# Patient Record
Sex: MALE
[De-identification: names, ages, dates, MRNs, and addresses within clinical notes are randomized per-mention and may not be internally consistent; named-entity substitution may affect disease eponyms.]

---

## 2017-03-31 ENCOUNTER — HOSPITAL ENCOUNTER (INPATIENT)
Dept: HOSPITAL 42 - ED | Age: 62
LOS: 6 days | Discharge: HOME | DRG: 690 | End: 2017-04-06
Attending: INTERNAL MEDICINE | Admitting: INTERNAL MEDICINE
Payer: COMMERCIAL

## 2017-03-31 VITALS — BODY MASS INDEX: 23.6 KG/M2

## 2017-03-31 DIAGNOSIS — L02.221: ICD-10-CM

## 2017-03-31 DIAGNOSIS — B96.20: ICD-10-CM

## 2017-03-31 DIAGNOSIS — K40.90: ICD-10-CM

## 2017-03-31 DIAGNOSIS — K76.9: ICD-10-CM

## 2017-03-31 DIAGNOSIS — K76.0: ICD-10-CM

## 2017-03-31 DIAGNOSIS — B95.7: ICD-10-CM

## 2017-03-31 DIAGNOSIS — K29.70: ICD-10-CM

## 2017-03-31 DIAGNOSIS — B37.89: ICD-10-CM

## 2017-03-31 DIAGNOSIS — Z83.3: ICD-10-CM

## 2017-03-31 DIAGNOSIS — D64.9: ICD-10-CM

## 2017-03-31 DIAGNOSIS — R16.0: ICD-10-CM

## 2017-03-31 DIAGNOSIS — K57.90: ICD-10-CM

## 2017-03-31 DIAGNOSIS — K63.5: ICD-10-CM

## 2017-03-31 DIAGNOSIS — K59.00: ICD-10-CM

## 2017-03-31 DIAGNOSIS — K64.8: ICD-10-CM

## 2017-03-31 DIAGNOSIS — E03.9: ICD-10-CM

## 2017-03-31 DIAGNOSIS — J45.909: ICD-10-CM

## 2017-03-31 DIAGNOSIS — N43.3: ICD-10-CM

## 2017-03-31 DIAGNOSIS — N17.9: ICD-10-CM

## 2017-03-31 DIAGNOSIS — N50.89: ICD-10-CM

## 2017-03-31 DIAGNOSIS — N18.9: ICD-10-CM

## 2017-03-31 DIAGNOSIS — Z82.5: ICD-10-CM

## 2017-03-31 DIAGNOSIS — N99.842: ICD-10-CM

## 2017-03-31 DIAGNOSIS — Z87.440: ICD-10-CM

## 2017-03-31 DIAGNOSIS — T83.89XA: ICD-10-CM

## 2017-03-31 DIAGNOSIS — F41.9: ICD-10-CM

## 2017-03-31 DIAGNOSIS — N13.30: ICD-10-CM

## 2017-03-31 DIAGNOSIS — Z93.2: ICD-10-CM

## 2017-03-31 DIAGNOSIS — Z90.6: ICD-10-CM

## 2017-03-31 DIAGNOSIS — N40.0: ICD-10-CM

## 2017-03-31 DIAGNOSIS — K22.2: ICD-10-CM

## 2017-03-31 DIAGNOSIS — N39.0: Primary | ICD-10-CM

## 2017-03-31 DIAGNOSIS — K21.9: ICD-10-CM

## 2017-03-31 LAB
ADD MANUAL DIFF?: NO
ALBUMIN/GLOB SERPL: 0.9 {RATIO} (ref 1.1–1.8)
ALP SERPL-CCNC: 64 U/L (ref 38–133)
ALT SERPL-CCNC: 9 U/L (ref 7–56)
AMORPH SED URNS QL MICRO: (no result)
APPEARANCE UR: (no result)
AST SERPL-CCNC: 27 U/L (ref 15–59)
BACTERIA #/AREA URNS HPF: (no result) /[HPF]
BASOPHILS # BLD AUTO: 0.02 K/MM3 (ref 0–2)
BASOPHILS NFR BLD: 0.4 % (ref 0–3)
BILIRUB SERPL-MCNC: 0.3 MG/DL (ref 0.2–1.3)
BILIRUB UR-MCNC: NEGATIVE MG/DL
BUN SERPL-MCNC: 16 MG/DL (ref 7–21)
CALCIUM SERPL-MCNC: 9 MG/DL (ref 8.4–10.5)
CHLORIDE SERPL-SCNC: 103 MMOL/L (ref 98–107)
CO2 SERPL-SCNC: 29 MMOL/L (ref 21–33)
COLOR UR: YELLOW
EOSINOPHIL # BLD: 0.3 10*3/UL (ref 0–0.7)
EOSINOPHIL NFR BLD: 5.6 % (ref 1.5–5)
EPI CELLS #/AREA URNS HPF: (no result) /HPF (ref 0–5)
ERYTHROCYTE [DISTWIDTH] IN BLOOD BY AUTOMATED COUNT: 14.8 % (ref 11.5–14.5)
GLOBULIN SER-MCNC: 4.4 GM/DL
GLUCOSE SERPL-MCNC: 128 MG/DL (ref 70–110)
GLUCOSE UR STRIP-MCNC: NEGATIVE MG/DL
GRANULOCYTES # BLD: 2.9 10*3/UL (ref 1.4–6.5)
GRANULOCYTES NFR BLD: 57.9 % (ref 50–68)
HCT VFR BLD CALC: 32.6 % (ref 42–52)
KETONES UR STRIP-MCNC: NEGATIVE MG/DL
LEUKOCYTE ESTERASE UR-ACNC: (no result) LEU/UL
LIPASE SERPL-CCNC: 96 U/L (ref 23–300)
LYMPHOCYTES # BLD: 1.5 10*3/UL (ref 1.2–3.4)
LYMPHOCYTES NFR BLD AUTO: 30.1 % (ref 22–35)
MCH RBC QN AUTO: 24.4 PG (ref 25–35)
MCHC RBC AUTO-ENTMCNC: 31.3 G/DL (ref 31–37)
MCV RBC AUTO: 78 FL (ref 80–105)
MONOCYTES # BLD AUTO: 0.3 10*3/UL (ref 0.1–0.6)
MONOCYTES NFR BLD: 6 % (ref 1–6)
PH UR STRIP: 6 [PH] (ref 4.7–8)
PLATELET # BLD: 259 10^3/UL (ref 120–450)
PMV BLD AUTO: 8.5 FL (ref 7–11)
POTASSIUM SERPL-SCNC: 3.7 MMOL/L (ref 3.6–5)
PROT SERPL-MCNC: 8.3 G/DL (ref 5.8–8.3)
PROT UR STRIP-MCNC: 100 MG/DL
RBC # UR STRIP: (no result) /UL
RBC #/AREA URNS HPF: (no result) /HPF (ref 0–2)
SODIUM SERPL-SCNC: 141 MMOL/L (ref 132–148)
SP GR UR STRIP: 1.02 (ref 1–1.03)
UROBILINOGEN UR STRIP-ACNC: 0.2 E.U./DL
WBC # BLD AUTO: 5 10^3/UL (ref 4.5–11)
WBC #/AREA URNS HPF: (no result) /HPF (ref 0–6)

## 2017-03-31 NOTE — ED PDOC
Arrival/HPI





- General


Historian: Patient





- General


Chief Complaint: Male Genitourinary


Time Seen by Provider: 03/31/17 17:28





- History of Present Illness


Narrative History of Present Illness (Text): 





03/31/17 21:57


61-year-old male presents today with diffuse abdominal pain worsening over the 

past week. He is complaining of urinary frequency and burning sensation around 

the urostomy site. Patient also with a one-month history of left testicular 

swelling. Patient states he has been taking pain medications at home without 

improvement in his symptoms. Denies chest pain or shortness of breath. Denies 

back pain. Denies fevers but is complaining of chills. No other complaints  (

Azoia,Carin T)








Past Medical History





- Provider Review


Nursing Documentation Reviewed: Yes





- Travel History


Have you recently traveled outside [place] w/in the past 3 mons?: No





- Infectious Disease


Hx of Infectious Diseases: None





- Tetanus Immunization


Tetanus Immunization: Unknown





- Cardiac


Hx Pacemaker: No





- Pulmonary


Hx Respiratory Disorders: Yes


Hx Asthma: Yes





- Neurological


Hx Paralysis: No





- HEENT


Hx HEENT Disorder: No





- Renal


Hx Renal Disorder: Yes (Interstitial Cystitis)


Other/Comment: urinary bladder removed, has urostomy





- Endocrine/Metabolic


Hx Endocrine Disorders: Yes


Hx Hypothyroidism: Yes





- Hematological/Oncological


Hx Blood Transfusions: No


Hx Blood Transfusion Reaction: No





- Integumentary


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


Other/Comment: boil on abd above urostomy bright red with foul smelling 

drainage with pain to right hip, groin, r lower abd





- Musculoskeletal/Rheumatological


Hx Musculoskeletal Disorders: No





- Gastrointestinal


Hx Gastrointestinal Disorders: Yes (esophageal stricture,HERNIORHAPPHY 4 X)


Hx Gastroesophageal Reflux: Yes


Hx Liver Failure: Yes (HEPATOMEGALY)


HX Swallowing Problems: Yes


Other/Comment: constipation





- Genitourinary/Gynecological


Hx Genitourinary Disorders: Yes (interstital cystitis,  urostomy 2009)


Hx Hematuria: Yes


Hx Prostate Problems: Yes (LASER SX-DYSURIA, enlarged 2004)


Hx Urinary Tract Infection: Yes





- Psychiatric


Hx Emotional Abuse: No


Hx Physical Abuse: No


Hx Substance Use: No





- Surgical History


Other/Comment: bladder removal, post stoma interstitial hernias multiple hernias

, has right inguinal hernia presently





- Anesthesia


Hx Anesthesia Reactions: Yes (N/V X 1 PROCEDURE)


Hx Malignant Hyperthermia: No





- Suicidal Assessment


Feels Threatened In Home Enviroment: No





Family/Social History





- Physician Review


Nursing Documentation Reviewed: Yes


Family/Social History: Unknown Family HX


Smoking Status: Never Smoked


Hx Alcohol Use: No


Hx Substance Use: No


Hx Substance Use Treatment: No





Allergies/Home Meds


Allergies/Adverse Reactions: 


Allergies





No Known Allergies Allergy (Verified 03/02/17 17:17)


 








Home Medications: 


 Home Meds











 Medication  Instructions  Recorded  Confirmed


 


Levothyroxine [Synthroid] 100 mcg PO DAILY 12/15/16 03/06/17


 


Albuterol HFA [Ventolin HFA 90 1 puff IH PRN PRN 03/03/17 03/06/17





mcg/actuation (8 g)]   


 


traMADol [Ultram] 50 mg PO BID 03/03/17 03/06/17














Review of Systems





- Review of Systems


Constitutional: Other (chills).  absent: Fatigue


Respiratory: absent: SOB, Cough


Cardiovascular: absent: Chest Pain, Palpitations


Gastrointestinal: Abdominal Pain, Nausea.  absent: Constipation, Diarrhea, 

Vomiting


Genitourinary Male: Frequency.  absent: Hematuria


Musculoskeletal: absent: Back Pain, Neck Pain


Skin: absent: Rash, Pruritis


Neurological: absent: Headache





Physical Exam


Vital Signs Reviewed: Yes


Temperature: Afebrile


Blood Pressure: Normal


Pulse: Regular


Respiratory Rate: Normal


Appearance: Positive for: Well-Appearing, Non-Toxic, Comfortable


Pain Distress: None


Mental Status: Positive for: Alert and Oriented X 3





- Systems Exam


Head: Present: Atraumatic


Mouth: Present: Moist Mucous Membranes


Respiratory/Chest: Present: Clear to Auscultation


Cardiovascular: Present: Regular Rate and Rhythm, Normal S1, S2.  No: Murmurs


Abdomen: Present: Tenderness (+ diffuse abdominal tenderness, greatest in the 

right lower quadrant. ), Normal Bowel Sounds, Guarding (voluntary), Ostomy 

Tubes (urostomy; without surrounding erythema or edema).  No: Distention, 

Peritoneal Signs, Rebound


Genitourinary Male: Present: Circumcised Penis, Other (left sided scrotal 

swelling; + minimal tenderness. no erythema; ).  No: Normal External Genitalia


Back: Present: Normal Inspection


Upper Extremity: Present: Normal Inspection, Normal ROM


Lower Extremity: Present: Normal Inspection


Skin: Present: Warm, Dry, Normal Color.  No: Rashes


Psychiatric: Present: Alert, Oriented x 3





Vital Signs











  Temp Pulse Resp BP Pulse Ox


 


 04/01/17 01:13   53 L  16  105/72  99


 


 03/31/17 23:17   54 L  16  137/81  98


 


 03/31/17 22:17   52 L  16  142/82  100


 


 03/31/17 17:58  98.2 F  60  17  137/69  99


 


 03/31/17 17:55  98.2 F  60  17  137/69  99

















Medical Decision Making


ED Course and Treatment: 


03/31/17 22:02


Patient is nontoxic well appearing with stable vital signs presenting with 

worsening abdominal pain and urinary symptoms over the past week





CBC within normal limits


CMP within normal limits


blood cultures


urine cultures. 





Urinalysis + blood, nitrates, moderate leukocytes 20-25 white blood cells








Rocephin given IV for UTI. 





CAT scan :FINDINGS:


LOWER THORAX: No infiltrate seen in the lung bases.


ABDOMEN:


LIVER: Stable appearance of multiple scattered, small low density liver lesions

, which are not


definitely cysts, given their densities. These are indeterminate in nature. A 

least 7 are seen, with the


largest measuring 1.2 cm. Findings could be further evaluated with followup 

liver MRI, as clinically


indicated, on a nonemergent basis.


GALLBLADDER AND BILE DUCTS: No CT evidence of acute cholecystitis. No evidence 

of


significant biliary ductal dilatation.


PANCREAS: No CT evidence of acute pancreatitis.


SPLEEN: No acute abnormality of the spleen identified.


ADRENALS: No acute abnormality of the adrenal glands identified.


KIDNEYS AND URETERS: Ileal conduit is seen in the right anterior pelvis. 

Bilateral


hydroureteronephrosis is seen, overall mild in degree, greatest in the distal 

ureters, with no definite


causative obstructing stones seen or evidence of significant urothelial 

enhancement or abnormal


renal enhancement.


STOMACH AND BOWEL: Ileal conduit seen in the right anterior pelvis which 

appears to drain via a


right anterior pelvic wall ileostomy. Otherwise, no significant abnormality of 

the bowel is identified. No


evidence of small bowel obstruction. No acute abnormality of the colon 

identified.


APPENDIX: Appendix is seen, and is within normal limits in appearance. .


PELVIS:


BLADDER: Evidence of previous cystectomy.


REPRODUCTIVE: Prostate gland is enlarged. The scrotum was included on the exam. 

There is


moderate amount of fluid in the anterior scrotum, suspicious for a hydrocele. 

No evidence of scrotal


gas.


ABDOMEN and PELVIS:


INTRAPERITONEAL SPACE: Again seen in the right anterior pelvis is a masslike 

area measuring 7


x 2.7 cm. This is elongated and irregular in shape, and contains what appears 

to be surgical material


within it, including tiny metallic surgical clips and additional linear 

hyperdense surgical material, as


well as fluid and a small amount of air. This has a soft tissue rim. It is 

suspicious for a chronic right


pelvic fluid collection, associated with retained surgical material. It closely 

abuts the ileal conduit.


Compared to the 11/16/2016 CT, it has mildly decreased in size. There is stable 

appearance of


nearby irregular soft tissue in the right anterior pelvis, which extends into 

the anterior pelvic wall soft


tissues. This could be due to scarring or chronic phlegmonous inflammation. No 

free fluid. No


evidence of free air.


BONES/JOINTS: No acute fractures or other acute bony abnormality noted.


SOFT TISSUES: New masslike density measuring 4 cm seen in the left anterior 

pelvis, with an


irregular shape, located just above the left inguinal canal, most likely 

representing a small


postoperative fluid collection. This does not contain gas. It most likely 

represents a lymphocele,


resolving hematoma, or seroma. Recommend clinical correlation and followup. 

Findings in the left


inguinal and left anterior pelvic soft tissues which are most likely related to 

interval repair of a left


inguinal hernia. Post operative scarring noted in the left inguinal soft 

tissues.


VASCULATURE: No evidence of abdominal aortic aneurysm. No evidence of periaortic


hemorrhage.


LYMPH NODES: No evidence of diffuse pathologic lymphadenopathy.


IMPRESSION:


- New 4 cm masslike density in the left anterior pelvis, most likely 

representing a small


postoperative fluid collection related to prior left inguinal hernia repair. 

This does not contain


gas. It most likely represents a lymphocele, resolving hematoma, or seroma. 

Recommend


clinical correlation and followup.


- 7 x 2 cm complex collection again seen in the right anterior pelvis, 

containing surgical


material, and associated with adjacent scarring or chronic phlegmonous 

inflammation. This


appears stable compared to a 1/31/2017 CT and mildly decreased in size compared 

to an 11/16 2016 CT. It is suspicious for a chronic fluid collection, associated with 

retained surgical


material. It closely abuts the ileal conduit.


- Mild bilateral hydroureteronephrosis, with no causative obstructing stones 

seen, stable


compared to a prior CT, of uncertain clinical significance.


- Moderate hydrocele incidentally noted in the scrotum.


- Otherwise, no evidence of significant acute process.


- Patient is post cystectomy and ileal conduit formation.


- Small indeterminate liver lesions, unchanged compared to a prior study. See 

above.


- See above for remaining findings





Patient reassessment: after toradol pt still c/o pain and is tenderness rlq 

abdomen. morphine ordered











Discussed all results with patient in depth





dr. marroquin patient; to be admitted to hospitalist. 


pt still with continued pain; will admit observational status to med/surg with 

surgical consult for abdominal pain, uti, abnormal ct. 


case discussed with michel owens; accepts admission





Impression: Abdominal pain, UTI, b/l hydronephrosis


admit observational status to med/surg. 


 (Carin Barnett)








- Lab Interpretations


Microbiology Results: 





Microbiology Results





03/31/17 21:00   Blood   S.aureus & Coag-Neg Staph PNA FISH - Final


03/31/17 21:00   Blood   Blood Culture - Final


                            Coagulase Neg Staphylococcus


03/31/17 21:00   Blood   Gram Stain - Final


03/31/17 20:45   Blood   Blood Culture - Final


                            Coagulase Neg Staphylococcus


03/31/17 20:45   Blood   Gram Stain - Final


03/31/17 18:35   Urine   Urine Culture - Final


                            ,000 CFU/ML.


                            MULTIPLE SPECIES. SUGGEST REPEAT SPECIMEM.











Lab Results: 











 04/03/17 06:50 





 04/03/17 06:50 





 Lab Results





04/03/17 06:50: WBC 4.8, RBC 4.05, Hgb 9.8 L, Hct 31.4 L, MCV 77.5 L, MCH 24.2 L

, MCHC 31.2, RDW 14.5, Plt Count 220, MPV 8.4, Gran % 60.4, Lymph % (Auto) 24.5

, Mono % (Auto) 6.4 H, Eos % (Auto) 8.3 H, Baso % (Auto) 0.4, Gran # 2.90, 

Lymph # 1.2, Mono # 0.3, Eos # 0.4, Baso # 0.02, Sodium 142, Potassium 3.9, 

Chloride 102, Carbon Dioxide 29, Anion Gap 15, BUN 15, Creatinine 1.3, Est GFR (

 Amer) > 60, Est GFR (Non-Af Amer) 56, Random Glucose 99, Calcium 8.6, 

Total Bilirubin 0.8, AST 20, ALT 10, Alkaline Phosphatase 53, Total Protein 7.4

, Albumin 3.4, Globulin 4.0, Albumin/Globulin Ratio 0.9 L


04/02/17 08:06: WBC 5.3  D, RBC 4.12, Hgb 9.8 L, Hct 32.3 L, MCV 78.4 L, MCH 

23.8 L, MCHC 30.3 L, RDW 14.9 H, Plt Count 239, MPV 8.8, Gran % 64.3, Lymph % (

Auto) 22.5, Mono % (Auto) 6.3 H, Eos % (Auto) 6.3 H, Baso % (Auto) 0.6, Gran # 

3.38, Lymph # 1.2, Mono # 0.3, Eos # 0.3, Baso # 0.03, Sodium 140, Potassium 

3.4 L, Chloride 101, Carbon Dioxide 28, Anion Gap 14, BUN 13, Creatinine 1.4, 

Est GFR ( Amer) > 60, Est GFR (Non-Af Amer) 52, Random Glucose 100, 

Calcium 8.6, Total Bilirubin 0.5, AST 24, ALT 12, Alkaline Phosphatase 56, 

Total Protein 7.5, Albumin 3.5, Globulin 4.0, Albumin/Globulin Ratio 0.9 L


04/01/17 07:00: Sodium 141, Potassium 3.6, Chloride 105, Carbon Dioxide 28, 

Anion Gap 12, BUN 14, Creatinine 1.2, Est GFR (African Amer) > 60, Est GFR (Non-

Af Amer) > 60, Random Glucose 93, Calcium 8.5, Total Bilirubin 0.4, AST 22, ALT 

13, Alkaline Phosphatase 57, Total Protein 7.5, Albumin 3.6, Globulin 3.9, 

Albumin/Globulin Ratio 0.9 L, Free PSA 0.8, % Free PSA 16 L, Total PSA 4.9 H, 

Prostate Cancer Risk 20, Free T4 1.36, Thyroxine (T4) 7.5, TSH 3rd Generation 

1.25


04/01/17 05:00: WBC 6.9  D, RBC 4.19, Hgb 10.1 L, Hct 32.7 L, MCV 78.0 L, MCH 

24.1 L, MCHC 30.9 L, RDW 14.7 H, Plt Count 243, MPV 8.6, Gran % 73.8 H, Lymph % 

(Auto) 16.9 L, Mono % (Auto) 4.7, Eos % (Auto) 4.5, Baso % (Auto) 0.1, Gran # 

5.06, Lymph # 1.2, Mono # 0.3, Eos # 0.3, Baso # 0.01


03/31/17 19:01: WBC 5.0  D, RBC 4.18, Hgb 10.2 L, Hct 32.6 L, MCV 78.0 L, MCH 

24.4 L, MCHC 31.3, RDW 14.8 H, Plt Count 259, MPV 8.5, Gran % 57.9, Lymph % (

Auto) 30.1, Mono % (Auto) 6.0, Eos % (Auto) 5.6 H, Baso % (Auto) 0.4, Gran # 

2.90, Lymph # 1.5, Mono # 0.3, Eos # 0.3, Baso # 0.02, Sodium 141, Potassium 3.7

, Chloride 103, Carbon Dioxide 29, Anion Gap 13, BUN 16, Creatinine 1.1, Est 

GFR ( Amer) > 60, Est GFR (Non-Af Amer) > 60, Random Glucose 128 H, 

Calcium 9.0, Total Bilirubin 0.3, AST 27, ALT 9, Alkaline Phosphatase 64, Total 

Protein 8.3, Albumin 3.9, Globulin 4.4, Albumin/Globulin Ratio 0.9 L, Lipase 96


03/31/17 18:35: Urine Color Yellow, Urine Appearance Cloudy, Urine pH 6.0, Ur 

Specific Gravity 1.025, Urine Protein 100 H, Urine Glucose (UA) Negative, Urine 

Ketones Negative, Urine Blood Large H, Urine Nitrate Positive H, Urine 

Bilirubin Negative, Urine Urobilinogen 0.2, Ur Leukocyte Esterase Moderate H, 

Urine RBC Tntc, Urine WBC 20 - 25, Ur Epithelial Cells Many, Amorphous Sediment 

Large, Urine Bacteria Small














- RAD Interpretation


Radiology Orders: 











03/31/17 18:30


ABD & PELVIS IV CONTRAST ONLY [CT] Stat 

















- Medication Orders


Current Medication Orders: 











Albuterol Sulfate (Albuterol 0.083% Inhal Sol (2.5 Mg/3 Ml) Ud)  2.5 mg IH 

[id number] PRN


   PRN Reason: Shortness of Breath


Alprazolam (Xanax)  0.25 mg PO Q6 PRN; Protocol


   PRN Reason: Anxiety


   Stop: 04/09/17 00:01


   Last Admin: 04/05/17 14:43  Dose: 0.25 MG





Behavioural


 Document     04/05/17 14:43  AS  (Rec: 04/05/17 14:43  AS  [id number])


     Maintenance


      Maintenance Dose                           Yes


Re-Assess: Reassess Psych Meds


 Document     04/05/17 15:43  AS  (Rec: 04/05/17 16:47  AS  [id number])


      Reassess Psych Med                         Effective





Enoxaparin Sodium (Lovenox)  40 mg SC DAILY MARIA D


   PRN Reason: Protocol


   Last Admin: 04/05/17 11:02  Dose: 40 MG





Protocol for PTT Monitoring


 Document     04/05/17 11:02  AS  (Rec: 04/05/17 11:04  AS  [id number])


     Protocol


      Protocol for PTT Monitoring                Following clinical pathway


                                                 protocol (regime/therapy)


Subcutaneous Administrations


 Document     04/05/17 11:02  AS  (Rec: 04/05/17 11:04  AS  [id number])


     Injection Site


      MAR Injection Site                         Left Abdomen


     Charges for Administration


      # of Subcutaneous Administrations          1





Meropenem 1 gm/ Sodium (Chloride)  100 mls @ 100 mls/hr IVPB Q8 MARIA D


   PRN Reason: Protocol


   Stop: 04/08/17 10:01


   Last Admin: 04/05/17 14:30  Dose: 100 MLS/HR





eMAR Start Stop


 Document     04/05/17 14:30  AS  (Rec: 04/05/17 14:30  AS  [id number])


     Intravenous Solution


      Start Date                                 04/05/17


      Start Time                                 14:30


      End Date                                   04/05/17


      End time                                   15:30


      Total Infusion Time                        60





Vancomycin HCl (Vancomycin 1gm)  250 mls @ 167 mls/hr IVPB Q12H MARIA D


   PRN Reason: Protocol


   Last Admin: 04/05/17 11:07  Dose: 167 MLS/HR





eMAR Start Stop


 Document     04/05/17 11:07  AS  (Rec: 04/05/17 11:07  AS  [id number])


     Intravenous Solution


      Start Date                                 04/05/17


      Start Time                                 11:07


      End Date                                   04/05/17


      End time                                   12:37


      Total Infusion Time                        90





Levothyroxine Sodium (Synthroid)  100 mcg PO DAILY MARIA D


   Last Admin: 04/05/17 11:02  Dose: 100 MCG





Morphine Sulfate (Morphine)  4 mg IVP Q3H PRN


   PRN Reason: Pain, moderate (4-7)


   Last Admin: 04/05/17 17:08  Dose: 4 MG





MAR Pain Assessment


 Document     04/05/17 17:08  AS  (Rec: 04/05/17 17:08  AS  [id number])


     Pain Reassessment


      Is this a pain reassessment?               No


     Presence of Pain


      Presence of Pain                           Yes


IVP Administration


 Document     04/05/17 17:08  AS  (Rec: 04/05/17 17:08  AS  [id number])


     Charges for Administration


      # of IVP Administrations                   1





Nystatin (Nystop Topical Powder)  0 gm TOP TID [facility], [facility]


   Last Admin: 04/05/17 14:35  Dose: 1 APPLIC





Ondansetron HCl (Zofran Odt)  4 mg PO Q6 PRN


   PRN Reason: Nausea/Vomiting


   Last Admin: 04/04/17 21:20  Dose: 4 MG





Pantoprazole Sodium (Protonix Ec Tab)  40 mg PO ACB [facility], [facility]


   Last Admin: 04/05/17 08:26  Dose: 40 MG





Polyethylene Glycol (Miralax)  17 gm PO DAILY [facility]


   Last Admin: 04/05/17 11:02  Dose: 17 GM





Discontinued Medications





Albuterol Sulfate (Albuterol 0.083% Inhal Sol (2.5 Mg/3 Ml) Ud)  2.5 mg IH 

[id number] PRN


   PRN Reason: Shortness of Breath


Diphenhydramine HCl (Benadryl)  25 mg PO ONCE ONE


   Stop: 04/04/17 23:31


   Last Admin: 04/04/17 23:43  Dose: 25 MG





Hydromorphone HCl (Dilaudid)  0.5 mg IVP Q3H PRN


   PRN Reason: Pain, severe (8-10)


   Last Admin: 04/04/17 04:19  Dose: 0.5 MG





MAR Pain Assessment


 Document     04/04/17 04:19  MJ  (Rec: 04/04/17 04:21  MJ  [id number])


     Pain Reassessment


      Is this a pain reassessment?               No


     Sleep


      Is patient sleeping during reassessment?   No


     Presence of Pain


      Presence of Pain                           Yes


     Pain Scale Used


      Pain Scale Used                            Numeric


     Location


      Pain Location Body Site                    Abdomen


     Description


      Description                                Constant


      Intensity of Pain at present               8


      Alleviating Factors/Management             Medication


       Techniques                                


      Alleviating Factors                        Medication


IVP Administration


 Document     04/04/17 04:19  MJ  (Rec: 04/04/17 04:21  MJ  [id number])


     Charges for Administration


      # of IVP Administrations                   1


Re-Assess: MAR Pain Assessment


 Document     04/04/17 05:19  MJ  (Rec: 04/04/17 07:56  MJ  [id number])


     Pain Reassessment


      Is this a pain reassessment?               Yes


     Sleep


      Is patient sleeping during reassessment?   Yes





Sodium Chloride (Sodium Chloride 0.9%)  1,000 mls @ 999 mls/hr IV .Q1H1M STA


   Stop: 03/31/17 19:29


   Last Admin: 03/31/17 19:00  Dose: 999 MLS/HR





eMAR Start Stop


 Document     03/31/17 19:00  HI  (Rec: 03/31/17 19:55  HI  [id number])


     Intravenous Solution


      Start Date                                 03/31/17


      Start Time                                 19:00





Ceftriaxone Sodium (Rocephin 1 Gram Ivpb)  100 mls @ 200 mls/hr IVPB STAT STA


   PRN Reason: Protocol


   Stop: 03/31/17 19:33


   Last Admin: 03/31/17 20:03  Dose: 200 MLS/HR





eMAR Start Stop


 Document     03/31/17 20:03  HI  (Rec: 03/31/17 20:03  HI  [id number])


     Intravenous Solution


      Start Date                                 03/31/17


      Start Time                                 20:03





Ceftriaxone Sodium (Rocephin 1 Gram Ivpb)  100 mls @ 100 mls/hr IVPB DAILY MARIA D


   PRN Reason: Protocol


   Last Admin: 04/02/17 18:04  Dose: 100 MLS/HR





eMAR Start Stop


 Document     04/02/17 18:04  RV  (Rec: 04/02/17 18:05  RV  [id number])


     Intravenous Solution


      Start Date                                 04/02/17


      Start Time                                 09:30


      End Date                                   04/02/17


      End time                                   10:30


      Total Infusion Time                        60





Iohexol (Omnipaque 350 100 Ml) Confirm Administered Dose 350 mg .ROUTE .STK-MED 

ONE


   Stop: 03/31/17 18:39


Ketorolac Tromethamine (Toradol)  30 mg IVP STAT STA


   Stop: 03/31/17 19:44


   Last Admin: 03/31/17 20:03  Dose: 30 MG





IVP Administration


 Document     03/31/17 20:03  HI  (Rec: 03/31/17 20:03  HI  [id number])


     Charges for Administration


      # of IVP Administrations                   1





Magnesium Hydroxide (Milk Of Magnesia)  30 ml PO ONCE ONE


   Stop: 04/01/17 15:02


   Last Admin: 04/01/17 15:30  Dose: 30 ML





Morphine Sulfate (Morphine)  4 mg IVP STAT STA


   Stop: 03/31/17 21:59


   Last Admin: 03/31/17 22:08  Dose: 4 MG





MAR Pain Assessment


 Document     03/31/17 22:08  HI  (Rec: 03/31/17 22:12  HI  [id number])


     Pain Reassessment


      Is this a pain reassessment?               No


     Presence of Pain


      Presence of Pain                           Yes


     Pain Scale Used


      Pain Scale Used                            Numeric


     Description


      Intensity of Pain at present               7


IVP Administration


 Document     03/31/17 22:08  HI  (Rec: 03/31/17 22:12  HI  [id number])


     Charges for Administration


      # of IVP Administrations                   1





Morphine Sulfate (Morphine)  1 mg IVP Q3 PRN


   PRN Reason: Pain, moderate (4-7)


   Last Admin: 04/01/17 17:28  Dose: 1 MG





MAR Pain Assessment


 Document     04/01/17 17:28  BIR  (Rec: 04/01/17 17:28  BIR  [id number])


     Pain Reassessment


      Is this a pain reassessment?               No


     Sleep


      Is patient sleeping during reassessment?   No


     Presence of Pain


      Presence of Pain                           Yes


IVP Administration


 Document     04/01/17 17:28  BIR  (Rec: 04/01/17 17:28  BIR  [id number])


     Charges for Administration


      # of IVP Administrations                   1





Morphine Sulfate (Morphine)  3 mg IM Q3H PRN


   PRN Reason: Pain, severe (8-10)


   Last Admin: 04/02/17 14:23  Dose: 3 MG





MAR Pain Assessment


 Document     04/02/17 14:23  RV  (Rec: 04/02/17 14:23  RV  [id number])


     Pain Reassessment


      Is this a pain reassessment?               No


     Sleep


      Is patient sleeping during reassessment?   No


     Presence of Pain


      Presence of Pain                           Yes


     Location


      Left, Right or Bilateral                   Bilateral


      Upper or Lower                             Lower


      Pain Location Body Site                    Abdomen


     Description


      Description                                Constant


      Intensity of Pain at present               9


      Pain Behavior                              Irritability


                                                 Facial Grimacing


      Aggravating Factors                        ADL's


                                                 Changing Position


                                                 Standing


                                                 Sitting


      Alleviating Factors/Management             Medication


       Techniques                                


      Alleviating Factors                        Medication


IM Administration Charges


 Document     04/02/17 14:23  RV  (Rec: 04/02/17 14:23  RV  [id number])


     Injection Site


      MAR Injection Site                         Left Arm


     Charges for Administration


      # of IM Administrations                    1


Re-Assess: MAR Pain Assessment


 Document     04/02/17 15:23  RV  (Rec: 04/02/17 17:29  RV  [id number])


     Pain Reassessment


      Is this a pain reassessment?               Yes


     Sleep


      Is patient sleeping during reassessment?   Yes





Morphine Sulfate (Morphine)  4 mg IVP Q3H PRN


   PRN Reason: Pain, severe (8-10)


   Last Admin: 04/03/17 17:05  Dose: 4 MG





MAR Pain Assessment


 Document     04/03/17 17:05  FARIDA  (Rec: 04/03/17 17:05  FARIDA  [id number])


     Pain Reassessment


      Is this a pain reassessment?               Yes


     Sleep


      Is patient sleeping during reassessment?   No


     Presence of Pain


      Presence of Pain                           Yes


     Pain Scale Used


      Pain Scale Used                            Numeric


     Location


      Pain Location Body Site                    Abdomen


     Description


      Description                                Sharp


      Intensity of Pain at present               8


      Pain Behavior                              Moaning


                                                 Guarding


                                                 Irritability


      Aggravating Factors                        ADL's


      Alleviating Factors/Management             Medication


       Techniques                                


      Alleviating Factors                        Medication


IVP Administration


 Document     04/03/17 17:05  FARIDA  (Rec: 04/03/17 17:05  FARIDA  [id number])


     Charges for Administration


      # of IVP Administrations                   1


Re-Assess: MAR Pain Assessment


 Document     04/03/17 18:05  FARIDA  (Rec: 04/03/17 19:42    [id number])


     Pain Reassessment


      Is this a pain reassessment?               Yes


     Sleep


      Is patient sleeping during reassessment?   Yes





Potassium Chloride (K-Dur 20 Meq Er Tab)  40 meq PO STAT STA


   Stop: 04/02/17 09:29


   Last Admin: 04/02/17 09:57  Dose: 40 MEQ














ED OBSERVATION


Date of observation admission: 03/31/17


Time of observation admission: 18:30





- Observation admission statement


Patient is being placed in observation because:: 


abdominal pain


 (Carin Barnett)








- Goals of Observation


Goals of observation are:: 


improvement in symptoms (Carin Barnett)








- Progress Note


Progress Note: 


03/31/17 20:15


pt resting in er. c/o pain  





03/31/17 22:34


morphine given for pain; still with pain. 





03/31/17 23:34


case discussed with dr. jarek owens; accepts observational status admission for UTI, 

intractable abdominal pain.  (Carin Barnett)








Disposition/Present on Arrival





- Present on Arrival


Any Indicators Present on Arrival: Yes


History of DVT/PE: No


History of Uncontrolled Diabetes: No


Urinary Catheter: Yes (urostomy)


History of Decub. Ulcer: No


History Surgical Site Infection Following: None





- Disposition


Have Diagnosis and Disposition been Completed?: Yes


Disposition Time: 22:05


Patient Plan: Observation





- Disposition


Diagnosis: 


 Urinary tract infection, Abdominal pain, Hydronephrosis


Disposition: HOSPITALIZED


Patient Problems: 


 Current Active Problems











Problem Status Diagnosed


 


Abdominal pain Acute 


 


Hydronephrosis Acute 


 


Urinary tract infection Acute 











Condition: FAIR

## 2017-03-31 NOTE — CT
EXAM:

  CT Abdomen and Pelvis With Intravenous Contrast



CLINICAL HISTORY:

  61 years old, male; Pain; Abdominal pain; Acute; Prior surgery; Surgery date: 

6+ months; Surgery type: Bladder removed for cysittis; Additional info: Abd pain



TECHNIQUE:

  Axial computed tomography images of the abdomen and pelvis with intravenous 

contrast.  This CT exam was performed using one or more of the following dose 

reduction techniques:  automated exposure control, adjustment of the mA and/or 

kV according to patient size, and/or use of iterative reconstruction technique.

  Coronal and sagittal reformatted images were created and reviewed.



CONTRAST:

  100 mL of OMNI administered intravenously.



EXAM DATE/TIME:

  3/31/2017 6:30 PM



COMPARISON:

  Prior CT scans of 1/31/2017 and 11/16/2016.



FINDINGS:

  LOWER THORAX:  No infiltrate seen in the lung bases.



 ABDOMEN:

  LIVER:  Stable appearance of multiple scattered, small low density liver 

lesions, which are not definitely cysts, given their densities.  These are 

indeterminate in nature.  A least 7 are seen, with the largest measuring 1.2 

cm.  Findings could be further evaluated with followup liver MRI, as clinically 

indicated, on a nonemergent basis.

  GALLBLADDER AND BILE DUCTS:  No CT evidence of acute cholecystitis.  No 

evidence of significant biliary ductal dilatation.

  PANCREAS:  No CT evidence of acute pancreatitis.

  SPLEEN:  No acute abnormality of the spleen identified.

  ADRENALS:  No acute abnormality of the adrenal glands identified.

  KIDNEYS AND URETERS:  Ileal conduit is seen in the right anterior pelvis.  

Bilateral hydroureteronephrosis is seen, overall mild in degree, greatest in 

the distal ureters, with no definite causative obstructing stones seen or 

evidence of significant urothelial enhancement or abnormal renal enhancement.

  STOMACH AND BOWEL:  Ileal conduit seen in the right anterior pelvis which 

appears to drain via a right anterior pelvic wall ileostomy. Otherwise, no 

significant abnormality of the bowel is identified.  No evidence of small bowel 

obstruction.  No acute abnormality of the colon identified.

  APPENDIX: Appendix is seen, and is within normal limits in appearance. .



 PELVIS:

  BLADDER:  Evidence of previous cystectomy.

  REPRODUCTIVE:  Prostate gland is enlarged.  The scrotum was included on the 

exam.  There is moderate amount of fluid in the anterior scrotum, suspicious 

for a hydrocele.  No evidence of scrotal gas.



 ABDOMEN and PELVIS:

  INTRAPERITONEAL SPACE:  Again seen in the right anterior pelvis is a masslike 

area measuring 7 x 2.7 cm.  This is elongated and irregular in shape, and 

contains what appears to be surgical material within it, including tiny 

metallic surgical clips and additional linear hyperdense surgical material, as 

well as fluid and a small amount of air.  This has a soft tissue rim.  It is 

suspicious for a chronic right pelvic fluid collection, associated with 

retained surgical material.  It closely abuts the ileal conduit.  Compared to 

the 11/16/2016 CT, it has mildly decreased in size.  There is stable appearance 

of nearby irregular soft tissue in the right anterior pelvis, which extends 

into the anterior pelvic wall soft tissues.  This could be due to scarring or 

chronic phlegmonous inflammation.  No free fluid.  No evidence of free air.

  BONES/JOINTS:  No acute fractures or other acute bony abnormality noted.

  SOFT TISSUES:  New masslike density measuring 4 cm seen in the left anterior 

pelvis, with an irregular shape, located just above the left inguinal canal, 

most likely representing a small postoperative fluid collection.  This does not 

contain gas.  It most likely represents a lymphocele, resolving hematoma, or 

seroma. Recommend clinical correlation and followup.  Findings in the left 

inguinal and left anterior pelvic soft tissues which are most likely related to 

interval repair of a left inguinal hernia.  Post operative scarring noted in 

the left inguinal soft tissues.

  VASCULATURE:  No evidence of abdominal aortic aneurysm. No evidence of 

periaortic hemorrhage.

  LYMPH NODES:   No evidence of diffuse pathologic lymphadenopathy. 



IMPRESSION:     

- New 4 cm masslike density in the left anterior pelvis, most likely 

representing a small postoperative fluid collection related to prior left 

inguinal hernia repair.  This does not contain gas.  It most likely represents 

a lymphocele, resolving hematoma, or seroma. Recommend clinical correlation and 

followup.  

- 7 x 2 cm complex collection again seen in the right anterior pelvis, 

containing surgical material, and associated with adjacent scarring or chronic 

phlegmonous inflammation.  This appears stable compared to a 1/31/2017 CT and 

mildly decreased in size compared to an 11/16 2016 CT.  It is suspicious for a 

chronic fluid collection, associated with retained surgical material.  It 

closely abuts the ileal conduit.

- Mild bilateral hydroureteronephrosis, with no causative obstructing stones 

seen, stable compared to a prior CT, of uncertain clinical significance.

- Moderate hydrocele incidentally noted in the scrotum.

- Otherwise, no evidence of significant acute process.

- Patient is post cystectomy and ileal conduit formation.

- Small indeterminate liver lesions, unchanged compared to a prior study.  See 

above.

- See above for remaining findings.

## 2017-04-01 LAB
ADD MANUAL DIFF?: NO
ALBUMIN/GLOB SERPL: 0.9 {RATIO} (ref 1.1–1.8)
ALP SERPL-CCNC: 57 U/L (ref 38–133)
ALT SERPL-CCNC: 13 U/L (ref 7–56)
AST SERPL-CCNC: 22 U/L (ref 15–59)
BASOPHILS # BLD AUTO: 0.01 K/MM3 (ref 0–2)
BASOPHILS NFR BLD: 0.1 % (ref 0–3)
BILIRUB SERPL-MCNC: 0.4 MG/DL (ref 0.2–1.3)
BUN SERPL-MCNC: 14 MG/DL (ref 7–21)
CALCIUM SERPL-MCNC: 8.5 MG/DL (ref 8.4–10.5)
CHLORIDE SERPL-SCNC: 105 MMOL/L (ref 95–110)
CO2 SERPL-SCNC: 28 MMOL/L (ref 21–33)
EOSINOPHIL # BLD: 0.3 10*3/UL (ref 0–0.7)
EOSINOPHIL NFR BLD: 4.5 % (ref 1.5–5)
ERYTHROCYTE [DISTWIDTH] IN BLOOD BY AUTOMATED COUNT: 14.7 % (ref 11.5–14.5)
GLOBULIN SER-MCNC: 3.9 GM/DL
GLUCOSE SERPL-MCNC: 93 MG/DL (ref 70–110)
GRANULOCYTES # BLD: 5.06 10*3/UL (ref 1.4–6.5)
GRANULOCYTES NFR BLD: 73.8 % (ref 50–68)
HCT VFR BLD CALC: 32.7 % (ref 42–52)
LYMPHOCYTES # BLD: 1.2 10*3/UL (ref 1.2–3.4)
LYMPHOCYTES NFR BLD AUTO: 16.9 % (ref 22–35)
MCH RBC QN AUTO: 24.1 PG (ref 25–35)
MCHC RBC AUTO-ENTMCNC: 30.9 G/DL (ref 31–37)
MCV RBC AUTO: 78 FL (ref 80–105)
MONOCYTES # BLD AUTO: 0.3 10*3/UL (ref 0.1–0.6)
MONOCYTES NFR BLD: 4.7 % (ref 1–6)
PLATELET # BLD: 243 10^3/UL (ref 120–450)
PMV BLD AUTO: 8.6 FL (ref 7–11)
POTASSIUM SERPL-SCNC: 3.6 MMOL/L (ref 3.6–5)
PROT SERPL-MCNC: 7.5 G/DL (ref 5.8–8.3)
SODIUM SERPL-SCNC: 141 MMOL/L (ref 132–148)
T4 FREE SERPL-MCNC: 1.36 NG/DL (ref 0.78–2.19)
T4 SERPL-MCNC: 7.5 UG/DL (ref 5.5–11)
TSH SERPL-ACNC: 1.25 MIU/ML (ref 0.46–4.68)
WBC # BLD AUTO: 6.9 10^3/UL (ref 4.5–11)

## 2017-04-01 RX ADMIN — MORPHINE SULFATE PRN MG: 2 INJECTION, SOLUTION INTRAMUSCULAR; INTRAVENOUS at 02:32

## 2017-04-01 RX ADMIN — NYSTATIN SCH APPLIC: 100000 POWDER TOPICAL at 17:29

## 2017-04-01 RX ADMIN — NYSTATIN SCH APPLIC: 100000 POWDER TOPICAL at 14:30

## 2017-04-01 RX ADMIN — VANCOMYCIN HYDROCHLORIDE SCH MLS/HR: 1 INJECTION, POWDER, LYOPHILIZED, FOR SOLUTION INTRAVENOUS at 22:00

## 2017-04-01 RX ADMIN — MORPHINE SULFATE PRN MG: 4 INJECTION, SOLUTION INTRAMUSCULAR; INTRAVENOUS at 21:00

## 2017-04-01 RX ADMIN — ENOXAPARIN SODIUM SCH MG: 40 INJECTION SUBCUTANEOUS at 09:09

## 2017-04-01 RX ADMIN — CEFTRIAXONE SCH MLS/HR: 1 INJECTION, SOLUTION INTRAVENOUS at 09:10

## 2017-04-01 RX ADMIN — NYSTATIN SCH APPLIC: 100000 POWDER TOPICAL at 09:09

## 2017-04-01 RX ADMIN — VANCOMYCIN HYDROCHLORIDE SCH MLS/HR: 1 INJECTION, POWDER, LYOPHILIZED, FOR SOLUTION INTRAVENOUS at 10:30

## 2017-04-01 RX ADMIN — MORPHINE SULFATE PRN MG: 2 INJECTION, SOLUTION INTRAMUSCULAR; INTRAVENOUS at 07:56

## 2017-04-01 RX ADMIN — MORPHINE SULFATE PRN MG: 2 INJECTION, SOLUTION INTRAMUSCULAR; INTRAVENOUS at 17:28

## 2017-04-01 RX ADMIN — PANTOPRAZOLE SODIUM SCH MG: 40 TABLET, DELAYED RELEASE ORAL at 09:10

## 2017-04-01 NOTE — CP.PCM.HP
History of Present Illness





- History of Present Illness


History of Present Illness: 


PGY-1 for Dr. DEANNA Dave





Admission: Intractable suprapubic pain, Complex UTI








61-year-old male, with Interstitial hemorrhagic cyctitis s/p ileal conduit and 

cystectomy, presents today with worsening of suprapubic pain that started 1 

week ago. The suprapubic pain wrapped around the flank area b/l. Pt noticed 

that his urine drained from urostomy turned cloudy x 1 week with foul smell. 

Today, he felt strong sever cramps from mid-suprapubic area radiating to the 

shaft of penis. (+) Nausea, chills. 





Patient also with a one-month history of left testicular swelling since L 

inguinal hernia repair 3/6/17. Patient states he has been taking pain 

medications at home without improvement in his symptoms. 





At ED arrival. VS stable. Hb at 10, which is chronic. WBC 5. CMP normal.


Urinalysis shows (+) blood, nitrates, moderate leukocytes 20-25 white blood 

cells


CT of abdomen and pelvis shows:


   (1) Multiple small low density liver lesions, which are not definitely cysts

, given their densities. largest 1.2 cm. liver MRI


   (2) Bilateral hydroureteronephrosis, mild. No obstructing stones


   (3) Ileal conduit in the right anterior pelvis drain via a right anterior 

pelvic wall ileostomy. 


   (4) Prostate gland is enlarged. Moderate amount of fluid in the anterior 

scrotum, suspicious for a hydrocele. 


   (5) chronic right pelvic fluid collection, associated with retained surgical 

material. 


   (6) New masslike density measuring 4 cm seen in the left anterior pelvis, 

with an irregular shape, located just above the left inguinal canal, most 

likely representing a small postoperative fluid collection. No gas. It most 

likely represents a lymphocele, resolving hematoma, or seroma. 





Pt received morphine and toradol. still have pain. Receive 1L NS bolus and 

ceftiazone. Blood and urine culture sent.





ROS: + chills. + Nausea + suprapubic pain


   Denies chest pain or shortness of breath. Denies back pain. 


   10 bowel movement, formed stool, chronic





PMH


   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 

[facility]


   H[place] at [place] [facility] [place]


   GERD


   Asthma, never intubated or hospitalized for it


   Hypothyroidism


   Hx UTI, multiple


   Anemia, Colonospcopy showed gastritis, diverticulosos, polyps


   vertiglio arms and hands


   Enlarged prostate, s/p LASER SX-DYSURIA, enlarged 2004


    bladder removal, post stoma interstitial hernias multiple hernias


   Fatty liver, s/p liver biopsy





PSH


   esophageal stricture,HERNIORHAPPHY 4 X


   Incarcerated L inguinal hernia repair, 3/6/2017; R inguinal repair 30 years 

ago


   cystectomy, transuretero-uretostomy


   Incisional hernia repairs w/ mesh


   right inguinal hernia repair


   I&D abdominal abscess


   deviated nasal septum repair


   interSTIM placement and removal for sacral nerve


   bladder biopsy


   Upper Endoscopy with esophageal dilation





FH   


   Diabetes





SH   


   denies tobacco/EtOH/drugs








All


   NKDA





Med


   Omeprazole 20 daily


   Synthroid 100 mcg daily


   Albuterol PRN





PMD: Dr. Stein


Urologist: Dr. Ashley


Surgeon: Dr. Crawford


GI: Dr. Pham





Present on Admission





- Present on Admission


Any Indicators Present on Admission: Yes


Urinary Catheter: Yes (urostomy)





Past Patient History





- Infectious Disease


Hx of Infectious Diseases: None





- Tetanus Immunizations


Tetanus Immunization: Unknown





- Past Medical History & Family History


Past Medical History?: Yes





- Past Social History


Smoking Status: Never Smoked





- CARDIAC


Hx Pacemaker: No





- PULMONARY


Hx Respiratory Disorders: Yes


Hx Asthma: Yes





- NEUROLOGICAL


Hx Paralysis: No





- HEENT


Hx HEENT Problems: No





- RENAL


Hx Chronic Kidney Disease: Yes (Interstitial Cystitis)


Other/Comment: urinary bladder removed, has urostomy





- ENDOCRINE/METABOLIC


Hx Endocrine Disorders: Yes


Hx Hypothyroidism: Yes





- HEMATOLOGICAL/ONCOLOGICAL


Hx Blood Transfusions: No


Hx Blood Transfusion Reaction: No





- INTEGUMENTARY


Hx Dermatological Problems: Yes (vertiglio arms and hands)


Other/Comment: boil on abd above urostomy bright red with foul smelling 

drainage with pain to right hip, groin, r lower abd





- MUSCULOSKELETAL/RHEUMATOLOGICAL


Hx Musculoskeletal Disorders: No





- GASTROINTESTINAL


Hx Gastrointestinal Disorders: Yes (esophageal stricture,HERNIORHAPPHY 4 X)


Hx Gastroesophageal Reflux: Yes


Hx Liver Failure: Yes (HEPATOMEGALY)


HX Swallowing Problems: Yes


Other/Comment: constipation





- GENITOURINARY/GYNECOLOGICAL


Hx Genitourinary Disorders: Yes (interstital cystitis,  urostomy 2009)


Hx Hematuria: Yes


Hx Prostate Problems: Yes (LASER SX-DYSURIA, enlarged 2004)


Hx Urinary Tract Infection: Yes





- PSYCHIATRIC


Hx Emotional Abuse: No


Hx Physical Abuse: No


Hx Substance Use: No





- SURGICAL HISTORY


Other/Comment: bladder removal, post stoma interstitial hernias multiple hernias

, has right inguinal hernia presently





- ANESTHESIA


Hx Anesthesia Reactions: Yes (N/V X 1 PROCEDURE)


Hx Malignant Hyperthermia: No





Meds


Allergies/Adverse Reactions: 


 Allergies











Allergy/AdvReac Type Severity Reaction Status Date / Time


 


No Known Allergies Allergy   Verified 03/02/17 17:17














Physical Exam





- Constitutional


Appears: No Acute Distress, Chronically Ill


Additional comments: 


pale





- Head Exam


Head Exam: ATRAUMATIC, NORMOCEPHALIC





- Eye Exam


Eye Exam: EOMI, Normal appearance.  absent: Scleral icterus


Pupil Exam: NORMAL ACCOMODATION





- ENT Exam


ENT Exam: Mucous Membranes Moist





- Neck Exam


Neck exam: Positive for: Normal Inspection.  Negative for: Meningismus





- Respiratory Exam


Respiratory Exam: Clear to Auscultation Bilateral, NORMAL BREATHING PATTERN.  

absent: Rales, Rhonchi, Wheezes





- Cardiovascular Exam


Cardiovascular Exam: REGULAR RHYTHM, +S1, +S2.  absent: Systolic Murmur





- GI/Abdominal Exam


GI & Abdominal Exam: Normal Bowel Sounds, Soft, Tenderness (No tenderness all 4 

quadrants. (+) suprapubic tenderness on midline. )


Additional comments: 


Urostomy bag intact with yellow cloudy urine





-  Exam


 Exam: Scrotal Swelling (L scrotal swelling. Rash along medial thigh/scrotal 

fold. No perineal redness).  absent: Testicular Tenderness, Uretheral Discharge

, Testicular Vertical Lie, Bladder Distension





- Extremities Exam


Extremities exam: Positive for: normal capillary refill, pedal pulses present.  

Negative for: calf tenderness, pedal edema





- Back Exam


Back exam: CVA tenderness (L), CVA tenderness (R).  absent: rash noted





- Neurological Exam


Neurological exam: Alert, Oriented x3





- Psychiatric Exam


Psychiatric exam: Anxious, Normal Affect, Normal Mood





- Skin


Skin Exam: Dry, Normal Color, Rash (scrotal/inner thighs), Warm





Results





- Vital Signs


Recent Vital Signs: 





 Last Vital Signs











Temp  98.2 F   03/31/17 17:58


 


Pulse  52 L  03/31/17 22:17


 


Resp  16   03/31/17 22:17


 


BP  142/82   03/31/17 22:17


 


Pulse Ox  100   03/31/17 22:17














- Labs


Result Diagrams: 


 03/31/17 19:01





 03/31/17 19:01


Labs: 





 Laboratory Results - last 24 hr











  03/31/17 03/31/17





  18:35 19:01


 


WBC   5.0  D


 


RBC   4.18


 


Hgb   10.2 L


 


Hct   32.6 L


 


MCV   78.0 L


 


MCH   24.4 L


 


MCHC   31.3


 


RDW   14.8 H


 


Plt Count   259


 


MPV   8.5


 


Gran %   57.9


 


Lymph % (Auto)   30.1


 


Mono % (Auto)   6.0


 


Eos % (Auto)   5.6 H


 


Baso % (Auto)   0.4


 


Gran #   2.90


 


Lymph #   1.5


 


Mono #   0.3


 


Eos #   0.3


 


Baso #   0.02


 


Sodium   141


 


Potassium   3.7


 


Chloride   103


 


Carbon Dioxide   29


 


Anion Gap   13


 


BUN   16


 


Creatinine   1.1


 


Est GFR ( Amer)   > 60


 


Est GFR (Non-Af Amer)   > 60


 


Random Glucose   128 H


 


Calcium   9.0


 


Total Bilirubin   0.3


 


AST   27


 


ALT   9


 


Alkaline Phosphatase   64


 


Total Protein   8.3


 


Albumin   3.9


 


Globulin   4.4


 


Albumin/Globulin Ratio   0.9 L


 


Lipase   96


 


Urine Color  Yellow 


 


Urine Appearance  Cloudy 


 


Urine pH  6.0 


 


Ur Specific Gravity  1.025 


 


Urine Protein  100 H 


 


Urine Glucose (UA)  Negative 


 


Urine Ketones  Negative 


 


Urine Blood  Large H 


 


Urine Nitrate  Positive H 


 


Urine Bilirubin  Negative 


 


Urine Urobilinogen  0.2 


 


Ur Leukocyte Esterase  Moderate H 


 


Urine RBC  Tntc 


 


Urine WBC  20 - 25 


 


Ur Epithelial Cells  Many 


 


Amorphous Sediment  Large 


 


Urine Bacteria  Small 














Assessment & Plan





- Assessment and Plan (Free Text)


Plan: 


61-year-old male, with Interstitial hemorrhagic cyctitis s/p ileal conduit and 

cystectomy, presents today with worsening of suprapubic pain that started 1 

week ago. The suprapubic pain wrapped around the flank area b/l. Pt noticed 

that his urine drained from urostomy turned cloudy x 1 week with foul smell. 

Today, he felt strong sever cramps from mid-suprapubic area radiating to the 

shaft of penis. (+) Nausea, chills, swelling L scrotum.





Complex UTI vs cystitis vs pyelonephritis


Bilateral hydroureteronephrosis, mild. No obstructing stones


- Ceftriazone IV


- Urol consult





Suprapubic pain


New masslike density measuring 4 cm seen in the left anterior pelvis


- pain from cystitis vs irritation from Pelvic mass


- postoperative fluid collection vs lymphocele vs resolving hematoma vs or 

seroma. 


- Morphine 1q3 PRN


- Surgery consult





Hydrocele, painless


Prostate enlarged


Candidiasis along scrotal fold, medial thighs


- lymphatic drainage blockage vs Injury vs Infection vs Testicula tumor


- CT: Moderate amount of fluid in the anterior scrotum, suspicious for a 

hydrocele.


- nystatin powder TID


- PSA


- Urol consult for possible aspirate





Hx Liver lesions, s/p Liver biopsy jan 2017, benign


- CT: Multiple small low density liver lesions, which are not definitely cysts, 

given their densities. largest 1.2 cm


- follow up with GI


- Consider liver MRI





Hypothyroidism on synthroid   


- check TSH, F4





Chronic anemia stable at 10-11. Hx Diverticula, internal hemorrhoids, sessile 

polys (hyperplastic) 8-20mm, Stomach papule.





Hx Asthma on albuterol PRN





Prophylaxis


- lovenox, home omeprazole





S/R/D/w Dr. DEANNA Dave





- Date & Time


Date: 04/01/17


Time: 01:22

## 2017-04-01 NOTE — PN
DATE: 04/01/2017



CHIEF COMPLAINT:  Abdominal pain.



HISTORY OF PRESENT ILLNESS:  This is one of multiple admissions for this patient with chronic abdomin
al pain.  The patient has a history of interstitial cystitis and had a cystectomy and ileal conduit d
one at [facility] years ago.  He has had multiple admissions with continued problems since the surgery.  SO avila recently had a left inguinal hernia repair.  He has had some pain and swelling since the surgery.  




He is admitted again now with suprapubic pain and a urinary infection.  The patient reports his urine
 became cloudy about 1 week ago.  The patient was seen recently in my office.  He was referred to see
 Dr. Gallo Stern, who is one of the original surgeons who performed his cystectomy and conduit.  He 
has had a history of mild hydronephrosis and, on the current CT scan, this appears to be stable.  



LABORATORY EXAM:  The patient's GFR is noted to be greater than 60, so there does not appear to be an
y significant decrease in his renal function.  His urinalysis did show positive nitrates, 20-25 wbc's
, and too numerous to count RBCs; however, this is urine from an ileal conduit.  



VITAL SIGNS:  The patient is afebrile.  He had no noted fever while at the hospital.  Temperature tod
ay was 97.6, blood pressure 133/84, respirations 18.  



PLAN:  For now, is to continue with pain management.  Consultation with Dr. Crawford regarding the
 suprapubic pain and swelling after the hernia repair.  A CT scan was done, which showed the chronic 
collection was somewhat smaller; however, there appears to be a new swelling and hydrocele, and a new
 collection in the left anterior pelvis, which appears to be a small postoperative fluid collection. 
 The collection did not contain any gas, but does likely represent a lymphocele resolving hematoma wi
th seroma by CAT scan.  We will continue to follow the patient with you.





__________________________________________

Anthony Ashley MD







cc:



DD: 04/01/2017 12:08:22  [id number]

TT: 04/01/2017 12:38:02

Confirmation # [id number]

Dictation # [id number]

ln

## 2017-04-01 NOTE — CP.PCM.CON
History of Present Illness





- History of Present Illness


History of Present Illness: 


SURGERY CONSULT NOTE FOR DR. HARPER





61[place] [facility] [place]rapub[place] pressure and discomfort. Patient states this 

pain stated about one week ago and radiates towards his urostomy on the right 

abdomen. He states he as had this type of pain in the past before multiples 

times and it usually goes away by itself. Patient states the pressure made it 

feel as though he needed to urinated even though he has a history of cystectomy 

and ileal conduit with urostomy. Denies any urethra discharge. Patient denies 

fevers, chill. States he has been able to eat but sometimes feels nausea but 

denies any vomiting. He complains of constipation but states he has a history 

of this and it is not new. Patient denies any issues with recent surgical site 

for repair of left inguinal hernia. Patient has developed a left fluid filled 

sac in the left scrotal sac. He does have urine output from his urostomy. 





PMH: Hemorrhagic cystitis, hypothyroid, asthma, liver lesion


PSH: Radical cystectomy, ileal conduit, urostomy, multiple hernia repairs, most 

recent left inguinal hernia repair 3 weeks ago, wound debridements


Social: denies tobacco, alcohol, illicit drugs


Allergies: NKDA





Past Patient History





- Infectious Disease


Hx of Infectious Diseases: None





- Tetanus Immunizations


Tetanus Immunization: Unknown





- Past Medical History & Family History


Past Medical History?: Yes





- Past Social History


Smoking Status: Never Smoked





- CARDIAC


Hx Pacemaker: No





- PULMONARY


Hx Respiratory Disorders: Yes


Hx Asthma: Yes





- NEUROLOGICAL


Hx Paralysis: No





- HEENT


Hx HEENT Problems: No





- RENAL


Hx Chronic Kidney Disease: Yes (Interstitial Cystitis)


Other/Comment: urinary bladder removed, has urostomy





- ENDOCRINE/METABOLIC


Hx Endocrine Disorders: Yes


Hx Hypothyroidism: Yes





- HEMATOLOGICAL/ONCOLOGICAL


Hx Blood Transfusions: No


Hx Blood Transfusion Reaction: No





- INTEGUMENTARY


Hx Dermatological Problems: Yes (vertiglio arms and hands)


Other/Comment: boil on abd above urostomy bright red with foul smelling 

drainage with pain to right hip, groin, r lower abd





- MUSCULOSKELETAL/RHEUMATOLOGICAL


Hx Musculoskeletal Disorders: No





- GASTROINTESTINAL


Hx Gastrointestinal Disorders: Yes (esophageal stricture,HERNIORHAPPHY 4 X)


Hx Gastroesophageal Reflux: Yes


Hx Liver Failure: Yes (HEPATOMEGALY)


HX Swallowing Problems: Yes


Other/Comment: constipation





- GENITOURINARY/GYNECOLOGICAL


Hx Genitourinary Disorders: Yes (interstital cystitis,  urostomy 2009)


Hx Hematuria: Yes


Hx Prostate Problems: Yes (LASER SX-DYSURIA, enlarged 2004)


Hx Urinary Tract Infection: Yes





- PSYCHIATRIC


Hx Emotional Abuse: No


Hx Physical Abuse: No


Hx Substance Use: No





- SURGICAL HISTORY


Other/Comment: bladder removal, post stoma interstitial hernias multiple hernias

, has right inguinal hernia presently





- ANESTHESIA


Hx Anesthesia Reactions: Yes (N/V X 1 PROCEDURE)


Hx Malignant Hyperthermia: No





Meds


Allergies/Adverse Reactions: 


 Allergies











Allergy/AdvReac Type Severity Reaction Status Date / Time


 


No Known Allergies Allergy   Verified 03/02/17 17:17














- Medications


Medications: 


 Current Medications





Albuterol Sulfate (Albuterol 0.083% Inhal Sol (2.5 Mg/3 Ml) Ud)  2.5 mg IH 

[id number] PRN


   PRN Reason: Shortness of Breath


Enoxaparin Sodium (Lovenox)  40 mg SC DAILY [facility]IA D


   PRN Reason: Protocol


Ceftriaxone Sodium (Rocephin 1 Gram Ivpb)  100 mls @ 100 mls/hr IVPB DAILY [facility]


   PRN Reason: Protocol


Levothyroxine Sodium (Synthroid)  100 mcg PO DAILY [facility]


Morphine Sulfate (Morphine)  1 mg IVP Q3 PRN


   PRN Reason: Pain, moderate (4-7)


   Last Admin: 04/01/17 02:32 Dose:  1 mg


Nystatin (Nystop Topical Powder)  0 gm TOP TID [facility]


Ondansetron HCl (Zofran Odt)  4 mg PO Q6 PRN


   PRN Reason: Nausea/Vomiting


Pantoprazole Sodium (Protonix Ec Tab)  40 mg PO ACB [facility] D











Physical Exam





- Constitutional


Appears: Non-toxic, No Acute Distress





- Head Exam


Head Exam: ATRAUMATIC





- Eye Exam


Eye Exam: EOMI


Pupil Exam: PERRL





- ENT Exam


ENT Exam: Mucous Membranes Moist


Additional comments: 


poor dental hygiene





- Respiratory Exam


Respiratory Exam: Clear to Auscultation Bilateral, NORMAL BREATHING PATTERN





- Cardiovascular Exam


Cardiovascular Exam: REGULAR RHYTHM, +S1, +S2





- GI/Abdominal Exam


GI & Abdominal Exam: Soft.  absent: Distended, Firm, Guarding, Rebound, Rigid, 

Tenderness


Additional comments: 


parastomal hernia, urostomy in place with urine output, multiple healed 

surgical scars noted, no signs of skin erythema, no tenderness on the left/

right inguinal region 





-  Exam


 Exam: Scrotal Swelling (left side fluid filled sac palpable in the left 

scrotum).  absent: Testicular Tenderness





- Extremities Exam


Extremities exam: Negative for: tenderness





- Neurological Exam


Neurological exam: Alert, Oriented x3





- Psychiatric Exam


Psychiatric exam: Anxious





- Skin


Skin Exam: Dry, Intact, Normal Color, Warm





Results





- Vital Signs


Recent Vital Signs: 


 Last Vital Signs











Temp  97.6 F   04/01/17 01:41


 


Pulse  50 L  04/01/17 01:41


 


Resp  18   04/01/17 01:41


 


BP  133/84   04/01/17 01:41


 


Pulse Ox  99   04/01/17 01:13














- Labs


Result Diagrams: 


 04/01/17 05:00





 04/01/17 07:00


Labs: 


 Laboratory Results - last 24 hr











  03/31/17 03/31/17





  18:35 19:01


 


WBC   5.0  D


 


RBC   4.18


 


Hgb   10.2 L


 


Hct   32.6 L


 


MCV   78.0 L


 


MCH   24.4 L


 


MCHC   31.3


 


RDW   14.8 H


 


Plt Count   259


 


MPV   8.5


 


Gran %   57.9


 


Lymph % (Auto)   30.1


 


Mono % (Auto)   6.0


 


Eos % (Auto)   5.6 H


 


Baso % (Auto)   0.4


 


Gran #   2.90


 


Lymph #   1.5


 


Mono #   0.3


 


Eos #   0.3


 


Baso #   0.02


 


Sodium   141


 


Potassium   3.7


 


Chloride   103


 


Carbon Dioxide   29


 


Anion Gap   13


 


BUN   16


 


Creatinine   1.1


 


Est GFR ( Amer)   > 60


 


Est GFR (Non-Af Amer)   > 60


 


Random Glucose   128 H


 


Calcium   9.0


 


Total Bilirubin   0.3


 


AST   27


 


ALT   9


 


Alkaline Phosphatase   64


 


Total Protein   8.3


 


Albumin   3.9


 


Globulin   4.4


 


Albumin/Globulin Ratio   0.9 L


 


Lipase   96


 


Urine Color  Yellow 


 


Urine Appearance  Cloudy 


 


Urine pH  6.0 


 


Ur Specific Gravity  1.025 


 


Urine Protein  100 H 


 


Urine Glucose (UA)  Negative 


 


Urine Ketones  Negative 


 


Urine Blood  Large H 


 


Urine Nitrate  Positive H 


 


Urine Bilirubin  Negative 


 


Urine Urobilinogen  0.2 


 


Ur Leukocyte Esterase  Moderate H 


 


Urine RBC  Tntc 


 


Urine WBC  20 - 25 


 


Ur Epithelial Cells  Many 


 


Amorphous Sediment  Large 


 


Urine Bacteria  Small 














Assessment & Plan





- Assessment and Plan (Free Text)


Assessment: 


61M with suprapubic pressure/discomfort being treated for UTI


Plan: 


- continue antibiotics


- continue medical management


- serial abdominal exams


- re-evaluate patient this afternoon





Discussed with Dr. Yvette Ward, PGY1

## 2017-04-01 NOTE — CP.PCM.CON
History of Present Illness





- History of Present Illness


History of Present Illness: 


61 year old male with PMH of Abdominal wall abscess S/P drainage, S/P repeat 

debridement and wound vacuum placement with Enterobacter (11/2016), 

hypothyroidism, Prostate disease, anxiety disorder, esophageal strictures, S/P 

ileal conduit for the urine, recently had surgery done - had left inguinal 

hernia repair, patient had left testicular swelling associated with this. The 

surgery was done in early March 2017. He had been doing well but has been 

having abdominal pain for about a week now associated with nausea. He also 

noted some cloudy urine and note of swelling of his left testicle. He denies 

fever or chills, no headache or dizziness, no chest pain, no SOB, no sore throat

, no cough or colds, no diarrhea. In the ED, CT Abdomen and pelvis was done 

which showed new left pelvic collection. There was also note of pyuria on 

urinalysis. Infectious Diseases consult is requested to further evaluate and 

manage. 





Review of Systems





- Review of Systems


All systems: reviewed and no additional remarkable complaints except (as per HPI

)





Past Patient History





- Infectious Disease


Hx of Infectious Diseases: None





- Tetanus Immunizations


Tetanus Immunization: Unknown





- Past Medical History & Family History


Past Medical History?: Yes


Past Family History: Reviewed and not pertinent





- Past Social History


Smoking Status: Never Smoked


Alcohol: None


Drugs: Denies





- CARDIAC


Hx Pacemaker: No





- PULMONARY


Hx Respiratory Disorders: Yes


Hx Asthma: Yes





- NEUROLOGICAL


Hx Paralysis: No





- HEENT


Hx HEENT Problems: No





- RENAL


Hx Chronic Kidney Disease: Yes (Interstitial Cystitis)


Other/Comment: urinary bladder removed, has urostomy





- ENDOCRINE/METABOLIC


Hx Endocrine Disorders: Yes


Hx Hypothyroidism: Yes





- HEMATOLOGICAL/ONCOLOGICAL


Hx Blood Transfusions: No


Hx Blood Transfusion Reaction: No





- INTEGUMENTARY


Hx Dermatological Problems: Yes (vertiglio arms and hands)


Other/Comment: boil on abd above urostomy bright red with foul smelling 

drainage with pain to right hip, groin, r lower abd





- MUSCULOSKELETAL/RHEUMATOLOGICAL


Hx Musculoskeletal Disorders: No





- GASTROINTESTINAL


Hx Gastrointestinal Disorders: Yes (esophageal stricture,HERNIORHAPPHY 4 X)


Hx Gastroesophageal Reflux: Yes


Hx Liver Failure: Yes (HEPATOMEGALY)


HX Swallowing Problems: Yes


Other/Comment: constipation





- GENITOURINARY/GYNECOLOGICAL


Hx Genitourinary Disorders: Yes (interstital cystitis,  urostomy 2009)


Hx Hematuria: Yes


Hx Prostate Problems: Yes (LASER SX-DYSURIA, enlarged 2004)


Hx Urinary Tract Infection: Yes





- PSYCHIATRIC


Hx Emotional Abuse: No


Hx Physical Abuse: No


Hx Substance Use: No





- SURGICAL HISTORY


Other/Comment: bladder removal, post stoma interstitial hernias multiple hernias

, has right inguinal hernia presently





- ANESTHESIA


Hx Anesthesia Reactions: Yes (N/V X 1 PROCEDURE)


Hx Malignant Hyperthermia: No





Meds


Allergies/Adverse Reactions: 


 Allergies











Allergy/AdvReac Type Severity Reaction Status Date / Time


 


No Known Allergies Allergy   Verified 03/02/17 17:17














- Medications


Medications: 


 Current Medications





Albuterol Sulfate (Albuterol 0.083% Inhal Sol (2.5 Mg/3 Ml) Ud)  2.5 mg IH 

[id number] PRN


   PRN Reason: Shortness of Breath


Enoxaparin Sodium (Lovenox)  40 mg SC DAILY [facility] D


   PRN Reason: Protocol


   Last Admin: 04/01/17 09:09 Dose:  40 mg


Ceftriaxone Sodium (Rocephin 1 Gram Ivpb)  100 mls @ 100 mls/hr IVPB DAILY [facility]


   PRN Reason: Protocol


   Last Admin: 04/01/17 09:10 Dose:  100 mls/hr


Levothyroxine Sodium (Synthroid)  100 mcg PO DAILY [facility]


   Last Admin: 04/01/17 09:10 Dose:  100 mcg


Morphine Sulfate (Morphine)  1 mg IVP Q3 PRN


   PRN Reason: Pain, moderate (4-7)


   Last Admin: 04/01/17 07:56 Dose:  1 mg


Nystatin (Nystop Topical Powder)  0 gm TOP TID [facility]


   Last Admin: 04/01/17 09:09 Dose:  1 applic


Ondansetron HCl (Zofran Odt)  4 mg PO Q6 PRN


   PRN Reason: Nausea/Vomiting


Pantoprazole Sodium (Protonix Ec Tab)  40 mg PO ACB [facility]


   Last Admin: 04/01/17 09:10 Dose:  40 mg











Physical Exam





- Constitutional


Appears: Non-toxic, No Acute Distress





- Head Exam


Head Exam: NORMAL INSPECTION





- ENT Exam


ENT Exam: Mucous Membranes Moist





- Neck Exam


Neck exam: Negative for: Lymphadenopathy, Meningismus





- Respiratory Exam


Respiratory Exam: Decreased Breath Sounds





- Cardiovascular Exam


Cardiovascular Exam: +S1, +S2





- GI/Abdominal Exam


GI & Abdominal Exam: Soft, Tenderness (mild, left lower quadrant area with some 

swelling of the left testicle without tenderness noted)


Additional comments: 


There is note of ileal conduit without tenderness noted





Results





- Vital Signs


Recent Vital Signs: 


 Last Vital Signs











Temp  97.9 F   04/01/17 06:00


 


Pulse  55 L  04/01/17 06:00


 


Resp  18   04/01/17 06:00


 


BP  118/68   04/01/17 06:00


 


Pulse Ox  99   04/01/17 06:00














- Labs


Result Diagrams: 


 04/01/17 05:00





 04/01/17 07:00


Labs: 


 Laboratory Results - last 24 hr











  03/31/17 03/31/17 04/01/17





  18:35 19:01 05:00


 


WBC   5.0  D  6.9  D


 


RBC   4.18  4.19


 


Hgb   10.2 L  10.1 L


 


Hct   32.6 L  32.7 L


 


MCV   78.0 L  78.0 L


 


MCH   24.4 L  24.1 L


 


MCHC   31.3  30.9 L


 


RDW   14.8 H  14.7 H


 


Plt Count   259  243


 


MPV   8.5  8.6


 


Gran %   57.9  73.8 H


 


Lymph % (Auto)   30.1  16.9 L


 


Mono % (Auto)   6.0  4.7


 


Eos % (Auto)   5.6 H  4.5


 


Baso % (Auto)   0.4  0.1


 


Gran #   2.90  5.06


 


Lymph #   1.5  1.2


 


Mono #   0.3  0.3


 


Eos #   0.3  0.3


 


Baso #   0.02  0.01


 


Sodium   141 


 


Potassium   3.7 


 


Chloride   103 


 


Carbon Dioxide   29 


 


Anion Gap   13 


 


BUN   16 


 


Creatinine   1.1 


 


Est GFR ( Amer)   > 60 


 


Est GFR (Non-Af Amer)   > 60 


 


Random Glucose   128 H 


 


Calcium   9.0 


 


Total Bilirubin   0.3 


 


AST   27 


 


ALT   9 


 


Alkaline Phosphatase   64 


 


Total Protein   8.3 


 


Albumin   3.9 


 


Globulin   4.4 


 


Albumin/Globulin Ratio   0.9 L 


 


Lipase   96 


 


Free T4   


 


Thyroxine (T4)   


 


TSH 3rd Generation   


 


Urine Color  Yellow  


 


Urine Appearance  Cloudy  


 


Urine pH  6.0  


 


Ur Specific Gravity  1.025  


 


Urine Protein  100 H  


 


Urine Glucose (UA)  Negative  


 


Urine Ketones  Negative  


 


Urine Blood  Large H  


 


Urine Nitrate  Positive H  


 


Urine Bilirubin  Negative  


 


Urine Urobilinogen  0.2  


 


Ur Leukocyte Esterase  Moderate H  


 


Urine RBC  Tntc  


 


Urine WBC  20 - 25  


 


Ur Epithelial Cells  Many  


 


Amorphous Sediment  Large  


 


Urine Bacteria  Small  














  04/01/17





  07:00


 


WBC 


 


RBC 


 


Hgb 


 


Hct 


 


MCV 


 


MCH 


 


MCHC 


 


RDW 


 


Plt Count 


 


MPV 


 


Gran % 


 


Lymph % (Auto) 


 


Mono % (Auto) 


 


Eos % (Auto) 


 


Baso % (Auto) 


 


Gran # 


 


Lymph # 


 


Mono # 


 


Eos # 


 


Baso # 


 


Sodium  141


 


Potassium  3.6


 


Chloride  105


 


Carbon Dioxide  28


 


Anion Gap  12


 


BUN  14


 


Creatinine  1.2


 


Est GFR ( Amer)  > 60


 


Est GFR (Non-Af Amer)  > 60


 


Random Glucose  93


 


Calcium  8.5


 


Total Bilirubin  0.4


 


AST  22


 


ALT  13


 


Alkaline Phosphatase  57


 


Total Protein  7.5


 


Albumin  3.6


 


Globulin  3.9


 


Albumin/Globulin Ratio  0.9 L


 


Lipase 


 


Free T4  1.36


 


Thyroxine (T4)  7.5


 


TSH 3rd Generation  1.25


 


Urine Color 


 


Urine Appearance 


 


Urine pH 


 


Ur Specific Gravity 


 


Urine Protein 


 


Urine Glucose (UA) 


 


Urine Ketones 


 


Urine Blood 


 


Urine Nitrate 


 


Urine Bilirubin 


 


Urine Urobilinogen 


 


Ur Leukocyte Esterase 


 


Urine RBC 


 


Urine WBC 


 


Ur Epithelial Cells 


 


Amorphous Sediment 


 


Urine Bacteria 














Assessment & Plan





- Assessment and Plan (Free Text)


Plan: 


Assessment


consider left pelvic area fluid collection post-op R/O abscess


consider urinary tract infection


history of Abdominal wall abscess S/P drainage S/P repeat debridement and wound 

vacuum placement, with Enterobacter


hypothyroidism


Prostate disease


anxiety disorder


esophageal strictures


acute renal failure





Plan


started patient on Vancomycin and Merrem pending blood cx, urine cx; follow up 

Surgery evaluation of the fluid collection


Will follow clinically

## 2017-04-02 LAB
ADD MANUAL DIFF?: NO
ALBUMIN/GLOB SERPL: 0.9 {RATIO} (ref 1.1–1.8)
ALP SERPL-CCNC: 56 U/L (ref 38–133)
ALT SERPL-CCNC: 12 U/L (ref 7–56)
AST SERPL-CCNC: 24 U/L (ref 15–59)
BASOPHILS # BLD AUTO: 0.03 K/MM3 (ref 0–2)
BASOPHILS NFR BLD: 0.6 % (ref 0–3)
BILIRUB SERPL-MCNC: 0.5 MG/DL (ref 0.2–1.3)
BUN SERPL-MCNC: 13 MG/DL (ref 7–21)
CALCIUM SERPL-MCNC: 8.6 MG/DL (ref 8.4–10.5)
CHLORIDE SERPL-SCNC: 101 MMOL/L (ref 95–110)
CO2 SERPL-SCNC: 28 MMOL/L (ref 21–33)
EOSINOPHIL # BLD: 0.3 10*3/UL (ref 0–0.7)
EOSINOPHIL NFR BLD: 6.3 % (ref 1.5–5)
ERYTHROCYTE [DISTWIDTH] IN BLOOD BY AUTOMATED COUNT: 14.9 % (ref 11.5–14.5)
GLOBULIN SER-MCNC: 4 GM/DL
GLUCOSE SERPL-MCNC: 100 MG/DL (ref 70–110)
GRANULOCYTES # BLD: 3.38 10*3/UL (ref 1.4–6.5)
GRANULOCYTES NFR BLD: 64.3 % (ref 50–68)
HCT VFR BLD CALC: 32.3 % (ref 42–52)
LYMPHOCYTES # BLD: 1.2 10*3/UL (ref 1.2–3.4)
LYMPHOCYTES NFR BLD AUTO: 22.5 % (ref 22–35)
MCH RBC QN AUTO: 23.8 PG (ref 25–35)
MCHC RBC AUTO-ENTMCNC: 30.3 G/DL (ref 31–37)
MCV RBC AUTO: 78.4 FL (ref 80–105)
MONOCYTES # BLD AUTO: 0.3 10*3/UL (ref 0.1–0.6)
MONOCYTES NFR BLD: 6.3 % (ref 1–6)
PLATELET # BLD: 239 10^3/UL (ref 120–450)
PMV BLD AUTO: 8.8 FL (ref 7–11)
POTASSIUM SERPL-SCNC: 3.4 MMOL/L (ref 3.6–5)
PROT SERPL-MCNC: 7.5 G/DL (ref 5.8–8.3)
SODIUM SERPL-SCNC: 140 MMOL/L (ref 132–148)
WBC # BLD AUTO: 5.3 10^3/UL (ref 4.5–11)

## 2017-04-02 RX ADMIN — NYSTATIN SCH APPLIC: 100000 POWDER TOPICAL at 09:58

## 2017-04-02 RX ADMIN — MORPHINE SULFATE PRN MG: 4 INJECTION, SOLUTION INTRAMUSCULAR; INTRAVENOUS at 11:38

## 2017-04-02 RX ADMIN — NYSTATIN SCH APPLIC: 100000 POWDER TOPICAL at 13:26

## 2017-04-02 RX ADMIN — CEFTRIAXONE SCH MLS/HR: 1 INJECTION, SOLUTION INTRAVENOUS at 18:04

## 2017-04-02 RX ADMIN — VANCOMYCIN HYDROCHLORIDE SCH MLS/HR: 1 INJECTION, POWDER, LYOPHILIZED, FOR SOLUTION INTRAVENOUS at 11:38

## 2017-04-02 RX ADMIN — MORPHINE SULFATE PRN MG: 4 INJECTION, SOLUTION INTRAMUSCULAR; INTRAVENOUS at 08:37

## 2017-04-02 RX ADMIN — NYSTATIN SCH APPLIC: 100000 POWDER TOPICAL at 17:29

## 2017-04-02 RX ADMIN — PANTOPRAZOLE SODIUM SCH MG: 40 TABLET, DELAYED RELEASE ORAL at 09:58

## 2017-04-02 RX ADMIN — ENOXAPARIN SODIUM SCH MG: 40 INJECTION SUBCUTANEOUS at 09:58

## 2017-04-02 RX ADMIN — MORPHINE SULFATE PRN MG: 4 INJECTION, SOLUTION INTRAMUSCULAR; INTRAVENOUS at 18:23

## 2017-04-02 RX ADMIN — VANCOMYCIN HYDROCHLORIDE SCH MLS/HR: 1 INJECTION, POWDER, LYOPHILIZED, FOR SOLUTION INTRAVENOUS at 22:42

## 2017-04-02 RX ADMIN — MORPHINE SULFATE PRN MG: 4 INJECTION, SOLUTION INTRAMUSCULAR; INTRAVENOUS at 22:42

## 2017-04-02 RX ADMIN — MORPHINE SULFATE PRN MG: 4 INJECTION, SOLUTION INTRAMUSCULAR; INTRAVENOUS at 04:23

## 2017-04-02 RX ADMIN — MORPHINE SULFATE PRN MG: 4 INJECTION, SOLUTION INTRAMUSCULAR; INTRAVENOUS at 14:23

## 2017-04-02 NOTE — PN
DATE: 04/02/2017



The patient is in bed in no acute distress, nontoxic.



PHYSICAL EXAMINATION:

VITAL SIGNS:  Temperature is 98, blood pressure is 119/60, respiratory rate of 18.

HEENT:  Unremarkable.

NECK:  Supple.

LUNGS:  Have decreased breath sounds.

HEART:  Normal S1, S2.

ABDOMEN:  Soft, nontender.



LABORATORY DATA:  Reveals a white count of 5.3, hemoglobin of 9 and platelets of 239.  Chemistries re
veal BUN of 13, creatinine of 1.4.  Urinalysis is noted.  Microbiology reveals the blood cultures no 
growth.  Urine cultures no growth.



ASSESSMENT AND PLAN:  This is a 61-year-old male with left pelvic area fluid collection, rule out sep
sis with urinary tract infection, history of abdominal wall abscess status post drainage, on vancomyc
in, meropenem at this time in a patient with anxiety, prostate disease, hypothyroidism, esophageal st
rictures.  Waiting for microbiology.  We will continue the meropenem.  We will discontinue the ceftri
axone which was started by  _____ yesterday.  The patient is also on vancomycin and meropenem as p
er Dr. Johnson.





__________________________________________

Margarito Khan MD







cc:



DD: 04/02/2017 10:36:35  350

TT: 04/02/2017 11:15:27

Confirmation # [id number]

Dictation # [id number]

mn

## 2017-04-02 NOTE — CP.PCM.PN
Subjective





- Date & Time of Evaluation


Date of Evaluation: 04/02/17


Time of Evaluation: 11:55





- Subjective


Subjective: 


Surgery for Dr. Crawford





Pt s&e. C/o abd pain. Denies F/C/N/V/D/Cp/SOB. Stoma in place. Tolerating diet. 





Objective





- Vital Signs/Intake and Output


Vital Signs (last 24 hours): 


 











Temp Pulse Resp BP Pulse Ox


 


 98.3 F   56 L  19   119/60   97 


 


 04/02/17 06:00  04/02/17 06:00  04/02/17 06:00  04/02/17 06:00  04/02/17 06:00








Intake and Output: 


 











 04/02/17 04/02/17





 06:59 18:59


 


Intake Total 360 


 


Output Total 1900 


 


Balance -1540 














- Medications


Medications: 


 Current Medications





Albuterol Sulfate (Albuterol 0.083% Inhal Sol (2.5 Mg/3 Ml) Ud)  2.5 mg IH 

[id number] PRN


   PRN Reason: Shortness of Breath


Alprazolam (Xanax)  0.25 mg PO Q6 PRN; Protocol


   PRN Reason: Anxiety


   Stop: 04/09/17 00:01


   Last Admin: 04/02/17 08:37 Dose:  0.25 mg


Enoxaparin Sodium (Lovenox)  40 mg SC DAILY [facility]IA D


   PRN Reason: Protocol


   Last Admin: 04/02/17 09:58 Dose:  40 mg


Meropenem 1 gm/ Sodium (Chloride)  100 mls @ 100 mls/hr IVPB Q8 [facility] D


   PRN Reason: Protocol


   Stop: 04/08/17 10:01


   Last Admin: 04/02/17 06:15 Dose:  100 mls/hr


Vancomycin HCl (Vancomycin 1gm)  250 mls @ 167 mls/hr IVPB Q12H [facility]


   PRN Reason: Protocol


   Last Admin: 04/02/17 11:38 Dose:  167 mls/hr


Levothyroxine Sodium (Synthroid)  100 mcg PO DAILY [facility]


   Last Admin: 04/02/17 09:58 Dose:  100 mcg


Morphine Sulfate (Morphine)  1 mg IVP Q3 PRN


   PRN Reason: Pain, moderate (4-7)


   Last Admin: 04/01/17 17:28 Dose:  1 mg


Morphine Sulfate (Morphine)  3 mg IM Q3H PRN


   PRN Reason: Pain, severe (8-10)


   Last Admin: 04/02/17 11:38 Dose:  3 mg


Nystatin (Nystop Topical Powder)  0 gm TOP TID [facility]


   Last Admin: 04/02/17 09:58 Dose:  1 applic


Ondansetron HCl (Zofran Odt)  4 mg PO Q6 PRN


   PRN Reason: Nausea/Vomiting


   Last Admin: 04/01/17 13:22 Dose:  4 mg


Pantoprazole Sodium (Protonix Ec Tab)  40 mg PO ACB [facility]


   Last Admin: 04/02/17 09:58 Dose:  40 mg











- Labs


Labs: 


 





 04/02/17 08:06 





 04/02/17 08:06 











- Constitutional


Appears: No Acute Distress





- Head Exam


Head Exam: ATRAUMATIC, NORMAL INSPECTION, NORMOCEPHALIC





- Eye Exam


Eye Exam: EOMI, Normal appearance, PERRL


Pupil Exam: NORMAL ACCOMODATION, PERRL





- ENT Exam


ENT Exam: Mucous Membranes Moist, Normal Exam





- Neck Exam


Neck Exam: Full ROM, Normal Inspection.  absent: Lymphadenopathy





- Respiratory Exam


Respiratory Exam: Clear to Ausculation Bilateral, NORMAL BREATHING PATTERN





- Cardiovascular Exam


Cardiovascular Exam: REGULAR RHYTHM, +S1, +S2.  absent: Murmur





- GI/Abdominal Exam


GI & Abdominal Exam: Soft, Tenderness, Normal Bowel Sounds.  absent: Distended, 

Firm, Guarding, Rigid


Additional comments: 


Ileal conduit in place: cloudy Urine. No erythema. 





-  Exam


 Exam: absent: Uretheral Discharge





- Extremities Exam


Extremities Exam: Full ROM, Normal Inspection





- Back Exam


Back Exam: NORMAL INSPECTION





- Neurological Exam


Neurological Exam: Alert, Awake, CN II-XII Intact, Normal Gait, Oriented x3





- Psychiatric Exam


Psychiatric exam: Normal Affect, Normal Mood





- Skin


Skin Exam: Dry, Intact, Normal Color, Warm





Assessment and Plan





- Assessment and Plan (Free Text)


Assessment: 


61M with suprapubic pressure/discomfort being treated for UTI h/o hernia repair





Plan: 


- continue antibiotics


- continue medical management


- serial abdominal exams


- No emergent surgical intervention at this time. 





Discussed with Dr. Crawford

## 2017-04-02 NOTE — CP.PCM.PN
<Garth Ortega - Last Filed: 04/02/17 16:08>





Subjective





- Date & Time of Evaluation


Date of Evaluation: 04/02/17


Time of Evaluation: 16:08





- Subjective


Subjective: 


Pt seen and examined at bedside. Pt was given morphine for pain overnight. Pt 

doing well today, with improvement in pain. Pt tolerating diet at this time. Pt 

denies CP, SOB, N/V/D.





Objective





- Vital Signs/Intake and Output


Vital Signs (last 24 hours): 


 











Temp Pulse Resp BP Pulse Ox


 


 98.3 F   56 L  19   119/60   97 


 


 04/02/17 06:00  04/02/17 06:00  04/02/17 06:00  04/02/17 06:00  04/02/17 06:00








Intake and Output: 


 











 04/02/17 04/02/17





 06:59 18:59


 


Intake Total 360 600


 


Output Total 1900 500


 


Balance -1540 100














- Medications


Medications: 


 Current Medications





Albuterol Sulfate (Albuterol 0.083% Inhal Sol (2.5 Mg/3 Ml) Ud)  2.5 mg IH 

[id number] PRN


   PRN Reason: Shortness of Breath


Alprazolam (Xanax)  0.25 mg PO Q6 PRN; Protocol


   PRN Reason: Anxiety


   Stop: 04/09/17 00:01


   Last Admin: 04/02/17 14:23 Dose:  0.25 mg


Enoxaparin Sodium (Lovenox)  40 mg SC DAILY [facility]


   PRN Reason: Protocol


   Last Admin: 04/02/17 09:58 Dose:  40 mg


Meropenem 1 gm/ Sodium (Chloride)  100 mls @ 100 mls/hr IVPB Q8 [facility]


   PRN Reason: Protocol


   Stop: 04/08/17 10:01


   Last Admin: 04/02/17 13:25 Dose:  100 mls/hr


Vancomycin HCl (Vancomycin 1gm)  250 mls @ 167 mls/hr IVPB Q12H [facility]


   PRN Reason: Protocol


   Last Admin: 04/02/17 11:38 Dose:  167 mls/hr


Levothyroxine Sodium (Synthroid)  100 mcg PO DAILY [facility]


   Last Admin: 04/02/17 09:58 Dose:  100 mcg


Morphine Sulfate (Morphine)  1 mg IVP Q3 PRN


   PRN Reason: Pain, moderate (4-7)


   Last Admin: 04/01/17 17:28 Dose:  1 mg


Morphine Sulfate (Morphine)  3 mg IM Q3H PRN


   PRN Reason: Pain, severe (8-10)


   Last Admin: 04/02/17 14:23 Dose:  3 mg


Nystatin (Nystop Topical Powder)  0 gm TOP TID [facility]


   Last Admin: 04/02/17 13:26 Dose:  1 applic


Ondansetron HCl (Zofran Odt)  4 mg PO Q6 PRN


   PRN Reason: Nausea/Vomiting


   Last Admin: 04/01/17 13:22 Dose:  4 mg


Pantoprazole Sodium (Protonix Ec Tab)  40 mg PO ACB [facility]


   Last Admin: 04/02/17 09:58 Dose:  40 mg











- Labs


Labs: 


 





 04/02/17 08:06 





 04/02/17 08:06 











- Constitutional


Appears: Well, No Acute Distress





- Head Exam


Head Exam: ATRAUMATIC, NORMAL INSPECTION, NORMOCEPHALIC





- ENT Exam


ENT Exam: Mucous Membranes Moist, Normal Exam





- Respiratory Exam


Respiratory Exam: Clear to Ausculation Bilateral, NORMAL BREATHING PATTERN.  

absent: Rales, Rhonchi, Wheezes





- Cardiovascular Exam


Cardiovascular Exam: RRR, +S1, +S2





- GI/Abdominal Exam


GI & Abdominal Exam: Soft, Normal Bowel Sounds.  absent: Tenderness





- Extremities Exam


Extremities Exam: Normal Inspection.  absent: Calf Tenderness, Pedal Edema





- Neurological Exam


Neurological Exam: Alert, Awake, Oriented x3





- Psychiatric Exam


Psychiatric exam: Normal Affect, Normal Mood





- Skin


Skin Exam: Intact, Normal Color, Warm





Assessment and Plan





- Assessment and Plan (Free Text)


Plan: 


60 y/o M with PMH of Interstitial hemorrhagic cyctitis s/p ileal conduit and 

cystectomy, presents with left pelvic fluid collection. Pt is currently being 

seen by ID who placed the patient on Merrem and Vancomycin, pending cultures. 

Pt also being seen by Urology, who recommend medical management at this time. 

Surgery is also following the pt and recommending no surgical intervention. Pt 

will continue abx and symptomatic treatment. 





Pelvic fluid collection


- Likely secondary to recent surgical procedure


- Surgery and urology recommend no surgical treatment


- Continue Merrem and Vancomycin


- Morphine 


- Surgery and urology following 





Hydrocele/UTI


- Urology recommends no surgical intervention 


- Urine culture repeated


- CT: Moderate amount of fluid in the anterior scrotum, suspicious for a 

hydrocele.


- Continue medical management, merrem and vancomycin





Candidiasis 


- Nystatin powder ordered





Prophylaxis


- lovenox


- Zofran





Seen, reviewed, and discussed with attending.





Shannon, PGY-1








<Tosha Hoover - Last Filed: 04/02/17 16:55>





Objective





- Vital Signs/Intake and Output


Vital Signs (last 24 hours): 


 











Temp Pulse Resp BP Pulse Ox


 


 98.3 F   56 L  19   119/60   97 


 


 04/02/17 06:00  04/02/17 06:00  04/02/17 06:00  04/02/17 06:00  04/02/17 06:00








Intake and Output: 


 











 04/02/17 04/02/17





 06:59 18:59


 


Intake Total 360 600


 


Output Total 1900 500


 


Balance -1540 100














- Medications


Medications: 


 Current Medications





Albuterol Sulfate (Albuterol 0.083% Inhal Sol (2.5 Mg/3 Ml) Ud)  2.5 mg IH 

[id number] PRN


   PRN Reason: Shortness of Breath


Alprazolam (Xanax)  0.25 mg PO Q6 PRN; Protocol


   PRN Reason: Anxiety


   Stop: 04/09/17 00:01


   Last Admin: 04/02/17 14:23 Dose:  0.25 mg


Enoxaparin Sodium (Lovenox)  40 mg SC DAILY [facility]


   PRN Reason: Protocol


   Last Admin: 04/02/17 09:58 Dose:  40 mg


Meropenem 1 gm/ Sodium (Chloride)  100 mls @ 100 mls/hr IVPB Q8 [facility]


   PRN Reason: Protocol


   Stop: 04/08/17 10:01


   Last Admin: 04/02/17 13:25 Dose:  100 mls/hr


Vancomycin HCl (Vancomycin 1gm)  250 mls @ 167 mls/hr IVPB Q12H [facility]


   PRN Reason: Protocol


   Last Admin: 04/02/17 11:38 Dose:  167 mls/hr


Levothyroxine Sodium (Synthroid)  100 mcg PO DAILY [facility]


   Last Admin: 04/02/17 09:58 Dose:  100 mcg


Morphine Sulfate (Morphine)  1 mg IVP Q3 PRN


   PRN Reason: Pain, moderate (4-7)


   Last Admin: 04/01/17 17:28 Dose:  1 mg


Morphine Sulfate (Morphine)  3 mg IM Q3H PRN


   PRN Reason: Pain, severe (8-10)


   Last Admin: 04/02/17 14:23 Dose:  3 mg


Nystatin (Nystop Topical Powder)  0 gm TOP TID [facility]


   Last Admin: 04/02/17 13:26 Dose:  1 applic


Ondansetron HCl (Zofran Odt)  4 mg PO Q6 PRN


   PRN Reason: Nausea/Vomiting


   Last Admin: 04/01/17 13:22 Dose:  4 mg


Pantoprazole Sodium (Protonix Ec Tab)  40 mg PO ACB [facility]


   Last Admin: 04/02/17 09:58 Dose:  40 mg











- Labs


Labs: 


 





 04/02/17 08:06 





 04/02/17 08:06 











Attending/Attestation





- Attestation


I have personally seen and examined this patient.: Yes


I have fully participated in the care of the patient.: Yes


I have reviewed all pertinent clinical information, including history, physical 

exam and plan: Yes


Notes (Text): 


04/02/17 16:51


61 year old male with past medical history of interstitial hemorrhagic cystitis 

s/p ileal conduit and cystectomy and history of recent inguinal hernia surgery 

repair who presented with abdominal pain and dysuria.  UA was positive.  CT abd/

pelvis reviewed which shows fluid collection (likely related to recent procedure

).  He is on iv antibiotics being followed by ID, surgery and urology.  

Continue with iv antibiotics while awaiting for cultures.  He continues to 

complain of abdominal pain and GI evaluation is also requested.





Tosha Hoover MD


Hospitalist.

## 2017-04-03 LAB
ADD MANUAL DIFF?: NO
ALBUMIN/GLOB SERPL: 0.9 {RATIO} (ref 1.1–1.8)
ALP SERPL-CCNC: 53 U/L (ref 38–133)
ALT SERPL-CCNC: 10 U/L (ref 7–56)
AST SERPL-CCNC: 20 U/L (ref 15–59)
BASOPHILS # BLD AUTO: 0.02 K/MM3 (ref 0–2)
BASOPHILS NFR BLD: 0.4 % (ref 0–3)
BILIRUB SERPL-MCNC: 0.8 MG/DL (ref 0.2–1.3)
BUN SERPL-MCNC: 15 MG/DL (ref 7–21)
CALCIUM SERPL-MCNC: 8.6 MG/DL (ref 8.4–10.5)
CHLORIDE SERPL-SCNC: 102 MMOL/L (ref 98–107)
CO2 SERPL-SCNC: 29 MMOL/L (ref 21–33)
EOSINOPHIL # BLD: 0.4 10*3/UL (ref 0–0.7)
EOSINOPHIL NFR BLD: 8.3 % (ref 1.5–5)
ERYTHROCYTE [DISTWIDTH] IN BLOOD BY AUTOMATED COUNT: 14.5 % (ref 11.5–14.5)
ESTIM. PROBABILITY CANCER: 20 PERCENT
GLOBULIN SER-MCNC: 4 GM/DL
GLUCOSE SERPL-MCNC: 99 MG/DL (ref 70–110)
GRANULOCYTES # BLD: 2.9 10*3/UL (ref 1.4–6.5)
GRANULOCYTES NFR BLD: 60.4 % (ref 50–68)
HCT VFR BLD CALC: 31.4 % (ref 42–52)
LYMPHOCYTES # BLD: 1.2 10*3/UL (ref 1.2–3.4)
LYMPHOCYTES NFR BLD AUTO: 24.5 % (ref 22–35)
MCH RBC QN AUTO: 24.2 PG (ref 25–35)
MCHC RBC AUTO-ENTMCNC: 31.2 G/DL (ref 31–37)
MCV RBC AUTO: 77.5 FL (ref 80–105)
MONOCYTES # BLD AUTO: 0.3 10*3/UL (ref 0.1–0.6)
MONOCYTES NFR BLD: 6.4 % (ref 1–6)
PLATELET # BLD: 220 10^3/UL (ref 120–450)
PMV BLD AUTO: 8.4 FL (ref 7–11)
POTASSIUM SERPL-SCNC: 3.9 MMOL/L (ref 3.6–5)
PROT SERPL-MCNC: 7.4 G/DL (ref 5.8–8.3)
PSA SERPL-MCNC: 4.9 NG/ML (ref ?–4)
SODIUM SERPL-SCNC: 142 MMOL/L (ref 132–148)
WBC # BLD AUTO: 4.8 10^3/UL (ref 4.5–11)

## 2017-04-03 RX ADMIN — VANCOMYCIN HYDROCHLORIDE SCH MLS/HR: 1 INJECTION, POWDER, LYOPHILIZED, FOR SOLUTION INTRAVENOUS at 21:26

## 2017-04-03 RX ADMIN — PANTOPRAZOLE SODIUM SCH MG: 40 TABLET, DELAYED RELEASE ORAL at 06:49

## 2017-04-03 RX ADMIN — NYSTATIN SCH APPLIC: 100000 POWDER TOPICAL at 10:30

## 2017-04-03 RX ADMIN — HYDROMORPHONE HYDROCHLORIDE PRN MG: 1 INJECTION, SOLUTION INTRAMUSCULAR; INTRAVENOUS; SUBCUTANEOUS at 21:18

## 2017-04-03 RX ADMIN — ENOXAPARIN SODIUM SCH MG: 40 INJECTION SUBCUTANEOUS at 09:28

## 2017-04-03 RX ADMIN — VANCOMYCIN HYDROCHLORIDE SCH MLS/HR: 1 INJECTION, POWDER, LYOPHILIZED, FOR SOLUTION INTRAVENOUS at 09:28

## 2017-04-03 RX ADMIN — NYSTATIN SCH APPLIC: 100000 POWDER TOPICAL at 17:05

## 2017-04-03 RX ADMIN — NYSTATIN SCH APPLIC: 100000 POWDER TOPICAL at 13:58

## 2017-04-03 RX ADMIN — MORPHINE SULFATE PRN MG: 4 INJECTION, SOLUTION INTRAMUSCULAR; INTRAVENOUS at 17:05

## 2017-04-03 RX ADMIN — MORPHINE SULFATE PRN MG: 4 INJECTION, SOLUTION INTRAMUSCULAR; INTRAVENOUS at 06:49

## 2017-04-03 RX ADMIN — MORPHINE SULFATE PRN MG: 4 INJECTION, SOLUTION INTRAMUSCULAR; INTRAVENOUS at 12:45

## 2017-04-03 NOTE — PN
DATE: 04/03/2017



SUBJECTIVE:  The patient was seen and examined at the bedside earlier today.  The patient complains o
f increased urine output and also complains of discharge from his penis, but no reports of any blood.
  He is waiting to get evaluation from urology.  He does complain of abdominal pain, mostly around hi
s pelvic wall by his ileostomy.  No rebound or guarding.  No acute overnight events.  No reports of n
ausea or vomiting, fever or chills.



PHYSICAL EXAMINATION:

VITAL SIGNS:  Temperature is 98.0, blood pressure is 105/61, pulse 56, respirations 18, 96 on room ai
r.

HEENT:  Sclerae are anicteric.

NECK:  Supple.

CARDIAC:  S1, S2.

LUNGS:  Decreased breath sounds, but no rales or wheeze.

ABDOMEN:  With bowel sounds, soft, with positive ileostomy/ileal conduit on the right side with semi-
cloudy urine, no blood.  He has tenderness all around that area, also down by pelvic area.  No reboun
d or guarding.  His left inguinal hernia scar is healing, the incision.  No erythema.  

SKIN:  Dry and intact.

EXTREMITIES:  No edema.



ASSESSMENT:  This is a 61-year-old male who came with abdominal pain, was found to have a soft tissue
 mass-like density in the right anterior pelvis with irregularly shape as well as a 4 cm mass-like de
nsity in the left anterior pelvis, probable seroma, and low density lesions in the liver.  He has a h
istory of a total cystectomy with ileal conduit which is having increased output.  Now complains of p
enile discharge as well as a 4 cm fluid collection in the left anterior abdominal wall and pelvis.  SO avila also has bilateral scrotal swelling, history of anemia and multiple colon polyps.



PLAN:  Continue his antibiotics and surgical followup.  He is also pending evaluation with urology.  
Continue his diet as tolerated.  He did have bowel movement.  No reports of diarrhea or blood.  He is
 on DVT prophylaxis, Lovenox.  On IV antibiotics on meropenem.  Continue GI prophylaxis, Protonix.  SO avila is also on vancomycin IV antibiotics.  It was then discussed with the patient regarding that he may
 benefit from elective colonoscopy when optimal for polypectomy.  Will continue to follow.  The patie
nt was seen and cased discussed with Dr. Pham.





__________________________________________

Claritza VIRK







cc:



DD: 04/03/2017 12:06:53  [id number]

TT: 04/03/2017 12:48:41

Confirmation # [id number]

Dictation # [id number]

mn

## 2017-04-03 NOTE — CP.PCM.PN
<Garth Ortega - Last Filed: 04/03/17 12:10>





Subjective





- Date & Time of Evaluation


Date of Evaluation: 04/03/17


Time of Evaluation: 12:10





- Subjective


Subjective: 


Pt seen and examined at bedside. Pt complaining of lower abdominal pain, which 

is not improving. Pt states he has been producing more urine and has had the 

urge to urinate, even though he is not able to. Denies CP, SOB, N/V/D, fever, 

chills. 





Objective





- Vital Signs/Intake and Output


Vital Signs (last 24 hours): 


 











Temp Pulse Resp BP Pulse Ox


 


 98.0 F   56 L  18   105/61   96 


 


 04/03/17 06:00  04/03/17 06:00  04/03/17 06:00  04/03/17 06:00  04/03/17 06:00








Intake and Output: 


 











 04/03/17 04/03/17





 06:59 18:59


 


Intake Total  240


 


Balance  240














- Medications


Medications: 


 Current Medications





Albuterol Sulfate (Albuterol 0.083% Inhal Sol (2.5 Mg/3 Ml) Ud)  2.5 mg IH 

[id number] PRN


   PRN Reason: Shortness of Breath


Alprazolam (Xanax)  0.25 mg PO Q6 PRN; Protocol


   PRN Reason: Anxiety


   Stop: 04/09/17 00:01


   Last Admin: 04/02/17 22:41 Dose:  0.25 mg


Enoxaparin Sodium (Lovenox)  40 mg SC DAILY [facility]


   PRN Reason: Protocol


   Last Admin: 04/03/17 09:28 Dose:  40 mg


Meropenem 1 gm/ Sodium (Chloride)  100 mls @ 100 mls/hr IVPB Q8 [facility]


   PRN Reason: Protocol


   Stop: 04/08/17 10:01


   Last Admin: 04/03/17 06:46 Dose:  100 mls/hr


Vancomycin HCl (Vancomycin 1gm)  250 mls @ 167 mls/hr IVPB Q12H [facility]


   PRN Reason: Protocol


   Last Admin: 04/03/17 09:28 Dose:  167 mls/hr


Levothyroxine Sodium (Synthroid)  100 mcg PO DAILY [facility]


   Last Admin: 04/03/17 09:28 Dose:  100 mcg


Morphine Sulfate (Morphine)  4 mg IVP Q3H PRN


   PRN Reason: Pain, severe (8-10)


   Last Admin: 04/03/17 06:49 Dose:  4 mg


Nystatin (Nystop Topical Powder)  [facility] gm [facility]


   Last Admin: 04/03/17 10:30 Dose:  1 applic


Ondansetron HCl (Zofran Odt)  4 mg PO Q6 PRN


   PRN Reason: Nausea/Vomiting


   Last Admin: 04/01/17 13:22 Dose:  4 mg


Pantoprazole Sodium (Protonix Ec Tab)  40 mg PO ACB [facility]


   Last Admin: 04/03/17 06:49 Dose:  40 mg











- Constitutional


Appears: Well, No Acute Distress





- Head Exam


Head Exam: ATRAUMATIC, NORMAL INSPECTION, NORMOCEPHALIC





- ENT Exam


ENT Exam: Mucous Membranes Moist, Normal Exam





- Respiratory Exam


Respiratory Exam: Clear to Ausculation Bilateral, NORMAL BREATHING PATTERN.  

absent: Rhonchi, Wheezes





- Cardiovascular Exam


Cardiovascular Exam: RRR, +S1, +S2





- GI/Abdominal Exam


GI & Abdominal Exam: Soft, Tenderness (Lower quadrant tenderness to palpation), 

Normal Bowel Sounds


Additional comments: 


Urostomy bag in place. 





- Extremities Exam


Extremities Exam: Normal Inspection.  absent: Calf Tenderness, Pedal Edema





- Neurological Exam


Neurological Exam: Alert, Awake, Oriented x3





- Psychiatric Exam


Psychiatric exam: Normal Affect, Normal Mood





- Skin


Skin Exam: Intact, Normal Color, Warm





Assessment and Plan





- Assessment and Plan (Free Text)


Plan: 


62 y/o M with PMH of Interstitial hemorrhagic cyctitis s/p ileal conduit and 

cystectomy, presents with left pelvic fluid collection/abscess. CT ab/pelvis 

shows 4 cm post surgical fluid collection, 7 x 2 cm complex collection. Also, 

there is unchanged liver lesions from previous scan. Pt being seen by urology 

and surgery, who both recommend conservative management at this time. Pt is 

placed on Merrem and Vancomycin by ID. Pt will be continued on abx and follow 

cultures. 





Pelvic fluid collection/Abscess 


- Likely secondary to recent surgical procedure


- Surgery and urology recommend conservative treatment


- Continue Merrem and Vancomycin


- Morphine for pain


- Surgery and urology following 





Hydrocele/UTI


- Urology recommends no surgical intervention 


- Urine culture repeated due to contaminated specimen right anterior pelvis


- CT results as above


- Medical management, merrem and vancomycin





Candidiasis 


- Nystatin powder ordered





Prophylaxis


- lovenox


- Zofran





Seen, reviewed, and discussed with attending.





Shannon, PGY-1








<Lora CHUNG,Dante - Last Filed: 04/03/17 17:46>





Objective





- Vital Signs/Intake and Output


Vital Signs (last 24 hours): 


 











Temp Pulse Resp BP Pulse Ox


 


 98.0 F   56 L  18   105/61   96 


 


 04/03/17 06:00  04/03/17 06:00  04/03/17 06:00  04/03/17 06:00  04/03/17 06:00








Intake and Output: 


 











 04/03/17 04/03/17





 06:59 18:59


 


Intake Total  360


 


Output Total  400


 


Balance  -40














- Medications


Medications: 


 Current Medications





Albuterol Sulfate (Albuterol 0.083% Inhal Sol (2.5 Mg/3 Ml) Ud)  2.5 mg IH 

[id number] PRN


   PRN Reason: Shortness of Breath


Alprazolam (Xanax)  0.25 mg PO Q6 PRN; Protocol


   PRN Reason: Anxiety


   Stop: 04/09/17 00:01


   Last Admin: 04/02/17 22:41 Dose:  0.25 mg


Enoxaparin Sodium (Lovenox)  40 mg SC DAILY [facility]


   PRN Reason: Protocol


   Last Admin: 04/03/17 09:28 Dose:  40 mg


Meropenem 1 gm/ Sodium (Chloride)  100 mls @ 100 mls/hr IVPB Q8 [facility]


   PRN Reason: Protocol


   Stop: 04/08/17 10:01


   Last Admin: 04/03/17 13:57 Dose:  100 mls/hr


Vancomycin HCl (Vancomycin 1gm)  250 mls @ 167 mls/hr IVPB Q12H [facility]


   PRN Reason: Protocol


   Last Admin: 04/03/17 09:28 Dose:  167 mls/hr


Levothyroxine Sodium (Synthroid)  100 mcg PO DAILY [facility]


   Last Admin: 04/03/17 09:28 Dose:  100 mcg


Morphine Sulfate (Morphine)  4 mg IVP Q3H PRN


   PRN Reason: Pain, severe (8-10)


   Last Admin: 04/03/17 17:05 Dose:  4 mg


Nystatin (Nystop Topical Powder)  0 gm TOP TID [facility]


   Last Admin: 04/03/17 17:05 Dose:  1 applic


Ondansetron HCl (Zofran Odt)  4 mg PO Q6 PRN


   PRN Reason: Nausea/Vomiting


   Last Admin: 04/01/17 13:22 Dose:  4 mg


Pantoprazole Sodium (Protonix Ec Tab)  40 mg PO ACB [facility]


   Last Admin: 04/03/17 06:49 Dose:  40 mg











Attending/Attestation





- Attestation


I have personally seen and examined this patient.: Yes


I have fully participated in the care of the patient.: Yes


I have reviewed all pertinent clinical information, including history, physical 

exam and plan: Yes


Notes (Text): 





Patient was seen and examined with medical resident .Agreed with resident 

assessment and plan.





62 y/o M with PMH of Interstitial hemorrhagic cystitis s/p ileal conduit and 

cystectomy, presents with left pelvic fluid collection/abscess. CT ab/pelvis 

shows 4 cm post surgical fluid collection, 7 x 2 cm complex collection. Also, 

there is unchanged liver lesions from previous scan. Patient has been evaluated 

by   urology and surgery, who both recommend conservative management at this 

time.He is on broad spectrum antibiotics as per ID.Cultures are negative so 

far.Abdominal pain is better, cultures are negative so far.





Management plan was discussed in detail with patient


 Education was provided.

## 2017-04-03 NOTE — CON
DATE: 04/02/2017





REASON FOR CONSULTATION:  Abdominal pain.



HISTORY OF PRESENT ILLNESS:  This 61-year-old patient with a history of total 
cystectomy with ileal conduit, history of chronic abdominal pain due to 
incisional hernia, history of abdominal abscess, status post surgeries in the 
past.  The patient does have discomfort mainly in the right upper quadrant and 
right side of the abdomen near the ileostomy and also more discomfort on the 
left side of the abdomen.  The patient had repair of the inguinal hernia done 
about close to  4 weeks ago.  The patient is now having a feeling of urgency to 
pass urine.  The patient did have cystectomy and also has now some discharge 
per urethra.  He was concerned about it.  



OTHER PAST MEDICAL HISTORY:  As above.  He has a history of multiple colonic 
polyps  and biopsies were reported  hyperplastic type of polyps than 
adenomatous polyps.  They are larger polyps, not removed pending further workup 
for abdominal pain.  History of iron deficiency anemia, history of hepatic 
lesions status post biopsy nondiagnostic.



SURGICAL HISTORY:  Multiple complicated surgeries.  Ileal conduit.  Surgeries 
for abdominal wall abscess.  He also had an EGD and colonoscopy done.



SOCIAL HISTORY:  Denies smoking, no alcohol.



REVIEW OF SYSTEMS:  Positive as above.  Other systems reviewed and negative.



PHYSICAL EXAMINATION:

GENERAL:  The patient is lying on the bed, not in acute distress. 

VITAL SIGNS:  Temperature is 98.3, pulse 56, blood pressure 119/60, 
respirations 18, O2 saturation is 97%.

HEENT:  Atraumatic, anicteric.

NECK:  Supple.

HEART:  S1, S2 heard.

LUNGS:  Bilateral air entry present.

ABDOMEN:  Soft.  There was ileostomy /ileal conduit present  on  the  right 
side , which is draining cloudy urine .  There was a tenderness around that 
area and also complains of discomfort on the left side of the abdomen.  Well-
healed left inguinal hernia scar was present.

EXTREMITIES:  No edema.



LABORATORY DATA:  Hemoglobin 9.8, hematocrit 32.3, WBC 5.3, platelets 239.  
Chemistry is essentially unremarkable except for potassium is 3.4,   Urine 
shows leukocytes and also nitrites.  The patient did have a CT scan of the 
abdomen and pelvis done with IV contrast.  It showed the same multiple low 
density lesions in the liver, bilateral hydroureteronephrosis.  Ileal conduit.  
There was right pelvic fluid collection with  surgical material material close 
to the ileal conduit.  At the left anterior pelvis there is also new mass-like 
density noticed, most likely representing postoperative fluid collection.  



IMPRESSION:  This 61-year-old patient now admitted with an abdominal pain, 
worsening of the symptoms.  Found to have slightly increased soft tissue mass-
like density.  Found to have a large 7 x 2.7 cm mass-like area noticed in the 
right anterior pelvis with irregular in shape with surgical material in it.  
The patient also has a small 4 cm mass-like density in the left anterior pelvis
, probable seroma.  Multiple low density lesions in the liver.  This 61-year-
old patient status post total cystectomy with ileal conduit has slightly 
increased fluid collection in the right anterior pelvis, closer to the ileal 
conduit with surgical material.  The patient is tender at that point.  There is 
about 4 cm fluid collection noticed on the left side anterior abdominal wall,  
anterior pelvis.  This is thought to be representing some seroma  T The patient 
also has bilateral scrotal swelling.  Other comorbidities include multiple 
colon polyps, anemia.



e.  



IMPRESSION AND PLAN :  

1.  Multiple fluid collections, one on the right side closer to the ileostomy  
increased in size with surgical material and smallier one left side probable 
seroma was present on the left side. The patient is  being followed by the 
urologist team in [facility], whom he saw  about a month ago.  At that 
time, the suggestion was to follow conservative management as the surgical 
procedure was felt to  be more complicated.  The patient did have left inguinal 
hernia repair and there is a small fluid collection noticed on left side .  It 
could be postoperative, but we will request a surgeon to evaluate this.  

2.  The patient had hepatic lesions.  The etiology is unknown.  Biopsy is 
nondiagnostic.Referred to [facility] liver unit but patient did not followup 

3.  The plan is to f Continue the antibiotics and surgical followup.  The 
patient does have some difficulty in moving his bowels.  Consider some stool 
softeners and would benefit from elective colonoscopic evaluation later on for 
polypectomy once the acute problem  is resolved.  



Thank you very much for allowing us to participate in the care of the patient.





__________________________________________

Racheal Pham MD







cc:   



DD: 04/02/2017 21:48:45  [id number]

TT: 04/02/2017 23:49:02

Confirmation # [id number]

Dictation # [id number]

sn [id number]

## 2017-04-03 NOTE — CP.PCM.PN
Subjective





- Date & Time of Evaluation


Date of Evaluation: 04/03/17


Time of Evaluation: 06:35





- Subjective


Subjective: 


Surgery Note for Dr. Crawford





Pt seen and evaluated at the bedside. Has c/o lower abd pain. Denies N/V. Stoma 

in place. Tolerating diet.  Afebrile overnight.








Objective





- Vital Signs/Intake and Output


Vital Signs (last 24 hours): 


 











Temp Pulse Resp BP Pulse Ox


 


 98.3 F   56 L  19   119/60   97 


 


 04/02/17 06:00  04/02/17 06:00  04/02/17 06:00  04/02/17 06:00  04/02/17 06:00








Intake and Output: 


 











 04/03/17 04/03/17





 06:59 18:59


 


Intake Total 660 


 


Output Total 400 


 


Balance 260 














- Medications


Medications: 


 Current Medications





Albuterol Sulfate (Albuterol 0.083% Inhal Sol (2.5 Mg/3 Ml) Ud)  2.5 mg IH 

[id number] PRN


   PRN Reason: Shortness of Breath


Alprazolam (Xanax)  0.25 mg PO Q6 PRN; Protocol


   PRN Reason: Anxiety


   Stop: 04/09/17 00:01


   Last Admin: 04/02/17 22:41 Dose:  0.25 mg


Enoxaparin Sodium (Lovenox)  40 mg SC DAILY [facility]


   PRN Reason: Protocol


   Last Admin: 04/02/17 09:58 Dose:  40 mg


Meropenem 1 gm/ Sodium (Chloride)  100 mls @ 100 mls/hr IVPB Q8 [facility]


   PRN Reason: Protocol


   Stop: 04/08/17 10:01


   Last Admin: 04/03/17 06:46 Dose:  100 mls/hr


Vancomycin HCl (Vancomycin 1gm)  250 mls @ 167 mls/hr IVPB Q12H [facility]


   PRN Reason: Protocol


   Last Admin: 04/02/17 22:42 Dose:  167 mls/hr


Levothyroxine Sodium (Synthroid)  100 mcg PO DAILY [facility]


   Last Admin: 04/02/17 09:58 Dose:  100 mcg


Morphine Sulfate (Morphine)  4 mg IVP Q3H PRN


   PRN Reason: Pain, severe (8-10)


   Last Admin: 04/03/17 06:49 Dose:  4 mg


Nystatin (Nystop Topical Powder)  0 gm TOP TID [facility]


   Last Admin: 04/02/17 17:29 Dose:  1 applic


Ondansetron HCl (Zofran Odt)  4 mg PO Q6 PRN


   PRN Reason: Nausea/Vomiting


   Last Admin: 04/01/17 13:22 Dose:  4 mg


Pantoprazole Sodium (Protonix Ec Tab)  40 mg PO ACB [facility]


   Last Admin: 04/03/17 06:49 Dose:  40 mg











- Labs


Labs: 


 





 04/03/17 06:50 





 04/02/17 08:06 











- Additional Findings


Additional findings: 


- Constitutional


Appears: No Acute Distress





- Head Exam


Head Exam: ATRAUMATIC





- Eye Exam


Eye Exam: EOMI





- ENT Exam


ENT Exam: Mucous Membranes Moist





- Neck Exam


Neck Exam: Full ROM, Normal Inspection.  absent: Lymphadenopathy





- Respiratory Exam


Respiratory Exam: NORMAL BREATHING PATTERN





- Cardiovascular Exam


Cardiovascular Exam: REGULAR RHYTHM





- GI/Abdominal Exam


GI & Abdominal Exam: Soft, Tenderness 


Additional comments: 


Ileal conduit in place: cloudy Urine. No erythema. 





-  Exam


 Exam: absent: Uretheral Discharge





- Extremities Exam


Extremities Exam: Full ROM, Normal Inspection





- Neurological Exam


Neurological Exam: Alert, Awake





- Skin


Skin Exam: Dry, Intact, Normal Color, Warm





Assessment and Plan





- Assessment and Plan (Free Text)


Plan: 


61M with suprapubic pressure/discomfort being treated for UTI, h/o hernia repair





Plan: 


- continue antibiotics


- continue medical management


- serial abdominal exams


- No emergent surgical intervention at this time. 





Nicolas, PGY-1





Case to be discussed with Dr. Crawford

## 2017-04-04 LAB
ALBUMIN/GLOB SERPL: 0.9 {RATIO} (ref 1.1–1.8)
ALP SERPL-CCNC: 58 U/L (ref 38–133)
ALT SERPL-CCNC: 23 U/L (ref 7–56)
APPEARANCE UR: (no result)
AST SERPL-CCNC: 23 U/L (ref 15–59)
BACTERIA #/AREA URNS HPF: (no result) /[HPF]
BILIRUB SERPL-MCNC: 0.4 MG/DL (ref 0.2–1.3)
BILIRUB UR-MCNC: NEGATIVE MG/DL
BUN SERPL-MCNC: 16 MG/DL (ref 7–21)
CALCIUM SERPL-MCNC: 8.5 MG/DL (ref 8.4–10.5)
CHLORIDE SERPL-SCNC: 99 MMOL/L (ref 95–110)
CO2 SERPL-SCNC: 30 MMOL/L (ref 21–33)
COLOR UR: YELLOW
ERYTHROCYTE [DISTWIDTH] IN BLOOD BY AUTOMATED COUNT: 14.4 % (ref 11.5–14.5)
GLOBULIN SER-MCNC: 3.9 GM/DL
GLUCOSE SERPL-MCNC: 105 MG/DL (ref 70–110)
GLUCOSE UR STRIP-MCNC: NEGATIVE MG/DL
HCT VFR BLD CALC: 32.1 % (ref 42–52)
KETONES UR STRIP-MCNC: (no result) MG/DL
LEUKOCYTE ESTERASE UR-ACNC: (no result) LEU/UL
MCH RBC QN AUTO: 24 PG (ref 25–35)
MCHC RBC AUTO-ENTMCNC: 31.2 G/DL (ref 31–37)
MCV RBC AUTO: 77.2 FL (ref 80–105)
PH UR STRIP: 6.5 [PH] (ref 4.7–8)
PLATELET # BLD: 208 10^3/UL (ref 120–450)
PMV BLD AUTO: 8.4 FL (ref 7–11)
POTASSIUM SERPL-SCNC: 3.4 MMOL/L (ref 3.6–5)
PROT SERPL-MCNC: 7.4 G/DL (ref 5.8–8.3)
PROT UR STRIP-MCNC: 100 MG/DL
RBC # UR STRIP: (no result) /UL
SODIUM SERPL-SCNC: 139 MMOL/L (ref 132–148)
SP GR UR STRIP: 1.02 (ref 1–1.03)
UROBILINOGEN UR STRIP-ACNC: 0.2 E.U./DL
WBC # BLD AUTO: 5.1 10^3/UL (ref 4.5–11)
WBC #/AREA URNS HPF: (no result) /HPF (ref 0–6)

## 2017-04-04 RX ADMIN — MORPHINE SULFATE PRN MG: 4 INJECTION, SOLUTION INTRAMUSCULAR; INTRAVENOUS at 17:10

## 2017-04-04 RX ADMIN — VANCOMYCIN HYDROCHLORIDE SCH MLS/HR: 1 INJECTION, POWDER, LYOPHILIZED, FOR SOLUTION INTRAVENOUS at 09:25

## 2017-04-04 RX ADMIN — MORPHINE SULFATE PRN MG: 4 INJECTION, SOLUTION INTRAMUSCULAR; INTRAVENOUS at 09:25

## 2017-04-04 RX ADMIN — NYSTATIN SCH APPLIC: 100000 POWDER TOPICAL at 17:12

## 2017-04-04 RX ADMIN — ENOXAPARIN SODIUM SCH MG: 40 INJECTION SUBCUTANEOUS at 09:25

## 2017-04-04 RX ADMIN — MORPHINE SULFATE PRN MG: 4 INJECTION, SOLUTION INTRAMUSCULAR; INTRAVENOUS at 23:17

## 2017-04-04 RX ADMIN — VANCOMYCIN HYDROCHLORIDE SCH MLS/HR: 1 INJECTION, POWDER, LYOPHILIZED, FOR SOLUTION INTRAVENOUS at 21:02

## 2017-04-04 RX ADMIN — POLYETHYLENE GLYCOL 3350 SCH GM: 17 POWDER, FOR SOLUTION ORAL at 15:36

## 2017-04-04 RX ADMIN — PANTOPRAZOLE SODIUM SCH MG: 40 TABLET, DELAYED RELEASE ORAL at 09:25

## 2017-04-04 RX ADMIN — NYSTATIN SCH APPLIC: 100000 POWDER TOPICAL at 13:50

## 2017-04-04 RX ADMIN — MORPHINE SULFATE PRN MG: 4 INJECTION, SOLUTION INTRAMUSCULAR; INTRAVENOUS at 13:58

## 2017-04-04 RX ADMIN — NYSTATIN SCH APPLIC: 100000 POWDER TOPICAL at 09:28

## 2017-04-04 RX ADMIN — MORPHINE SULFATE PRN MG: 4 INJECTION, SOLUTION INTRAMUSCULAR; INTRAVENOUS at 21:19

## 2017-04-04 RX ADMIN — HYDROMORPHONE HYDROCHLORIDE PRN MG: 1 INJECTION, SOLUTION INTRAMUSCULAR; INTRAVENOUS; SUBCUTANEOUS at 04:19

## 2017-04-04 RX ADMIN — HYDROMORPHONE HYDROCHLORIDE PRN MG: 1 INJECTION, SOLUTION INTRAMUSCULAR; INTRAVENOUS; SUBCUTANEOUS at 01:17

## 2017-04-04 NOTE — CP.PCM.PN
Subjective





- Date & Time of Evaluation


Date of Evaluation: 04/04/17


Time of Evaluation: 07:30





- Subjective


Subjective: 


Surgery Note for Dr. Crawford





Pt seen and evaluated at the bedside. C/o lower abd pain. Denies N/V. Passing 

gas, and BM overnight.  Stoma in place.  Afebrile overnight.





Objective





- Vital Signs/Intake and Output


Vital Signs (last 24 hours): 


 











Temp Pulse Resp BP Pulse Ox


 


 97.9 F   52 L  18   99/59 L  95 


 


 04/04/17 06:00  04/04/17 06:00  04/04/17 06:00  04/04/17 06:00  04/04/17 06:00








Intake and Output: 


 











 04/04/17 04/04/17





 06:59 18:59


 


Intake Total 360 


 


Output Total 200 


 


Balance 160 














- Medications


Medications: 


 Current Medications





Albuterol Sulfate (Albuterol 0.083% Inhal Sol (2.5 Mg/3 Ml) Ud)  2.5 mg IH 

[id number] PRN


   PRN Reason: Shortness of Breath


Alprazolam (Xanax)  0.25 mg PO Q6 PRN; Protocol


   PRN Reason: Anxiety


   Stop: 04/09/17 00:01


   Last Admin: 04/04/17 05:21 Dose:  0.25 mg


Enoxaparin Sodium (Lovenox)  40 mg SC DAILY [facility]


   PRN Reason: Protocol


   Last Admin: 04/03/17 09:28 Dose:  40 mg


Hydromorphone HCl (Dilaudid)  0.5 mg IVP Q3H PRN


   PRN Reason: Pain, severe (8-10)


   Last Admin: 04/04/17 04:19 Dose:  0.5 mg


Meropenem 1 gm/ Sodium (Chloride)  100 mls @ 100 mls/hr IVPB Q8 [facility]


   PRN Reason: Protocol


   Stop: 04/08/17 10:01


   Last Admin: 04/04/17 05:20 Dose:  100 mls/hr


Vancomycin HCl (Vancomycin 1gm)  250 mls @ 167 mls/hr IVPB Q12H [facility]


   PRN Reason: Protocol


   Last Admin: 04/03/17 21:26 Dose:  167 mls/hr


Levothyroxine Sodium (Synthroid)  100 mcg PO DAILY [facility]


   Last Admin: 04/03/17 09:28 Dose:  100 mcg


Nystatin (Nystop Topical Powder)  0 gm TOP TID [facility]


   Last Admin: 04/03/17 17:05 Dose:  1 applic


Ondansetron HCl (Zofran Odt)  4 mg PO Q6 PRN


   PRN Reason: Nausea/Vomiting


   Last Admin: 04/04/17 05:53 Dose:  4 mg


Pantoprazole Sodium (Protonix Ec Tab)  40 mg PO ACB [facility]


   Last Admin: 04/03/17 06:49 Dose:  40 mg











- Labs


Labs: 


 





 04/04/17 06:45 





 04/04/17 06:45 











- Additional Findings


Additional findings: 


- Constitutional


Appears: No Acute Distress





- Head Exam


Head Exam: ATRAUMATIC





- Eye Exam


Eye Exam: EOMI





- ENT Exam


ENT Exam: Mucous Membranes Moist





- Respiratory Exam


Respiratory Exam: NORMAL BREATHING PATTERN





- Cardiovascular Exam


Cardiovascular Exam: REGULAR RHYTHM





- GI/Abdominal Exam


GI & Abdominal Exam: Soft, Tenderness 


Additional comments: 


Ileal conduit in place: cloudy Urine. No erythema. 





-  Exam


 Exam: absent: Uretheral Discharge





- Extremities Exam


Extremities Exam: Full ROM, Normal Inspection





- Neurological Exam


Neurological Exam: Alert, Awake





- Skin


Skin Exam: Dry, Intact, Normal Color, Warm








Assessment and Plan





- Assessment and Plan (Free Text)


Assessment: 


61M with suprapubic pressure/discomfort being treated for UTI, h/o hernia repair





Plan: 


- continue antibiotics


- continue medical management


- serial abdominal exams


- No emergent surgical intervention at this time. 





Nicolas, PGY-1





Case to be discussed with Dr. Crawford

## 2017-04-04 NOTE — CON
DATE: 04/03/2017



CHIEF COMPLAINT:  Intractable suprapubic pain.



HISTORY OF PRESENT ILLNESS:  This is a 61-year-old male who is well known to me from multiple prior a
dmissions.  The patient has a history of severe interstitial cystitis.  He underwent a radical cystec
eleuterio with ileal conduit formation at [facility] years ago.  The patient has had multiple anjana
dmissions for chronic abdominal pain.  He had some abscesses and drainage around his urostomy which w
as revised in the past.  He recently underwent repair of a left inguinal hernia and is complaining of
 scrotal swelling since the procedure.  Currently, the patient is complaining of constipation and fee
ling he needs to urinate, although he has no bladder.  He denies any current fever or chills, but was
 feeling some chills prior to admission.  He also reports that his urine from his urostomy became elda
udy and foul smelling.  The patient was admitted for further evaluation and treatment.



PAST MEDICAL HISTORY:  As above in HPI plus asthma, hypothyroidism, anemia, gastritis, colon polyps, 
fatty liver.



MEDICATIONS:  Include albuterol, Lovenox, meropenem, morphine, Nystatin, Protonix, Synthroid, vancomy
anna, Xanax and Zofran.



ALLERGIES:  No known drug allergies.



FAMILY HISTORY:  Noncontributory.



SOCIAL HISTORY:  No smoking or ETOH use.



REVIEW OF SYSTEMS:  A 12-point review of systems was obtained.  Positive for abdominal pain, constipa
tion, scrotal swelling.  Other systems are negative.



PHYSICAL EXAMINATION:

GENERAL:  The patient is awake and alert, lying in bed.  He is in no acute distress.  He has been afe
brile.

VITAL SIGNS:  Temp of 98, pulse 56, /61, respirations 18.

NECK:  Supple.  There is no adenopathy.

CHEST:  Reveals normal inspiratory effort.

CARDIAC:  Showed positive S1, S2.  There is no peripheral edema.

ABDOMEN:  Soft, there is some mild diffuse tenderness.  There is no rebound or guarding.  A urostomy 
in place, which is viable and functioning.

GENITOURINARY:  Scrotum has some mild edema and swelling.  There is no noted tenderness.  Testes are 
down bilaterally descended, nontender, no masses.  Epididymides are normal.



LABORATORY DATA:  WBC count 4.8, hemoglobin 9.8, creatinine 1.3 with a GFR of 56.  Urinalysis showed 
large blood, positive nitrites, moderate leukocyte esterase.  Urine culture grew ,000 multiple 
species.  Blood culture grew gram-positive cocci.



RADIOLOGIC EXAMINATION:  The patient had a CT scan of the abdomen and pelvis repeated on 3/31.  A new
 CT scan shows again noted in the right anterior pelvis is a mass-like area measuring 7 cm x 27 cm, c
ontains surgical material within it, including tiny metallic clips.  There is fluid and a small amoun
t of air, has a soft tissue rim and is suspicious for chronic right pelvic fluid collection associate
d with a retained surgical material, it is stable in appearance.  There is a nearby irregular soft ti
ssue in the right anterior pelvis which extends into the anterior pelvic wall soft tissues which coul
d be scarring or chronic phlegmon inflammation.  There is a new density measuring 4 cm seen in the le
ft anterior pelvis with an irregular shape just above the left inguinal canal, which appears to be a 
lymphocele or resolving hematoma or seroma.



IMPRESSION AND PLAN:  This is a very complicated 61-year-old male who had a cystectomy and ileal cond
uit done at another institution.  Since that time, the patient has had recurrent problems including c
hronic abdominal pain.  He has had multiple abscesses drained, stomal revision.  He recently had a le
ft inguinal hernia repair and appears to have a resolving seroma.  Urologically the patient's renal f
unction has remained stable.  His ileal conduit is viable and draining.  The patient was recommended 
to see Dr. Earl Stern, a urologist who performed portion of the surgery at [facility] who is now working
 in [facility].  The patient reports he did follow up with Dr. Stern and was told there wa
s nothing surgically to do at this time.  The patient is very complicated given his multiple surgical
 procedures.  It is unclear what the cause of his chronic abdominal pain is and feeling that he needs
 to urinate as he does not have a bladder in situ.  My recommendations would be to have general surge
ry followup as he had a recent inguinal hernia repair, appears to have a seroma.  I would also recomm
end a GI consultation as he has a history of colon polyps.  He is complaining of constipation and a f
eeling of chronic pelvic pressure.  This could be GI related.  As for the urinary infection, urine is
 obtained from the ileal conduit, so there is always going to be a bacterial count.  It is unclear wh
ere the enterococcus in his blood is coming from.  I would also recommend a consultation with Dr. Arnold Jennings to see if there is any possibility of further abscess drainage, I would consider a possible 
nuclear scan to see if there are other sources of infection.



Thank you for allowing us to participate in the care of this patient, it is very complicated and we w
ill continue to follow him with you.  No acute urologic surgical intervention is necessary at this ti
me.





__________________________________________

Anthony Ashley MD







cc:



DD: 04/03/2017 19:34:08  [id number]

TT: 04/03/2017 22:33:43

Confirmation # [id number]

Dictation # [id number]

jn

## 2017-04-04 NOTE — CP.PCM.PN
<Garth Ortega - Last Filed: 04/04/17 15:58>





Subjective





- Date & Time of Evaluation


Date of Evaluation: 04/04/17


Time of Evaluation: 15:38





- Subjective


Subjective: 


Pt seen and examined at bedside. Pt states he had pain overnight and received 

Dilaudid, but it made him nauseous. Pt states he still experience lower 

abdominal pain and urinary frequency. Denies CP, SOB, N/V/D. 





Objective





- Vital Signs/Intake and Output


Vital Signs (last 24 hours): 


 











Temp Pulse Resp BP Pulse Ox


 


 97.9 F   52 L  18   99/59 L  95 


 


 04/04/17 06:00  04/04/17 06:00  04/04/17 06:00  04/04/17 06:00  04/04/17 06:00








Intake and Output: 


 











 04/04/17 04/04/17





 06:59 18:59


 


Intake Total 360 


 


Output Total 200 


 


Balance 160 














- Medications


Medications: 


 Current Medications





Albuterol Sulfate (Albuterol 0.083% Inhal Sol (2.5 Mg/3 Ml) Ud)  2.5 mg IH 

[id number] PRN


   PRN Reason: Shortness of Breath


Alprazolam (Xanax)  0.25 mg PO Q6 PRN; Protocol


   PRN Reason: Anxiety


   Stop: 04/09/17 00:01


   Last Admin: 04/04/17 05:21 Dose:  0.25 mg


Enoxaparin Sodium (Lovenox)  40 mg SC DAILY [facility]


   PRN Reason: Protocol


   Last Admin: 04/04/17 09:25 Dose:  40 mg


Meropenem 1 gm/ Sodium (Chloride)  100 mls @ 100 mls/hr IVPB Q8 [facility]


   PRN Reason: Protocol


   Stop: 04/08/17 10:01


   Last Admin: 04/04/17 13:49 Dose:  100 mls/hr


Vancomycin HCl (Vancomycin 1gm)  250 mls @ 167 mls/hr IVPB Q12H [facility]


   PRN Reason: Protocol


   Last Admin: 04/04/17 09:25 Dose:  167 mls/hr


Levothyroxine Sodium (Synthroid)  100 mcg PO DAILY [facility]


   Last Admin: 04/04/17 09:24 Dose:  100 mcg


Morphine Sulfate (Morphine)  4 mg IVP Q3H PRN


   PRN Reason: Pain, moderate (4-7)


   Last Admin: 04/04/17 13:58 Dose:  4 mg


Nystatin (Nystop Topical Powder)  0 gm TOP TID [facility]


   Last Admin: 04/04/17 13:50 Dose:  1 applic


Ondansetron HCl (Zofran Odt)  4 mg PO Q6 PRN


   PRN Reason: Nausea/Vomiting


   Last Admin: 04/04/17 05:53 Dose:  4 mg


Pantoprazole Sodium (Protonix Ec Tab)  40 mg PO ACB [facility]


   Last Admin: 04/04/17 09:25 Dose:  40 mg


Polyethylene Glycol (Miralax)  17 gm PO DAILY [facility]


   Last Admin: 04/04/17 15:36 Dose:  17 gm











- Labs


Labs: 


 





 04/04/17 06:45 





 04/04/17 06:45 











- Constitutional


Appears: Well, No Acute Distress





- Head Exam


Head Exam: ATRAUMATIC, NORMAL INSPECTION, NORMOCEPHALIC





- ENT Exam


ENT Exam: Mucous Membranes Moist, Normal Exam





- Respiratory Exam


Respiratory Exam: Clear to Ausculation Bilateral, NORMAL BREATHING PATTERN.  

absent: Rhonchi, Wheezes





- Cardiovascular Exam


Cardiovascular Exam: RRR, +S1, +S2





- GI/Abdominal Exam


GI & Abdominal Exam: Soft, Tenderness (LLQ), Normal Bowel Sounds.  absent: 

Distended, Guarding


Additional comments: 


Illeal conduit present





- Extremities Exam


Extremities Exam: Normal Inspection.  absent: Calf Tenderness, Pedal Edema





- Neurological Exam


Neurological Exam: Alert, Awake, Oriented x3





- Psychiatric Exam


Psychiatric exam: Normal Affect, Normal Mood





- Skin


Skin Exam: Intact, Normal Color, Warm





Assessment and Plan





- Assessment and Plan (Free Text)


Plan: 


60 y/o M with PMH of Interstitial hemorrhagic cyctitis s/p ileal conduit and 

cystectomy, presents with left pelvic fluid collection/abscess. CT ab/pelvis 

shows 4 cm post surgical fluid collection, 7 x 2 cm complex collection and 

unchanged liver lesions. Pt was evaluated by IR for drainage of fluid collection

, but was determined not to have drainable collection. Pt also seen by GI, who 

recommend outpt elective colonoscopy. Pt remains on Merrem and Vanc as per ID. 

Pt seen by Urology who states pt is not a surgical candidate at this time. 

Blood cultures show coagulase negative staph and gram positive cocci.





Pelvic fluid collection/Abscess 


- Likely secondary to hernia repair


- Surgery and urology recommend conservative medical treatment


- Continue Merrem and Vancomycin as per ID


- Morphine for pain, not tolerating Dilaudid


- Surgery and urology following 





Hydrocele/UTI


- Urology recommends no surgical intervention 


- Urine culture repeat pending. 


- Medical management, merrem and vancomycin





Candidiasis of groin


- Continue Nystatin powder





Prophylaxis


- lovenox


- Zofran





Seen, reviewed, and discussed with attending.





Shannon, PGY-1








<Lora CHUNG,aDnte - Last Filed: 04/04/17 17:24>





Objective





- Vital Signs/Intake and Output


Vital Signs (last 24 hours): 


 











Temp Pulse Resp BP Pulse Ox


 


 97.9 F   52 L  18   99/59 L  95 


 


 04/04/17 06:00  04/04/17 06:00  04/04/17 06:00  04/04/17 06:00  04/04/17 06:00








Intake and Output: 


 











 04/04/17 04/04/17





 06:59 18:59


 


Intake Total 360 


 


Output Total 200 


 


Balance 160 














- Medications


Medications: 


 Current Medications





Albuterol Sulfate (Albuterol 0.083% Inhal Sol (2.5 Mg/3 Ml) Ud)  2.5 mg IH 

[id number] PRN


   PRN Reason: Shortness of Breath


Alprazolam (Xanax)  0.25 mg PO Q6 PRN; Protocol


   PRN Reason: Anxiety


   Stop: 04/09/17 00:01


   Last Admin: 04/04/17 05:21 Dose:  0.25 mg


Enoxaparin Sodium (Lovenox)  40 mg SC DAILY [facility]


   PRN Reason: Protocol


   Last Admin: 04/04/17 09:25 Dose:  40 mg


Meropenem 1 gm/ Sodium (Chloride)  100 mls @ 100 mls/hr IVPB Q8 [facility]


   PRN Reason: Protocol


   Stop: 04/08/17 10:01


   Last Admin: 04/04/17 13:49 Dose:  100 mls/hr


Vancomycin HCl (Vancomycin 1gm)  250 mls @ 167 mls/hr IVPB Q12H [facility]


   PRN Reason: Protocol


   Last Admin: 04/04/17 09:25 Dose:  167 mls/hr


Levothyroxine Sodium (Synthroid)  100 mcg PO DAILY [facility]


   Last Admin: 04/04/17 09:24 Dose:  100 mcg


Morphine Sulfate (Morphine)  4 mg IVP Q3H PRN


   PRN Reason: Pain, moderate (4-7)


   Last Admin: 04/04/17 17:10 Dose:  4 mg


Nystatin (Nystop Topical Powder)  0 gm TOP TID [facility]


   Last Admin: 04/04/17 17:12 Dose:  1 applic


Ondansetron HCl (Zofran Odt)  4 mg PO Q6 PRN


   PRN Reason: Nausea/Vomiting


   Last Admin: 04/04/17 05:53 Dose:  4 mg


Pantoprazole Sodium (Protonix Ec Tab)  40 mg PO ACB [facility]


   Last Admin: 04/04/17 09:25 Dose:  40 mg


Polyethylene Glycol (Miralax)  17 gm PO DAILY [facility]


   Last Admin: 04/04/17 15:36 Dose:  17 gm











- Labs


Labs: 


 





 04/04/17 06:45 





 04/04/17 06:45 











Attending/Attestation





- Attestation


I have personally seen and examined this patient.: Yes


I have fully participated in the care of the patient.: Yes


I have reviewed all pertinent clinical information, including history, physical 

exam and plan: Yes


Notes (Text): 


Patient was seen and examined with medical resident .Agreed with resident 

assessment and plan.








60 y/o M with PMH of Interstitial hemorrhagic cystitis s/p ileal conduit and 

cystectomy, presents with left pelvic fluid collection/abscess. CT ab/pelvis 

shows 4 cm post surgical fluid collection, 7 x 2 cm complex collection. Blood 

cultures grew gram positive cocci in cluster on IV Vancomycin and 

Meropenem.Patient case was discussed with IR, there is not enough fluid to be 

drained as per IR.





Repeat blood cultures have been ordered, we will follow repeat blood cultures. 





Management plan was discussed in detail with patient


 Education was provided.

## 2017-04-04 NOTE — PN
DATE: 04/04/2017



Seen and examined on the bedside earlier today.  The patient continues to complain of abdominal pain,
 mostly near his stoma site.  The ileostomy is draining cloudy urine, no blood.  Denies any nausea or
 vomiting.  He does complain of having multiple bowel movements yesterday but stool was semi-loose.  
Did not report or note any bleeding.  No fever, chills, shortness of breath or chest pain.



PHYSICAL EXAMINATION:

VITAL SIGNS:  Temperature is 97.9, his blood pressure is 99/59, pulse is _____, respirations 18, 95% 
on room air.



LABORATORY DATA:  WBC is 5.1, H and H is 10.0 and 32.1, platelets are 208.  Chem: Sodium is 139, K 3.
4, BUN 16, creatinine 1.1.  LFTs are within normal limits.



HEENT:  Sclerae are anicteric.

NECK:  Supple.

CARDIAC:  S1, S2.

LUNGS:  Clear.

ABDOMEN:  With bowel sounds positive.  Diffuse tenderness around ileal conduit.  No rebound or guardi
ng.  He also has tenderness near his suprapubic area. 

EXTREMITIES:  No edema.

NEUROLOGIC:  He is awake and alert.



ASSESSMENT:  A 61-year-old male with suprapubic/left diffuse abdominal pain.  He has a urinary tract 
infection, status post recent hernia repair.  The patient complaining of constipation.  He has a pelv
ic fluid collection/abscess evaluated by Dr. Jennings for possible drainage which reports no collection 
or mass that requires drainage of biopsy upon reviewing the CT.  Urinary tract infection, history of 
colon polyp and liver lesion and anemia.



PLAN:  He is on IV antibiotics.  Will start a dose of MiraLax daily and monitor the stools.  He is al
so on IV vancomycin.  Continue GI prophylaxis, Protonix.  The patient is on DVT prophylaxis, Lovenox.
  Also, it has been discussed with patient that he needs a repeat colonoscopy, elective outpatient co
lonoscopy for polypectomy when optimal.  Continue his diet as tolerated.  The patient was seen and ca
se discussed with Dr. Pham.





__________________________________________

Claritza VIRK







cc:



DD: 04/04/2017 14:42:24  [id number]

TT: 04/04/2017 15:14:46

Confirmation # [id number]

Dictation # [id number]

rn

## 2017-04-04 NOTE — PN
DATE: 04/04/2017



TIME:  12:40 p.m.



I reviewed the patient's recent CT scan of the abdomen and pelvis.  The patient 
is post-ileal conduit and cystectomy for interstitial cystitis.  The left 
inguinal changes may be related to previous hernia repair.  There is no 
collection or mass that requires drainage or biopsy.  The ileal conduit is 
unremarkable.



No aspiration or drainage is necessary upon reviewing the CT findings.





__________________________________________

Quinn Jennings MD







cc:   



DD: 04/04/2017 12:40:12  [id number]

TT: 04/04/2017 12:59:11

Confirmation # [id number]

Dictation # [id number]

tn

MTDD

## 2017-04-05 LAB
ALBUMIN/GLOB SERPL: 0.9 {RATIO} (ref 1.1–1.8)
ALP SERPL-CCNC: 52 U/L (ref 38–133)
ALT SERPL-CCNC: 17 U/L (ref 7–56)
AST SERPL-CCNC: 32 U/L (ref 15–59)
BILIRUB SERPL-MCNC: 0.4 MG/DL (ref 0.2–1.3)
BUN SERPL-MCNC: 17 MG/DL (ref 7–21)
CALCIUM SERPL-MCNC: 8.7 MG/DL (ref 8.4–10.5)
CHLORIDE SERPL-SCNC: 98 MMOL/L (ref 95–110)
CO2 SERPL-SCNC: 31 MMOL/L (ref 21–33)
ERYTHROCYTE [DISTWIDTH] IN BLOOD BY AUTOMATED COUNT: 14.6 % (ref 11.5–14.5)
GLOBULIN SER-MCNC: 4 GM/DL
GLUCOSE SERPL-MCNC: 91 MG/DL (ref 70–110)
HCT VFR BLD CALC: 32.1 % (ref 42–52)
MCH RBC QN AUTO: 24.8 PG (ref 25–35)
MCHC RBC AUTO-ENTMCNC: 31.8 G/DL (ref 31–37)
MCV RBC AUTO: 78.1 FL (ref 80–105)
PLATELET # BLD: 231 10^3/UL (ref 120–450)
PMV BLD AUTO: 8.4 FL (ref 7–11)
POTASSIUM SERPL-SCNC: 4.4 MMOL/L (ref 3.6–5)
PROT SERPL-MCNC: 7.6 G/DL (ref 5.8–8.3)
SODIUM SERPL-SCNC: 138 MMOL/L (ref 132–148)
WBC # BLD AUTO: 5 10^3/UL (ref 4.5–11)

## 2017-04-05 RX ADMIN — MORPHINE SULFATE PRN MG: 4 INJECTION, SOLUTION INTRAMUSCULAR; INTRAVENOUS at 14:31

## 2017-04-05 RX ADMIN — MORPHINE SULFATE PRN MG: 4 INJECTION, SOLUTION INTRAMUSCULAR; INTRAVENOUS at 17:08

## 2017-04-05 RX ADMIN — NYSTATIN SCH APPLIC: 100000 POWDER TOPICAL at 11:08

## 2017-04-05 RX ADMIN — MORPHINE SULFATE PRN MG: 4 INJECTION, SOLUTION INTRAMUSCULAR; INTRAVENOUS at 20:28

## 2017-04-05 RX ADMIN — VANCOMYCIN HYDROCHLORIDE SCH MLS/HR: 1 INJECTION, POWDER, LYOPHILIZED, FOR SOLUTION INTRAVENOUS at 11:07

## 2017-04-05 RX ADMIN — POLYETHYLENE GLYCOL 3350 SCH GM: 17 POWDER, FOR SOLUTION ORAL at 11:02

## 2017-04-05 RX ADMIN — VANCOMYCIN HYDROCHLORIDE SCH MLS/HR: 1 INJECTION, POWDER, LYOPHILIZED, FOR SOLUTION INTRAVENOUS at 21:00

## 2017-04-05 RX ADMIN — MORPHINE SULFATE PRN MG: 4 INJECTION, SOLUTION INTRAMUSCULAR; INTRAVENOUS at 08:26

## 2017-04-05 RX ADMIN — ENOXAPARIN SODIUM SCH MG: 40 INJECTION SUBCUTANEOUS at 11:02

## 2017-04-05 RX ADMIN — NYSTATIN SCH: 100000 POWDER TOPICAL at 17:32

## 2017-04-05 RX ADMIN — MORPHINE SULFATE PRN MG: 4 INJECTION, SOLUTION INTRAMUSCULAR; INTRAVENOUS at 11:04

## 2017-04-05 RX ADMIN — MORPHINE SULFATE PRN MG: 4 INJECTION, SOLUTION INTRAMUSCULAR; INTRAVENOUS at 05:14

## 2017-04-05 RX ADMIN — PANTOPRAZOLE SODIUM SCH MG: 40 TABLET, DELAYED RELEASE ORAL at 08:26

## 2017-04-05 RX ADMIN — NYSTATIN SCH APPLIC: 100000 POWDER TOPICAL at 14:35

## 2017-04-05 NOTE — PN
DATE: 04/04/2017



ADDENDUM:



SUBJECTIVE:  This patient was seen and evaluated earlier, discussed with the nursing staff.  This is 
an addendum to the GI progress report dictated by Claritza Claros NP.  The patient still complains of pa
in over the ileostomy area. 



DIAGNOSTICS:  The CT was reviewed by Dr. Quinn Jennings.  There was no collection or mass requiring drai
nage or biopsy noticed by him.  



PHYSICAL EXAMINATION:  There was tenderness over the ileostomy area.  



PLAN:  Continue the antibiotics, surgical followup.  The patient would need followup at the [facility] regarding the complex ileostomy collection and the ___ ileal ____ area, inflammatory vergara
es, and collection and multiple hepatic lesions.  Biopsy before was nondiagnostic.  The patient did n
ot have any bowel movements.  Will increase the dose of MiraLax.  



Thank you for allowing me to participate in the care of the patient.





__________________________________________

Racheal Pham MD







cc:



DD: 04/04/2017 23:58:21  [id number]

TT: 04/05/2017 03:10:09

Confirmation # [id number]

Dictation # [id number]

mn

## 2017-04-05 NOTE — CP.PCM.PN
Subjective





- Date & Time of Evaluation


Date of Evaluation: 04/05/17


Time of Evaluation: 12:30





- Subjective


Subjective: 


Surgery Note for Dr. Crawford





Pt seen and evaluated at the bedside.  Has c/o of lower abdominal pain, 

unchanged from day before.  Denies n/v.  Afebrile overnight.





Objective





- Vital Signs/Intake and Output


Vital Signs (last 24 hours): 


 











Temp Pulse Resp BP Pulse Ox


 


 97.5 F L  48 L  18   97/55 L  95 


 


 04/05/17 06:00  04/05/17 06:00  04/05/17 06:00  04/05/17 06:00  04/05/17 06:00








Intake and Output: 


 











 04/05/17 04/05/17





 06:59 18:59


 


Intake Total 400 


 


Balance 400 














- Medications


Medications: 


 Current Medications





Albuterol Sulfate (Albuterol 0.083% Inhal Sol (2.5 Mg/3 Ml) Ud)  2.5 mg IH 

[id number] PRN


   PRN Reason: Shortness of Breath


Alprazolam (Xanax)  0.25 mg PO Q6 PRN; Protocol


   PRN Reason: Anxiety


   Stop: 04/09/17 00:01


   Last Admin: 04/04/17 21:20 Dose:  0.25 mg


Enoxaparin Sodium (Lovenox)  40 mg SC DAILY [facility]


   PRN Reason: Protocol


   Last Admin: 04/05/17 11:02 Dose:  40 mg


Meropenem 1 gm/ Sodium (Chloride)  100 mls @ 100 mls/hr IVPB Q8 [facility]


   PRN Reason: Protocol


   Stop: 04/08/17 10:01


   Last Admin: 04/05/17 05:13 Dose:  100 mls/hr


Vancomycin HCl (Vancomycin 1gm)  250 mls @ 167 mls/hr IVPB Q12H [facility]


   PRN Reason: Protocol


   Last Admin: 04/05/17 11:07 Dose:  167 mls/hr


Levothyroxine Sodium (Synthroid)  100 mcg PO DAILY [facility]


   Last Admin: 04/05/17 11:02 Dose:  100 mcg


Morphine Sulfate (Morphine)  4 mg IVP Q3H PRN


   PRN Reason: Pain, moderate (4-7)


   Last Admin: 04/05/17 11:04 Dose:  4 mg


Nystatin (Nystop Topical Powder)  0 gm TOP TID [facility]


   Last Admin: 04/05/17 11:08 Dose:  1 applic


Ondansetron HCl (Zofran Odt)  4 mg PO Q6 PRN


   PRN Reason: Nausea/Vomiting


   Last Admin: 04/04/17 21:20 Dose:  4 mg


Pantoprazole Sodium (Protonix Ec Tab)  40 mg PO ACB [facility]


   Last Admin: 04/05/17 08:26 Dose:  40 mg


Polyethylene Glycol (Miralax)  17 gm PO DAILY [facility]


   Last Admin: 04/05/17 11:02 Dose:  17 gm











- Labs


Labs: 


 





 04/05/17 07:30 





 04/05/17 07:30 











- Additional Findings


Additional findings: 


- Constitutional


Appears: No Acute Distress





- Head Exam


Head Exam: ATRAUMATIC





- Eye Exam


Eye Exam: EOMI





- ENT Exam


ENT Exam: Mucous Membranes Moist





- Respiratory Exam


Respiratory Exam: NORMAL BREATHING PATTERN





- Cardiovascular Exam


Cardiovascular Exam: REGULAR RHYTHM, Bradycardia





- GI/Abdominal Exam


GI & Abdominal Exam: Soft, Tenderness 


Additional comments: 


Ileal conduit in place: clear Urine. No erythema. 





-  Exam


 Exam: absent: Uretheral Discharge





- Extremities Exam


Extremities Exam: Full ROM, Normal Inspection





- Neurological Exam


Neurological Exam: Alert, Awake





- Skin


Skin Exam: Dry, Intact, Normal Color, Warm





Assessment and Plan





- Assessment and Plan (Free Text)


Assessment: 


61M with suprapubic pressure/discomfort being treated for UTI, h/o hernia repair


Plan: 


-Ileal conduit is non-obstructed, draining clear yellow urine


- surrounding area is clean, dry and intact


- continue medical management


- No emergent surgical intervention at this time. 





Nicolas, PGY-1





Case to be discussed with Dr. Crawford

## 2017-04-05 NOTE — CARD
--------------- APPROVED REPORT --------------





EXAM: Two-dimensional and M-mode echocardiogram with Doppler and 

color Doppler.



INDICATION

Infection:Rule out subacute bacterial endocarditis 



2D DIMENSIONS 

Left Atrium (2D)3.8   (1.6-4.0cm)IVSd1.0   (0.7-1.1cm)

LVDd4.9   (3.9-5.9cm)PWd1.0   (0.7-1.1cm)

LVDs3.1   (2.5-4.0cm)FS (%) 36.0   %

LVEF (%)65.6   (>50%)



M-Mode DIMENSIONS 

Aortic Root3.00   (2.2-3.7cm)Aortic Cusp Exc.1.60   (1.5-2.0cm)



Aortic Valve

AoV Peak Frbxqjiw110.0cm/Reynaldo Peak GR.14mmHg



Mitral Valve

MV E Gqtloorl80.2cm/sMV A Omnyflax10.3cm/sE/A ratio0.9



TDI

E/Lateral E'0.0E/Medial E'0.0



Tricuspid Valve

TR Peak Yucoyvox703fe/sRAP AAVNUGQB46kmYqRO Peak Gr.25mmHg

[id number]



 LEFT VENTRICLE 

The left ventricle is normal size. There is normal left ventricular 

wall thickness. The left ventricular function is normal. The left 

ventricular ejection fraction is within the normal range. There is 

normal LV segmental wall motion. Transmitral Doppler flow pattern is 

Grade I-abnormal relaxation pattern.



 RIGHT VENTRICLE 

The right ventricle is normal size. There is normal right ventricular 

wall thickness. The right ventricular systolic function is normal.



 ATRIA 

The left atrium size is normal. The right atrium size is normal.



 AORTIC VALVE 

The aortic valve is moderately thickened. There is trace aortic 

regurgitation.



 MITRAL VALVE 

The mitral valve is mildly thickened. There is no mitral valve 

regurgitation noted.



 TRICUSPID VALVE 

There is mild pulmonary hypertension.



 GREAT VESSELS 

The aortic root is normal in size.



<Conclusion>

The left ventricle is normal size.

There is normal left ventricular wall thickness.

The left ventricular function is normal.

The left ventricular ejection fraction is within the normal range.

There is normal LV segmental wall motion.

Transmitral Doppler flow pattern is Grade I-abnormal relaxation 

pattern.

The aortic valve is moderately thickened.

There is trace aortic regurgitation.

There is mild pulmonary hypertension.

No definit vegitation seen

## 2017-04-05 NOTE — CP.PCM.PN
<Garth Ortega - Last Filed: 04/05/17 12:50>





Subjective





- Date & Time of Evaluation


Date of Evaluation: 04/05/17


Time of Evaluation: 12:50





- Subjective


Subjective: 


Pt seen and examined at bedside. Pt states he had trouble sleeping overnight 

and received benadryl. Pt still complaining of left lower abdominal pain. 

Tolerating diet well. Denies CP, SOB, N/V/D, fever, chills. 





Objective





- Vital Signs/Intake and Output


Vital Signs (last 24 hours): 


 











Temp Pulse Resp BP Pulse Ox


 


 97.5 F L  48 L  18   97/55 L  95 


 


 04/05/17 06:00  04/05/17 06:00  04/05/17 06:00  04/05/17 06:00  04/05/17 06:00








Intake and Output: 


 











 04/05/17 04/05/17





 06:59 18:59


 


Intake Total 400 


 


Balance 400 














- Medications


Medications: 


 Current Medications





Albuterol Sulfate (Albuterol 0.083% Inhal Sol (2.5 Mg/3 Ml) Ud)  2.5 mg IH 

[id number] PRN


   PRN Reason: Shortness of Breath


Alprazolam (Xanax)  0.25 mg PO Q6 PRN; Protocol


   PRN Reason: Anxiety


   Stop: 04/09/17 00:01


   Last Admin: 04/04/17 21:20 Dose:  0.25 mg


Enoxaparin Sodium (Lovenox)  40 mg SC DAILY [facility]


   PRN Reason: Protocol


   Last Admin: 04/05/17 11:02 Dose:  40 mg


Meropenem 1 gm/ Sodium (Chloride)  100 mls @ 100 mls/hr IVPB Q8 [facility]


   PRN Reason: Protocol


   Stop: 04/08/17 10:01


   Last Admin: 04/05/17 05:13 Dose:  100 mls/hr


Vancomycin HCl (Vancomycin 1gm)  250 mls @ 167 mls/hr IVPB Q12H [facility]


   PRN Reason: Protocol


   Last Admin: 04/05/17 11:07 Dose:  167 mls/hr


Levothyroxine Sodium (Synthroid)  100 mcg PO DAILY [facility]


   Last Admin: 04/05/17 11:02 Dose:  100 mcg


Morphine Sulfate (Morphine)  4 mg IVP Q3H PRN


   PRN Reason: Pain, moderate (4-7)


   Last Admin: 04/05/17 11:04 Dose:  4 mg


Nystatin (Nystop Topical Powder)  0 gm TOP TID [facility]


   Last Admin: 04/05/17 11:08 Dose:  1 applic


Ondansetron HCl (Zofran Odt)  4 mg PO Q6 PRN


   PRN Reason: Nausea/Vomiting


   Last Admin: 04/04/17 21:20 Dose:  4 mg


Pantoprazole Sodium (Protonix Ec Tab)  40 mg PO ACB [facility]


   Last Admin: 04/05/17 08:26 Dose:  40 mg


Polyethylene Glycol (Miralax)  17 gm PO DAILY [facility]


   Last Admin: 04/05/17 11:02 Dose:  17 gm











- Labs


Labs: 


 





 04/05/17 07:30 





 04/05/17 07:30 











- Constitutional


Appears: Well, No Acute Distress





- Head Exam


Head Exam: ATRAUMATIC, NORMAL INSPECTION, NORMOCEPHALIC





- ENT Exam


ENT Exam: Mucous Membranes Moist, Normal Exam





- Respiratory Exam


Respiratory Exam: Clear to Ausculation Bilateral, NORMAL BREATHING PATTERN.  

absent: Rhonchi, Wheezes





- Cardiovascular Exam


Cardiovascular Exam: RRR, +S1, +S2





- GI/Abdominal Exam


GI & Abdominal Exam: Soft, Normal Bowel Sounds.  absent: Tenderness


Additional comments: 


Ileal stoma clean and dry





- Extremities Exam


Extremities Exam: Normal Inspection.  absent: Calf Tenderness, Pedal Edema





- Neurological Exam


Neurological Exam: Alert, Awake, Oriented x3





- Psychiatric Exam


Psychiatric exam: Normal Affect, Normal Mood





- Skin


Skin Exam: Intact, Normal Color, Warm





Assessment and Plan





- Assessment and Plan (Free Text)


Plan: 


60 y/o M with PMH of Interstitial hemorrhagic cyctitis s/p ileal conduit and 

cystectomy, presents with left pelvic fluid collection/abscess. CT ab/pelvis 

shows 4 cm post surgical fluid collection, 7 x 2 cm complex collection and 

unchanged liver lesions. Pt is not a candidate for fluid drainage by IR. Pt on 

Merrem and Vancomycin at this time. Repeat blood cultures are negative at this 

time. 





Pelvic fluid collection/Abscess 


- Likely secondary to hernia repair


- Surgery and urology recommend conservative medical treatment


- Continue Merrem and Vancomycin as per ID


- Repeat blood cultures are negative at this time


- Echocardiogram pending


- Morphine for pain


- Surgery and urology following 





Hydrocele/UTI


- Urology recommends no surgical intervention 


- Urine culture repeat shows no growth


- Repeat UA is positive for LE, no nitrates, and many bacteria.


- Medical management, merrem and vancomycin





Candidiasis of groin


- Continue Nystatin powder





Prophylaxis


- lovenox


- Zofran





Seen, reviewed, and discussed with attending.





Shannon, PGY-1








<Lora CHUNG,Dante - Last Filed: 04/05/17 16:26>





Objective





- Vital Signs/Intake and Output


Vital Signs (last 24 hours): 


 











Temp Pulse Resp BP Pulse Ox


 


 97.5 F L  48 L  18   97/55 L  95 


 


 04/05/17 06:00  04/05/17 06:00  04/05/17 06:00  04/05/17 06:00  04/05/17 06:00








Intake and Output: 


 











 04/05/17 04/05/17





 06:59 18:59


 


Intake Total 400 


 


Balance 400 














- Medications


Medications: 


 Current Medications





Albuterol Sulfate (Albuterol 0.083% Inhal Sol (2.5 Mg/3 Ml) Ud)  2.5 mg IH 

[id number] PRN


   PRN Reason: Shortness of Breath


Alprazolam (Xanax)  0.25 mg PO Q6 PRN; Protocol


   PRN Reason: Anxiety


   Stop: 04/09/17 00:01


   Last Admin: 04/05/17 14:43 Dose:  0.25 mg


Enoxaparin Sodium (Lovenox)  40 mg SC DAILY [facility]


   PRN Reason: Protocol


   Last Admin: 04/05/17 11:02 Dose:  40 mg


Meropenem 1 gm/ Sodium (Chloride)  100 mls @ 100 mls/hr IVPB Q8 [facility]


   PRN Reason: Protocol


   Stop: 04/08/17 10:01


   Last Admin: 04/05/17 14:30 Dose:  100 mls/hr


Vancomycin HCl (Vancomycin 1gm)  250 mls @ 167 mls/hr IVPB Q12H [facility]


   PRN Reason: Protocol


   Last Admin: 04/05/17 11:07 Dose:  167 mls/hr


Levothyroxine Sodium (Synthroid)  100 mcg PO DAILY [facility]


   Last Admin: 04/05/17 11:02 Dose:  100 mcg


Morphine Sulfate (Morphine)  4 mg IVP Q3H PRN


   PRN Reason: Pain, moderate (4-7)


   Last Admin: 04/05/17 14:31 Dose:  4 mg


Nystatin (Nystop Topical Powder)  0 gm TOP TID [facility]


   Last Admin: 04/05/17 14:35 Dose:  1 applic


Ondansetron HCl (Zofran Odt)  4 mg PO Q6 PRN


   PRN Reason: Nausea/Vomiting


   Last Admin: 04/04/17 21:20 Dose:  4 mg


Pantoprazole Sodium (Protonix Ec Tab)  40 mg PO ACB [facility]


   Last Admin: 04/05/17 08:26 Dose:  40 mg


Polyethylene Glycol (Miralax)  17 gm PO DAILY [facility]


   Last Admin: 04/05/17 11:02 Dose:  17 gm











- Labs


Labs: 


 





 04/05/17 07:30 





 04/05/17 07:30 











Attending/Attestation





- Attestation


I have personally seen and examined this patient.: Yes


I have fully participated in the care of the patient.: Yes


I have reviewed all pertinent clinical information, including history, physical 

exam and plan: Yes


Notes (Text): 





Patient was seen and examined with medical resident .Agreed with resident 

assessment and plan.





Patient is feeling better, abdominal is at base line, patient is afebrile. 

Blood cultures grew coagulase negative staph.Repeat blood cultures are negative 

so far, discuss with ID, if repeat blood cultures remain negative for 48 hour, 

antibiotics can be changed to oral.





Management plan was discussed in detail with patient


 Education was provided.

## 2017-04-06 VITALS
TEMPERATURE: 98.5 F | OXYGEN SATURATION: 96 % | DIASTOLIC BLOOD PRESSURE: 69 MMHG | SYSTOLIC BLOOD PRESSURE: 102 MMHG | HEART RATE: 54 BPM | RESPIRATION RATE: 20 BRPM

## 2017-04-06 LAB
ALBUMIN/GLOB SERPL: 0.9 {RATIO} (ref 1.1–1.8)
ALP SERPL-CCNC: 52 U/L (ref 38–133)
ALT SERPL-CCNC: 21 U/L (ref 7–56)
AST SERPL-CCNC: 29 U/L (ref 15–59)
BILIRUB SERPL-MCNC: 0.4 MG/DL (ref 0.2–1.3)
BUN SERPL-MCNC: 18 MG/DL (ref 7–21)
CALCIUM SERPL-MCNC: 8.9 MG/DL (ref 8.4–10.5)
CHLORIDE SERPL-SCNC: 98 MMOL/L (ref 98–107)
CO2 SERPL-SCNC: 31 MMOL/L (ref 21–33)
ERYTHROCYTE [DISTWIDTH] IN BLOOD BY AUTOMATED COUNT: 14.7 % (ref 11.5–14.5)
GLOBULIN SER-MCNC: 4.2 GM/DL
GLUCOSE SERPL-MCNC: 95 MG/DL (ref 70–110)
HCT VFR BLD CALC: 32.9 % (ref 42–52)
MCH RBC QN AUTO: 24.2 PG (ref 25–35)
MCHC RBC AUTO-ENTMCNC: 31 G/DL (ref 31–37)
MCV RBC AUTO: 78.1 FL (ref 80–105)
PLATELET # BLD: 230 10^3/UL (ref 120–450)
PMV BLD AUTO: 8.6 FL (ref 7–11)
POTASSIUM SERPL-SCNC: 4.3 MMOL/L (ref 3.6–5)
PROT SERPL-MCNC: 7.9 G/DL (ref 5.8–8.3)
SODIUM SERPL-SCNC: 139 MMOL/L (ref 132–148)
WBC # BLD AUTO: 4.6 10^3/UL (ref 4.5–11)

## 2017-04-06 RX ADMIN — MORPHINE SULFATE PRN MG: 4 INJECTION, SOLUTION INTRAMUSCULAR; INTRAVENOUS at 05:41

## 2017-04-06 RX ADMIN — NYSTATIN SCH APPLIC: 100000 POWDER TOPICAL at 13:05

## 2017-04-06 RX ADMIN — MORPHINE SULFATE PRN MG: 4 INJECTION, SOLUTION INTRAMUSCULAR; INTRAVENOUS at 03:29

## 2017-04-06 RX ADMIN — MORPHINE SULFATE PRN MG: 4 INJECTION, SOLUTION INTRAMUSCULAR; INTRAVENOUS at 10:23

## 2017-04-06 RX ADMIN — PANTOPRAZOLE SODIUM SCH MG: 40 TABLET, DELAYED RELEASE ORAL at 10:13

## 2017-04-06 RX ADMIN — MORPHINE SULFATE PRN MG: 4 INJECTION, SOLUTION INTRAMUSCULAR; INTRAVENOUS at 00:07

## 2017-04-06 RX ADMIN — VANCOMYCIN HYDROCHLORIDE SCH MLS/HR: 1 INJECTION, POWDER, LYOPHILIZED, FOR SOLUTION INTRAVENOUS at 10:14

## 2017-04-06 RX ADMIN — POLYETHYLENE GLYCOL 3350 SCH GM: 17 POWDER, FOR SOLUTION ORAL at 10:13

## 2017-04-06 RX ADMIN — ENOXAPARIN SODIUM SCH MG: 40 INJECTION SUBCUTANEOUS at 10:13

## 2017-04-06 RX ADMIN — NYSTATIN SCH APPLIC: 100000 POWDER TOPICAL at 10:13

## 2017-04-06 NOTE — CP.PCM.PN
Subjective





- Date & Time of Evaluation


Date of Evaluation: 04/06/17


Time of Evaluation: 09:50





- Subjective


Subjective: 


Comfortable in bed, less pain in the abdomen, no fevers.





Objective





- Vital Signs/Intake and Output


Vital Signs (last 24 hours): 


 











Temp Pulse Resp BP Pulse Ox


 


 98.5 F   54 L  20   102/69   96 


 


 04/06/17 06:00  04/06/17 06:00  04/06/17 06:00  04/06/17 06:00  04/06/17 06:00








Intake and Output: 


 











 04/06/17 04/06/17





 06:59 18:59


 


Intake Total 720 920


 


Output Total 550 470


 


Balance 170 450














- Labs


Labs: 


 





 04/06/17 07:00 





 04/06/17 07:00 











- Constitutional


Appears: Non-toxic, No Acute Distress





- Head Exam


Head Exam: NORMAL INSPECTION





- ENT Exam


ENT Exam: Mucous Membranes Moist





- Neck Exam


Neck Exam: absent: Lymphadenopathy, Meningismus





- Respiratory Exam


Respiratory Exam: Decreased Breath Sounds





- Cardiovascular Exam


Cardiovascular Exam: +S1, +S2





- GI/Abdominal Exam


GI & Abdominal Exam: Soft.  absent: Tenderness


Additional comments: 


ileal conduit on the right side of the abdomen





Assessment and Plan





- Assessment and Plan (Free Text)


Plan: 


Assessment


consider left pelvic area fluid collection probably post-op seroma 


consider urinary tract infection, clinically improving


Methicillin-sensitive coagulase negative staph in 2 out of 8 bottles - no 

specific source is present (the patient does not have indwelling hardware)


history of Abdominal wall abscess S/P drainage S/P repeat debridement and wound 

vacuum placement, with Enterobacter


hypothyroidism


Prostate disease


anxiety disorder


esophageal strictures


acute renal failure





Plan


on Vancomycin and Merrem (day 6) - as discussed with Dr. Paulino, the patient can 

be switched to PO Augmentin to complete another 5-7 days

## 2017-04-06 NOTE — PN
DATE: 04/05/2017



This patient was seen and evaluated earlier.  The patient is still complaining of significant tendern
ess along the ileal conduit area.  He did have bowel movements, softer now.  



PHYSICAL EXAMINATION:  

VITAL SIGNS:  Temperature 97.5, blood pressure is 97/55, pulse ____, respirations 18, O2 saturation 9
5% room air.

HEENT:  Atraumatic, anicteric.

NECK:  Supple.

HEART:  S1, S2 heard.  

LUNGS:  Bilateral air entry present.

ABDOMEN:  Soft.  There was significant tenderness present along the ileal conduit area.  There is no 
rebound or guarding.

EXTREMITIES:  No edema, no cyanosis.



LABORATORY DATA:  Hemoglobin is 10.2, hematocrit 32.1, WBC 5.0, platelets 231.  Chemistry is essentia
lly unremarkable.



IMPRESSION:  

1.  This is a 61-year-old patient with a total cystectomy for interstitial cystitis had an ileal cond
uit placement.  The patient does have an incisional hernia and multiple surgeries in the past.  Recen
tly, about 3 weeks ago, left inguinal hernia presented.  Multiple surgeries then incisional hernia, c
omplicated.  The patient admitted now with worsening of abdominal pain.  Found to have fluid collecti
ons both in right and left side of the abdomen, right side near the ileal conduit area, complex, cont
aining some suture material.  The patient was evaluated by the interventional radiologist, Dr. Jennings.
  This fluid collection was not for drainage.  Presently, the urine culture showed Escherichia coli u
rinary tract infection.  The patient has been treated with IV antibiotics.  The patient was followed 
at the [facility] with urologist.  ____ at that time initially was recommended to have conse
rvative management.  The patient may need a more aggressive approach if this present treatment regime
n fails.  The patient may benefit from further evaluation at the [facility] regarding this i
grayson conduit.  

2.  The patient also has multiple small liver lesions.  Biopsies inconclusive.  The patient was stron
gly recommended to follow up at the [facility] for another opinion and review of the patholo
gy.  I have discussed personally with the patient and also the patient's father at length in the past
, and encouraged ____ strongly to have this followed up.  

3.  History of anemia.  The patient had an EGD and colonoscopy.  Colonoscopy was found to have multip
le polyps.  They are all hyperplastic polyps.  The polypectomy was not pursued at that time in view o
f these subsequent abdominal issues which got worse.  This repeat colonoscopy can be pursued elective
ly.



Thank you very much for allowing us to participate in the care of the patient.





__________________________________________

Racheal Pham MD







cc:



DD: 04/06/2017 00:46:47  [id number]

TT: 04/06/2017 08:44:40

Confirmation # [id number]

Dictation # [id number]

mn

## 2017-04-06 NOTE — PN
DATE: 04/06/2017



Seen and examined at the bedside earlier today.  The patient denies any nausea, vomiting.  Still has 
abdominal pain to the left quadrant.  No acute overnight events.  He reports moving his bowels yester
day.  No reports of any overt GI bleed.



VITAL SIGNS:  Temperature is 98.5, blood pressure is 102/69, pulse 54, respirations 20, 96 on room ai
r.  



He had urine culture done yesterday, it was negative. Blood cultures x 2 were negative.



PHYSICAL EXAMINATION:

HEENT:  Sclerae are anicteric.

NECK:  Supple.

CARDIAC:  S1, S2.

LUNGS:  Decreased breath sounds but good air entry, no rales or wheeze.

ABDOMEN:  With bowel sounds, soft with positive tenderness around the ileal conduit area.  No rebound
 or guarding.

EXTREMITIES:  No edema.

NEUROLOGIC:  Awake, alert, and oriented.



ASSESSMENT:  This is a 61-year-old male with history of a total cystectomy for interstitial cystitis 
and placement of an ileal conduit.  The patient also has history of incisional hernia with multiple s
urgeries.  He had recent left inguinal hernia surgery.  The patient came with worsening abdominal minh
n.  He was found to have fluid collections in both the right and left sides of the abdomen, one near 
the ileal conduit area complex containing some suture material.  Consult was placed for interventiona
l radiology for possible fluid collection drainage, but not enough fluid collection for drainage.  He
 does have a urinary tract infection, positive Escherichia coli.  He had repeat urine cultures, which
 are now negative.  He was treated with IV antibiotics.  He also has history of anemia and colon poly
ps.  The patient also noted to have multiple small liver lesions.  He did have a biopsy in the past w
hich was inconclusive.



PLAN:  This has been discussed with the patient on previous occasions to follow up at [facility] for another opinion and review of the pathology.  Also discussed with him regarding elective rep
eat colonoscopy.  The patient states understanding.  Right now he is on MiraLax p.o. daily.  He is on
 PPI, continue.  He is on IV antibiotics of vancomycin, on Lovenox.  We will increase his MiraLax.  T
he patient was seen and case discussed with Dr. Pham.





__________________________________________

Claritza VIRK







cc:



DD: 04/06/2017 12:40:12  [id number]

TT: 04/06/2017 13:10:14

Confirmation # [id number]

Dictation # [id number]

rn

## 2017-04-06 NOTE — CP.PCM.DIS
Provider





- Provider


Date of Admission: 


04/03/17 09:21





Attending physician: 


Dante Paulino MD





Primary care physician: 


NO PRIMARY CARE PROVIDER








Hospital Course





- Lab Results


Lab Results: 


 Micro Results





04/04/17 09:15   Blood-Venous   Blood Culture - Preliminary


                            NO GROWTH AFTER 48 HOURS


04/04/17 09:15   Blood-Venous   Blood Culture - Preliminary


                            NO GROWTH AFTER 48 HOURS


04/04/17 11:00   Urine   Urine Culture - Final


                            No Growth (<1,000 CFU/ML)





 Most Recent Lab Values











WBC  4.6 10^3/ul (4.5-11.0)   04/06/17  07:00    


 


RBC  4.21 10^6/uL (3.5-6.1)   04/06/17  07:00    


 


Hgb  10.2 gm/dL (14.0-18.0)  L  04/06/17  07:00    


 


Hct  32.9 % (42.0-52.0)  L  04/06/17  07:00    


 


MCV  78.1 fL (80.0-105.0)  L  04/06/17  07:00    


 


MCH  24.2 pg (25.0-35.0)  L  04/06/17  07:00    


 


MCHC  31.0 g/dl (31.0-37.0)   04/06/17  07:00    


 


RDW  14.7 % (11.5-14.5)  H  04/06/17  07:00    


 


Plt Count  230 10^3/uL (120.0-450.0)   04/06/17  07:00    


 


MPV  8.6 fl (7.0-11.0)   04/06/17  07:00    


 


Gran %  60.4 % (50.0-68.0)   04/03/17  06:50    


 


Lymph % (Auto)  24.5 % (22.0-35.0)   04/03/17  06:50    


 


Mono % (Auto)  6.4 % (1.0-6.0)  H  04/03/17  06:50    


 


Eos % (Auto)  8.3 % (1.5-5.0)  H  04/03/17  06:50    


 


Baso % (Auto)  0.4 % (0.0-3.0)   04/03/17  06:50    


 


Gran #  2.90  (1.4-6.5)   04/03/17  06:50    


 


Lymph #  1.2  (1.2-3.4)   04/03/17  06:50    


 


Mono #  0.3  (0.1-0.6)   04/03/17  06:50    


 


Eos #  0.4  (0.0-0.7)   04/03/17  06:50    


 


Baso #  0.02 K/mm3 (0.0-2.0)   04/03/17  06:50    


 


Sodium  139 mmol/L (132-148)   04/06/17  07:00    


 


Potassium  4.3 mmol/L (3.6-5.0)   04/06/17  07:00    


 


Chloride  98 mmol/L ()   04/06/17  07:00    


 


Carbon Dioxide  31 mmol/L (21-33)   04/06/17  07:00    


 


Anion Gap  14  (10-20)   04/06/17  07:00    


 


BUN  18 mg/dL (7-21)   04/06/17  07:00    


 


Creatinine  1.4 mg/dL (0.5-1.4)   04/06/17  07:00    


 


Est GFR ( Amer)  > 60   04/06/17  07:00    


 


Est GFR (Non-Af Amer)  52   04/06/17  07:00    


 


Random Glucose  95 mg/dL ()   04/06/17  07:00    


 


Calcium  8.9 mg/dL (8.4-10.5)   04/06/17  07:00    


 


Total Bilirubin  0.4 mg/dL (0.2-1.3)   04/06/17  07:00    


 


AST  29 U/L (15-59)   04/06/17  07:00    


 


ALT  21 U/L (7-56)   04/06/17  07:00    


 


Alkaline Phosphatase  52 U/L ()   04/06/17  07:00    


 


Total Protein  7.9 g/dL (5.8-8.3)   04/06/17  07:00    


 


Albumin  3.7 g/dL (3.0-4.8)   04/06/17  07:00    


 


Globulin  4.2 gm/dL  04/06/17  07:00    


 


Albumin/Globulin Ratio  0.9  (1.1-1.8)  L  04/06/17  07:00    


 


Lipase  96 U/L ()   03/31/17  19:01    


 


Free PSA  0.8 ng/mL (())   04/01/17  07:00    


 


% Free PSA  16 Percent (>25)  L  04/01/17  07:00    


 


Total PSA  4.9 ng/mL (<=4.0)  H  04/01/17  07:00    


 


Prostate Cancer Risk  20 Percent (())   04/01/17  07:00    


 


Free T4  1.36 ng/dL (0.78-2.19)   04/01/17  07:00    


 


Thyroxine (T4)  7.5 ug/dL (5.5-11.0)   04/01/17  07:00    


 


TSH 3rd Generation  1.25 mIU/mL (0.46-4.68)   04/01/17  07:00    


 


Urine Color  Yellow  (YELLOW)   04/04/17  11:00    


 


Urine Appearance  Turbid  (CLEAR)   04/04/17  11:00    


 


Urine pH  6.5  (4.7-8.0)   04/04/17  11:00    


 


Ur Specific Gravity  1.020  (1.005-1.035)   04/04/17  11:00    


 


Urine Protein  100 mg/dL (<30 mg/dL)  H  04/04/17  11:00    


 


Urine Glucose (UA)  Negative mg/dL (NEGATIVE)   04/04/17  11:00    


 


Urine Ketones  Trace mg/dL (NEGATIVE)  H  04/04/17  11:00    


 


Urine Blood  Large  (NEGATIVE)  H  04/04/17  11:00    


 


Urine Nitrate  Negative  (NEGATIVE)   04/04/17  11:00    


 


Urine Bilirubin  Negative  (NEGATIVE)   04/04/17  11:00    


 


Urine Urobilinogen  0.2 E.U./dL (<1 E.U./dL)   04/04/17  11:00    


 


Ur Leukocyte Esterase  Large Maria C/uL (NEGATIVE)  H  04/04/17  11:00    


 


Urine RBC  5 - 10 /hpf (0-2)   04/04/17  11:00    


 


Urine WBC  Tntc /hpf (0-6)   04/04/17  11:00    


 


Ur Epithelial Cells  Many /hpf (0-5)   03/31/17  18:35    


 


Amorphous Sediment  Large   03/31/17  18:35    


 


Urine Bacteria  Many  (NEG)   04/04/17  11:00    














Discharge Exam





- Head Exam


Head Exam: ATRAUMATIC, NORMAL INSPECTION, NORMOCEPHALIC





Discharge Plan





- Discharge Medications


Prescriptions: 


Nystatin [Nystop Topical Powder] 1 gm TOP TID #1 bottle


oxyCODONE/Acetaminophen [Percocet 5/325 mg Tab] 1 ea PO Q6H #8 tab


Levothyroxine [Synthroid] 100 mcg PO DAILY #7 tab





- Follow Up Plan


Condition: FAIR


Disposition: HOME/ ROUTINE


Additional Instructions: 


Follow up with doctor at [facility] to discuss further treatment and 

discuss pathology


Follow up with PMD in 1 week


Follow up with GI doctor in week


Follow up with urologist in 1 week


Return to Hospital if symptoms worsen

## 2017-04-11 ENCOUNTER — HOSPITAL ENCOUNTER (INPATIENT)
Dept: HOSPITAL 42 - ED | Age: 62
LOS: 3 days | Discharge: HOME | DRG: 202 | End: 2017-04-14
Attending: INTERNAL MEDICINE | Admitting: HOSPITALIST
Payer: COMMERCIAL

## 2017-04-11 VITALS — BODY MASS INDEX: 23.5 KG/M2

## 2017-04-11 DIAGNOSIS — N28.9: ICD-10-CM

## 2017-04-11 DIAGNOSIS — R11.2: ICD-10-CM

## 2017-04-11 DIAGNOSIS — E03.9: ICD-10-CM

## 2017-04-11 DIAGNOSIS — Z93.2: ICD-10-CM

## 2017-04-11 DIAGNOSIS — J45.901: ICD-10-CM

## 2017-04-11 DIAGNOSIS — K62.1: ICD-10-CM

## 2017-04-11 DIAGNOSIS — N13.30: ICD-10-CM

## 2017-04-11 DIAGNOSIS — K59.00: ICD-10-CM

## 2017-04-11 DIAGNOSIS — R19.7: ICD-10-CM

## 2017-04-11 DIAGNOSIS — K21.9: ICD-10-CM

## 2017-04-11 DIAGNOSIS — K64.8: ICD-10-CM

## 2017-04-11 DIAGNOSIS — Z86.010: ICD-10-CM

## 2017-04-11 DIAGNOSIS — K76.0: ICD-10-CM

## 2017-04-11 DIAGNOSIS — Z86.19: ICD-10-CM

## 2017-04-11 DIAGNOSIS — Z90.6: ICD-10-CM

## 2017-04-11 DIAGNOSIS — K29.70: ICD-10-CM

## 2017-04-11 DIAGNOSIS — D64.9: ICD-10-CM

## 2017-04-11 DIAGNOSIS — R00.1: ICD-10-CM

## 2017-04-11 DIAGNOSIS — Z87.440: ICD-10-CM

## 2017-04-11 DIAGNOSIS — D72.819: ICD-10-CM

## 2017-04-11 DIAGNOSIS — N50.89: ICD-10-CM

## 2017-04-11 DIAGNOSIS — J20.9: Primary | ICD-10-CM

## 2017-04-11 DIAGNOSIS — R65.10: ICD-10-CM

## 2017-04-11 DIAGNOSIS — J06.9: ICD-10-CM

## 2017-04-11 DIAGNOSIS — N40.0: ICD-10-CM

## 2017-04-11 DIAGNOSIS — R16.0: ICD-10-CM

## 2017-04-11 DIAGNOSIS — K57.90: ICD-10-CM

## 2017-04-11 DIAGNOSIS — N30.11: ICD-10-CM

## 2017-04-11 DIAGNOSIS — K22.2: ICD-10-CM

## 2017-04-11 DIAGNOSIS — L80: ICD-10-CM

## 2017-04-11 LAB
ADD MANUAL DIFF?: NO
ALBUMIN/GLOB SERPL: 0.9 {RATIO} (ref 1.1–1.8)
ALP SERPL-CCNC: 55 U/L (ref 38–133)
ALT SERPL-CCNC: 32 U/L (ref 7–56)
APPEARANCE UR: (no result)
AST SERPL-CCNC: 35 U/L (ref 15–59)
BACTERIA #/AREA URNS HPF: (no result) /[HPF]
BASOPHILS # BLD AUTO: 0.02 K/MM3 (ref 0–2)
BASOPHILS NFR BLD: 0.4 % (ref 0–3)
BILIRUB SERPL-MCNC: 0.4 MG/DL (ref 0.2–1.3)
BILIRUB UR-MCNC: NEGATIVE MG/DL
BUN SERPL-MCNC: 18 MG/DL (ref 7–21)
CALCIUM SERPL-MCNC: 8.8 MG/DL (ref 8.4–10.5)
CHLORIDE SERPL-SCNC: 102 MMOL/L (ref 98–107)
CO2 SERPL-SCNC: 23 MMOL/L (ref 21–33)
COLOR UR: YELLOW
EOSINOPHIL # BLD: 0.1 10*3/UL (ref 0–0.7)
EOSINOPHIL NFR BLD: 2.4 % (ref 1.5–5)
EPI CELLS #/AREA URNS HPF: (no result) /HPF (ref 0–5)
ERYTHROCYTE [DISTWIDTH] IN BLOOD BY AUTOMATED COUNT: 14.8 % (ref 11.5–14.5)
GLOBULIN SER-MCNC: 4.5 GM/DL
GLUCOSE SERPL-MCNC: 112 MG/DL (ref 70–110)
GLUCOSE UR STRIP-MCNC: NEGATIVE MG/DL
GRANULOCYTES # BLD: 3.04 10*3/UL (ref 1.4–6.5)
GRANULOCYTES NFR BLD: 60.8 % (ref 50–68)
HCT VFR BLD CALC: 32.6 % (ref 42–52)
KETONES UR STRIP-MCNC: NEGATIVE MG/DL
LEUKOCYTE ESTERASE UR-ACNC: (no result) LEU/UL
LYMPHOCYTES # BLD: 1.6 10*3/UL (ref 1.2–3.4)
LYMPHOCYTES NFR BLD AUTO: 31 % (ref 22–35)
MCH RBC QN AUTO: 23.9 PG (ref 25–35)
MCHC RBC AUTO-ENTMCNC: 31 G/DL (ref 31–37)
MCV RBC AUTO: 77.1 FL (ref 80–105)
MONOCYTES # BLD AUTO: 0.3 10*3/UL (ref 0.1–0.6)
MONOCYTES NFR BLD: 5.4 % (ref 1–6)
PH UR STRIP: 6 [PH] (ref 4.7–8)
PLATELET # BLD: 238 10^3/UL (ref 120–450)
PMV BLD AUTO: 8.5 FL (ref 7–11)
POTASSIUM SERPL-SCNC: 3.7 MMOL/L (ref 3.6–5)
PROT SERPL-MCNC: 8.4 G/DL (ref 5.8–8.3)
PROT UR STRIP-MCNC: 100 MG/DL
RBC # UR STRIP: (no result) /UL
RBC #/AREA URNS HPF: (no result) /HPF (ref 0–2)
SODIUM SERPL-SCNC: 141 MMOL/L (ref 132–148)
SP GR UR STRIP: 1.02 (ref 1–1.03)
UROBILINOGEN UR STRIP-ACNC: 0.2 E.U./DL
WBC # BLD AUTO: 5 10^3/UL (ref 4.5–11)
WBC #/AREA URNS HPF: (no result) /HPF (ref 0–6)

## 2017-04-11 RX ADMIN — MORPHINE SULFATE PRN MG: 2 INJECTION, SOLUTION INTRAMUSCULAR; INTRAVENOUS at 22:22

## 2017-04-11 RX ADMIN — MORPHINE SULFATE PRN MG: 2 INJECTION, SOLUTION INTRAMUSCULAR; INTRAVENOUS at 17:39

## 2017-04-11 RX ADMIN — METHYLPREDNISOLONE SODIUM SUCCINATE SCH MG: 40 INJECTION, POWDER, FOR SOLUTION INTRAMUSCULAR; INTRAVENOUS at 21:09

## 2017-04-11 RX ADMIN — NYSTATIN SCH APPLIC: 100000 POWDER TOPICAL at 18:58

## 2017-04-11 RX ADMIN — MEROPENEM AND SODIUM CHLORIDE SCH MLS/HR: 1 INJECTION, SOLUTION INTRAVENOUS at 21:08

## 2017-04-11 NOTE — CP.PCM.HP
<Jena Ghosh - Last Filed: 04/11/17 18:09>





History of Present Illness





- History of Present Illness


History of Present Illness: 


Medicine HPI for Dr Hoover





HPI:  60 yo M w/ PMHx of asthma, interstitial hemorrhagic cystitis s/p ilieal 

conduit and cystectomy, presents for nausea with constant abdominal pain for 3 

days  with nausea and multiple episodes of non-bloody emesis.  Also c/o 5 day 

history of productive cough, grey sputum.  Reports blood in ilieostomy bag day 

before.  Pt has recent d/c from [facility] one week prior for UTI and bacteremia.  

Reports one episode of non-bloody diarrhea.  Pt also claims headaches and 

denies chest pain, palpitations, urinary symptoms.





PMHx:  asthma, interstitial hemorrhagic cystitis, hypothyroidism, 


PSxHx:   ilieal conduit and cystectomy, esophageal stricture, HERNIORHAPPHY 4 X


   Incarcerated L inguinal hernia repair, 3/6/2017; R inguinal repair 30 years 

ago


   I&D abdominal abscess


   deviated nasal septum repair


   interSTIM placement and removal for sacral nerve


   bladder resection


   Upper Endoscopy with esophageal dilation


Allergies:  NKDA


Medications:Omeprazole 20 daily


   Synthroid 100 mcg daily


   Albuterol PRN, xanax


SOcial:  denies ETOH, smoking and ilicit drugs.  Pt works as a 





Present on Admission





- Present on Admission


Any Indicators Present on Admission: No





Review of Systems





- Review of Systems


All systems: reviewed and no additional remarkable complaints except





- Constitutional


Constitutional: Fever, Headache





- Cardiovascular


Cardiovascular: Dyspnea.  absent: Chest Pain





- Respiratory


Respiratory: Cough.  absent: Hemoptysis





- Gastrointestinal


Gastrointestinal: Abdominal Pain, Loose Stools





- Genitourinary


Genitourinary: absent: Difficulty Urinating, Dysuria





Past Patient History





- Infectious Disease


Hx of Infectious Diseases: None





- Tetanus Immunizations


Tetanus Immunization: Unknown





- Past Medical History & Family History


Past Medical History?: Yes





- Past Social History


Smoking Status: Never Smoked





- CARDIAC


Hx Pacemaker: No





- PULMONARY


Hx Respiratory Disorders: Yes


Hx Asthma: Yes





- NEUROLOGICAL


Hx Paralysis: No





- HEENT


Hx HEENT Problems: No





- RENAL


Hx Chronic Kidney Disease: Yes (Interstitial Cystitis)


Other/Comment: urinary bladder removed, has urostomy





- ENDOCRINE/METABOLIC


Hx Endocrine Disorders: Yes


Hx Hypothyroidism: Yes





- HEMATOLOGICAL/ONCOLOGICAL


Hx Blood Transfusions: No


Hx Blood Transfusion Reaction: No





- INTEGUMENTARY


Hx Dermatological Problems: Yes (vertiglio arms and hands)


Other/Comment: boil on abd above urostomy bright red with foul smelling 

drainage with pain to right hip, groin, r lower abd





- MUSCULOSKELETAL/RHEUMATOLOGICAL


Hx Musculoskeletal Disorders: No





- GASTROINTESTINAL


Hx Gastrointestinal Disorders: Yes (esophageal stricture,HERNIORHAPPHY 4 X)


Hx Gastroesophageal Reflux: Yes


Hx Liver Failure: Yes (HEPATOMEGALY)


HX Swallowing Problems: Yes


Other/Comment: constipation





- GENITOURINARY/GYNECOLOGICAL


Hx Genitourinary Disorders: Yes (interstital cystitis,  urostomy 2009)


Hx Hematuria: Yes


Hx Prostate Problems: Yes (LASER SX-DYSURIA, enlarged 2004)


Hx Urinary Tract Infection: Yes





- PSYCHIATRIC


Hx Emotional Abuse: No


Hx Physical Abuse: No


Hx Substance Use: No





- SURGICAL HISTORY


Other/Comment: bladder removal, post stoma interstitial hernias multiple hernias

, has right inguinal hernia presently





- ANESTHESIA


Hx Anesthesia Reactions: Yes (N/V X 1 PROCEDURE)


Hx Malignant Hyperthermia: No





Meds


Allergies/Adverse Reactions: 


 Allergies











Allergy/AdvReac Type Severity Reaction Status Date / Time


 


No Known Allergies Allergy   Verified 03/02/17 17:17














Physical Exam





- Constitutional


Appears: Non-toxic, No Acute Distress





- Head Exam


Head Exam: ATRAUMATIC, NORMOCEPHALIC





- Eye Exam


Eye Exam: EOMI, Normal appearance


Pupil Exam: NORMAL ACCOMODATION, PERRL





- Respiratory Exam


Respiratory Exam: Wheezes, NORMAL BREATHING PATTERN





- Cardiovascular Exam


Cardiovascular Exam: Bradycardia, +S1, +S2





- GI/Abdominal Exam


GI & Abdominal Exam: Soft, Tenderness


Additional comments: 


tenderness in RLQ along ilieostomy bag





- Extremities Exam


Extremities exam: Positive for: pedal pulses present.  Negative for: tenderness





- Neurological Exam


Neurological exam: Alert, Oriented x3





- Skin


Skin Exam: Intact, Normal Color





Results





- Vital Signs


Recent Vital Signs: 





 Last Vital Signs











Temp      


 


Pulse  56 L  04/11/17 11:13


 


Resp  15   04/11/17 11:13


 


BP  115/72   04/11/17 11:13


 


Pulse Ox  100   04/11/17 11:13














- Labs


Result Diagrams: 


 04/11/17 07:30





 04/11/17 07:30





Assessment & Plan





- Assessment and Plan (Free Text)


Plan: 


60 yo M w/ PMHx of asthma, interstitial hemorrhagic cystitis s/p ilieal conduit 

and cystectomy presents w/ intractable vomiting, abdominal pain and productive 

cough.





INtractable vomiting, abdominal pain:


NPO, NS IV @100cc/hr


zofran


morphine IV 2mg Q6


GI consult, help appreciated





Asthma exacerbation:


Duonebs


solumedrol 30mg IV q12


Levofloxacin for bronchitis





anxiety hx:


xanax





PPx measures:


heparin, protonix





ID consult appreciated


bcx, ucx, stool cx collected, Cdiff stool ordered





<Tosha Hoover - Last Filed: 04/12/17 06:49>





Results





- Vital Signs


Recent Vital Signs: 





 Last Vital Signs











Temp  98.8 F   04/11/17 16:00


 


Pulse  54 L  04/11/17 16:00


 


Resp  20   04/11/17 16:00


 


BP  124/71   04/11/17 16:00


 


Pulse Ox  99   04/11/17 16:00














- Labs


Result Diagrams: 


 04/11/17 07:30





 04/11/17 07:30





Attending/Attestation





- Attestation


I have personally seen and examined this patient.: Yes


I have fully participated in the care of the patient.: Yes


I have reviewed all pertinent clinical information: Yes


Notes (Text): 





04/11/17 


61 year old male with past medical history of asthma, interstitial hemorrhagic 

cystitis s/p ileal conduit and cystectomy, recurrent UTI and recent hernia 

surgery who presents with complaint of cough and shortness of breath secondary 

to acute exacerbation of asthmatic bronchitis.  CXR was negative.  Continue 

with iv steroids, duonebs and antibiotics.  





He also complains of intractable nausea/vomiting with abdominal pain for the 

past few days, unable to tolerate po intake.  Will keep NPO with iv fluids, 

analgesics and antiemetics.  Stool studies are ordered if patient is having 

loose stools.  GI evaluation is requested.





+UA noted.  He is on antibiotics and ID evaluation is requested.  UCx culture 

is pending.





He reports chronic scrotal swelling.  Has history of hydrocele.  Continue with 

scrotal elevation with antifungal cream.  He follows up with urology as 

outpatient.





Continue with xanax prn for anxiety.





Tosha Hoover MD


Hospitalist. 06-Feb-2017 09:12

## 2017-04-11 NOTE — CARD
--------------- APPROVED REPORT --------------





EKG Measurement

Heart [id number]

NE [id number]

[id number]

[id number]

[id number]



<Conclusion>

Sinus bradycardia

Otherwise normal ECG

## 2017-04-11 NOTE — CON
DATE: 04/11/2017



Seen and examined at the bedside this afternoon.



REQUEST FOR CONSULT:  Intractable vomiting.



HISTORY OF PRESENT ILLNESS:  This is a 61-year-old male with a past medical history of interstitial h
emorrhagic cystitis with an ileal conduit and history of cystectomy, as well as asthma and history of
 chronic abdominal pain with history of incisional hernia, history of abdominal abscess, status post 
recent inguinal hernia repair over a month ago, was recently discharged from [facility] f
or complaints of worsening abdominal pain, UTI.  He was discharged on 04/06.  He was sent home on ant
ibiotic therapy of amoxicillin.  The patient returned back to the Emergency Room with complaints of p
ersistent nausea and multiple episodes of nonbloody emesis as well as diarrhea, complained of product
aramis cough with gray sputum.  He states that when he was discharged on Friday, he went to work and com
plained of abdominal cramps, had episodes of diarrhea and vomiting and was unable to keep any food do
wn.  On Saturday he took liquid Imodium at least half the bottle and became constipated.  When he did
 have a bowel movement, it appeared like a dark mucus from his rectum.  He complains of mid abdominal
 discomfort.  The lower abdominal pain that he had near the suprapubic area has improved.  He did alaina
e his temperature on Saturday and reported a temperature of 101.7.  He took some ibuprofen at that ti
me.  He complains of a headache, but denies any chest pain, no shortness of breath or chest pain.  He
 reports that the amoxicillin was making him nauseous.  His last day of this was on Monday.  He did n
ot complete the antibiotic therapy.  He did have a chest x-ray on admission and it was negative for a
ny active pulmonary disease.



PAST MEDICAL HISTORY:  Asthma, interstitial hemorrhagic cystitis, hypothyroidism, I and D of abdomina
l abscess, history of colon polyps.  The patient has a history of multiple polyps, larger polyps not 
removed, a history of iron deficiency anemia, and hepatic lesions status post liver biopsy that was n
ondiagnostic.



PAST SURGICAL HISTORY:  Abdominal wall abscess, incarcerated left inguinal hernia repair on 03/06/201
7, right inguinal hernia repair, deviated septum repair, InterStim placement and removal for sacral n
erve, bladder resection.  He had colonoscopy and endoscopy done in the past.



SOCIAL HISTORY:  He denies smoking, ETOH, or substance abuse.  He is a  for the school [employer].



MEDICATIONS:  Reviewed as per MAR.



FAMILY HISTORY:  Noncontributory at this time.



ALLERGIES:  No known drug allergies.



REVIEW OF SYSTEMS:  Systems were reviewed with positive findings.



BLOOD WORK:  WBC is 5.0, H and H 10.1 and 32.6, platelets is 238.  Chemistry:  Sodium 141, K is 3.7, 
chloride 102, carbon dioxide 22, BUN is 18, creatinine is 1.4.  His LFTs are within normal limits.  U
rinalysis was done and this shows moderate leukocyte esterase, large blood and 100 protein. Sputum cu
lture is pending.



PHYSICAL EXAMINATION:

VITAL SIGNS:  Temperature is 98.6, blood pressure 115/72, pulse 56, respirations 16, is 99 on room ai
r.

HEENT:  Sclerae is anicteric.

NECK:  Supple.

CARDIAC:  S1, S2.

LUNGS:  With decreased breath sounds but good air entry, but positive for wheezing.

ABDOMEN:  With bowel sounds, soft, positive ileostomy.  He does have on palpation tenderness around t
he ileostomy, which is not new, but he now has tenderness to the mid abdomen.  No rebound, guarding, 
or organomegaly.

EXTREMITIES:  No edema.

NEUROLOGIC:  Awake, alert, and oriented.



ASSESSMENT:  This is a 61-year-old male with a history of interstitial hematocrit cystitis, status po
st ileal conduit and cystectomy and history of productive cough who comes to the Emergency Room with 
intractable nausea and vomiting and also a complaint of diarrhea.  He does have a recent history of a
 urinary tract infection, was on antibiotics, rule out any Clostridium difficile colitis.  Has a hist
ory of multiple colonic polyps, liver lesions, constipation, now diarrhea.  It looks like the patient
 may have urinary tract infection, maybe upper respiratory infection, although x-ray negative for any
 pneumonia.



PLAN:  The patient is pending stool studies for Clostridium difficile.  He is on Levaquin.  He is get
ting Solu-Medrol.  He is continued on GI prophylaxis, Protonix, Zofran for nausea.  He is getting Jalil
ax.  Continue IV fluids and he is currently n.p.o.  He is also on DVT prophylaxis of heparin.



We will continue to monitor closely.



Thank you for this consult and for allowing us to participate in your patient's care.  Will make furt
her recommendations based upon the patient's clinical course.  The patient was seen and discussed alan Pham.





__________________________________________

Claritza VIRK







cc:



DD: 04/11/2017 16:25:07  [id number]

TT: 04/11/2017 18:58:32

Confirmation # [id number]

Dictation # [id number]

dn

## 2017-04-11 NOTE — RAD
HISTORY:

productive cough  



COMPARISON:

01/09/2017



TECHNIQUE:

Chest PA and lateral



FINDINGS:



LUNGS:

The lungs are well inflated and clear.



PLEURA:

No significant pleural effusion identified. No pneumothorax apparent.



CARDIOVASCULAR:

Normal.



OSSEOUS STRUCTURES:

No significant abnormalities.



VISUALIZED UPPER ABDOMEN:

Normal.



OTHER FINDINGS:

None.



IMPRESSION:

No active pulmonary disease.

## 2017-04-11 NOTE — ED PDOC
Arrival/HPI





- General


Chief Complaint: Shortness Of Breath


Time Seen by Provider: 04/11/17 07:09


Historian: Patient





- History of Present Illness


Narrative History of Present Illness (Text): 





04/11/17 07:40


62 yo M w h/o asthma, Interstitial hemorrhagic cystitis s/p ileal conduit and 

cystectomy, recently discharged from [facility] after admission for UTI and bacteremia 

presents to ER with c/o grey/green productive cough since Thursday and 

shortness of breath. Patient also admits to nausea and emesis since Sunday, 

unable to keep any PO intake down for the past 2 days. Admits to subjective 

fevers, holocephalic headache x 2 days, some blood in ostomy bag yesterday, no 

change in chronic constant abdominal pain. Patient denies chills, chest pain, 

palpitations, new rashes, edema.


Time/Duration: < week


Symptom Onset: Gradual


Symptom Course: Worsening


Severity Level: 5


Activities at Onset: Rest


Modifying Factors (Text): 





04/11/17 07:41


Nothing improves or worsens cough. Nausea and emesis episodes worsened by cough.





Past Medical History





- Provider Review


Nursing Documentation Reviewed: Yes





- Travel History


Have you recently traveled outside [place] w/in the past 3 mons?: No





- Patient History


Narrative Patient History: 


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 

[facility]


Bladder removal, post stoma interstitial hernias multiple hernias


Hx abscess at ileal conduit stoma, MSSA, 2016


GERD


Asthma, never intubated or hospitalized for it


Hypothyroidism


Hx UTI, multiple


Anemia, Colonospcopy showed gastritis, diverticulosos, polyps


vertiglio arms and hands


Enlarged prostate, s/p LASER SX-DYSURIA, enlarged 2004


Fatty liver, s/p liver biopsy





- Infectious Disease


Hx of Infectious Diseases: None





- Tetanus Immunization


Tetanus Immunization: Unknown





- Cardiac


Hx Pacemaker: No





- Pulmonary


Hx Respiratory Disorders: Yes


Hx Asthma: Yes





- Neurological


Hx Paralysis: No





- HEENT


Hx HEENT Disorder: No





- Renal


Hx Renal Disorder: Yes (Interstitial Cystitis)


Other/Comment: urinary bladder removed, has urostomy





- Endocrine/Metabolic


Hx Endocrine Disorders: Yes


Hx Hypothyroidism: Yes





- Hematological/Oncological


Hx Blood Transfusions: No


Hx Blood Transfusion Reaction: No





- Integumentary


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


Other/Comment: boil on abd above urostomy bright red with foul smelling 

drainage with pain to right hip, groin, r lower abd





- Musculoskeletal/Rheumatological


Hx Musculoskeletal Disorders: No





- Gastrointestinal


Hx Gastrointestinal Disorders: Yes (esophageal stricture,HERNIORHAPPHY 4 X)


Hx Gastroesophageal Reflux: Yes


Hx Liver Failure: Yes (HEPATOMEGALY)


HX Swallowing Problems: Yes


Other/Comment: constipation





- Genitourinary/Gynecological


Hx Genitourinary Disorders: Yes (interstital cystitis,  urostomy 2009)


Hx Hematuria: Yes


Hx Prostate Problems: Yes (LASER SX-DYSURIA, enlarged 2004)


Hx Urinary Tract Infection: Yes





- Psychiatric


Hx Emotional Abuse: No


Hx Physical Abuse: No


Hx Substance Use: No





- Surgical History


Other/Comment: bladder removal, post stoma interstitial hernias multiple hernias

, has right inguinal hernia presently





- Anesthesia


Hx Anesthesia Reactions: Yes (N/V X 1 PROCEDURE)


Hx Malignant Hyperthermia: No





- Suicidal Assessment


Feels Threatened In Home Enviroment: No





Family/Social History


Family/Social History: Diabetes


Smoking Status: Never Smoked


Hx Alcohol Use: No


Hx Substance Use: No


Hx Substance Use Treatment: No





Allergies/Home Meds


Allergies/Adverse Reactions: 


Allergies





No Known Allergies Allergy (Verified 03/02/17 17:17)


 








Home Medications: 


 Home Meds











 Medication  Instructions  Recorded  Confirmed


 


traMADol [Ultram] 50 mg PO BID 03/03/17 03/06/17














Review of Systems





- Review of Systems


Constitutional: Fevers


Eyes: Normal


ENT: Sore Throat


Respiratory: SOB


Cardiovascular: Normal.  absent: Chest Pain, Palpitations, Edema, Calf Pain, 

Syncope


Gastrointestinal: Abdominal Pain (chornic, diffuse, no chnages), Nausea, 

Vomiting


Genitourinary Male: Hematuria (x1 yesterday)


Musculoskeletal: Normal


Skin: Normal.  absent: Rash


Neurological: Headache


Endocrine: absent: Normal





Physical Exam


Vital Signs











  Pulse Resp BP Pulse Ox


 


 04/11/17 10:00  52 L  20  114/72  100


 


 04/11/17 08:54  52 L  26 H  117/76  99


 


 04/11/17 06:59  51 L  24  112/71  100











Blood Pressure: Normal


Pulse: Bradycardic


Respiratory Rate: Normal


Appearance: Positive for: Well-Appearing, Non-Toxic


Pain Distress: None


Mental Status: Positive for: Alert and Oriented X 3





- Systems Exam


Head: Present: Atraumatic, Normocephalic


Pupils: Present: PERRL


Extroacular Muscles: Present: EOMI


Conjunctiva: Present: Normal


Mouth: Present: Moist Mucous Membranes


Neck: Present: Normal Range of Motion.  No: JVD


Respiratory/Chest: Present: Good Air Exchange, Wheezes (mild, end-exp).  No: 

Respiratory Distress, Accessory Muscle Use, Rales, Rhonchi


Cardiovascular: Present: Regular Rate and Rhythm, Normal S1, S2.  No: Murmurs


Abdomen: Present: Tenderness (diffuse), Normal Bowel Sounds, Other (ileal 

conduit ostomy lower abdomen).  No: Distention, Peritoneal Signs, Rebound, 

Guarding


Upper Extremity: Present: Normal Inspection.  No: Cyanosis


Lower Extremity: Present: Normal Inspection.  No: Edema, CALF TENDERNESS


Neurological: Present: GCS=15, CN II-XII Intact, Speech Normal


Skin: Present: Warm, Dry.  No: Rashes


Psychiatric: Present: Alert, Oriented x 3, Normal Concentration





Medical Decision Making


ED Course and Treatment: 


04/11/17 08:40


CXR no active disease. 








04/11/17 09:37


Patient re-examined, still complaining of nausea, abd pain, headache, some 

dizziness. States he is concerned about going home given intolerance of PO 

intake in the past 3 days. 








04/11/17 10:00


Hospitalist jo. Spoke with Dr. Gela Dave, accepts admission - observation 

to Med/Surg. Dx: bronchitits and intractable vomiting





04/11/17 10:27


UA - TNTC WBC, mod LE.


Levaquin for bronchitis





Reassessment Condition: Re-examined, Unchanged





- Lab Interpretations


Lab Results: 








 04/11/17 07:30 





 04/11/17 07:30 





 Lab Results





04/11/17 09:40: Urine Color Yellow, Urine Appearance Cloudy, Urine pH 6.0, Ur 

Specific Gravity 1.025, Urine Protein 100 H, Urine Glucose (UA) Negative, Urine 

Ketones Negative, Urine Blood Large H, Urine Nitrate Negative, Urine Bilirubin 

Negative, Urine Urobilinogen 0.2, Ur Leukocyte Esterase Moderate H, Urine RBC 

Tntc, Urine WBC Tntc, Ur Epithelial Cells 0 - 2, Urine Bacteria Many


04/11/17 07:30: WBC 5.0, RBC 4.23, Hgb 10.1 L, Hct 32.6 L, MCV 77.1 L, MCH 23.9 

L, MCHC 31.0, RDW 14.8 H, Plt Count 238, MPV 8.5, Gran % 60.8, Lymph % (Auto) 

31.0, Mono % (Auto) 5.4, Eos % (Auto) 2.4, Baso % (Auto) 0.4, Gran # 3.04, 

Lymph # 1.6, Mono # 0.3, Eos # 0.1, Baso # 0.02, Sodium 141, Potassium 3.7, 

Chloride 102, Carbon Dioxide 23, Anion Gap 20, BUN 18, Creatinine 1.4, Est GFR (

 Amer) > 60, Est GFR (Non-Af Amer) 52, Random Glucose 112 H, Calcium 8.8

, Total Bilirubin 0.4, AST 35, ALT 32, Alkaline Phosphatase 55, Total Protein 

8.4 H, Albumin 3.9, Globulin 4.5, Albumin/Globulin Ratio 0.9 L








I have reviewed the lab results: Yes





- RAD Interpretation


Radiology Orders: 








04/11/17 07:28


CXR [CHEST TWO VIEWS (PA/LAT)] [RAD] Stat 














- EKG Interpretation


EKG Interpretation (Text): 





04/11/17 07:48


Sinus bradycardia, 56bpm, nonspecific ST-T wave abnormalities, no changes vs 3/6

/2017


Interpreted by ED Physician: Yes


Type: 12 lead EKG


Comparison: Similar to previous EKG





- Medication Orders


Current Medication Orders: 








Levofloxacin/Dextrose (Levaquin 500mg)  100 mls @ 100 mls/hr IVPB DAILY [facility]


   Last Admin: 04/11/17 10:41  Dose: 100 MLS/HR





eMAR Start Stop


 Document     04/11/17 10:41  JOL  (Rec: 04/11/17 10:41  JOL  [id number]


     Intravenous Solution


      Start Date                                 04/11/17


      Start Time                                 10:41


      End Date                                   04/11/17


      End time                                   11:41


      Total Infusion Time                        60








Discontinued Medications





Acetaminophen (Tylenol 325mg Tab)  650 mg PO STAT STA


   Stop: 04/11/17 09:35


   Last Admin: 04/11/17 09:55  Dose: 650 MG





MAR Pain/Vitals


 Document     04/11/17 09:55  JOL  (Rec: 04/11/17 09:55  JOL  [facility] [id number]


     Sleep


      Is patient sleeping during reassessment?   No


     Presence of Pain


      Presence of Pain                           Yes





Albuterol/Ipratropium (Duoneb 3 Mg/0.5 Mg (3 Ml) Ud)  3 ml IH STAT STA


   Stop: 04/11/17 07:24


   Last Admin: 04/11/17 07:35  Dose: 3 ML





Guaifenesin/Dextromethorphan (Robitussin Dm)  10 ml PO ONCE ONE


   Stop: 04/11/17 08:21


   Last Admin: 04/11/17 09:14  Dose: 10 ML





Sodium Chloride (Sodium Chloride 0.9%)  1,000 mls @ 999 mls/hr IV .Q1H1M STA


   Stop: 04/11/17 09:21


   Last Admin: 04/11/17 09:14  Dose: 999 MLS/HR





eMAR Start Stop


 Document     04/11/17 09:14  JORAD  (Rec: 04/11/17 09:14  JOL  [facility] [id number]


     Intravenous Solution


      Start Date                                 04/11/17


      Start Time                                 09:14


      End Date                                   04/11/17


      End time                                   10:14


      Total Infusion Time                        60





Methylprednisolone (Solu-Medrol)  125 mg IVP STAT STA


   Stop: 04/11/17 07:28


   Last Admin: 04/11/17 07:43  Dose: 125 MG





IVP Administration


 Document     04/11/17 07:43  SRE  (Rec: 04/11/17 07:43  SRE  [id number])


     Charges for Administration


      # of IVP Administrations                   1





Ondansetron HCl (Zofran Inj)  4 mg IVP ONCE ONE


   Stop: 04/11/17 09:34


   Last Admin: 04/11/17 09:55  Dose: 4 MG





IVP Administration


 Document     04/11/17 09:55  JOL  (Rec: 04/11/17 09:55  JOL  [facility] [id number][facility]


     Charges for Administration


      # of IVP Administrations                   1














Disposition/Present on Arrival





- Present on Arrival


Any Indicators Present on Arrival: Yes


History of DVT/PE: No


History of Uncontrolled Diabetes: No


Urinary Catheter: Yes (urostomy)


History of Decub. Ulcer: No


History Surgical Site Infection Following: None





- Disposition


Have Diagnosis and Disposition been Completed?: Yes


Diagnosis: 


 Acute bronchitis, Intractable vomiting with nausea, Abdominal pain


Disposition: HOSPITALIZED


Disposition Time: 11:10


Patient Plan: Observation


Patient Problems: 


 Current Active Problems











Problem Status Diagnosed


 


Abdominal pain Acute 


 


Acute bronchitis Acute 


 


Hydronephrosis Acute 


 


Intractable vomiting with nausea Acute 


 


Urinary tract infection Acute 











Condition: FAIR

## 2017-04-12 LAB
BUN SERPL-MCNC: 18 MG/DL (ref 7–21)
CALCIUM SERPL-MCNC: 8.2 MG/DL (ref 8.4–10.5)
CHLORIDE SERPL-SCNC: 107 MMOL/L (ref 95–110)
CO2 SERPL-SCNC: 23 MMOL/L (ref 21–33)
ERYTHROCYTE [DISTWIDTH] IN BLOOD BY AUTOMATED COUNT: 14.8 % (ref 11.5–14.5)
GLUCOSE SERPL-MCNC: 124 MG/DL (ref 70–110)
HCT VFR BLD CALC: 31 % (ref 42–52)
MCH RBC QN AUTO: 24 PG (ref 25–35)
MCHC RBC AUTO-ENTMCNC: 31.3 G/DL (ref 31–37)
MCV RBC AUTO: 76.5 FL (ref 80–105)
PLATELET # BLD: 190 10^3/UL (ref 120–450)
PMV BLD AUTO: 8.5 FL (ref 7–11)
POTASSIUM SERPL-SCNC: 4.2 MMOL/L (ref 3.6–5)
SODIUM SERPL-SCNC: 142 MMOL/L (ref 132–148)
WBC # BLD AUTO: 2.6 10^3/UL (ref 4.5–11)

## 2017-04-12 RX ADMIN — NYSTATIN SCH APPLIC: 100000 POWDER TOPICAL at 17:57

## 2017-04-12 RX ADMIN — METHYLPREDNISOLONE SODIUM SUCCINATE SCH MG: 40 INJECTION, POWDER, FOR SOLUTION INTRAMUSCULAR; INTRAVENOUS at 22:30

## 2017-04-12 RX ADMIN — NYSTATIN SCH APPLIC: 100000 POWDER TOPICAL at 10:50

## 2017-04-12 RX ADMIN — MORPHINE SULFATE PRN MG: 2 INJECTION, SOLUTION INTRAMUSCULAR; INTRAVENOUS at 22:28

## 2017-04-12 RX ADMIN — IPRATROPIUM BROMIDE AND ALBUTEROL SULFATE PRN ML: .5; 3 SOLUTION RESPIRATORY (INHALATION) at 07:11

## 2017-04-12 RX ADMIN — NYSTATIN SCH APPLIC: 100000 POWDER TOPICAL at 13:48

## 2017-04-12 RX ADMIN — METHYLPREDNISOLONE SODIUM SUCCINATE SCH MG: 40 INJECTION, POWDER, FOR SOLUTION INTRAMUSCULAR; INTRAVENOUS at 10:24

## 2017-04-12 RX ADMIN — MEROPENEM AND SODIUM CHLORIDE SCH MLS/HR: 1 INJECTION, SOLUTION INTRAVENOUS at 22:30

## 2017-04-12 RX ADMIN — MEROPENEM AND SODIUM CHLORIDE SCH MLS/HR: 1 INJECTION, SOLUTION INTRAVENOUS at 10:49

## 2017-04-12 NOTE — CP.PCM.PN
<AugieJena - Last Filed: 04/12/17 23:04>





Subjective





- Date & Time of Evaluation


Date of Evaluation: 04/12/17


Time of Evaluation: 08:50





- Subjective


Subjective: 


Pt seen and evaluated at the bedside.  Denies N/V, and reports improved 

abdominal pain.  Reports improved SOB.  Reports diarrhea occurring early this 

AM. 





Objective





- Vital Signs/Intake and Output


Vital Signs (last 24 hours): 


 











Temp Pulse Resp BP Pulse Ox


 


 97.8 F   54 L  20   110/70   97 


 


 04/12/17 07:20  04/12/17 07:20  04/12/17 07:20  04/12/17 07:20  04/12/17 07:20








Intake and Output: 


 











 04/12/17 04/12/17





 06:59 18:59


 


Output Total 600 


 


Balance -600 














- Medications


Medications: 


 Current Medications





Albuterol/Ipratropium (Duoneb 3 Mg/0.5 Mg (3 Ml) Ud)  3 ml IH Q3H PRN


   PRN Reason: Shortness of Breath


Alprazolam (Xanax)  0.25 mg PO Q6 PRN; Protocol


   PRN Reason: Anxiety


   Stop: 04/18/17 18:01


   Last Admin: 04/12/17 10:50 Dose:  0.25 mg


Heparin Sodium (Porcine) (Heparin)  5,000 units SC Q12 MARIA D


   PRN Reason: Protocol


   Last Admin: 04/12/17 10:27 Dose:  5,000 units


Levofloxacin/Dextrose (Levaquin 500mg)  100 mls @ 100 mls/hr IVPB DAILY [facility]


   Last Admin: 04/12/17 10:22 Dose:  100 mls/hr


Sodium Chloride (Sodium Chloride 0.9%)  1,000 mls @ 100 mls/hr IV .Q10H [facility] D


   Last Admin: 04/12/17 03:59 Dose:  100 mls/hr


Meropenem 1g/NS 100mL IVPB (Meropenem 1g/Ns 100ml Ivpb)  100 mls @ 100 mls/hr 

IVPB Q12 MARIA D


   PRN Reason: Protocol


   Stop: 04/18/17 22:01


   Last Admin: 04/12/17 10:49 Dose:  100 mls/hr


Methylprednisolone (Solu-Medrol)  30 mg IVP Q12 [facility] D


   Last Admin: 04/12/17 10:24 Dose:  30 mg


Morphine Sulfate (Morphine)  2 mg IVP Q6H PRN


   PRN Reason: Pain, severe (8-10)


   Last Admin: 04/11/17 22:22 Dose:  2 mg


Nystatin (Nystop Topical Powder)  0 gm TOP TID [facility]


   Last Admin: 04/12/17 13:48 Dose:  1 applic


Ondansetron HCl (Zofran Inj)  4 mg IVP Q4H PRN


   PRN Reason: Nausea/Vomiting


   Last Admin: 04/12/17 02:16 Dose:  4 mg


Pantoprazole Sodium (Protonix Inj)  40 mg IVP DAILY [facility]


   Last Admin: 04/12/17 10:50 Dose:  40 mg











- Labs


Labs: 


 





 04/12/17 06:00 





 04/12/17 06:00 











- Constitutional


Appears: Non-toxic, No Acute Distress





- Head Exam


Head Exam: ATRAUMATIC, NORMOCEPHALIC





- Eye Exam


Eye Exam: EOMI, Normal appearance


Pupil Exam: NORMAL ACCOMODATION, PERRL





- ENT Exam


ENT Exam: Mucous Membranes Moist, Normal Exam





- Respiratory Exam


Respiratory Exam: Decreased Breath Sounds, NORMAL BREATHING PATTERN





- Cardiovascular Exam


Cardiovascular Exam: Bradycardia, +S1, +S2





- GI/Abdominal Exam


GI & Abdominal Exam: Soft, Tenderness


Additional comments: 


illieal conduit with urine draining.  





-  Exam


 Exam: Scrotal Swelling


External exam: absent: Lacerations, Lesions





- Extremities Exam


Extremities Exam: absent: Pedal Edema, Tenderness





- Neurological Exam


Neurological Exam: Alert, Awake





- Skin


Skin Exam: Normal Color, Warm





Assessment and Plan





- Assessment and Plan (Free Text)


Plan: 


62 yo M w/ PMHx of asthma, interstitial hemorrhagic cystitis s/p ilieal conduit 

and cystectomy , recurrent UTI and hernia surgery, initially presented w/ 

intractable vomiting, abdominal pain, SOB and productive cough.  CXR without 

positive findings.  Pt reports diarrhea today.





Intractable vomiting, abdominal pain:


Liquid diet, NS IV @100cc/hr


zofran


morphine IV 2mg Q6


GI consult, help appreciated


CT abdomen:  w/o acute findings





Asthma exacerbation, SOB:


Duonebs


solumedrol 20mg IV q12


Meropenem





anxiety hx:


xanax





UTI


+UA, Ucx negative final


ID consult appreciated





Loose BM


stool cx collected, Cdiff stool ordered


ID consult appreciated





Scrotal swelling:  


continue elevation with anti-fungal cream


Pt follows with urology as outpatient





PPx measures:


heparin, protonix








<Sneha,Anwar A - Last Filed: 04/13/17 07:27>





Objective





- Vital Signs/Intake and Output


Vital Signs (last 24 hours): 


 











Temp Pulse Resp BP Pulse Ox


 


 97.8 F   60   18   106/70   99 


 


 04/12/17 16:00  04/12/17 16:00  04/12/17 16:00  04/12/17 16:00  04/12/17 16:00








Intake and Output: 


 











 04/13/17 04/13/17





 06:59 18:59


 


Intake Total 240 


 


Output Total 1800 


 


Balance -1560 














- Medications


Medications: 


 Current Medications





Albuterol/Ipratropium (Duoneb 3 Mg/0.5 Mg (3 Ml) Ud)  3 ml IH Q3H PRN


   PRN Reason: Shortness of Breath


Alprazolam (Xanax)  0.25 mg PO Q6 PRN; Protocol


   PRN Reason: Anxiety


   Stop: 04/18/17 18:01


   Last Admin: 04/13/17 03:56 Dose:  0.25 mg


Heparin Sodium (Porcine) (Heparin)  5,000 units SC Q12 [facility] D


   PRN Reason: Protocol


   Last Admin: 04/12/17 22:30 Dose:  5,000 units


Levofloxacin/Dextrose (Levaquin 500mg)  100 mls @ 100 mls/hr IVPB DAILY [facility]


   Last Admin: 04/12/17 10:22 Dose:  100 mls/hr


Sodium Chloride (Sodium Chloride 0.9%)  1,000 mls @ 100 mls/hr IV .Q10H [facility]


   Last Admin: 04/13/17 06:33 Dose:  100 mls/hr


Meropenem 1g/NS 100mL IVPB (Meropenem 1g/Ns 100ml Ivpb)  100 mls @ 100 mls/hr 

IVPB Q12 [facility]


   PRN Reason: Protocol


   Stop: 04/18/17 22:01


   Last Admin: 04/12/17 22:30 Dose:  100 mls/hr


Methylprednisolone (Solu-Medrol)  20 mg IVP Q12 [facility] D


Morphine Sulfate (Morphine)  2 mg IVP Q6H PRN


   PRN Reason: Pain, severe (8-10)


   Last Admin: 04/12/17 22:28 Dose:  2 mg


Nystatin (Nystop Topical Powder)  0 gm TOP TID [facility]


   Last Admin: 04/12/17 17:57 Dose:  1 applic


Ondansetron HCl (Zofran Inj)  4 mg IVP Q4H PRN


   PRN Reason: Nausea/Vomiting


   Last Admin: 04/12/17 02:16 Dose:  4 mg


Pantoprazole Sodium (Protonix Inj)  40 mg IVP DAILY [facility]


   Last Admin: 04/12/17 10:50 Dose:  40 mg











- Labs


Labs: 


 





 04/13/17 05:30 





 04/13/17 05:30 











Attending/Attestation





- Attestation


I have personally seen and examined this patient.: Yes


I have fully participated in the care of the patient.: Yes


I have reviewed all pertinent clinical information, including history, physical 

exam and plan: Yes


Notes (Text): 





04/12/17


61 year old male with past medical history of asthma, interstitial hemorrhagic 

cystitis s/p ileal conduit and cystectomy, recurrent UTI and recent hernia 

surgery who presented with complaint of cough and shortness of breath secondary 

to acute exacerbation of asthmatic bronchitis.  CXR was negative.  He is on iv 

steroids, duonebs and antibiotics.  His wheezing is improving and his steroids 

will be tapered. 





He also reports his abdominal pain and nauseas is improving.  No longer 

vomiting and is able to tolerate liquids.  CT abd/pelvis was negative for acute 

findings.  GI evaluation was appreciated.  Continue with antiemetics.  Stool 

studies pending to rule out CDif.





He is on iv antibiotics for UTI.  ID is following.





He has chronic scrotal swelling with history of hydrocele.  Continue with 

scrotal elevation with antifungal cream.  Outpatient urology follow up was 

advised.





Continue with xanax prn for anxiety.





Tosha Hoover MD


Hospitalist.

## 2017-04-12 NOTE — PN
DATE: 04/12/2017



Seen and examined at the bedside earlier today.  He just came back from CT scan of abdomen and pelvis
.  The patient is still having nausea, but no episodes of vomiting.  He is still complaining of abdom
inal discomfort and had some episodes of diarrhea and noted some blood.  The patient is complaining o
f scrotal swelling and discomfort.  



VITAL SIGNS:  Temperature is 97.8, blood pressure is 110/70, pulse 54, respirations 20, 97 on room ai
r.



LABORATORY DATA:  WBC is 2.6, his hemoglobin is 9.7, hematocrit 31.0, platelets are 190.  Chem:  Sodi
um is 142, K 4.2, BUN 18, creatinine is 1.2.  Urine culture from the suprapubic is negative.  



He had the CAT scan of abdomen and pelvis and it reports previously dense calcified soft tissue mass 
adjacent to the abdominal wall in the right anterior pelvis; this is unchanged.  This was adjacent to
 an ileostomy.  The soft tissue thickening adjacent to the inguinal canal is less prominent.  Bowel i
s unremarkable; no obstruction, no gross mural thickening.  No enlarged lymph nodes.  Impression:  No
 acute distress.



PHYSICAL EXAMINATION:

HEENT:  Sclerae anicteric.

NECK:  Supple.

CARDIAC:  S1, S2.

LUNGS:  With decreased breath sounds, but no rales or wheeze.

ABDOMEN:  With bowel sounds.  It is soft but still has some midabdominal tenderness.  No rebound or g
uarding.



ASSESSMENT:  This 61-year-old male with a history of interstitial hemorrhagic cystitis, status post i
grayson conduit and cystectomy, with complaint of productive cough and intractable nausea as well as vom
iting and diarrhea; recent history of urinary tract infection, on antibiotics, complaints of diarrhea
 with blood and mucus noted, rule out any Clostridium difficile colitis; history of liver lesions, mu
ltiple colon polyps, upper respiratory infection.  He does have a history of asthma.  His CT scan is 
negative for acute findings but does report this soft tissue mass adjacent to the abdominal wall whic
h is unchanged from the previous CAT scan.



PLAN:  Followup stool studies for C. diff and culture which were sent.  His sputum culture was sent o
ut, pending.  We are going to start him on a clear liquid diet.  He is on DVT prophylaxis of heparin.
  He is on Levaquin and meropenem.  He is also on Solu-Medrol and PPI.  Consider starting oral vancom
ycin.  The patient was seen and case discussed with Dr. Pham.





__________________________________________

Claritza VIRK







cc:



DD: 04/12/2017 12:28:45  [id number]

TT: 04/12/2017 13:06:22

Confirmation # [id number]

Dictation # [id number]

mn

## 2017-04-12 NOTE — CON
DATE: 04/11/2017



This is an addendum to the GI progress report dictated by Claritza Claros NP.  



This patient's imaging studies were also reviewed.  Admitted with nausea, vomiting, and diarrhea.  Th
e patient was recently in the hospital; has antibiotic course, and the patient did complain of some m
ucousy stools with blood.  We need to rule out a Clostridium difficile colitis in him.  Stool has bee
n requested.  



The patient has had intense nausea; has been on n.p.o.  Continue the PPI.  Stool for C. difficile.  W
ill consider empirically starting the patient on p.o. vancomycin if he remains symptomatic.  The seth
ent did not give any stool specimen yet for the C. diff.



The patient history of total cystectomy with ileal conduit, history of hepatic lesions.  Ileal condui
t inflammatory changes, chronic.  



Will continue to closely follow up the patient and suggest further management based on the clinical c
ourse.





__________________________________________

Racheal Pham MD







cc:



DD: 04/11/2017 23:39:21  [id number]

TT: 04/12/2017 07:17:16

Confirmation # [id number]

Dictation # [id number]

forest

## 2017-04-12 NOTE — CT
PROCEDURE:  CT Abdomen and Pelvis without intravenous contrast



HISTORY:

vomiting



COMPARISON:

03/31/2017



TECHNIQUE:

Without contrast.. 



Contrast Dose: 



Radiation dose:



Total exam DLP = 417 mGy-cm.



This CT exam was performed using one or more of the following dose 

reduction techniques: Automated exposure control, adjustment of the 

mA and/or kV according to patient size, and/or use of iterative 

reconstruction technique.



FINDINGS:



LOWER THORAX:

Unremarkable. 



LIVER:

Unremarkable. No gross lesion or ductal dilatation.  



GALLBLADDER AND BILE DUCTS:

Unremarkable. 



PANCREAS:

Unremarkable. No gross lesion or ductal dilatation.



SPLEEN:

Unremarkable. 



ADRENALS:

Unremarkable. No mass. 



KIDNEYS AND URETERS:

Unremarkable. No hydronephrosis. No solid mass. 



VASCULATURE:

Unremarkable. No aortic aneurysm. 



BOWEL:

Unremarkable. No obstruction. No gross mural thickening. 



APPENDIX:

Unremarkable. Normal appendix. 



PERITONEUM:

As noted previously there is a dense calcified soft tissue mass 

adjacent to the abdominal wall in the right anterior pelvis. This is 

unchanged. This is adjacent to an ileostomy.



The soft tissue thickening adjacent to the inguinal canal is less 

prominent. 



LYMPH NODES:

Unremarkable. No enlarged lymph nodes. 



BLADDER:

Unremarkable. 



REPRODUCTIVE:

Unremarkable. 



BONES:

No acute fracture. 



OTHER FINDINGS:

None.



IMPRESSION:

No acute findings

## 2017-04-13 LAB
BUN SERPL-MCNC: 21 MG/DL (ref 7–21)
CALCIUM SERPL-MCNC: 8.3 MG/DL (ref 8.4–10.5)
CHLORIDE SERPL-SCNC: 105 MMOL/L (ref 98–107)
CO2 SERPL-SCNC: 24 MMOL/L (ref 21–33)
ERYTHROCYTE [DISTWIDTH] IN BLOOD BY AUTOMATED COUNT: 15 % (ref 11.5–14.5)
GLUCOSE SERPL-MCNC: 121 MG/DL (ref 70–110)
HCT VFR BLD CALC: 30.6 % (ref 42–52)
MCH RBC QN AUTO: 23.9 PG (ref 25–35)
MCHC RBC AUTO-ENTMCNC: 31.4 G/DL (ref 31–37)
MCV RBC AUTO: 76.3 FL (ref 80–105)
PLATELET # BLD: 193 10^3/UL (ref 120–450)
PMV BLD AUTO: 9.1 FL (ref 7–11)
POTASSIUM SERPL-SCNC: 3.9 MMOL/L (ref 3.6–5)
SODIUM SERPL-SCNC: 140 MMOL/L (ref 132–148)
WBC # BLD AUTO: 2.9 10^3/UL (ref 4.5–11)

## 2017-04-13 RX ADMIN — MEROPENEM AND SODIUM CHLORIDE SCH MLS/HR: 1 INJECTION, SOLUTION INTRAVENOUS at 10:49

## 2017-04-13 RX ADMIN — METHYLPREDNISOLONE SODIUM SUCCINATE SCH MG: 40 INJECTION, POWDER, FOR SOLUTION INTRAMUSCULAR; INTRAVENOUS at 21:18

## 2017-04-13 RX ADMIN — NYSTATIN SCH APPLIC: 100000 POWDER TOPICAL at 18:19

## 2017-04-13 RX ADMIN — NYSTATIN SCH APPLIC: 100000 POWDER TOPICAL at 13:25

## 2017-04-13 RX ADMIN — NYSTATIN SCH APPLIC: 100000 POWDER TOPICAL at 09:23

## 2017-04-13 RX ADMIN — MORPHINE SULFATE PRN MG: 2 INJECTION, SOLUTION INTRAMUSCULAR; INTRAVENOUS at 17:37

## 2017-04-13 RX ADMIN — MORPHINE SULFATE PRN MG: 2 INJECTION, SOLUTION INTRAMUSCULAR; INTRAVENOUS at 09:08

## 2017-04-13 RX ADMIN — METHYLPREDNISOLONE SODIUM SUCCINATE SCH MG: 40 INJECTION, POWDER, FOR SOLUTION INTRAMUSCULAR; INTRAVENOUS at 09:07

## 2017-04-13 RX ADMIN — MEROPENEM AND SODIUM CHLORIDE SCH MLS/HR: 1 INJECTION, SOLUTION INTRAVENOUS at 21:18

## 2017-04-13 NOTE — CP.PCM.PN
<AugieJena - Last Filed: 04/13/17 17:20>





Subjective





- Date & Time of Evaluation


Date of Evaluation: 04/13/17


Time of Evaluation: 07:20





- Subjective


Subjective: 


Pt seen and evaluated at the bedside.  Pt is eating, denies SOB, has cough, and 

continues to have abdominal pain, though less than before.  Reports multiple 

diarrhea episodes with blood overnight but not corroborated with nursing.  





Objective





- Vital Signs/Intake and Output


Vital Signs (last 24 hours): 


 











Temp Pulse Resp BP Pulse Ox


 


 97.5 F L  50 L  20   107/71   97 


 


 04/13/17 07:27  04/13/17 07:27  04/13/17 07:27  04/13/17 07:27  04/13/17 07:27








Intake and Output: 


 











 04/13/17 04/13/17





 06:59 18:59


 


Intake Total 240 


 


Output Total 1800 


 


Balance -1560 














- Medications


Medications: 


 Current Medications





Albuterol/Ipratropium (Duoneb 3 Mg/0.5 Mg (3 Ml) Ud)  3 ml IH Q3H PRN


   PRN Reason: Shortness of Breath


Alprazolam (Xanax)  0.25 mg PO Q6 PRN; Protocol


   PRN Reason: Anxiety


   Stop: 04/18/17 18:01


   Last Admin: 04/13/17 03:56 Dose:  0.25 mg


Heparin Sodium (Porcine) (Heparin)  5,000 units SC Q12 MARIA D


   PRN Reason: Protocol


   Last Admin: 04/13/17 09:06 Dose:  5,000 units


Levofloxacin/Dextrose (Levaquin 500mg)  100 mls @ 100 mls/hr IVPB DAILY [facility]


   Last Admin: 04/13/17 09:06 Dose:  100 mls/hr


Sodium Chloride (Sodium Chloride 0.9%)  1,000 mls @ 100 mls/hr IV .Q10H MARIA D


   Last Admin: 04/13/17 06:33 Dose:  100 mls/hr


Meropenem 1g/NS 100mL IVPB (Meropenem 1g/Ns 100ml Ivpb)  100 mls @ 100 mls/hr 

IVPB Q12 MARIA D


   PRN Reason: Protocol


   Stop: 04/18/17 22:01


   Last Admin: 04/13/17 10:49 Dose:  100 mls/hr


Methylprednisolone (Solu-Medrol)  20 mg IVP Q12 MARIA D


   Last Admin: 04/13/17 09:07 Dose:  20 mg


Morphine Sulfate (Morphine)  2 mg IVP Q6H PRN


   PRN Reason: Pain, severe (8-10)


   Last Admin: 04/13/17 09:08 Dose:  2 mg


Nystatin (Nystop Topical Powder)  0 gm TOP TID [facility]


   Last Admin: 04/13/17 13:25 Dose:  1 applic


Ondansetron HCl (Zofran Inj)  4 mg IVP Q4H PRN


   PRN Reason: Nausea/Vomiting


   Last Admin: 04/12/17 02:16 Dose:  4 mg


Pantoprazole Sodium (Protonix Inj)  40 mg IVP DAILY [facility]


   Last Admin: 04/13/17 09:07 Dose:  40 mg











- Labs


Labs: 


 





 04/13/17 05:30 





 04/13/17 05:30 











Assessment and Plan





- Assessment and Plan (Free Text)


Plan: 


60 yo M w/ PMHx of asthma, interstitial hemorrhagic cystitis s/p ilieal conduit 

and cystectomy , recurrent UTI and hernia surgery, initially presented w/ 

intractable vomiting, abdominal pain, SOB and productive cough.  CXR without 

positive findings.  Pt reports diarrhea today and yesterday.





Intractable vomiting, abdominal pain:


Liquid diet, NS IV @100cc/hr


zofran


morphine IV 2mg Q6


GI consult, help appreciated


CT abdomen:  w/o acute findings


flex sigmoidoscopy planned for tomorrow, NPO at midnight





Asthma exacerbation, SOB:


Duonebs


prednisone 20 mg PO BID


Meropenem


ID consult appreciated





anxiety hx:


xanax





Loose BM


stool cx collected, Cdiff stool negative


ID consult appreciated, help appreciated





Scrotal swelling:  


continue elevation with anti-fungal cream


Pt follows with urology as outpatient





PPx measures:


heparin, protonix








<Tosha Hoover - Last Filed: 04/13/17 17:31>





Objective





- Vital Signs/Intake and Output


Vital Signs (last 24 hours): 


 











Temp Pulse Resp BP Pulse Ox


 


 97.5 F L  42 L  19   123/79   96 


 


 04/13/17 16:00  04/13/17 16:00  04/13/17 16:00  04/13/17 16:00  04/13/17 16:00








Intake and Output: 


 











 04/13/17 04/13/17





 06:59 18:59


 


Intake Total 240 240


 


Output Total 1800 1400


 


Balance -1560 -1160














- Medications


Medications: 


 Current Medications





Albuterol/Ipratropium (Duoneb 3 Mg/0.5 Mg (3 Ml) Ud)  3 ml IH Q3H PRN


   PRN Reason: Shortness of Breath


Alprazolam (Xanax)  0.25 mg PO Q6 PRN; Protocol


   PRN Reason: Anxiety


   Stop: 04/18/17 18:01


   Last Admin: 04/13/17 03:56 Dose:  0.25 mg


Heparin Sodium (Porcine) (Heparin)  5,000 units SC Q12 MARIA D


   PRN Reason: Protocol


   Last Admin: 04/13/17 09:06 Dose:  5,000 units


Sodium Chloride (Sodium Chloride 0.9%)  1,000 mls @ 100 mls/hr IV .Q10H [facility]


   Last Admin: 04/13/17 06:33 Dose:  100 mls/hr


Meropenem 1g/NS 100mL IVPB (Meropenem 1g/Ns 100ml Ivpb)  100 mls @ 100 mls/hr 

IVPB Q12 MARIA D


   PRN Reason: Protocol


   Stop: 04/18/17 22:01


   Last Admin: 04/13/17 10:49 Dose:  100 mls/hr


Methylprednisolone (Solu-Medrol)  20 mg IVP Q12 [facility]


   Stop: 04/13/17 22:00


   Last Admin: 04/13/17 09:07 Dose:  20 mg


Morphine Sulfate (Morphine)  2 mg IVP Q6H PRN


   PRN Reason: Pain, severe (8-10)


   Last Admin: 04/13/17 09:08 Dose:  2 mg


Nystatin (Nystop Topical Powder)  0 gm TOP TID [facility]


   Last Admin: 04/13/17 13:25 Dose:  1 applic


Ondansetron HCl (Zofran Inj)  4 mg IVP Q4H PRN


   PRN Reason: Nausea/Vomiting


   Last Admin: 04/12/17 02:16 Dose:  4 mg


Pantoprazole Sodium (Protonix Inj)  40 mg IVP DAILY [facility]


   Last Admin: 04/13/17 09:07 Dose:  40 mg


Prednisone (Prednisone Tab)  20 mg PO BID [facility]











- Labs


Labs: 


 





 04/13/17 05:30 





 04/13/17 05:30 











Attending/Attestation





- Attestation


I have personally seen and examined this patient.: Yes


I have fully participated in the care of the patient.: Yes


I have reviewed all pertinent clinical information, including history, physical 

exam and plan: Yes


Notes (Text): 





04/13/17 17:29


61 year old male with past medical history of asthma, interstitial hemorrhagic 

cystitis s/p ileal conduit and cystectomy, recurrent UTI and recent hernia 

surgery who presented with complaint of cough and shortness of breath secondary 

to acute exacerbation of asthmatic bronchitis.  CXR was negative.  He is on iv 

steroids, duonebs and antibiotics.  His wheezing continues to improve and we 

will switch his steroids to po prednisone.





He also reports his abdominal pain and nauseas has been improving and he is 

tolerating liquid diet.  CT abd/pelvis was negative for acute findings.  Stool 

for cdif was negative.  However he continues to complain of diarrhea and ?blood 

in stools (not corroborated with nursing staff).  GI is following and plan is 

for possible flex sig tomorrow.





He is on iv antibiotics for UTI.  ID is following.





He has chronic scrotal swelling which is improving with scrotal elevation with 

antifungal cream. He has history of hydrocele.  Outpatient urology follow up 

was advised.





Continue with xanax prn for anxiety.





Tosha Hoover MD


Hospitalist.

## 2017-04-13 NOTE — PN
DATE: 04/13/2017



The patient is in bed in no acute distress; however, complained of generalized aches and pains.



PHYSICAL EXAMINATION:

VITAL SIGNS:  Temperature is 97, blood pressure is 107/70, respiratory rate of 16.

HEENT:  Unremarkable.

NECK:  Supple.

LUNGS:  Decreased breath sounds.

HEART:  Normal S1, S2.

ABDOMEN:  Soft.



LABORATORY EXAMINATION:  Reveals a white count of 2.9, hemoglobin of 9.  Chemistries reveal a BUN of 
21, creatinine of 1.2.



ASSESSMENT AND PLAN:  A 61-year-old male with esophageal strictures, anemia, history of abdominal wal
l abscess, history of hemorrhagic and interstitial cystitis, history of hypothyroidism, asthma, renal
 insufficiency, history of Enterobacter urinary tract infection.  Admitted with systemic inflammatory
 response syndrome with negative blood cultures, negative CAT scan, negative procalcitonin and patien
t's stool for Clostridium difficile antigen and toxin reported to be negative with blood cultures neg
ative.  Sputum culture is negative.  Urine culture is negative.  With a negative antigen and toxin, u
nlikely to be pseudomembranous colitis and currently on meropenem.  The patient is also on Solu-Medro
l and prednisone and Levaquin with a QTC interval of 426.  Chest x-ray, no active disease.  We will d
iscontinue the Levaquin.





__________________________________________

Margarito Khan MD







cc:



DD: 04/13/2017 16:44:23  350

TT: 04/13/2017 17:29:29

Confirmation # [id number]

Dictation # [id number]

sn

## 2017-04-13 NOTE — PN
DATE: 04/13/2017



Seen and examined at the bedside earlier today.  The patient complained of 
having diarrhea last night as well as this morning and noticed blood.  Nursing 
staff reports the patient was seen with blood on his fingers, but not witnessed 
in the toilet.  The patient is still having left-sided abdominal discomfort.  
Able to tolerate some liquids.  No episodes of nausea or vomiting.  The patient'
s stool for C. diff is pending.



VITAL SIGNS:  Temperature is 97.5, his blood pressure is 107/71, pulse is 50, 
respirations 20, 97% on room air.



LABORATORIES:  Today, his WBC is 2.9, H and H is 9.6 and 30.6, platelets of 
193.  Sodium is 140, K 3.9, BUN 21, creatinine is 1.2.  His suprapubic urine 
culture is negative.



PHYSICAL EXAMINATION:

HEENT:  Sclera is anicteric.

NECK:  Supple.

CARDIAC:  S1, S2.

LUNG SOUNDS:  With decreased breath sounds.  No rales or wheeze.

ABDOMEN:  With bowel sounds.  It is soft.  He has a positive ileal conduit with 
urine.  No blood noted.  He does have some left-sided tenderness.  No rebound 
or guarding.  Positive scrotal swelling.

EXTREMITIES:  No edema.

NEUROLOGIC:  Awake, alert, and oriented.



ASSESSMENT:  This is a 61-year-old male with history of interstitial 
hemorrhagic cystitis, status post ileal conduit and cystectomy with history of 
recurrent urinary tract infection, status post recent hernia surgery.  Comes 
with intractable nausea, vomiting, and abdominal pain.  He also had shortness 
of breath and productive cough.



PLAN:  Follow up stool culture and Clostridium difficile.  The patient is on 
Levaquin.  He is on heparin q. 12, on meropenem.  He is also getting PPI, 
Protonix, on liquid diet for now and getting Solu-Medrol. We will also plan 
flexsigmoidoscopy tomorrow.



The patient was seen and case discussed with Dr. Pham.





__________________________________________

Clairtza VIRK







cc:   



DD: 04/13/2017 12:14:15  451

TT: 04/13/2017 12:37:06

Confirmation # [id number]

Dictation # [id number]

en

MTDD

## 2017-04-14 VITALS — RESPIRATION RATE: 16 BRPM | DIASTOLIC BLOOD PRESSURE: 69 MMHG | SYSTOLIC BLOOD PRESSURE: 111 MMHG

## 2017-04-14 VITALS — TEMPERATURE: 97.6 F

## 2017-04-14 VITALS — OXYGEN SATURATION: 99 %

## 2017-04-14 VITALS — HEART RATE: 43 BPM

## 2017-04-14 LAB
BUN SERPL-MCNC: 19 MG/DL (ref 7–21)
CALCIUM SERPL-MCNC: 8.3 MG/DL (ref 8.4–10.5)
CHLORIDE SERPL-SCNC: 105 MMOL/L (ref 98–107)
CO2 SERPL-SCNC: 25 MMOL/L (ref 21–33)
ERYTHROCYTE [DISTWIDTH] IN BLOOD BY AUTOMATED COUNT: 15 % (ref 11.5–14.5)
GLUCOSE SERPL-MCNC: 119 MG/DL (ref 70–110)
HCT VFR BLD CALC: 33.3 % (ref 42–52)
MCH RBC QN AUTO: 24.3 PG (ref 25–35)
MCHC RBC AUTO-ENTMCNC: 31.8 G/DL (ref 31–37)
MCV RBC AUTO: 76.2 FL (ref 80–105)
PLATELET # BLD: 213 10^3/UL (ref 120–450)
PMV BLD AUTO: 9.2 FL (ref 7–11)
POTASSIUM SERPL-SCNC: 3.9 MMOL/L (ref 3.6–5)
SODIUM SERPL-SCNC: 139 MMOL/L (ref 132–148)
WBC # BLD AUTO: 3.4 10^3/UL (ref 4.5–11)

## 2017-04-14 PROCEDURE — 0DBP8ZX EXCISION OF RECTUM, VIA NATURAL OR ARTIFICIAL OPENING ENDOSCOPIC, DIAGNOSTIC: ICD-10-PCS | Performed by: INTERNAL MEDICINE

## 2017-04-14 RX ADMIN — NYSTATIN SCH: 100000 POWDER TOPICAL at 14:57

## 2017-04-14 RX ADMIN — NYSTATIN SCH APPLIC: 100000 POWDER TOPICAL at 10:22

## 2017-04-14 RX ADMIN — MEROPENEM AND SODIUM CHLORIDE SCH MLS/HR: 1 INJECTION, SOLUTION INTRAVENOUS at 10:16

## 2017-04-14 RX ADMIN — NYSTATIN SCH APPLIC: 100000 POWDER TOPICAL at 17:22

## 2017-04-14 RX ADMIN — IPRATROPIUM BROMIDE AND ALBUTEROL SULFATE PRN ML: .5; 3 SOLUTION RESPIRATORY (INHALATION) at 11:29

## 2017-05-10 ENCOUNTER — HOSPITAL ENCOUNTER (OUTPATIENT)
Dept: HOSPITAL 42 - ED | Age: 62
Setting detail: OBSERVATION
Discharge: HOME | End: 2017-05-10
Attending: EMERGENCY MEDICINE | Admitting: EMERGENCY MEDICINE
Payer: COMMERCIAL

## 2017-05-10 VITALS
OXYGEN SATURATION: 100 % | TEMPERATURE: 98.1 F | DIASTOLIC BLOOD PRESSURE: 79 MMHG | HEART RATE: 54 BPM | RESPIRATION RATE: 18 BRPM | SYSTOLIC BLOOD PRESSURE: 123 MMHG

## 2017-05-10 VITALS — BODY MASS INDEX: 23.5 KG/M2

## 2017-05-10 DIAGNOSIS — R10.9: ICD-10-CM

## 2017-05-10 DIAGNOSIS — N39.0: Primary | ICD-10-CM

## 2017-05-10 DIAGNOSIS — Z93.3: ICD-10-CM

## 2017-05-10 LAB
ADD MANUAL DIFF?: NO
ALBUMIN/GLOB SERPL: 0.9 {RATIO} (ref 1.1–1.8)
ALP SERPL-CCNC: 56 U/L (ref 38–133)
ALT SERPL-CCNC: 22 U/L (ref 7–56)
APPEARANCE UR: (no result)
APTT BLD: 26.7 SECONDS (ref 23.7–30.8)
AST SERPL-CCNC: 25 U/L (ref 15–59)
BACTERIA #/AREA URNS HPF: (no result) /[HPF]
BASOPHILS # BLD AUTO: 0.02 K/MM3 (ref 0–2)
BASOPHILS NFR BLD: 0.3 % (ref 0–3)
BILIRUB SERPL-MCNC: 0.5 MG/DL (ref 0.2–1.3)
BILIRUB UR-MCNC: NEGATIVE MG/DL
BUN SERPL-MCNC: 15 MG/DL (ref 7–21)
CALCIUM SERPL-MCNC: 9.2 MG/DL (ref 8.4–10.5)
CHLORIDE SERPL-SCNC: 100 MMOL/L (ref 98–107)
CO2 SERPL-SCNC: 31 MMOL/L (ref 21–33)
COLOR UR: YELLOW
EOSINOPHIL # BLD: 0.2 10*3/UL (ref 0–0.7)
EOSINOPHIL NFR BLD: 2.6 % (ref 1.5–5)
ERYTHROCYTE [DISTWIDTH] IN BLOOD BY AUTOMATED COUNT: 15.6 % (ref 11.5–14.5)
GLOBULIN SER-MCNC: 4.5 GM/DL
GLUCOSE SERPL-MCNC: 105 MG/DL (ref 70–110)
GLUCOSE UR STRIP-MCNC: NEGATIVE MG/DL
GRANULOCYTES # BLD: 4.6 10*3/UL (ref 1.4–6.5)
GRANULOCYTES NFR BLD: 71.5 % (ref 50–68)
HCT VFR BLD CALC: 31.9 % (ref 42–52)
INR PPP: 0.99 (ref 0.93–1.08)
KETONES UR STRIP-MCNC: NEGATIVE MG/DL
LEUKOCYTE ESTERASE UR-ACNC: (no result) LEU/UL
LIPASE SERPL-CCNC: 97 U/L (ref 23–300)
LYMPHOCYTES # BLD: 1.3 10*3/UL (ref 1.2–3.4)
LYMPHOCYTES NFR BLD AUTO: 19.9 % (ref 22–35)
MCH RBC QN AUTO: 24.2 PG (ref 25–35)
MCHC RBC AUTO-ENTMCNC: 30.7 G/DL (ref 31–37)
MCV RBC AUTO: 78.8 FL (ref 80–105)
MONOCYTES # BLD AUTO: 0.4 10*3/UL (ref 0.1–0.6)
MONOCYTES NFR BLD: 5.7 % (ref 1–6)
PH UR STRIP: 6 [PH] (ref 4.7–8)
PLATELET # BLD: 314 10^3/UL (ref 120–450)
PMV BLD AUTO: 8 FL (ref 7–11)
POTASSIUM SERPL-SCNC: 4.3 MMOL/L (ref 3.6–5)
PROT SERPL-MCNC: 8.5 G/DL (ref 5.8–8.3)
PROT UR STRIP-MCNC: 100 MG/DL
RBC # UR STRIP: (no result) /UL
SODIUM SERPL-SCNC: 140 MMOL/L (ref 132–148)
SP GR UR STRIP: 1.02 (ref 1–1.03)
UROBILINOGEN UR STRIP-ACNC: 0.2 E.U./DL
WBC # BLD AUTO: 6.4 10^3/UL (ref 4.5–11)
WBC #/AREA URNS HPF: (no result) /HPF (ref 0–6)

## 2017-05-10 PROCEDURE — 96374 THER/PROPH/DIAG INJ IV PUSH: CPT

## 2017-05-10 PROCEDURE — 85025 COMPLETE CBC W/AUTO DIFF WBC: CPT

## 2017-05-10 PROCEDURE — 80053 COMPREHEN METABOLIC PANEL: CPT

## 2017-05-10 PROCEDURE — 85610 PROTHROMBIN TIME: CPT

## 2017-05-10 PROCEDURE — 81001 URINALYSIS AUTO W/SCOPE: CPT

## 2017-05-10 PROCEDURE — 83690 ASSAY OF LIPASE: CPT

## 2017-05-10 PROCEDURE — 36415 COLL VENOUS BLD VENIPUNCTURE: CPT

## 2017-05-10 PROCEDURE — 87086 URINE CULTURE/COLONY COUNT: CPT

## 2017-05-10 PROCEDURE — 74177 CT ABD & PELVIS W/CONTRAST: CPT

## 2017-05-10 PROCEDURE — 85730 THROMBOPLASTIN TIME PARTIAL: CPT

## 2017-05-10 PROCEDURE — 99283 EMERGENCY DEPT VISIT LOW MDM: CPT

## 2017-05-10 NOTE — CT
PROCEDURE:  CT Abdomen and Pelvis with contrast



HISTORY:

abd pain r/o obstruction



COMPARISON:

04/12/2017



TECHNIQUE:

Contrast dose: With contrast



Radiation dose:



Total exam DLP = 460 mGy-cm.



This CT exam was performed using one or more of the following dose 

reduction techniques: Automated exposure control, adjustment of the 

mA and/or kV according to patient size, and/or use of iterative 

reconstruction technique.



FINDINGS:



LOWER THORAX:

Unremarkable. 



LIVER:

Multiple solid liver lesions are seen suspicious for metastatic 

disease. These are unchanged from the previous contrast-enhanced 

study dated 03/31/2017. . 



GALLBLADDER AND BILE DUCTS:

Unremarkable. 



PANCREAS:

Unremarkable. No gross lesion or ductal dilatation.



SPLEEN:

Unremarkable. 



ADRENALS:

Unremarkable. No mass. 



KIDNEYS AND URETERS:

Right lower quadrant ostomy. Chronic dilatation of the ureters. 



VASCULATURE:

Unremarkable. No aortic aneurysm. 



BOWEL:

Unremarkable. No obstruction. No gross mural thickening. 



APPENDIX:

Normal appendix. 



PERITONEUM:

There is a right lower quadrant ostomy.  There is a calcified mass 

adjacent to this site. This is unchanged. 



LYMPH NODES:

There is adenopathy near the celiac axis. This is unchanged 



BLADDER:

Resected 



REPRODUCTIVE:

Unremarkable. 



BONES:

No acute fracture. 



OTHER FINDINGS:

None.



IMPRESSION:

Multiple liver lesions unchanged.



No acute intra-abdominal findings. No evidence of obstruction

## 2017-05-10 NOTE — ED PDOC
Arrival/HPI





- General


Chief Complaint: Abdominal Pain


Time Seen by Provider: 05/10/17 07:37





- History of Present Illness


Narrative History of Present Illness (Text): 


05/10/17 08:16


A 61 year old male, whose past medical history includes asthma, insterstitial 

hemorrhagic cystitis and multiple abdominal surgeries for hernia, presents to 

the emergency department complaining of localized, constant RLQ abdominal pain 

near the groin, some LLQ pain and around colostomy bag for the past 2 weeks. 

Patient also reports he has recurrent UTI symptoms and gets urges to urinate 

through penis. Patient also notes nausea and chills but denies any fever, 

vomiting, diarrhea, hematuria, hematochezia or any other complaints at this 

time. 








Gastroenterologist: Dr. Pham











Time/Duration: Other (2 weeks)


Symptom Onset: Sudden


Symptom Course: Unchanged


Activities at Onset: Rest


Context: Home


Associated Symptoms (Text): 


nausea and chills








Past Medical History





- Provider Review


Nursing Documentation Reviewed: Yes





- Infectious Disease


Hx of Infectious Diseases: None





- Tetanus Immunization


Tetanus Immunization: Unknown





- Cardiac


Hx Pacemaker: No





- Pulmonary


Hx Respiratory Disorders: Yes


Hx Asthma: Yes





- Neurological


Hx Paralysis: No





- HEENT


Hx HEENT Disorder: No





- Renal


Hx Renal Disorder: Yes (Interstitial Cystitis)


Other/Comment: urinary bladder removed, has urostomy





- Endocrine/Metabolic


Hx Endocrine Disorders: Yes


Hx Hypothyroidism: Yes





- Hematological/Oncological


Hx Blood Transfusions: No


Hx Blood Transfusion Reaction: No





- Integumentary


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





- Musculoskeletal/Rheumatological


Hx Musculoskeletal Disorders: No





- Gastrointestinal


Hx Gastrointestinal Disorders: Yes (esophageal stricture,HERNIORHAPPHY 4 X)


Hx Gastroesophageal Reflux: Yes


Hx Liver Failure: Yes (HEPATOMEGALY)


HX Swallowing Problems: Yes





- Genitourinary/Gynecological


Hx Genitourinary Disorders: Yes (interstital cystitis,  urostomy 2009)


Hx Hematuria: Yes


Hx Prostate Problems: Yes (LASER SX-DYSURIA, enlarged 2004)


Hx Urinary Tract Infection: Yes





- Psychiatric


Hx Emotional Abuse: No


Hx Physical Abuse: No


Hx Substance Use: No





- Surgical History


Other/Comment: Hernia repair





- Anesthesia


Hx Anesthesia Reactions: No


Hx Malignant Hyperthermia: No





- Suicidal Assessment


Feels Threatened In Home Enviroment: No





Family/Social History





- Physician Review


Nursing Documentation Reviewed: Yes


Family/Social History: No Known Family HX


Smoking Status: Never Smoked


Hx Alcohol Use: No


Hx Substance Use: No


Hx Substance Use Treatment: No





Allergies/Home Meds


Allergies/Adverse Reactions: 


Allergies





No Known Allergies Allergy (Verified 05/10/17 07:49)


 








Home Medications: 


 Home Meds











 Medication  Instructions  Recorded  Confirmed


 


Albuterol HFA [Ventolin HFA 90 2 puff IH [id number] PRN 05/10/17 05/10/17





mcg/actuation (8 g)]   


 


Omeprazole 20 mg PO DAILY 05/10/17 05/10/17














Review of Systems





- Physician Review


All systems were reviewed & negative as marked: Yes





- Review of Systems


Constitutional: Other (chills).  absent: Fevers


Eyes: Normal


ENT: Normal


Respiratory: Normal


Cardiovascular: Normal


Gastrointestinal: Abdominal Pain, Nausea.  absent: Diarrhea, Vomiting, 

Hematochezia


Genitourinary Male: absent: Dysuria, Hematuria


Musculoskeletal: Normal


Skin: Normal


Neurological: Normal


Endocrine: Normal


Hemo/Lymphatic: Normal


Psychiatric: Normal





Physical Exam


Vital Signs Reviewed: Yes


Vital Signs











  Temp Pulse Resp BP Pulse Ox


 


 05/10/17 11:58  98.1 F  54 L  18  123/79  100


 


 05/10/17 09:56   55 L  16  124/86  98


 


 05/10/17 07:41  98.5 F  69  18  138/84  99











Temperature: Afebrile


Blood Pressure: Normal


Pulse: Regular


Respiratory Rate: Normal


Appearance: Positive for: Well-Appearing, Non-Toxic, Comfortable


Pain Distress: None


Mental Status: Positive for: Alert and Oriented X 3





- Systems Exam


Head: Present: Atraumatic, Normocephalic


Pupils: Present: PERRL


Extroacular Muscles: Present: EOMI


Conjunctiva: Present: Normal


Mouth: Present: Moist Mucous Membranes


Neck: Present: Normal Range of Motion


Respiratory/Chest: Present: Clear to Auscultation, Good Air Exchange.  No: 

Respiratory Distress, Accessory Muscle Use


Cardiovascular: Present: Regular Rate and Rhythm, Normal S1, S2.  No: Murmurs


Abdomen: Present: Tenderness, Peritoneal Signs (lower, greater r groin more 

bulge inguinal area reducible)


Back: Present: Normal Inspection


Upper Extremity: Present: Normal Inspection.  No: Cyanosis, Edema


Lower Extremity: Present: Normal Inspection.  No: Edema


Neurological: Present: GCS=15, CN II-XII Intact, Speech Normal


Skin: Present: Warm, Dry, Normal Color.  No: Rashes


Psychiatric: Present: Alert, Oriented x 3, Normal Insight, Normal Concentration





Medical Decision Making


ED Course and Treatment: 


05/10/17 08:12


Impression:


A 61 year old male with abdominal pain. 





Differential Diagnosis include but are not limited to:  obstruction vs. hernia 

incarceration





Plan:


-- CT abd/pelvis


-- labs


-- Urinalysis


-- Toradol


-- Reassess and disposition





Prior Visits:


Notes and results from previous visits were reviewed.


Patient last reported to the emergency department on 4/11/17 for evaluation of 

grey/green productive cough and shortness of breath. Patient was admittied to 

Dr. Dave's service, observation to Med/Surg. 





Progress Notes:














- Lab Interpretations


I have reviewed the lab results: Yes





- RAD Interpretation


Radiology Orders: 








05/10/17 08:03


ABD PELVIS PO & IV CONTRAST [CT] Stat 














- Medication Orders


Current Medication Orders: 











Discontinued Medications





Iohexol (Omnipaque 240 (50 Ml)) Confirm Administered Dose 50 ml .ROUTE .STK-MED 

ONE


   Stop: 05/10/17 08:14


Iohexol (Omnipaque 350 100 Ml) Confirm Administered Dose 350 mg .ROUTE .STK-MED 

ONE


   Stop: 05/10/17 10:04


Ketorolac Tromethamine (Toradol)  30 mg IVP STAT STA


   Stop: 05/10/17 08:04


   Last Admin: 05/10/17 08:32  Dose: 30 mg





Trimethoprim/Sulfamethoxazole (Bactrim Ds Tab)  1 tab PO STAT STA


   PRN Reason: Protocol


   Stop: 05/10/17 11:45


   Last Admin: 05/10/17 12:02  Dose: 1 tab











ED OBSERVATION


Time of observation admission: 08:10





- Observation admission statement


Patient is being placed in observation because:: 


abdominal pain 





- Goals of Observation


Goals of observation are:: 


monitor patient symptoms 





- Progress Note


Progress Note: 


05/10/17 10:10


Patient is in no acute distress





05/10/17 11:14


CT abd/pelvis:


Dictated by Jamil Montgomery


Impression:


Multiple liver lesions unchanged.


No acute intra-abdominal findings. No evidence of obstruction.








05/10/17 11:37


Patient noted to have a UTI. He does have a urine bag so it may be colonized. 

No fever. WBC normal in CBC. Will tx with Bactrim and have him f/u with his 

urologist and PMD.





On reevaluation, patient is tolerating fluids; abdomen soft, non-tender, non-

distended. Patient is feeling better. 





- Scribe Statement


The provider has reviewed the documentation as recorded by the Vilmaibmargarita Carrillo





All medical record entries made by the Vilmaibmargarita were at my direction and 

personally dictated by me. I have reviewed the chart and agree that the record 

accurately reflects my personal performance of the history, physical exam, 

medical decision making, and the department course for this patient. I have 

also personally directed, reviewed, and agree with the discharge instructions 

and disposition.





Disposition/Present on Arrival





- Present on Arrival


Any Indicators Present on Arrival: Yes


History of DVT/PE: No


History of Uncontrolled Diabetes: No


Urinary Catheter: Yes (urostomy)


History of Decub. Ulcer: No


History Surgical Site Infection Following: None





- Disposition


Have Diagnosis and Disposition been Completed?: Yes


Diagnosis: 


 Urinary tract infection, Abdominal pain





Disposition: HOME/ ROUTINE


Disposition Time: 13:25


Patient Plan: Discharge


Condition: IMPROVED

## 2017-09-14 ENCOUNTER — HOSPITAL ENCOUNTER (EMERGENCY)
Dept: HOSPITAL 42 - ED | Age: 62
Discharge: HOME | End: 2017-09-14
Payer: COMMERCIAL

## 2017-09-14 VITALS — RESPIRATION RATE: 18 BRPM

## 2017-09-14 VITALS — SYSTOLIC BLOOD PRESSURE: 143 MMHG | HEART RATE: 65 BPM | TEMPERATURE: 97.9 F | DIASTOLIC BLOOD PRESSURE: 71 MMHG

## 2017-09-14 VITALS — BODY MASS INDEX: 25 KG/M2

## 2017-09-14 VITALS — OXYGEN SATURATION: 100 %

## 2017-09-14 DIAGNOSIS — J45.909: ICD-10-CM

## 2017-09-14 DIAGNOSIS — F41.9: Primary | ICD-10-CM

## 2017-09-14 NOTE — CARD
--------------- APPROVED REPORT --------------





EKG Measurement

Heart [id number]

TN [id number]

[id number]

[id number]

[id number]



<Conclusion>

Sinus bradycardia

Otherwise normal ECG

## 2017-09-14 NOTE — US
HISTORY:

testicular swelling



TECHNIQUE:

Realtime sonography through the scrotum with color and doppler flow.



COMPARISON:

None Available.



FINDINGS:



RIGHT TESTICLE:

Measures 5.3 x 2.2 x 4.1 cm. Normal echotexture and flow.



RIGHT EPIDIDYMIS:

Epididymal head measures 0.9 x 0.8 x 1.0 cm. Grossly unremarkable 

appearance with normal flow.



LEFT TESTICLE:

Measures 4.7 x 3.3 x 3.0 cm. Normal echotexture and flow.



LEFT EPIDIDYMIS:

Epididymal head measures 0.9 x 0.6 x 1.0 cm. Grossly unremarkable 

appearance with normal flow.



HYDROCELE:

A large left hydrocele is identified. There is a borderline right 

hydrocele.



VARICOCELE:

None.



OTHER FINDINGS:

The patient complained to the technologist of a palpated by at the 

right inguinal region which sonographically images and no discrete 

cyst or solid lesion was encountered in this location. 



IMPRESSION:

A large left hydrocele is identified, borderline at the right. No 

cystic or solid testicular mass is appreciate with the bilateral 

epididymides for couple as well. No evidence to suggest testicular 

torsion bilaterally.

## 2017-09-14 NOTE — ED PDOC
Arrival/HPI





- General


Historian: Patient





- History of Present Illness


Time/Duration: Other (12hrs )


Symptom Onset: Sudden


Symptom Course: Unchanged


Severity Level: 5


Activities at Onset: Rest


Context: Home





<Yudy Adams - Last Filed: 09/14/17 10:17>





<Nikita Ritchie - Last Filed: 09/14/17 10:39>





- General


Chief Complaint: Shortness Of Breath


Time Seen by Provider: 09/14/17 07:36





- History of Present Illness


Narrative History of Present Illness (Text): 


09/14/17 08:27


This is a 61Y M with PMH of asthma, hypothyroidism and interstitial hemorrhagic 

cystitis s/p urostomy placement who came to ED for SOB since last night. He 

noticed he felt as though he could not catch his breath. He denies having any 

wheezing, cough, sick contacts, fever/chills, leg edema or recent travel. He 

does have asthma, but rarely uses his inhaler. Patient does admit to feel 

anxious at times and takes Xanax prn. He said nothing made his breathing better 

and paint fumes make it worse. The patient reports that he saw Dr. Colón 

yesterday and did lab work. He is able to walk without any problems and speak 

full sentences. 


Patient also complains of testicular swelling x 2 months that has been getting 

worse. He denies erythema and is tender to palpation. He denies any penile 

drainage.   (Yudy Adams)





Past Medical History





- Provider Review


Nursing Documentation Reviewed: Yes





- Travel History


Have you recently traveled outside [place] w/in the past 3 mons?: No





- Infectious Disease


Hx of Infectious Diseases: None





- Tetanus Immunization


Tetanus Immunization: Unknown





- Cardiac


Hx Cardiac Disorders: No


Hx Pacemaker: No





- Pulmonary


Hx Respiratory Disorders: Yes


Hx Asthma: Yes





- Neurological


Hx Neurological Disorder: No


Hx Paralysis: No





- HEENT


Hx HEENT Disorder: Yes


Other/Comment: difficulty hearing





- Renal


Hx Renal Disorder: Yes (Interstitial Cystitis)


Other/Comment: urinary bladder removed, has urostomy





- Endocrine/Metabolic


Hx Endocrine Disorders: Yes


Hx Hypothyroidism: Yes





- Hematological/Oncological


Hx Blood Disorders: No


Hx Blood Transfusions: No


Hx Blood Transfusion Reaction: No





- Integumentary


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





- Musculoskeletal/Rheumatological


Hx Musculoskeletal Disorders: No





- Gastrointestinal


Hx Gastrointestinal Disorders: Yes (esophageal stricture,HERNIORHAPPHY 4 X)


Hx Gastroesophageal Reflux: Yes


Hx Liver Failure: Yes (HEPATOMEGALY)


HX Swallowing Problems: Yes





- Genitourinary/Gynecological


Hx Genitourinary Disorders: Yes (interstital cystitis,  urostomy 2009)


Hx Hematuria: Yes


Hx Prostate Problems: Yes (LASER SX-DYSURIA, enlarged 2004)


Hx Urinary Tract Infection: Yes





- Psychiatric


Hx Anxiety: Yes


Hx Emotional Abuse: No


Hx Physical Abuse: No


Hx Substance Use: No





- Surgical History


Other/Comment: Hernia repair, urostomy, mesh removal, cyst removal, cystectomy





- Anesthesia


Hx Anesthesia Reactions: No


Hx Malignant Hyperthermia: No





- Suicidal Assessment


Feels Threatened In Home Enviroment: No





<Yudy Adams - Last Filed: 09/14/17 10:17>





Family/Social History





- Physician Review


Nursing Documentation Reviewed: Yes


Family/Social History: Unknown Family HX


Smoking Status: Never Smoked


Hx Alcohol Use: No


Hx Substance Use: No


Hx Substance Use Treatment: No





<Yudy Adams - Last Filed: 09/14/17 10:17>





Allergies/Home Meds





<Yudy Adams - Last Filed: 09/14/17 10:17>





<Nikita Ritchie - Last Filed: 09/14/17 10:39>


Allergies/Adverse Reactions: 


Allergies





No Known Allergies Allergy (Verified 05/10/17 07:49)


 








Home Medications: 


 Home Meds











 Medication  Instructions  Recorded  Confirmed


 


Albuterol HFA [Ventolin HFA 90 2 puff IH [id number] PRN 05/10/17 09/14/17





mcg/actuation (8 g)]   


 


Omeprazole 20 mg PO DAILY 05/10/17 09/14/17














Review of Systems





- Physician Review


All systems were reviewed & negative as marked: Yes





- Review of Systems


Constitutional: Normal.  absent: Fevers


Eyes: Normal.  absent: Vision Changes


ENT: Normal.  absent: Hearing Changes


Respiratory: SOB.  absent: Cough, Sputum, Wheezing


Cardiovascular: Normal.  absent: Chest Pain, Palpitations


Gastrointestinal: Normal.  absent: Abdominal Pain


Genitourinary Male: Other (testicular swelling ).  absent: Dysuria, Frequency


Musculoskeletal: Normal.  absent: Arthralgias


Skin: Normal


Neurological: Normal.  absent: Headache, Dizziness


Endocrine: Normal


Hemo/Lymphatic: Normal


Psychiatric: Normal.  absent: Anxiety





<Yudy Adams - Last Filed: 09/14/17 10:17>





Physical Exam


Vital Signs Reviewed: Yes


Temperature: Afebrile


Blood Pressure: Hypertensive


Pulse: Regular


Respiratory Rate: Normal


Appearance: Positive for: Well-Appearing, Non-Toxic, Comfortable


Pain Distress: None


Mental Status: Positive for: Alert and Oriented X 3





- Systems Exam


Head: Present: Atraumatic, Normocephalic


Pupils: Present: PERRL


Extroacular Muscles: Present: EOMI


Conjunctiva: Present: Normal


Mouth: Present: Moist Mucous Membranes


Neck: Present: Normal Range of Motion


Respiratory/Chest: Present: Good Air Exchange, Wheezes ([place]).  No: Respiratory 

Distress, Accessory Muscle Use


Cardiovascular: Present: Regular Rate and Rhythm, Normal S1, S2.  No: Murmurs


Abdomen: Present: Tenderness (mild tenderness around ileourostomy site ), 

Normal Bowel Sounds, Other (ileourostomy bag in place- clean and dry draining 

uring ).  No: Distention, Peritoneal Signs


Genitourinary Male: Present: Normal External Genitalia, Testicle Tenderness (

bilaterally ), Testicle Swelling (bilaterally )


Back: Present: Normal Inspection


Upper Extremity: Present: Normal Inspection.  No: Cyanosis, Edema


Lower Extremity: Present: Normal Inspection.  No: Edema


Neurological: Present: GCS=15, CN II-XII Intact, Speech Normal


Skin: Present: Warm, Dry, Normal Color.  No: Rashes


Psychiatric: Present: Alert, Oriented x 3, Normal Insight, Normal Concentration





<Yudy Adams - Last Filed: 09/14/17 10:17>





<Nikita Ritchie - Last Filed: 09/14/17 10:39>


Vital Signs











  Temp Pulse Resp BP Pulse Ox


 


 09/14/17 09:36  97.9 F  65  18  143/71  100


 


 09/14/17 07:47  97.8 F  67  18  147/88  100


 


 09/14/17 07:44  97.8 F  67  18  147/88  98


 


 09/14/17 07:40  97.8 F  69  18  147/88  100














Medical Decision Making


Re-evaluation Time: 09:26


Reassessment Condition: Improved





- EKG Interpretation


Interpreted by ED Physician: Yes


Type: 12 lead EKG


Comparison: Similar to previous EKG





<Yudy Adams - Last Filed: 09/14/17 10:17>





<Nikita Ritchie - Last Filed: 09/14/17 10:39>


ED Course and Treatment: 





09/14/17 08:36


Impression:


This is a 61Y M with PMH hypothryoidism, asthma and interstitial hemorrhagic 

cystitis s/p urostomy with complaint of SOB x 12hrs and testicular swelling x 2 

months





Differential Diagnosis included but are not limited to:  


asthma exacerbation v. anxiety


hydrocele v. varicocele 





Plan:


-- CXR 2 view


-- EKG


-- Testicular U/S


-- Xopenex


-- Reassess and disposition





Prior Visits:


Notes and results from previous visits were reviewed.





EKG:


Ordered, reviewed, and independently interpreted the EKG.


Rate : 57 BPM


Rhythm : Sinus bradycardia


Interpretation : No ST-segment elevations or depressions, no T-wave inversions, 

normal intervals.


Comparison : Similar to previous EKG





Testicular U/S


HISTORY:


testicular swelling


TECHNIQUE:


Realtime sonography through the scrotum with color and doppler flow.


COMPARISON:


None Available.


FINDINGS:


RIGHT TESTICLE:


Measures 5.3 x 2.2 x 4.1 cm. Normal echotexture and flow.


RIGHT EPIDIDYMIS:


Epididymal head measures 0.9 x 0.8 x 1.0 cm. Grossly unremarkable appearance 

with normal flow.


LEFT TESTICLE:


Measures 4.7 x 3.3 x 3.0 cm. Normal echotexture and flow.


LEFT EPIDIDYMIS:


Epididymal head measures 0.9 x 0.6 x 1.0 cm. Grossly unremarkable appearance 

with normal flow.


HYDROCELE:


A large left hydrocele is identified. There is a borderline right hydrocele.


VARICOCELE:


None.


OTHER FINDINGS:


The patient complained to the technologist of a palpated by at the right 

inguinal region which sonographically images and no discrete cyst or solid 

lesion was encountered in this location. 


IMPRESSION:


A large left hydrocele is identified, borderline at the right. No cystic or 

solid testicular mass is appreciate with the bilateral epididymides for couple 

as well. No evidence to suggest testicular torsion bilaterally.





CXR 2 view: showed no active disease 





Progress Notes: 


Labs from yesterday ordered by Dr. Colón reviewed. Patient feels very anxious. 

Xanax 0.25mg given for his anxiety. Patient reports feeling much better. 





Discharge Instructions:


Re-evaluation.  Patient feels better.  Discussed results and plan with patient 

who expresses understanding.  All questions answered and there is agreement 

with the plan to discharge home with instructions.  Patient stable for 

discharge.  Return if symptoms persist or worsen.


_________________________________________________________________________











 (Yudy Adams)


09/14/17 09:51


Patient Seen With Resident:


In agreement with resident note which contains more details about the patient. 

Patient was seen and evaluated with resident. Came up with plan and treatment 

together. 62 y/o M p/w shortness of breath with wheezing on exam. Also with 

testicular swelling.


 (Nikita Ritchie)





- RAD Interpretation


Radiology Orders: 








09/14/17 07:58


CHEST TWO VIEWS (PA/LAT) [RAD] Stat 


TESTES DUPLEX COMPLETE [US] Stat 














- Medication Orders


Current Medication Orders: 











Discontinued Medications





Alprazolam (Xanax)  0.25 mg PO STAT STA


   PRN Reason: Protocol


   Stop: 09/14/17 09:26


   Last Admin: 09/14/17 09:36  Dose: 0.25 mg





Levalbuterol HCl (Xopenex)  1.25 mg IH STAT STA


   Stop: 09/14/17 08:02


   Last Admin: 09/14/17 08:18  Dose: 1.25 mg











<Yudy Adams - Last Filed: 09/14/17 10:17>





- Scribe Statement


The provider has reviewed the documentation as recorded by the Scribe





<Nikita Ritchie - Last Filed: 09/14/17 10:39>





- Scribe Statement


Apurva Hatch





Provider Scribe Attestation:


All medical record entries made by the Scribe were at my direction and 

personally dictated by me. I have reviewed the chart and agree that the record 

accurately reflects my personal performance of the history, physical exam, 

medical decision making, and the department course for this patient. I have 

also personally directed, reviewed, and agree with the discharge instructions 

and disposition.


 (Nikita Ritchie)





Disposition/Present on Arrival





- Present on Arrival


Any Indicators Present on Arrival: Yes


History of DVT/PE: No


History of Uncontrolled Diabetes: No


Urinary Catheter: Yes (urostomy)


History of Decub. Ulcer: No


History Surgical Site Infection Following: None





- Disposition


Have Diagnosis and Disposition been Completed?: Yes


Disposition Time: 10:12


Patient Plan: Discharge





<Yudy Adams - Last Filed: 09/14/17 10:17>





<ChauNikita - Last Filed: 09/14/17 10:39>





- Disposition


Diagnosis: 


 Asthma, Anxiety





Disposition: HOME/ ROUTINE


Patient Problems: 


 Current Active Problems











Problem Status Onset


 


Anxiety Acute  


 


Asthma Acute  











Condition: FAIR


Print Language: ENGLISH


Additional Instructions: 





Mr. Hickey, thank you for letting us take care of you today. Your provider 

was Dr. Adams. You were treated for shortness of breath. The emergency 

medical care you received today was directed at your acute symptoms. If you 

were prescribed any medication, please fill it and take as directed. It may 

take several days for your symptoms to resolve. Return to the Emergency 

Department if your symptoms worsen, do not improve, or if you have any other 

problems.





Please contact your doctor or call one of the physicians/clinics you have been 

referred to that are listed on the Patient Visit Information form that is 

included in your discharge packet. Bring any paperwork you were given at 

discharge with you along with any medications you are taking to your follow up 

visit. Our treatment cannot replace ongoing medical care by a primary care 

provider (PCP) outside of the emergency department.





Thank you for allowing the [facility] team to be part of your care today.





Referrals: 


Ihsan Stein MD [Primary Care Provider] - Follow up with primary


Forms:  [facility] (English)

## 2017-10-15 ENCOUNTER — HOSPITAL ENCOUNTER (INPATIENT)
Dept: HOSPITAL 42 - ED | Age: 62
LOS: 3 days | Discharge: HOME | DRG: 392 | End: 2017-10-18
Attending: INTERNAL MEDICINE | Admitting: HOSPITALIST
Payer: COMMERCIAL

## 2017-10-15 VITALS — BODY MASS INDEX: 25.8 KG/M2

## 2017-10-15 DIAGNOSIS — Z93.6: ICD-10-CM

## 2017-10-15 DIAGNOSIS — Z90.6: ICD-10-CM

## 2017-10-15 DIAGNOSIS — J45.909: ICD-10-CM

## 2017-10-15 DIAGNOSIS — N39.0: ICD-10-CM

## 2017-10-15 DIAGNOSIS — N42.9: ICD-10-CM

## 2017-10-15 DIAGNOSIS — R10.30: ICD-10-CM

## 2017-10-15 DIAGNOSIS — J34.2: ICD-10-CM

## 2017-10-15 DIAGNOSIS — N50.89: ICD-10-CM

## 2017-10-15 DIAGNOSIS — G89.29: ICD-10-CM

## 2017-10-15 DIAGNOSIS — F41.9: ICD-10-CM

## 2017-10-15 DIAGNOSIS — N43.3: ICD-10-CM

## 2017-10-15 DIAGNOSIS — K21.9: ICD-10-CM

## 2017-10-15 DIAGNOSIS — Z79.899: ICD-10-CM

## 2017-10-15 DIAGNOSIS — K22.2: ICD-10-CM

## 2017-10-15 DIAGNOSIS — E03.9: ICD-10-CM

## 2017-10-15 DIAGNOSIS — K59.00: Primary | ICD-10-CM

## 2017-10-15 DIAGNOSIS — R16.0: ICD-10-CM

## 2017-10-15 DIAGNOSIS — Z87.440: ICD-10-CM

## 2017-10-15 DIAGNOSIS — N17.9: ICD-10-CM

## 2017-10-15 LAB
ALBUMIN/GLOB SERPL: 1.1 {RATIO} (ref 1.1–1.8)
ALP SERPL-CCNC: 56 U/L (ref 38–126)
ALT SERPL-CCNC: 25 U/L (ref 7–56)
AMORPH SED URNS QL MICRO: (no result)
APPEARANCE UR: (no result)
APTT BLD: 26.4 SECONDS (ref 23.7–30.8)
AST SERPL-CCNC: 22 U/L (ref 17–59)
BACTERIA #/AREA URNS HPF: (no result) /[HPF]
BASE EXCESS BLDV CALC-SCNC: 7.1 MMOL/L (ref 0–2)
BASOPHILS # BLD AUTO: 0.04 K/MM3 (ref 0–2)
BASOPHILS NFR BLD: 0.7 % (ref 0–3)
BILIRUB SERPL-MCNC: 0.4 MG/DL (ref 0.2–1.3)
BILIRUB UR-MCNC: NEGATIVE MG/DL
BUN SERPL-MCNC: 20 MG/DL (ref 7–21)
CALCIUM SERPL-MCNC: 9.2 MG/DL (ref 8.4–10.5)
CHLORIDE SERPL-SCNC: 102 MMOL/L (ref 98–107)
CO2 SERPL-SCNC: 26 MMOL/L (ref 21–33)
COLOR UR: YELLOW
EOSINOPHIL # BLD: 0.2 10*3/UL (ref 0–0.7)
EOSINOPHIL NFR BLD: 3.4 % (ref 1.5–5)
ERYTHROCYTE [DISTWIDTH] IN BLOOD BY AUTOMATED COUNT: 15.2 % (ref 11.5–14.5)
GLOBULIN SER-MCNC: 3.6 GM/DL
GLUCOSE SERPL-MCNC: 124 MG/DL (ref 70–110)
GLUCOSE UR STRIP-MCNC: NEGATIVE MG/DL
GRANULOCYTES # BLD: 3.52 10*3/UL (ref 1.4–6.5)
GRANULOCYTES NFR BLD: 60.6 % (ref 50–68)
HCT VFR BLD CALC: 33.4 % (ref 42–52)
INR PPP: 0.99 (ref 0.93–1.08)
KETONES UR STRIP-MCNC: NEGATIVE MG/DL
LEUKOCYTE ESTERASE UR-ACNC: (no result) LEU/UL
LYMPHOCYTES # BLD: 1.6 10*3/UL (ref 1.2–3.4)
LYMPHOCYTES NFR BLD AUTO: 28.1 % (ref 22–35)
MCH RBC QN AUTO: 24.4 PG (ref 25–35)
MCHC RBC AUTO-ENTMCNC: 31.1 G/DL (ref 31–37)
MCV RBC AUTO: 78.4 FL (ref 80–105)
MONOCYTES # BLD AUTO: 0.4 10*3/UL (ref 0.1–0.6)
MONOCYTES NFR BLD: 7.2 % (ref 1–6)
PH BLDV: 7.43 [PH] (ref 7.32–7.43)
PH UR STRIP: 6.5 [PH] (ref 4.7–8)
PLATELET # BLD: 215 10^3/UL (ref 120–450)
PMV BLD AUTO: 8.3 FL (ref 7–11)
POTASSIUM SERPL-SCNC: 4 MMOL/L (ref 3.6–5)
PROT SERPL-MCNC: 7.6 G/DL (ref 5.8–8.3)
PROT UR STRIP-MCNC: 100 MG/DL
RBC # UR STRIP: (no result) /UL
RBC #/AREA URNS HPF: (no result) /HPF (ref 0–2)
SODIUM SERPL-SCNC: 141 MMOL/L (ref 132–148)
SP GR UR STRIP: 1.02 (ref 1–1.03)
TROPONIN I SERPL-MCNC: < 0.01 NG/ML
UROBILINOGEN UR STRIP-ACNC: 0.2 E.U./DL
WBC # BLD AUTO: 5.8 10^3/UL (ref 4.5–11)

## 2017-10-15 RX ADMIN — MEROPENEM AND SODIUM CHLORIDE SCH MLS/HR: 1 INJECTION, SOLUTION INTRAVENOUS at 21:23

## 2017-10-15 RX ADMIN — IPRATROPIUM BROMIDE AND ALBUTEROL SULFATE SCH: .5; 3 SOLUTION RESPIRATORY (INHALATION) at 21:40

## 2017-10-15 NOTE — CP.PCM.HP
<Gavin Donato - Last Filed: 10/15/17 13:39>





History of Present Illness





- History of Present Illness


History of Present Illness: 





CC: Abd pain & wheezing 





60 yo M w/ PMHx of asthma, interstitial hemorrhagic cystitis s/p ilieal conduit 

and cystectomy, and hypothyroidism presents to the ED for abdominal pain. Pt 

stated that since his surgery in 2009 he has been having this abdominal pain 

intermittently. The pain is located in b/l lower quadrants of the abdomen. He 

denies any radiation and pain is 8/10 in severity. He does complain of nausea 

but denies any vomiting. He also complains of burning sensation as if he needs 

to urinate but he is not able to due to his ilieal conduit.  He denies taking 

anything for the pain. He also states that he felt short of breath and wheezing 

this morning. He states that he has Ventolin at home but did not use it. He 

used the pump 4 times yesterday and it did not help. No other complaints. 





12 point ROS performed and negative other than stated above. 





As per HPI and prior notes:


PMHx:  asthma,  hypothyroidism, interstitial hemorrhagic cystitis, 


PSxHx:   ilieal conduit and cystectomy, esophageal stricture, HERNIORHAPPHY 4 X


   Incarcerated L inguinal hernia repair, 2017; R inguinal repair 30 years ago


   I&D abscess of abdomen 


   deviated nasal septum repair


   interSTIM placement and removal for sacral nerve


   Cystectomy 


   


Allergies:  NKA


Medications:refer to [facility] 


SH: denies ETOH, smoking and ilicit drugs.


FH: denies  





Present on Admission





- Present on Admission


Any Indicators Present on Admission: No





Review of Systems





- Review of Systems


All systems: reviewed and no additional remarkable complaints except





Past Patient History





- Infectious Disease


Hx of Infectious Diseases: None





- Tetanus Immunizations


Tetanus Immunization: Unknown





- Past Medical History & Family History


Past Medical History?: Yes





- Past Social History


Smoking Status: Never Smoked





- CARDIAC


Hx Cardiac Disorders: No


Hx Pacemaker: No





- PULMONARY


Hx Respiratory Disorders: Yes


Hx Asthma: Yes





- NEUROLOGICAL


Hx Neurological Disorder: No


Hx Paralysis: No





- HEENT


Hx HEENT Problems: Yes


Other/Comment: difficulty hearing





- RENAL


Hx Chronic Kidney Disease: Yes (Interstitial Cystitis)


Other/Comment: urinary bladder removed, has urostomy





- ENDOCRINE/METABOLIC


Hx Endocrine Disorders: Yes


Hx Hypothyroidism: Yes





- HEMATOLOGICAL/ONCOLOGICAL


Hx Blood Disorders: No


Hx Blood Transfusions: No


Hx Blood Transfusion Reaction: No





- INTEGUMENTARY


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





- MUSCULOSKELETAL/RHEUMATOLOGICAL


Hx Musculoskeletal Disorders: No





- GASTROINTESTINAL


Hx Gastrointestinal Disorders: Yes (esophageal stricture,HERNIORHAPPHY 4 X)


Hx Gastroesophageal Reflux: Yes


Hx Liver Failure: Yes (HEPATOMEGALY)


HX Swallowing Problems: Yes





- GENITOURINARY/GYNECOLOGICAL


Hx Genitourinary Disorders: Yes (interstital cystitis,  urostomy 2009)


Hx Hematuria: Yes


Hx Prostate Problems: Yes (LASER SX-DYSURIA, enlarged 2004)


Hx Urinary Tract Infection: Yes





- PSYCHIATRIC


Hx Anxiety: Yes


Hx Emotional Abuse: No


Hx Physical Abuse: No


Hx Substance Use: No





- SURGICAL HISTORY


Other/Comment: Hernia repair, urostomy, mesh removal, cyst removal, cystectomy





- ANESTHESIA


Hx Anesthesia Reactions: No


Hx Malignant Hyperthermia: No





Meds


Allergies/Adverse Reactions: 


 Allergies











Allergy/AdvReac Type Severity Reaction Status Date / Time


 


No Known Allergies Allergy   Verified 10/15/17 08:36














Physical Exam





- Constitutional


Appears: No Acute Distress





- Head Exam


Head Exam: ATRAUMATIC, NORMOCEPHALIC





- Eye Exam


Eye Exam: EOMI, PERRL





- ENT Exam


ENT Exam: Mucous Membranes Moist





- Respiratory Exam


Respiratory Exam: Clear to Auscultation Bilateral, Wheezes.  absent: Rales, 

Rhonchi


Additional comments: 





Slight B/l wheezes 





- Cardiovascular Exam


Cardiovascular Exam: REGULAR RHYTHM, +S1, +S2





- GI/Abdominal Exam


GI & Abdominal Exam: Distended, Normal Bowel Sounds, Soft, Tenderness


Additional comments: 





Mild tender and distended 





- Extremities Exam


Extremities exam: Negative for: calf tenderness, pedal edema





- Neurological Exam


Neurological exam: Alert, CN II-XII Intact, Oriented x3





- Psychiatric Exam


Psychiatric exam: Normal Affect, Normal Mood





- Skin


Skin Exam: Dry, Intact, Warm





Results





- Vital Signs


Recent Vital Signs: 





 Last Vital Signs











Temp  97.8 F   10/15/17 09:52


 


Pulse  60   10/15/17 09:52


 


Resp  17   10/15/17 09:52


 


BP  141/83   10/15/17 11:00


 


Pulse Ox  101 H  10/15/17 11:00














- Labs


Result Diagrams: 


 10/15/17 09:02





 10/15/17 09:02


Labs: 





 Laboratory Results - last 24 hr











  10/15/17 10/15/17 10/15/17





  09:02 09:02 09:02


 


WBC  5.8  


 


RBC  4.26  


 


Hgb  10.4 L  


 


Hct  33.4 L  


 


MCV  78.4 L  


 


MCH  24.4 L  


 


MCHC  31.1  


 


RDW  15.2 H  


 


Plt Count  215  


 


MPV  8.3  


 


Gran %  60.6  


 


Lymph % (Auto)  28.1  


 


Mono % (Auto)  7.2 H  


 


Eos % (Auto)  3.4  


 


Baso % (Auto)  0.7  


 


Gran #  3.52  


 


Lymph #  1.6  


 


Mono #  0.4  


 


Eos #  0.2  


 


Baso #  0.04  


 


PT   10.7 


 


INR   0.99 


 


APTT   26.4 


 


pO2   


 


VBG pH   


 


VBG pCO2   


 


VBG HCO3   


 


VBG Total CO2   


 


VBG O2 Sat (Calc)   


 


VBG Base Excess   


 


VBG Potassium   


 


Glucose   


 


Lactate   


 


FiO2   


 


Sodium    141


 


Potassium    4.0


 


Chloride    102


 


Carbon Dioxide    26


 


Anion Gap    17


 


BUN    20


 


Creatinine    1.6 H


 


Est GFR ( Amer)    53


 


Est GFR (Non-Af Amer)    44


 


Random Glucose    124 H


 


Calcium    9.2


 


Total Bilirubin    0.4


 


AST    22


 


ALT    25


 


Alkaline Phosphatase    56


 


Lactate Dehydrogenase    314 L


 


Total Creatine Kinase    147


 


Troponin I    < 0.01


 


NT-Pro-B Natriuret Pep    34.4


 


Total Protein    7.6


 


Albumin    4.1


 


Globulin    3.6


 


Albumin/Globulin Ratio    1.1


 


Venous Blood Potassium   


 


Urine Color   


 


Urine Appearance   


 


Urine pH   


 


Ur Specific Gravity   


 


Urine Protein   


 


Urine Glucose (UA)   


 


Urine Ketones   


 


Urine Blood   


 


Urine Nitrate   


 


Urine Bilirubin   


 


Urine Urobilinogen   


 


Ur Leukocyte Esterase   


 


Urine RBC   


 


Urine WBC   


 


Ur Epithelial Cells   


 


Amorphous Sediment   


 


Urine Bacteria   














  10/15/17 10/15/17





  09:48 11:45


 


WBC  


 


RBC  


 


Hgb  


 


Hct  


 


MCV  


 


MCH  


 


MCHC  


 


RDW  


 


Plt Count  


 


MPV  


 


Gran %  


 


Lymph % (Auto)  


 


Mono % (Auto)  


 


Eos % (Auto)  


 


Baso % (Auto)  


 


Gran #  


 


Lymph #  


 


Mono #  


 


Eos #  


 


Baso #  


 


PT  


 


INR  


 


APTT  


 


pO2   34


 


VBG pH   7.43


 


VBG pCO2   49.0


 


VBG HCO3   32.5 H


 


VBG Total CO2   34.0 H


 


VBG O2 Sat (Calc)   73.1 H


 


VBG Base Excess   7.1 H


 


VBG Potassium   3.8


 


Glucose   110


 


Lactate   1.5


 


FiO2   21.0


 


Sodium   140.0


 


Potassium  


 


Chloride   103.0


 


Carbon Dioxide  


 


Anion Gap  


 


BUN  


 


Creatinine  


 


Est GFR ( Amer)  


 


Est GFR (Non-Af Amer)  


 


Random Glucose  


 


Calcium  


 


Total Bilirubin  


 


AST  


 


ALT  


 


Alkaline Phosphatase  


 


Lactate Dehydrogenase  


 


Total Creatine Kinase  


 


Troponin I  


 


NT-Pro-B Natriuret Pep  


 


Total Protein  


 


Albumin  


 


Globulin  


 


Albumin/Globulin Ratio  


 


Venous Blood Potassium   3.8


 


Urine Color  Yellow 


 


Urine Appearance  Cloudy 


 


Urine pH  6.5 


 


Ur Specific Gravity  1.020 


 


Urine Protein  100 H 


 


Urine Glucose (UA)  Negative 


 


Urine Ketones  Negative 


 


Urine Blood  Large H 


 


Urine Nitrate  Positive H 


 


Urine Bilirubin  Negative 


 


Urine Urobilinogen  0.2 


 


Ur Leukocyte Esterase  Moderate H 


 


Urine RBC  Tntc 


 


Urine WBC  10 - 15 


 


Ur Epithelial Cells  10 - 12 


 


Amorphous Sediment  Large 


 


Urine Bacteria  Small 














Assessment & Plan





- Assessment and Plan (Free Text)


Assessment: 





60 yo M w/ PMHx of asthma, interstitial hemorrhagic cystitis s/p ilieal conduit 

and cystectomy, and hypothyroidism presents to the ED for abdominal pain and 

sob + wheezes.





1. UTI


- Afebrile and no leukocytosis 


- CT abdomen reviewed and no acute changes 


- Rocephin x 1 in  the ED 


- Started on antibiotics -  Floyd 


- ID consulted for recs 


- Blood and urine Cx 


- Consider Urology consult if pain worsens 


- Pain control 


- Daily labs 





2. Asthma exac: 


- Duonebs Samara Q6H and PRN Q2H


- Cont to monitor 





3. Hypothyroidism 


- Cont home meds 





4. GI/ DVT ppx 


- Pepcid and SCDs 





Case and plan was reviewed and discussed with Dr Hoover.   





<Tosha Hoover - Last Filed: 10/15/17 13:52>





Results





- Vital Signs


Recent Vital Signs: 





 Last Vital Signs











Temp  97.8 F   10/15/17 09:52


 


Pulse  60   10/15/17 09:52


 


Resp  17   10/15/17 09:52


 


BP  141/83   10/15/17 11:00


 


Pulse Ox  101 H  10/15/17 11:00














- Labs


Result Diagrams: 


 10/15/17 09:02





 10/15/17 09:02





Attending/Attestation





- Attestation


I have personally seen and examined this patient.: Yes


I have fully participated in the care of the patient.: Yes


I have reviewed all pertinent clinical information: Yes


Notes (Text): 





10/15/17 13:46


61 year old male with past medical history of asthma, hypothyroidism and 

interstitial hemorrhagic cystitis s/p ileal conduit and hysterctomy who 

presents with complaint of lower abdominal pain.  CT abd/pelvis reviewed.  +UA.

  Will start iv antibiotics while awaiting urine culture for UTI.  ID 

evaluation is requested.  Patient will need close follow up with urology as 

well.


Also has slight wheezing on examination.  Will start duonebs.


Continue with synthroid for history of hypothyroidism.


Will start iv fluids for mild CARLY.





Tosha Hoover MD


Hospitalist.

## 2017-10-15 NOTE — RAD
PROCEDURE:  CHEST RADIOGRAPH, 1 VIEW



HISTORY:

sob



COMPARISON:

Comparison chest 09/14/2017



FINDINGS:



LUNGS:

Clear.



PLEURA:

No pneumothorax or pleural fluid seen.



CARDIOVASCULAR:

Borderline cardiomegaly.



OSSEOUS STRUCTURES:

No significant abnormalities.



VISUALIZED UPPER ABDOMEN:

Normal.



OTHER FINDINGS:

None. 



IMPRESSION:

No active disease.

## 2017-10-15 NOTE — CT
PROCEDURE:  CT abdomen and pelvis dated 10/15/2017.



HISTORY:

flank pain



COMPARISON:

Comparison made with CT scan of the abdomen and pelvis dated 

05/10/2017 the the the



TECHNIQUE:

Contiguous axial images of the abdomen and pelvis. Oral contrast was 

administered. No IV contrast given. Coronal and Sagittal reformats 

generated. 



Radiation dose:



Total exam DLP = 467.65 mGy-cm.



This CT exam was performed using one or more of the following dose 

reduction techniques: Automated exposure control, adjustment of the 

mA and/or kV according to patient size, and/or use of iterative 

reconstruction technique.



FINDINGS:



LOWER THORAX:

Mild passive/dependent type atelectasis both posterior lower lung 

zones. No focal consolidation effusion or basilar pneumothorax. 



There is a tiny hiatal hernia. 



Heart size is mildly enlarged. No significant pericardial effusion. 



LIVER:

Previously noted multiple on rounded low-attenuation lesions 

scattered throughout the right and left lobes of the liver are poorly 

identified on this exam due to the lack of circulating intravenous 

contrast material.



GALLBLADDER AND BILE DUCTS:

The gallbladder is appears incompletely distended likely due to 

nonfasting state.  No evidence of intraluminal gallbladder calculi 



PANCREAS:

Pancreas is somewhat atrophic and fatty replaced unchanged from prior 

exam.



SPLEEN:

Spleen is upper limits of normal measuring 12.3 cm.  No splenic mass 

collection or calcification. 



ADRENALS:

Unremarkable. 



KIDNEYS AND URETERS:

Persistent dilatation of the renal pelves and ureters extending to an 

ileal conduit in the right anterior pelvis of. No definitive evidence 

of obstructing calculi.



BLADDER:

Apparently resected. 



REPRODUCTIVE:

Grossly unremarkable as visualized pacs. 



APPENDIX:

Appendix is not seen with certainty however no obvious inflammatory 

changes right lower quadrant of the abdomen. 



BOWEL:

Right lower quadrant ostomy again noted. Re- demonstrated is a 

elliptical shaped soft tissue mass density which contains 

calcification likely representing a component of the ileal conduit. . 

.  There is a anastomotic site seen within the mid to lower anterior 

abdomen. No evidence of acute mechanical bowel obstruction. 



PERITONEUM:

Unremarkable. No fluid collection. No free air. Small fat containing 

umbilical hernia. 



LYMPH NODES:

Unremarkable. No enlarged lymph nodes. 



VASCULATURE:

Unremarkable. No aortic aneurysm. 



BONES:

Mild multilevel degenerative spondylosis of the thoracic and lumbar 

spine. 



OTHER FINDINGS:

None. 



IMPRESSION:

Status post urinary bladder resection with ileal conduit.  Dilatation 

of the renal pelves and ureters to the level of the conduit with no 

obstructing calculi. There is an ostomy site in the right lower 

quadrant of the abdomen with an adjacent soft tissue mass density in 

containing calcifications and fluid likely a component of the ileal 

conduit itself. 



No evidence of acute mechanical bowel obstruction. 



See above discussion for additional details and findings.

## 2017-10-15 NOTE — ED PDOC
Arrival/HPI





- General


Chief Complaint: Abdominal Pain


Time Seen by Provider: 10/15/17 08:15


Historian: Patient





- History of Present Illness


Narrative History of Present Illness (Text): 


10/15/17 08:35


A 61 year old male, whose past medical history includes asthma, hypothyroidism 

and interstitial hemorrhagic cystitis s/p urostomy placement, presents to the 

emergency department complaining of suprapubic pain, associated with change in 

urine output with frequency of urination. Denies nausea or vomiting. Denies 

fevers or chills. Reports no change in bowel movements. Denies bloody urine or 

stool. Reports "wheezing" over past 2-3 days as well , which he describe as sob 

with exertion, typical of past "asthma attacks". Denies chest pain or pleuritic 

discomfort currently.





PMD: Dr. Ihsan Stein











Time/Duration: < week (4 days)


Symptom Onset: Sudden


Symptom Course: Unchanged





Past Medical History





- Provider Review


Nursing Documentation Reviewed: Yes





- Infectious Disease


Hx of Infectious Diseases: None





- Tetanus Immunization


Tetanus Immunization: Unknown





- Cardiac


Hx Cardiac Disorders: No


Hx Pacemaker: No





- Pulmonary


Hx Respiratory Disorders: Yes


Hx Asthma: Yes





- Neurological


Hx Neurological Disorder: No


Hx Paralysis: No





- HEENT


Hx HEENT Disorder: Yes


Other/Comment: difficulty hearing





- Renal


Hx Renal Disorder: Yes (Interstitial Cystitis)


Other/Comment: urinary bladder removed, has urostomy





- Endocrine/Metabolic


Hx Endocrine Disorders: Yes


Hx Hypothyroidism: Yes





- Hematological/Oncological


Hx Blood Disorders: No


Hx Blood Transfusions: No


Hx Blood Transfusion Reaction: No





- Integumentary


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





- Musculoskeletal/Rheumatological


Hx Musculoskeletal Disorders: No





- Gastrointestinal


Hx Gastrointestinal Disorders: Yes (esophageal stricture,HERNIORHAPPHY 4 X)


Hx Gastroesophageal Reflux: Yes


Hx Liver Failure: Yes (HEPATOMEGALY)


HX Swallowing Problems: Yes





- Genitourinary/Gynecological


Hx Genitourinary Disorders: Yes (interstital cystitis,  urostomy 2009)


Hx Hematuria: Yes


Hx Prostate Problems: Yes (LASER SX-DYSURIA, enlarged 2004)


Hx Urinary Tract Infection: Yes





- Psychiatric


Hx Anxiety: Yes


Hx Emotional Abuse: No


Hx Physical Abuse: No


Hx Substance Use: No





- Surgical History


Other/Comment: Hernia repair, urostomy, mesh removal, cyst removal, cystectomy





- Anesthesia


Hx Anesthesia Reactions: No


Hx Malignant Hyperthermia: No





- Suicidal Assessment


Feels Threatened In Home Enviroment: No





Family/Social History





- Physician Review


Nursing Documentation Reviewed: Yes


Family/Social History: No Known Family HX


Smoking Status: Never Smoked


Hx Alcohol Use: No


Hx Substance Use: No


Hx Substance Use Treatment: No





Allergies/Home Meds


Allergies/Adverse Reactions: 


Allergies





No Known Allergies Allergy (Verified 10/15/17 08:36)


 








Home Medications: 


 Home Meds











 Medication  Instructions  Recorded  Confirmed


 


Albuterol HFA [Ventolin HFA 90 2 puff IH [id number] PRN 05/10/17 10/15/17





mcg/actuation (8 g)]   


 


Omeprazole 20 mg PO DAILY 05/10/17 10/15/17














Review of Systems





- Review of Systems


Constitutional: Fatigue.  absent: Fevers


Eyes: absent: Vision Changes


ENT: absent: Hearing Changes


Respiratory: SOB, Cough, Wheezing.  absent: Sputum


Cardiovascular: MCNULTY.  absent: Chest Pain, Palpitations, Edema, Calf Pain


Gastrointestinal: Abdominal Pain.  absent: Constipation, Diarrhea, Nausea, 

Vomiting, Appetite Changes, Hematochezia, Hematemesis, Food Intolerance


Genitourinary Male: Dysuria, Frequency, Urinary Output Changes


Musculoskeletal: absent: Back Pain, Neck Pain


Skin: absent: Rash


Neurological: absent: Headache, Dizziness


Hemo/Lymphatic: absent: Easy Bleeding





Physical Exam





- Physical Exam


Narrative Physical Exam (Text): 





Head:  Atraumatic.  Normocephalic. 


Eyes:  PERRL.  EOMI.  Conjunctivae are not pale.


ENT:  Mucous membranes are moist and intact.  Oropharynx is clear and 

symmetric. 


Neck:  Supple.  Full ROM.  No JVD.  No lymphadenopathy.


Cardiovascular:  Regular rate.  Regular rhythm.  No murmurs, rubs, or gallops.  

Distal pulses are 2+ and symmetric.


Pulmonary/Chest:  No evidence of respiratory distress.  Bilateral expiratory 

wheezes, with no accessory muscle usage or retractions.


Abdominal:Ostomy noted to lower abdomen, there is scant clear urine noted in 

bag with no gross hematuria, there is moderate pain on palpation surrounding 

ostomy bag with no incarcerated hernia noted, no pulsatile masses, no rebound 

or guarding.


Genitourinary: testicular edema with no firm mass palpated, no incarcerated 

hernia, no discharge or edema, no penile pain or discharge


Back:  No CVA tenderness.


Extremities:  No calf pain, distal pulses intact. No hip or knee or ankle pain.


Skin:  Skin is warm and dry.  No petechiae.  No purpura. 


Neurological:  Alert, awake, and oriented to person, place, time, and 

situation. Motor and sensory exam intact with no meningeal signs.


Psychiatric:  Good eye contact.  Mild anxiety.














Vital Signs Reviewed: Yes


Vital Signs











  Temp Pulse Resp BP Pulse Ox


 


 10/15/17 13:00  98.9 F  78  18  132/78  98


 


 10/15/17 11:00     141/83  101 H


 


 10/15/17 09:52  97.8 F  60  17  127/87  96


 


 10/15/17 08:26  99.2 F  67  20  139/77  97











Temperature: Afebrile


Blood Pressure: Normal


Pulse: Regular


Respiratory Rate: Normal


Appearance: Positive for: Non-Toxic, Uncomfortable


Pain Distress: Moderate


Mental Status: Positive for: Alert and Oriented X 3





Medical Decision Making


ED Course and Treatment: 


10/15/17 08:40


Impression: 61 year old male with suprapubic pain, wheezing, and shortness of 

breath. 





Differential Diagnosis included but are not limited to:  Asthma Exacerbation 

vs. Urinary Tract Infection vs. Sepsis vs. Bowel Obstruction 





Plan:


-- EKG


-- Abd/Pelvis CT


-- Chest X-ray


-- Labs


-- Xopenex


-- Reassess and disposition











Progress Notes:


Patient with past history of ileal conduit with scant urine in ostomy bag with 

history of frequency and what he reports as "cloudy urine". Currently afebrile, 

does not appear septic although past surgical and infectious history noted.


Urinalysis suggestive of uti at this time remains afebrile with no cva 

tenderness or nausea, no flank pain by history.


Persistent severe abdominal pain around ostomy site with no obvious 

incarcerated hernias or abscess or lesion noted. As pain improved but 

persistent after iv pain medication, ct ordered and reviewed.





EKG:


Ordered, reviewed, and independently interpreted the EKG.


Rate : 59 BPM


Rhythm : Sinus bradycardia


Interpretation : No ST-segment elevations or depressions, no T-wave inversions, 

normal intervals.


Comparison : No previous EKG for comparison.





10/15/2017 11:46


Abd/Pelvis CT


IMPRESSION: 


Status post urinary bladder resection with ileal conduit. Dilatation of the 

renal pelves and ureters to the level of the conduit with no obstructing 

calculi. There is an stomy site i the right lower quadrant of the abdomen with 

an adjacent soft tissue mass density in containing calcifications and fluid 

likely a component of the ileal conduit itself.


No evidence of acute mechanical bowel obstruction. 


Dictator: Tim Tomlin DO





10/15/2017 12:27


Chest X-ray


IMPRESSION: No active disease.


Dictator: Tim Tomlin DO





On re-evaluation, patient with improved but persistent wheezing after nebulizer 

and improved but persistent pain.


IV antibiotics ordered will admit for serial exams, iv antibiotics.





Creatinine slightly elevated from previous baseline will consult urology, iv 

hydrate. Case d/w hospitalist accepts admission to service.








- Lab Interpretations


Lab Results: 








 10/15/17 09:02 





 10/15/17 09:02 





 Lab Results





10/15/17 11:45: pO2 34, VBG pH 7.43, VBG pCO2 49.0, VBG HCO3 32.5 H, VBG Total 

CO2 34.0 H, VBG O2 Sat (Calc) 73.1 H, VBG Base Excess 7.1 H, VBG Potassium 3.8, 

Glucose 110, Lactate 1.5, FiO2 21.0, Sodium 140.0, Chloride 103.0, Venous Blood 

Potassium 3.8


10/15/17 09:48: Urine Color Yellow, Urine Appearance Cloudy, Urine pH 6.5, Ur 

Specific Gravity 1.020, Urine Protein 100 H, Urine Glucose (UA) Negative, Urine 

Ketones Negative, Urine Blood Large H, Urine Nitrate Positive H, Urine 

Bilirubin Negative, Urine Urobilinogen 0.2, Ur Leukocyte Esterase Moderate H, 

Urine RBC Tntc, Urine WBC 10 - 15, Ur Epithelial Cells 10 - 12, Amorphous 

Sediment Large, Urine Bacteria Small


10/15/17 09:02: Sodium 141, Potassium 4.0, Chloride 102, Carbon Dioxide 26, 

Anion Gap 17, BUN 20, Creatinine 1.6 H, Est GFR ( Amer) 53, Est GFR (Non-

Af Amer) 44, Random Glucose 124 H, Calcium 9.2, Total Bilirubin 0.4, AST 22, 

ALT 25, Alkaline Phosphatase 56, Lactate Dehydrogenase 314 L, Total Creatine 

Kinase 147, Troponin I < 0.01, NT-Pro-B Natriuret Pep 34.4, Total Protein 7.6, 

Albumin 4.1, Globulin 3.6, Albumin/Globulin Ratio 1.1


10/15/17 09:02: PT 10.7, INR 0.99, APTT 26.4


10/15/17 09:02: WBC 5.8, RBC 4.26, Hgb 10.4 L, Hct 33.4 L, MCV 78.4 L, MCH 24.4 

L, MCHC 31.1, RDW 15.2 H, Plt Count 215, MPV 8.3, Gran % 60.6, Lymph % (Auto) 

28.1, Mono % (Auto) 7.2 H, Eos % (Auto) 3.4, Baso % (Auto) 0.7, Gran # 3.52, 

Lymph # 1.6, Mono # 0.4, Eos # 0.2, Baso # 0.04








I have reviewed the lab results: Yes





- RAD Interpretation


Radiology Orders: 








10/15/17 08:44


ABD & PELVIS W/O PO OR IV CONT [CT] Stat 


CHEST ONE VIEW [RAD] Stat 











: Radiologist





- EKG Interpretation


Interpreted by ED Physician: Yes


Type: 12 lead EKG





- Medication Orders


Current Medication Orders: 








Albuterol/Ipratropium (Duoneb 3 Mg/0.5 Mg (3 Ml) Ud)  3 ml IH [id number] MARIA D


Albuterol/Ipratropium (Duoneb 3 Mg/0.5 Mg (3 Ml) Ud)  3 ml IH Q2H PRN


   PRN Reason: Shortness of Breath


Famotidine (Pepcid)  40 mg PO HS MARIA D


Sodium Chloride (Sodium Chloride 0.9%)  1,000 mls @ 100 mls/hr IV .Q10H MARIA D


Meropenem 1g/NS 100mL IVPB (Meropenem 1g/Ns 100ml Ivpb)  1 gm in 100 mls @ 100 

mls/hr IVPB Q12 MARIA D


   PRN Reason: Protocol


   Stop: 10/24/17 22:01


Levothyroxine Sodium (Synthroid)  100 mcg PO DAILY MARIA D


Tramadol HCl (Ultram)  50 mg PO TID PRN


   PRN Reason: Pain, severe (8-10)


   Last Admin: 10/15/17 16:50  Dose: 50 mg





MAR Pain Assessment


 Document     10/15/17 16:50  YJ  (Rec: 10/15/17 16:50  YJ  [id number])


     Pain Reassessment


      Is this a pain reassessment?               No


     Sleep


      Is patient sleeping during reassessment?   No


     Presence of Pain


      Presence of Pain                           Yes


     Pain Scale Used


      Pain Scale Used                            Numeric


     Location


      Upper or Lower                             Lower


      Pain Location Body Site                    Abdomen


     Description


      Description                                Constant


      Intensity of Pain at present               8








Discontinued Medications





Albuterol/Ipratropium (Duoneb 3 Mg/0.5 Mg (3 Ml) Ud)  3 ml IH STAT STA


   Stop: 10/15/17 11:11


   Last Admin: 10/15/17 11:20  Dose: 3 ml





Ceftriaxone Sodium (Rocephin 1 Gram Ivpb)  1 gm in 100 mls @ 200 mls/hr IVPB 

ONCE STA


   PRN Reason: Protocol


   Stop: 10/15/17 11:50


   Last Admin: 10/15/17 11:33  Dose: 200 mls/hr





eMAR Start Stop


 Document     10/15/17 11:33  MB  (Rec: 10/15/17 11:34  MB  [id number])


     Intravenous Solution


      Start Date                                 10/15/17


      Start Time                                 11:34


      End Date                                   10/15/17


      End time                                   12:04


      Total Infusion Time                        30





Meropenem 1g/NS 100mL IVPB (Meropenem 1g/Ns 100ml Ivpb)  1 gm in 100 mls @ 100 

mls/hr IVPB Q12 MARIA D


   PRN Reason: Protocol


   Stop: 10/15/17 14:14


   Last Admin: 10/15/17 13:40  Dose: 100 mls/hr





eMAR Start Stop


 Document     10/15/17 13:40  EWO  (Rec: 10/15/17 13:40  EWO  [id number])


     Intravenous Solution


      Start Date                                 10/15/17


      Start Time                                 13:40


      End Date                                   10/15/17


      End time                                   14:40


      Total Infusion Time                        60





Levalbuterol HCl (Xopenex)  0.63 mg IH ONCE STA


   Stop: 10/15/17 08:45


   Last Admin: 10/15/17 09:04  Dose: 0.63 mg





Morphine Sulfate (Morphine)  2 mg IVP STAT STA


   Stop: 10/15/17 11:11


   Last Admin: 10/15/17 11:17  Dose: 2 mg





MAR Pain Assessment


 Document     10/15/17 11:17  MB  (Rec: 10/15/17 11:20  MB  [id number])


     Pain Reassessment


      Is this a pain reassessment?               No


     Sleep


      Is patient sleeping during reassessment?   No


     Presence of Pain


      Presence of Pain                           Yes


     Pain Scale Used


      Pain Scale Used                            Numeric


     Location


      Upper or Lower                             Lower


      Pain Location Body Site                    Abdomen


     Description


      Description                                Constant


      Intensity of Pain at present               8


      Acceptable Level of Pain                   1


      Pain Behavior                              Guarding


                                                 Rubbing Site


      Aggravating Factors                        None


      Alleviating Factors/Management             Medication


       Techniques                                


IVP Administration


 Document     10/15/17 11:17  MB  (Rec: 10/15/17 11:20  MB  [id number])


     Charges for Administration


      # of IVP Administrations                   1


Re-Assess: MAR Pain Assessment


 Document     10/15/17 12:17  EWO  (Rec: 10/15/17 13:44  EWO  [id number])


     Pain Reassessment


      Is this a pain reassessment?               Yes


     Sleep


      Is patient sleeping during reassessment?   No


     Presence of Pain


      Presence of Pain                           Yes





Morphine Sulfate (Morphine)  2 mg IVP STAT STA


   Stop: 10/15/17 12:46


   Last Admin: 10/15/17 12:52  Dose: 2 mg





MAR Pain Assessment


 Document     10/15/17 12:52  EWO  (Rec: 10/15/17 12:53  EWO  [id number])


     Pain Reassessment


      Is this a pain reassessment?               Yes


     Sleep


      Is patient sleeping during reassessment?   No


     Presence of Pain


      Presence of Pain                           Yes


     Pain Scale Used


      Pain Scale Used                            Numeric


     Location


      Upper or Lower                             Lower


      Pain Location Body Site                    Abdomen


     Description


      Description                                Intermittent


      Intensity of Pain at present               5


IVP Administration


 Document     10/15/17 12:52  EWO  (Rec: 10/15/17 12:53  [facility]  [id number])


     Charges for Administration


      # of IVP Administrations                   2





Tramadol HCl (Ultram)  50 mg PO TID MARIA D











- Scribe Statement


The provider has reviewed the documentation as recorded by the Treasure Evans





Provider Scribe Attestation:


All medical record entries made by the Vilmaibmargarita were at my direction and 

personally dictated by me. I have reviewed the chart and agree that the record 

accurately reflects my personal performance of the history, physical exam, 

medical decision making, and the department course for this patient. I have 

also personally directed, reviewed, and agree with the discharge instructions 

and disposition.








Disposition/Present on Arrival





- Present on Arrival


Any Indicators Present on Arrival: Yes


History of DVT/PE: No


History of Uncontrolled Diabetes: No


Urinary Catheter: Yes (urostomy)


History of Decub. Ulcer: No


History Surgical Site Infection Following: None





- Disposition


Have Diagnosis and Disposition been Completed?: Yes


Diagnosis: 


 Abdominal pain, Asthma, UTI (urinary tract infection), Renal insufficiency





Disposition: HOSPITALIZED


Disposition Time: 11:00


Patient Plan: Admission


Patient Problems: 


 Current Active Problems











Problem Status Onset


 


Renal insufficiency Acute  


 


Abdominal pain Chronic  


 


Asthma Chronic  


 


Urinary tract infection Chronic  











Condition: FAIR

## 2017-10-16 LAB
ALBUMIN/GLOB SERPL: 1.1 {RATIO} (ref 1.1–1.8)
ALP SERPL-CCNC: 56 U/L (ref 38–126)
ALT SERPL-CCNC: 24 U/L (ref 7–56)
AST SERPL-CCNC: 28 U/L (ref 17–59)
BASOPHILS # BLD AUTO: 0.03 K/MM3 (ref 0–2)
BASOPHILS NFR BLD: 0.5 % (ref 0–3)
BILIRUB SERPL-MCNC: 0.5 MG/DL (ref 0.2–1.3)
BUN SERPL-MCNC: 17 MG/DL (ref 7–21)
CALCIUM SERPL-MCNC: 8.5 MG/DL (ref 8.4–10.5)
CHLORIDE SERPL-SCNC: 104 MMOL/L (ref 98–107)
CO2 SERPL-SCNC: 31 MMOL/L (ref 21–33)
EOSINOPHIL # BLD: 0.2 10*3/UL (ref 0–0.7)
EOSINOPHIL NFR BLD: 3.4 % (ref 1.5–5)
ERYTHROCYTE [DISTWIDTH] IN BLOOD BY AUTOMATED COUNT: 15.2 % (ref 11.5–14.5)
GLOBULIN SER-MCNC: 3.4 GM/DL
GLUCOSE SERPL-MCNC: 101 MG/DL (ref 70–110)
GRANULOCYTES # BLD: 3.64 10*3/UL (ref 1.4–6.5)
GRANULOCYTES NFR BLD: 64.3 % (ref 50–68)
HCT VFR BLD CALC: 35.1 % (ref 42–52)
LYMPHOCYTES # BLD: 1.5 10*3/UL (ref 1.2–3.4)
LYMPHOCYTES NFR BLD AUTO: 25.6 % (ref 22–35)
MCH RBC QN AUTO: 24 PG (ref 25–35)
MCHC RBC AUTO-ENTMCNC: 30.5 G/DL (ref 31–37)
MCV RBC AUTO: 78.7 FL (ref 80–105)
MONOCYTES # BLD AUTO: 0.4 10*3/UL (ref 0.1–0.6)
MONOCYTES NFR BLD: 6.2 % (ref 1–6)
PLATELET # BLD: 209 10^3/UL (ref 120–450)
PMV BLD AUTO: 8.2 FL (ref 7–11)
POTASSIUM SERPL-SCNC: 4.7 MMOL/L (ref 3.6–5)
PROT SERPL-MCNC: 7.2 G/DL (ref 5.8–8.3)
SODIUM SERPL-SCNC: 143 MMOL/L (ref 132–148)
WBC # BLD AUTO: 5.7 10^3/UL (ref 4.5–11)

## 2017-10-16 RX ADMIN — MEROPENEM AND SODIUM CHLORIDE SCH MLS/HR: 1 INJECTION, SOLUTION INTRAVENOUS at 21:41

## 2017-10-16 RX ADMIN — IPRATROPIUM BROMIDE AND ALBUTEROL SULFATE SCH: .5; 3 SOLUTION RESPIRATORY (INHALATION) at 07:41

## 2017-10-16 RX ADMIN — IPRATROPIUM BROMIDE AND ALBUTEROL SULFATE SCH: .5; 3 SOLUTION RESPIRATORY (INHALATION) at 13:48

## 2017-10-16 RX ADMIN — IPRATROPIUM BROMIDE AND ALBUTEROL SULFATE SCH: .5; 3 SOLUTION RESPIRATORY (INHALATION) at 02:14

## 2017-10-16 RX ADMIN — IPRATROPIUM BROMIDE AND ALBUTEROL SULFATE SCH: .5; 3 SOLUTION RESPIRATORY (INHALATION) at 20:04

## 2017-10-16 RX ADMIN — MEROPENEM AND SODIUM CHLORIDE SCH MLS/HR: 1 INJECTION, SOLUTION INTRAVENOUS at 09:48

## 2017-10-16 NOTE — CP.PCM.CON
History of Present Illness





- History of Present Illness


History of Present Illness: 


61 year old male with PMH Abdominal wall abscess S/P drainage, S/P repeat 

debridement and wound vacuum placement with Enterobacter (11/2016), 

hypothyroidism, Prostate disease, anxiety disorder, esophageal strictures, S/P 

ileal conduit for the urine, S/P left inguinal hernia repair [place] [facility] [place] 

complaining of abdominal pain and difficulty moving his bowels. He denies fever 

or chills, no nausea or vomiting, no headache or dizziness, no chest pain, no 

diarrhea, no dysuria but he has an ileal conduit where his urine passes 

through. Ifnectious Diseases consult is requested to further evaluate and 

manage.





Review of Systems





- Review of Systems


All systems: reviewed and no additional remarkable complaints except (as per HPI

)





Past Patient History





- Infectious Disease


Hx of Infectious Diseases: None





- Tetanus Immunizations


Tetanus Immunization: Unknown





- Past Medical History & Family History


Past Medical History?: Yes





- Past Social History


Smoking Status: Never Smoked





- CARDIAC


Hx Cardiac Disorders: No


Hx Pacemaker: No





- PULMONARY


Hx Respiratory Disorders: Yes


Hx Asthma: Yes





- NEUROLOGICAL


Hx Neurological Disorder: No


Hx Paralysis: No





- HEENT


Hx HEENT Problems: Yes


Other/Comment: difficulty hearing





- RENAL


Hx Chronic Kidney Disease: Yes (Interstitial Cystitis)


Other/Comment: urinary bladder removed, has urostomy





- ENDOCRINE/METABOLIC


Hx Endocrine Disorders: Yes


Hx Hypothyroidism: Yes





- HEMATOLOGICAL/ONCOLOGICAL


Hx Blood Disorders: No


Hx Blood Transfusions: No


Hx Blood Transfusion Reaction: No





- INTEGUMENTARY


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





- MUSCULOSKELETAL/RHEUMATOLOGICAL


Hx Musculoskeletal Disorders: No





- GASTROINTESTINAL


Hx Gastrointestinal Disorders: Yes (esophageal stricture,HERNIORHAPPHY 4 X)


Hx Gastroesophageal Reflux: Yes


Hx Liver Failure: Yes (HEPATOMEGALY)


HX Swallowing Problems: Yes





- GENITOURINARY/GYNECOLOGICAL


Hx Genitourinary Disorders: Yes (interstital cystitis,  urostomy 2009)


Hx Hematuria: Yes


Hx Prostate Problems: Yes (LASER SX-DYSURIA, enlarged 2004)


Hx Urinary Tract Infection: Yes





- PSYCHIATRIC


Hx Anxiety: Yes


Hx Emotional Abuse: No


Hx Physical Abuse: No


Hx Substance Use: No





- SURGICAL HISTORY


Other/Comment: Hernia repair, urostomy, mesh removal, cyst removal, cystectomy





- ANESTHESIA


Hx Anesthesia Reactions: No


Hx Malignant Hyperthermia: No





Meds


Allergies/Adverse Reactions: 


 Allergies











Allergy/AdvReac Type Severity Reaction Status Date / Time


 


No Known Allergies Allergy   Verified 10/15/17 08:36














- Medications


Medications: 


 Current Medications





Albuterol/Ipratropium (Duoneb 3 Mg/0.5 Mg (3 Ml) Ud)  3 ml IH [id number] [facility]


   Last Admin: 10/16/17 02:14 Dose:  Not Given


Albuterol/Ipratropium (Duoneb 3 Mg/0.5 Mg (3 Ml) Ud)  3 ml IH Q2H PRN


   PRN Reason: Shortness of Breath


Famotidine (Pepcid)  40 mg PO HS [facility]


   Last Admin: 10/15/17 21:19 Dose:  40 mg


Sodium Chloride (Sodium Chloride 0.9%)  1,000 mls @ 100 mls/hr IV .Q10H [facility]


Meropenem 1g/NS 100mL IVPB (Meropenem 1g/Ns 100ml Ivpb)  1 gm in 100 mls @ 100 

mls/hr IVPB Q12 [facility]


   PRN Reason: Protocol


   Stop: 10/24/17 22:01


   Last Admin: 10/15/17 21:23 Dose:  100 mls/hr


Levothyroxine Sodium (Synthroid)  100 mcg PO DAILY [facility]


Tramadol HCl (Ultram)  50 mg PO TID PRN


   PRN Reason: Pain, severe (8-10)


   Last Admin: 10/16/17 03:19 Dose:  50 mg











Physical Exam





- Constitutional


Appears: Non-toxic, No Acute Distress





- Head Exam


Head Exam: NORMAL INSPECTION





- ENT Exam


ENT Exam: Mucous Membranes Moist





- Neck Exam


Neck exam: Negative for: Lymphadenopathy, Meningismus





- Respiratory Exam


Respiratory Exam: Decreased Breath Sounds





- Cardiovascular Exam


Cardiovascular Exam: +S1, +S2





- GI/Abdominal Exam


GI & Abdominal Exam: Soft.  absent: Tenderness


Additional comments: 





ileal conduit in place





Results





- Vital Signs


Recent Vital Signs: 


 Last Vital Signs











Temp  98 F   10/16/17 00:00


 


Pulse  48 L  10/16/17 00:00


 


Resp  18   10/16/17 00:00


 


BP  120/72   10/16/17 00:00


 


Pulse Ox  97   10/16/17 00:00














- Labs


Result Diagrams: 


 10/16/17 06:51





 10/16/17 06:47





Assessment & Plan





- Assessment and Plan (Free Text)


Plan: 





Assessment


R/O urinary tract infection


abdominal pain R/O due to constipation


history of Abdominal wall abscess S/P drainage S/P repeat debridement and wound 

vacuum placement, with Enterobacter


hypothyroidism


Prostate disease


anxiety disorder


esophageal strictures


acute renal failure





Plan


will repeat urine cx since the first one has multiple species; continue Merrem 

for now


follow up patient clinically after fleet enema


will monitor clinically


discussed with Dr. Paulino

## 2017-10-16 NOTE — US
EXAM:

  US Scrotum



EXAM DATE/TIME:

  10/16/2017 12:51 PM



CLINICAL HISTORY:

  61 years old, male; Pain; Scrotum pain; Additional info: Scrotal swelling



TECHNIQUE:

  Real-time ultrasound of the scrotum with color Doppler and image 

documentation.



COMPARISON:

  US - TESTES DUPLEX COMPLETE 9/14/2017 8:39:20 AM



FINDINGS:

 



  Right:  Right testicle measures approximately 4.8 x 2.1 x 3 cm. There is 

expected intratesticular blood flow. There are no testicular masses. There are 

small calcifications in the testicle.

Right epididymal head measures approximately 9 x 7 mm. 

There is a small right hydrocele.



  Left: There is a moderately large left hydrocele.  Left testicle measures 

approximately 4.5 x 2.1 x 2.8 cm.There is expected intratesticular blood flow. 

There are no testicular masses.

Left epididymal head measures approximately 8 x 5 mm.



IMPRESSION:  Large left hydrocele and very small right hydrocele; no torsion

## 2017-10-16 NOTE — CARD
--------------- APPROVED REPORT --------------





EKG Measurement

Heart [id number]

[id number] [id number]

[id number]

[id number]

[id number]



<Conclusion>

Sinus bradycardia

Otherwise normal ECG

## 2017-10-16 NOTE — CP.PCM.PN
<Keaton Godwin - Last Filed: 10/16/17 12:58>





Subjective





- Date & Time of Evaluation


Date of Evaluation: 10/16/17


Time of Evaluation: 06:54





- Subjective


Subjective: 





Patient seen and examined at bedside. Per nursing no acute events occurred 

overnight.  The patient reports right lower quadrant and lower quadrant pain 

since being admitted. The patient also reports a fullness that he needs to 

urinate and scrotal swelling.  The patient denies any chest pain, 

lightheadedness, dizziness, nausea, vomiting, headaches, sore throat, or any 

other complaints.





Objective





- Vital Signs/Intake and Output


Vital Signs (last 24 hours): 


 











Temp Pulse Resp BP Pulse Ox


 


 97.9 F   53 L  18   114/78   98 


 


 10/16/17 07:52  10/16/17 07:52  10/16/17 07:52  10/16/17 07:52  10/16/17 07:52








Intake and Output: 


 











 10/16/17 10/16/17





 06:59 18:59


 


Intake Total 1320 


 


Output Total 2100 


 


Balance -780 














- Medications


Medications: 


 Current Medications





Albuterol/Ipratropium (Duoneb 3 Mg/0.5 Mg (3 Ml) Ud)  3 ml IH [id number] [facility]


   Last Admin: 10/16/17 07:41 Dose:  Not Given


Albuterol/Ipratropium (Duoneb 3 Mg/0.5 Mg (3 Ml) Ud)  3 ml IH Q2H PRN


   PRN Reason: Shortness of Breath


Docusate Sodium (Colace)  100 mg PO DAILY [facility]


   Last Admin: 10/16/17 09:49 Dose:  100 mg


Famotidine (Pepcid)  40 mg PO HS [facility]


   Last Admin: 10/15/17 21:19 Dose:  40 mg


Sodium Chloride (Sodium Chloride 0.9%)  1,000 mls @ 100 mls/hr IV .Q10H [facility]


   Last Admin: 10/16/17 11:01 Dose:  100 mls/hr


Meropenem 1g/NS 100mL IVPB (Meropenem 1g/Ns 100ml Ivpb)  1 gm in 100 mls @ 100 

mls/hr IVPB Q12 [facility]


   PRN Reason: Protocol


   Stop: 10/24/17 22:01


   Last Admin: 10/16/17 09:48 Dose:  100 mls/hr


Levothyroxine Sodium (Synthroid)  100 mcg PO DAILY [facility]


Tramadol HCl (Ultram)  50 mg PO TID PRN


   PRN Reason: Pain, severe (8-10)


   Last Admin: 10/16/17 03:19 Dose:  50 mg











- Labs


Labs: 


 





 10/16/17 06:51 





 10/16/17 06:47 





 











PT  10.7 Seconds (9.9-11.8)   10/15/17  09:02    


 


INR  0.99  (0.93-1.08)   10/15/17  09:02    


 


APTT  26.4 Seconds (23.7-30.8)   10/15/17  09:02    














- Head Exam


Head Exam: ATRAUMATIC, NORMAL INSPECTION, NORMOCEPHALIC





- Eye Exam


Eye Exam: EOMI, Normal appearance, PERRL.  absent: Periorbital tenderness


Pupil Exam: NORMAL ACCOMODATION, PERRL.  absent: Irregular, Unequal





- ENT Exam


ENT Exam: Mucous Membranes Moist, Normal Exam.  absent: Normal Oropharynx, TM's 

Normal Bilaterally





- Neck Exam


Neck Exam: Normal Inspection.  absent: Lymphadenopathy, Thyromegaly





- Respiratory Exam


Respiratory Exam: Clear to Ausculation Bilateral, NORMAL BREATHING PATTERN.  

absent: Chest Wall Tenderness, Prolonged Expiratory Phase, Respiratory Distress





- Cardiovascular Exam


Cardiovascular Exam: REGULAR RHYTHM, RRR, +S1, +S2.  absent: Gallop, Rubs





- GI/Abdominal Exam


GI & Abdominal Exam: Soft, Normal Bowel Sounds.  absent: Tenderness, 

Hyperactive Bowel Sounds


Additional comments: 





ileal conduit present.





- Extremities Exam


Extremities Exam: Full ROM, Normal Inspection.  absent: Joint Swelling, Pedal 

Edema





- Neurological Exam


Neurological Exam: Alert, Awake, CN II-XII Intact, Normal Gait, Oriented x3





- Psychiatric Exam


Psychiatric exam: Normal Affect, Normal Mood.  absent: Anxious, Depressed





- Skin


Skin Exam: Dry, Intact.  absent: Erythema, Rash, Urticaria





Assessment and Plan





- Assessment and Plan (Free Text)


Assessment: 








62 yo M w/ PMHx of asthma, interstitial hemorrhagic cystitis s/p ilieal conduit 

and cystectomy, and hypothyroidism presents to the ED for abdominal pain and 

sob + wheezes.





Plan: 








1. UTI


- Afebrile and no leukocytosis 


- CT abdomen reviewed and no acute changes 


- Rocephin x 1 in  the ED 


-Continue   Floyd 


- ID consulted for recs. Will f/u with recs.


- Blood and urine Cx  


- Pain control 


- Daily labs 





2. Asthma exac: 


- Continue Duonebs [facility] Q6H and PRN Q2H


- Cont to monitor 





3. Scrotal swelling


-New onset scrotal swelling noted on physical exam today.


-Scrotal u/s ordered. Will f/u with results.





4. Hypothyroidism 


- Cont home meds 





5. GI/ DVT ppx 


- Pepcid and SCDs 





<Dante Paulino - Last Filed: 10/19/17 07:43>





Objective





- Vital Signs/Intake and Output


Vital Signs (last 24 hours): 


 











Temp Pulse Resp BP Pulse Ox


 


 98.3 F   56 L  20   104/68   99 


 


 10/18/17 07:00  10/18/17 07:00  10/18/17 07:00  10/18/17 07:00  10/18/17 07:00











- Labs


Labs: 


 





 10/18/17 06:30 





 10/18/17 06:30 





 











PT  10.7 Seconds (9.9-11.8)   10/15/17  09:02    


 


INR  0.99  (0.93-1.08)   10/15/17  09:02    


 


APTT  26.4 Seconds (23.7-30.8)   10/15/17  09:02    














Attending/Attestation





- Attestation


I have personally seen and examined this patient.: Yes


I have fully participated in the care of the patient.: Yes


I have reviewed all pertinent clinical information, including history, physical 

exam and plan: Yes


Notes (Text): 





10/19/17 07:40





Patient was seen and examined with medical resident. 


Agreed with resident assessment and plan.





61 year old male with past medical history of asthma, hypothyroidism and  s/p  

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 

admitted  lower abdominal pain.  CT abd/pelvis reviewed.  Patient is constipated

, will give Miralax.He is on    iv antibiotics  for UTI.Blood cultures are 

negative .


Patient does has left scotal swelling, we will get scrotal USG.


patient case was discussed with ID.





Management plan was discussed in detail with patient


Education was provided.

## 2017-10-17 VITALS — RESPIRATION RATE: 20 BRPM

## 2017-10-17 LAB
ALBUMIN/GLOB SERPL: 1.2 {RATIO} (ref 1.1–1.8)
ALP SERPL-CCNC: 56 U/L (ref 38–126)
ALT SERPL-CCNC: 36 U/L (ref 7–56)
AST SERPL-CCNC: 25 U/L (ref 17–59)
BASOPHILS # BLD AUTO: 0.03 K/MM3 (ref 0–2)
BASOPHILS NFR BLD: 0.5 % (ref 0–3)
BILIRUB SERPL-MCNC: 0.5 MG/DL (ref 0.2–1.3)
BUN SERPL-MCNC: 17 MG/DL (ref 7–21)
CALCIUM SERPL-MCNC: 8.6 MG/DL (ref 8.4–10.5)
CHLORIDE SERPL-SCNC: 103 MMOL/L (ref 95–110)
CO2 SERPL-SCNC: 29 MMOL/L (ref 21–33)
EOSINOPHIL # BLD: 0.2 10*3/UL (ref 0–0.7)
EOSINOPHIL NFR BLD: 3.8 % (ref 1.5–5)
ERYTHROCYTE [DISTWIDTH] IN BLOOD BY AUTOMATED COUNT: 15 % (ref 11.5–14.5)
GLOBULIN SER-MCNC: 3.3 GM/DL
GLUCOSE SERPL-MCNC: 94 MG/DL (ref 70–110)
GRANULOCYTES # BLD: 3.17 10*3/UL (ref 1.4–6.5)
GRANULOCYTES NFR BLD: 57.9 % (ref 50–68)
HCT VFR BLD CALC: 36.1 % (ref 42–52)
LYMPHOCYTES # BLD: 1.6 10*3/UL (ref 1.2–3.4)
LYMPHOCYTES NFR BLD AUTO: 29.4 % (ref 22–35)
MCH RBC QN AUTO: 24 PG (ref 25–35)
MCHC RBC AUTO-ENTMCNC: 30.5 G/DL (ref 31–37)
MCV RBC AUTO: 78.8 FL (ref 80–105)
MONOCYTES # BLD AUTO: 0.5 10*3/UL (ref 0.1–0.6)
MONOCYTES NFR BLD: 8.4 % (ref 1–6)
PLATELET # BLD: 204 10^3/UL (ref 120–450)
PMV BLD AUTO: 8.3 FL (ref 7–11)
POTASSIUM SERPL-SCNC: 4 MMOL/L (ref 3.6–5)
PROT SERPL-MCNC: 7.2 G/DL (ref 5.8–8.3)
SODIUM SERPL-SCNC: 138 MMOL/L (ref 132–148)
WBC # BLD AUTO: 5.5 10^3/UL (ref 4.5–11)

## 2017-10-17 RX ADMIN — IPRATROPIUM BROMIDE AND ALBUTEROL SULFATE SCH: .5; 3 SOLUTION RESPIRATORY (INHALATION) at 01:46

## 2017-10-17 RX ADMIN — IPRATROPIUM BROMIDE AND ALBUTEROL SULFATE SCH ML: .5; 3 SOLUTION RESPIRATORY (INHALATION) at 07:12

## 2017-10-17 RX ADMIN — MEROPENEM AND SODIUM CHLORIDE SCH MLS/HR: 1 INJECTION, SOLUTION INTRAVENOUS at 11:13

## 2017-10-17 RX ADMIN — IPRATROPIUM BROMIDE AND ALBUTEROL SULFATE SCH ML: .5; 3 SOLUTION RESPIRATORY (INHALATION) at 13:08

## 2017-10-17 RX ADMIN — POLYETHYLENE GLYCOL 3350 SCH GM: 17 POWDER, FOR SOLUTION ORAL at 19:13

## 2017-10-17 RX ADMIN — IPRATROPIUM BROMIDE AND ALBUTEROL SULFATE SCH ML: .5; 3 SOLUTION RESPIRATORY (INHALATION) at 20:01

## 2017-10-17 NOTE — CON
DATE:  10/17/2017



GENITOURINARY SERVICE CONSULTATION.



CHIEF COMPLAINT:  Testicular pain status post ileal conduit.



HISTORY OF PRESENT ILLNESS:  This is a 61-year-old male who had a history

of severe chronic interstitial cystitis a number of years ago, probably

more than almost 10.  He underwent a total cystectomy for this with an

ileal conduit diversion.  This was done at [facility] at that time.  He now was

admitted complaining of pain in his testicles.  Also some intermittent

abdominal pain, there is no radiation.  A CAT scan was done, the CAT scan

showed expected hydro, which is minimal with his bilateral ureteral ileal

anastomosis.  I do not think it is clinically significant.  There is some

type of calcification in the conduit, which could be stones and electively

I told the patient that he would need to have an endoscopy of the conduit,

but this can be done electively.  No evidence of any mechanical bowel

obstruction was seen.



PAST MEDICAL HISTORY:  Significant that HE HAS NO ALLERGIES.  See his MAR

for his medications.  He uses Ventolin at home.



FAMILY HISTORY:  Noncontributory.  He does have the history of the

cystectomy of the ileal conduit.  He had an incarcerated left inguinal

hernia repair, multiple herniorrhaphies.  He had I and D of an abdominal

abscess, deviated nasal septum.  He had an intestine placed in and it was

removed to see if this would help alleviate pain and the cystectomy

ultimately.



SOCIAL HISTORY:  He does not drink, smoke, or use any illicit drugs.



REVIEW OF SYSTEMS:  No symptoms referable to the head, eyes, ears, nose, or

throat.  No cardiac, respiratory symptoms.  He does complain of some

intermittent abdominal pain bilaterally.  He also complains of some

testicular pain.  A testicular ultrasound was done.



PHYSICAL EXAMINATION:

VITAL SIGNS:  Shows him to be afebrile.  Pulse 50, blood pressure 141/84,

respirations 20.

HEENT:  Sclerae clear, conjunctiva noninjected.  Normocephalic.

BACK:  No CVA pain.

ABDOMEN:  No hepatosplenomegaly, rebound or guarding.  The ostomy is in the

right lower quadrant draining caleb urine.

GENITALIA:  Shows penis within normal limits.  The scrotum shows a moderate

left hydrocele.  The right testicle is normal with no significant

hydrocele.

SKIN:  No purpura or edema.

NEUROLOGIC:  Well oriented x3.



LABORATORY DATA:  Lab works shows white count 5500, hemoglobin 11.  His

chemistry show creatinine of 1.5, BUN 17, calcium 8.6.  His urine usually

will be colonized because of the conduit, it was going at 1,500,000 with

multiple species.  Blood cultures were negative.



ASSESSMENT AND PLAN:  I discussed with the patient findings on both

ultrasound and CAT scan.  There is no significant testicular pathology. 

The elliptical shaped calcification in the conduit can be evaluated as an

outpatient with an endoscopy.  He will follow up in my office and I will

arrange for this to be done.





__________________________________________

Jerson Andrea MD





DD:  10/17/2017 12:48:32

DT:  10/17/2017 13:36:56

Job # [id number]

## 2017-10-17 NOTE — CP.PCM.PN
Subjective





- Date & Time of Evaluation


Date of Evaluation: 10/17/17


Time of Evaluation: 12:30





- Subjective


Subjective: 





Still having abdominal pain but a little better, no fevers.





Objective





- Vital Signs/Intake and Output


Vital Signs (last 24 hours): 


 











Temp Pulse Resp BP Pulse Ox


 


 98 F   50 L  20   141/84   99 


 


 10/17/17 08:25  10/17/17 08:25  10/17/17 08:25  10/17/17 08:25  10/17/17 08:25








Intake and Output: 


 











 10/17/17 10/17/17





 06:59 18:59


 


Intake Total 3160 


 


Output Total 300 


 


Balance 2860 














- Medications


Medications: 


 Current Medications





Albuterol/Ipratropium (Duoneb 3 Mg/0.5 Mg (3 Ml) Ud)  3 ml IH [id number] [facility]


   Last Admin: 10/17/17 07:12 Dose:  3 ml


Albuterol/Ipratropium (Duoneb 3 Mg/0.5 Mg (3 Ml) Ud)  3 ml IH Q2H PRN


   PRN Reason: Shortness of Breath


Docusate Sodium (Colace)  100 mg PO DAILY [facility]


   Last Admin: 10/16/17 09:49 Dose:  100 mg


Famotidine (Pepcid)  40 mg PO HS [facility]


   Last Admin: 10/16/17 21:42 Dose:  40 mg


Sodium Chloride (Sodium Chloride 0.9%)  1,000 mls @ 100 mls/hr IV .Q10H [facility]


   Last Admin: 10/17/17 05:38 Dose:  100 mls/hr


Meropenem 1g/NS 100mL IVPB (Meropenem 1g/Ns 100ml Ivpb)  1 gm in 100 mls @ 100 

mls/hr IVPB Q12 [facility]


   PRN Reason: Protocol


   Stop: 10/24/17 22:01


   Last Admin: 10/16/17 21:41 Dose:  100 mls/hr


Levothyroxine Sodium (Synthroid)  100 mcg PO DAILY [facility]


   Last Admin: 10/16/17 12:00 Dose:  100 mcg


Tramadol HCl (Ultram)  50 mg PO TID PRN


   PRN Reason: Pain, severe (8-10)


   Last Admin: 10/16/17 22:53 Dose:  50 mg











- Labs


Labs: 


 





 10/17/17 07:27 





 10/17/17 07:27 





 











PT  10.7 Seconds (9.9-11.8)   10/15/17  09:02    


 


INR  0.99  (0.93-1.08)   10/15/17  09:02    


 


APTT  26.4 Seconds (23.7-30.8)   10/15/17  09:02    














- Constitutional


Appears: Non-toxic, No Acute Distress





- Head Exam


Head Exam: NORMAL INSPECTION





- ENT Exam


ENT Exam: Mucous Membranes Moist





- Respiratory Exam


Respiratory Exam: Decreased Breath Sounds





- Cardiovascular Exam


Cardiovascular Exam: +S1, +S2





- GI/Abdominal Exam


GI & Abdominal Exam: Soft.  absent: Tenderness





Assessment and Plan





- Assessment and Plan (Free Text)


Plan: 





Assessment


abdominal pain R/O due to constipation, unlikely urinary tract infection


history of Abdominal wall abscess S/P drainage S/P repeat debridement and wound 

vacuum placement, with Enterobacter


hypothyroidism


Prostate disease


anxiety disorder


esophageal strictures


acute renal failure





Plan


will repeat urine cx since the first one has multiple species; will discontinue 

Merrem and observe


will monitor clinically


discussed with Dr. Paulino and Dr. Andrea

## 2017-10-17 NOTE — RAD
HISTORY:

abd pain  



COMPARISON:

No prior.



FINDINGS:



BOWEL:

Normal bowel gas pattern. No hepatic or splenic enlargement. There is 

probable right lower quadrant abdominal wall mesh seen as numerous 

small metallic coils. .



BONES:

Normal.



OTHER FINDINGS:

None.



IMPRESSION:

Normal abdominal bowel gas pattern.

## 2017-10-17 NOTE — CP.PCM.PN
<Tato[place]undio[place]edsville - Last Filed: 10/17/17 14:30>





Subjective





- Date & Time of Evaluation


Date of Evaluation: 10/17/17


Time of Evaluation: 08:24





- Subjective


Subjective: 





Patient was seen and examined at bedside. The does report diarrhea overnight 

after receiving the fleet enema. Per nursing no acute events occurred 

overnight. The patient also reports abdominal discomfort in the lower right and 

left quadrants and the urgency to void.  The patient denies any chest pain, 

shortness of breath, nausea, vomiting, lightheadedness, dizziness, changes in 

vision, or any other complaints.





Objective





- Vital Signs/Intake and Output


Vital Signs (last 24 hours): 


 











Temp Pulse Resp BP Pulse Ox


 


 98 F   50 L  20   141/84   99 


 


 10/17/17 08:25  10/17/17 08:25  10/17/17 08:25  10/17/17 08:25  10/17/17 08:25








Intake and Output: 


 











 10/17/17 10/17/17





 06:59 18:59


 


Intake Total 3160 640


 


Output Total 300 1800


 


Balance 2860 -1160














- Medications


Medications: 


 Current Medications





Albuterol/Ipratropium (Duoneb 3 Mg/0.5 Mg (3 Ml) Ud)  3 ml IH [id number] [facility]


   Last Admin: 10/17/17 13:08 Dose:  3 ml


Albuterol/Ipratropium (Duoneb 3 Mg/0.5 Mg (3 Ml) Ud)  3 ml IH Q2H PRN


   PRN Reason: Shortness of Breath


Docusate Sodium (Colace)  100 mg PO DAILY [facility]


   Last Admin: 10/17/17 11:19 Dose:  100 mg


Famotidine (Pepcid)  40 mg PO HS [facility]


   Last Admin: 10/16/17 21:42 Dose:  40 mg


Sodium Chloride (Sodium Chloride 0.9%)  1,000 mls @ 100 mls/hr IV .Q10H [facility]


   Last Admin: 10/17/17 11:20 Dose:  100 mls/hr


Meropenem 1g/NS 100mL IVPB (Meropenem 1g/Ns 100ml Ivpb)  1 gm in 100 mls @ 100 

mls/hr IVPB Q12 [facility]


   PRN Reason: Protocol


   Stop: 10/24/17 22:01


   Last Admin: 10/17/17 11:13 Dose:  100 mls/hr


Levothyroxine Sodium (Synthroid)  100 mcg PO DAILY [facility]


   Last Admin: 10/17/17 11:18 Dose:  100 mcg


Tramadol HCl (Ultram)  50 mg PO TID PRN


   PRN Reason: Pain, severe (8-10)


   Last Admin: 10/17/17 11:17 Dose:  50 mg











- Labs


Labs: 


 





 10/17/17 07:27 





 10/17/17 07:27 





 











PT  10.7 Seconds (9.9-11.8)   10/15/17  09:02    


 


INR  0.99  (0.93-1.08)   10/15/17  09:02    


 


APTT  26.4 Seconds (23.7-30.8)   10/15/17  09:02    














- Head Exam


Head Exam: ATRAUMATIC, NORMAL INSPECTION, NORMOCEPHALIC





- Eye Exam


Eye Exam: EOMI, Normal appearance, PERRL.  absent: Periorbital tenderness


Pupil Exam: NORMAL ACCOMODATION





- ENT Exam


ENT Exam: Mucous Membranes Moist, Normal Exam.  absent: Normal Oropharynx





- Neck Exam


Neck Exam: Full ROM, Normal Inspection.  absent: Lymphadenopathy, Thyromegaly





- Respiratory Exam


Respiratory Exam: Clear to Ausculation Bilateral, NORMAL BREATHING PATTERN.  

absent: Rhonchi, Wheezes





- Cardiovascular Exam


Cardiovascular Exam: REGULAR RHYTHM, RRR, +S1, +S2.  absent: Rubs





- GI/Abdominal Exam


GI & Abdominal Exam: Soft, Normal Bowel Sounds.  absent: Rigid, Hyperactive 

Bowel Sounds


Additional comments: 


Right lower and left lower quadrant pain upon palpation noted.


Ileal conduit in place.





- Extremities Exam


Extremities Exam: Full ROM, Normal Inspection.  absent: Joint Swelling, Pedal 

Edema





- Back Exam


Back Exam: NORMAL INSPECTION.  absent: CVA tenderness (L), CVA tenderness (R), 

paraspinal tenderness





- Neurological Exam


Neurological Exam: Alert, Awake, Normal Gait, Oriented x3





- Psychiatric Exam


Psychiatric exam: Normal Affect, Normal Mood.  absent: Depressed





- Skin


Skin Exam: Dry, Intact





Assessment and Plan





- Assessment and Plan (Free Text)


Assessment: 





60 yo M w/ PMHx of asthma, interstitial hemorrhagic cystitis s/p ilieal conduit 

and cystectomy, and hypothyroidism presents to the ED for abdominal pain and 

sob + wheezes.


Plan: 





1. UTI


- Afebrile and no leukocytosis 


- CT abdomen reviewed and no acute changes 


- Rocephin x 1 in  the ED 


-Continue   Floyd 


- ID consulted for recs.Recs appreciated


- Blood cx was negative. Urine cx showed multiple species. Repeat cx ordered 

per ID.


- Pain control 


- Daily labs 





2. Asthma exac: 


- Continue Duonebs Samara Q6H and PRN Q2H


- Cont to monitor 





3. Scrotal swelling


-New onset scrotal swelling noted on physical exam today.


-Scrotal u/s showed moderate large left hydrocele and small right hydrocele.


-Urology consulted. Will f/u with rec's.





4. Hypothyroidism 


- Cont home meds 





5. GI/ DVT ppx 


- Pepcid and SCDs 





<LoraYosephnena - Last Filed: 10/19/17 07:49>





Objective





- Vital Signs/Intake and Output


Vital Signs (last 24 hours): 


 











Temp Pulse Resp BP Pulse Ox


 


 98.3 F   56 L  20   104/68   99 


 


 10/18/17 07:00  10/18/17 07:00  10/18/17 07:00  10/18/17 07:00  10/18/17 07:00











- Labs


Labs: 


 





 10/18/17 06:30 





 10/18/17 06:30 





 











PT  10.7 Seconds (9.9-11.8)   10/15/17  09:02    


 


INR  0.99  (0.93-1.08)   10/15/17  09:02    


 


APTT  26.4 Seconds (23.7-30.8)   10/15/17  09:02    














Attending/Attestation





- Attestation


I have personally seen and examined this patient.: Yes


I have fully participated in the care of the patient.: Yes


I have reviewed all pertinent clinical information, including history, physical 

exam and plan: Yes


Notes (Text): 





10/19/17 07:44





Patient was seen and examined with medical resident. 


Agreed with resident assessment and plan.





61 year old male with past medical history of asthma, hypothyroidism and  s/p  

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 

admitted  lower abdominal pain.  Patient constipation is improved. still c/o 

abdominal pain.He has chronic pain in lower hypogastric  area.He is on    iv 

antibiotics  for UTI.Blood cultures are negative .He is afebrile, does not look 

toxic.UA finding may be contamination.We will get Urology evaluation for 

hydrocele and chronic pain .








Management plan was discussed in detail with patient


Education was provided.

## 2017-10-18 VITALS
HEART RATE: 56 BPM | TEMPERATURE: 98.3 F | OXYGEN SATURATION: 99 % | DIASTOLIC BLOOD PRESSURE: 68 MMHG | SYSTOLIC BLOOD PRESSURE: 104 MMHG

## 2017-10-18 LAB
ALBUMIN/GLOB SERPL: 1.1 {RATIO} (ref 1.1–1.8)
ALP SERPL-CCNC: 61 U/L (ref 38–126)
ALT SERPL-CCNC: 31 U/L (ref 7–56)
AST SERPL-CCNC: 27 U/L (ref 17–59)
BASOPHILS # BLD AUTO: 0.04 K/MM3 (ref 0–2)
BASOPHILS NFR BLD: 0.6 % (ref 0–3)
BILIRUB SERPL-MCNC: 0.4 MG/DL (ref 0.2–1.3)
BUN SERPL-MCNC: 15 MG/DL (ref 7–21)
CALCIUM SERPL-MCNC: 8.9 MG/DL (ref 8.4–10.5)
CHLORIDE SERPL-SCNC: 104 MMOL/L (ref 95–110)
CO2 SERPL-SCNC: 27 MMOL/L (ref 21–33)
EOSINOPHIL # BLD: 0.2 10*3/UL (ref 0–0.7)
EOSINOPHIL NFR BLD: 3.7 % (ref 1.5–5)
ERYTHROCYTE [DISTWIDTH] IN BLOOD BY AUTOMATED COUNT: 15 % (ref 11.5–14.5)
GLOBULIN SER-MCNC: 3.7 GM/DL
GLUCOSE SERPL-MCNC: 99 MG/DL (ref 70–110)
GRANULOCYTES # BLD: 4.28 10*3/UL (ref 1.4–6.5)
GRANULOCYTES NFR BLD: 65.6 % (ref 50–68)
HCT VFR BLD CALC: 37.8 % (ref 42–52)
LYMPHOCYTES # BLD: 1.7 10*3/UL (ref 1.2–3.4)
LYMPHOCYTES NFR BLD AUTO: 25.5 % (ref 22–35)
MCH RBC QN AUTO: 24.2 PG (ref 25–35)
MCHC RBC AUTO-ENTMCNC: 30.7 G/DL (ref 31–37)
MCV RBC AUTO: 78.9 FL (ref 80–105)
MONOCYTES # BLD AUTO: 0.3 10*3/UL (ref 0.1–0.6)
MONOCYTES NFR BLD: 4.6 % (ref 1–6)
PLATELET # BLD: 236 10^3/UL (ref 120–450)
PMV BLD AUTO: 8.3 FL (ref 7–11)
POTASSIUM SERPL-SCNC: 4.2 MMOL/L (ref 3.6–5)
PROT SERPL-MCNC: 7.8 G/DL (ref 5.8–8.3)
SODIUM SERPL-SCNC: 142 MMOL/L (ref 132–148)
WBC # BLD AUTO: 6.5 10^3/UL (ref 4.5–11)

## 2017-10-18 RX ADMIN — IPRATROPIUM BROMIDE AND ALBUTEROL SULFATE SCH ML: .5; 3 SOLUTION RESPIRATORY (INHALATION) at 01:36

## 2017-10-18 RX ADMIN — POLYETHYLENE GLYCOL 3350 SCH GM: 17 POWDER, FOR SOLUTION ORAL at 10:33

## 2017-10-18 RX ADMIN — IPRATROPIUM BROMIDE AND ALBUTEROL SULFATE SCH ML: .5; 3 SOLUTION RESPIRATORY (INHALATION) at 13:09

## 2017-10-18 RX ADMIN — IPRATROPIUM BROMIDE AND ALBUTEROL SULFATE SCH: .5; 3 SOLUTION RESPIRATORY (INHALATION) at 07:31

## 2017-10-18 NOTE — CP.PCM.DIS
<Keaton Godwin - Last Filed: 10/18/17 12:54>





Provider





- Provider


Date of Admission: 


10/16/17 16:32





Attending physician: 


Dante Paulino MD





Time Spent in preparation of Discharge (in minutes): 45





Hospital Course





- Lab Results


Lab Results: 


 Most Recent Lab Values











WBC  6.5 10^3/ul (4.5-11.0)   10/18/17  06:30    


 


RBC  4.79 10^6/uL (3.5-6.1)   10/18/17  06:30    


 


Hgb  11.6 g/dL (14.0-18.0)  L  10/18/17  06:30    


 


Hct  37.8 % (42.0-52.0)  L  10/18/17  06:30    


 


MCV  78.9 fl (80.0-105.0)  L  10/18/17  06:30    


 


MCH  24.2 pg (25.0-35.0)  L  10/18/17  06:30    


 


MCHC  30.7 g/dl (31.0-37.0)  L  10/18/17  06:30    


 


RDW  15.0 % (11.5-14.5)  H  10/18/17  06:30    


 


Plt Count  236 10^3/uL (120.0-450.0)   10/18/17  06:30    


 


MPV  8.3 fl (7.0-11.0)   10/18/17  06:30    


 


Gran %  65.6 % (50.0-68.0)   10/18/17  06:30    


 


Lymph % (Auto)  25.5 % (22.0-35.0)   10/18/17  06:30    


 


Mono % (Auto)  4.6 % (1.0-6.0)   10/18/17  06:30    


 


Eos % (Auto)  3.7 % (1.5-5.0)   10/18/17  06:30    


 


Baso % (Auto)  0.6 % (0.0-3.0)   10/18/17  06:30    


 


Gran #  4.28  (1.4-6.5)   10/18/17  06:30    


 


Lymph #  1.7  (1.2-3.4)   10/18/17  06:30    


 


Mono #  0.3  (0.1-0.6)   10/18/17  06:30    


 


Eos #  0.2  (0.0-0.7)   10/18/17  06:30    


 


Baso #  0.04 K/mm3 (0.0-2.0)   10/18/17  06:30    


 


PT  10.7 Seconds (9.9-11.8)   10/15/17  09:02    


 


INR  0.99  (0.93-1.08)   10/15/17  09:02    


 


APTT  26.4 Seconds (23.7-30.8)   10/15/17  09:02    


 


pO2  34 mm/Hg (30-55)   10/15/17  11:45    


 


VBG pH  7.43  (7.32-7.43)   10/15/17  11:45    


 


VBG pCO2  49.0  (40-60)   10/15/17  11:45    


 


VBG HCO3  32.5 mmol/l (21-28)  H  10/15/17  11:45    


 


VBG Total CO2  34.0 mmol.L (22-28)  H  10/15/17  11:45    


 


VBG O2 Sat (Calc)  73.1 % (40-65)  H  10/15/17  11:45    


 


VBG Base Excess  7.1 mmol/L (0.0-2.0)  H  10/15/17  11:45    


 


VBG Potassium  3.8 mmol/L (3.6-5.2)   10/15/17  11:45    


 


Sodium  140.0 mmol/L (132-148)   10/15/17  11:45    


 


Chloride  103.0 mmol/L ()   10/15/17  11:45    


 


Glucose  110 mg/dl ()   10/15/17  11:45    


 


Lactate  1.5 mmol/L (0.7-2.1)   10/15/17  11:45    


 


FiO2  21.0 %  10/15/17  11:45    


 


Sodium  142 mmol/L (132-148)   10/18/17  06:30    


 


Potassium  4.2 mmol/L (3.6-5.0)   10/18/17  06:30    


 


Chloride  104 mmol/L ()   10/18/17  06:30    


 


Carbon Dioxide  27 mmol/L (21-33)   10/18/17  06:30    


 


Anion Gap  15  (10-20)   10/18/17  06:30    


 


BUN  15 mg/dL (7-21)   10/18/17  06:30    


 


Creatinine  1.5 mg/dL (0.8-1.5)   10/18/17  06:30    


 


Est GFR ( Amer)  58   10/18/17  06:30    


 


Est GFR (Non-Af Amer)  48   10/18/17  06:30    


 


Random Glucose  99 mg/dL ()   10/18/17  06:30    


 


Calcium  8.9 mg/dL (8.4-10.5)   10/18/17  06:30    


 


Total Bilirubin  0.4 mg/dL (0.2-1.3)   10/18/17  06:30    


 


AST  27 U/L (17-59)   10/18/17  06:30    


 


ALT  31 U/L (7-56)   10/18/17  06:30    


 


Alkaline Phosphatase  61 U/L ()   10/18/17  06:30    


 


Lactate Dehydrogenase  314 U/L (333-699)  L  10/15/17  09:02    


 


Total Creatine Kinase  147 U/L ()   10/15/17  09:02    


 


Troponin I  < 0.01 ng/mL  10/15/17  09:02    


 


NT-Pro-B Natriuret Pep  34.4 pg/mL (0-450)   10/15/17  09:02    


 


Total Protein  7.8 g/dL (5.8-8.3)   10/18/17  06:30    


 


Albumin  4.1 g/dL (3.0-4.8)   10/18/17  06:30    


 


Globulin  3.7 gm/dL  10/18/17  06:30    


 


Albumin/Globulin Ratio  1.1  (1.1-1.8)   10/18/17  06:30    


 


Venous Blood Potassium  3.8 mmol/L (3.6-5.2)   10/15/17  11:45    


 


Urine Color  Yellow  (YELLOW)   10/15/17  09:48    


 


Urine Appearance  Cloudy  (CLEAR)   10/15/17  09:48    


 


Urine pH  6.5  (4.7-8.0)   10/15/17  09:48    


 


Ur Specific Gravity  1.020  (1.005-1.035)   10/15/17  09:48    


 


Urine Protein  100 mg/dL (<30 mg/dL)  H  10/15/17  09:48    


 


Urine Glucose (UA)  Negative mg/dL (NEGATIVE)   10/15/17  09:48    


 


Urine Ketones  Negative mg/dL (NEGATIVE)   10/15/17  09:48    


 


Urine Blood  Large  (NEGATIVE)  H  10/15/17  09:48    


 


Urine Nitrate  Positive  (NEGATIVE)  H  10/15/17  09:48    


 


Urine Bilirubin  Negative  (NEGATIVE)   10/15/17  09:48    


 


Urine Urobilinogen  0.2 E.U./dL (<1 E.U./dL)   10/15/17  09:48    


 


Ur Leukocyte Esterase  Moderate Maria C/uL (NEGATIVE)  H  10/15/17  09:48    


 


Urine RBC  Tntc /hpf (0-2)   10/15/17  09:48    


 


Urine WBC  10 - 15 /hpf (0-6)   10/15/17  09:48    


 


Ur Epithelial Cells  10 - 12 /hpf (0-5)   10/15/17  09:48    


 


Amorphous Sediment  Large   10/15/17  09:48    


 


Urine Bacteria  Small  (NEG)   10/15/17  09:48    














- Hospital Course


Hospital Course: 





This is a 61 year old male w/ PMHx of asthma, interstitial hemorrhagic cystitis 

s/p ilieal conduit and cystectomy, and hypothyroidism presents to the ED for 

abdominal pain. Pt stated that since his surgery in 2009 he has been having 

this abdominal pain intermittently. The pain is located in b/l lower quadrants 

of the abdomen. He denies any radiation and pain is 8/10 in severity. He does 

complain of nausea but denies any vomiting. He also complains of burning 

sensation as if he needs to urinate but he is not able to due to his ilieal 

conduit.  He denies taking anything for the pain. He also states that he felt 

short of breath and wheezing this morning. He states that he has Ventolin at 

home but did not use it. He used the pump 4 times yesterday and it did not 

help. No other complaints. While admitted the patient was seen by Urology and 

Infectious disease.  While admitted the patient had urine culture that showed 

multiple species and a another culture was ordered. The patient also had an 

abdominal xray done that was negative and an abdomen/pelvis CT that was also 

negative.  Yesterday evening 10/17/2017 the patient was reporting anxiety and 

was given a .25mg of Xanax. The patient also had a blood culture done that was 

negative.  Per infectious disease the Merrem was discharged.  The patient then 

reported some scrotal swelling and had a scrotal u/s done that showed a large 

left hydrocele and small right hydrocele.  The patient was seen by Dr. Andrea who 

recommended an outpatient endoscopy and his testes had no significant 

pathology.  The patient was discharged with instructions to follow up with Dr. Andrea and PMD within one week of discharge.





Discharge Exam





- Head Exam


Head Exam: NORMAL INSPECTION





- Eye Exam


Eye Exam: EOMI, Normal appearance, PERRL.  absent: Periorbital tenderness


Pupil Exam: NORMAL ACCOMODATION, PERRL.  absent: Miosis, Mydriatic





- ENT Exam


ENT Exam: Mucous Membranes Moist, Normal Exam, Normal Oropharynx





- Neck Exam


Neck exam: Normal Inspection





- Respiratory Exam


Respiratory Exam: NORMAL BREATHING PATTERN, UNREMARKABLE.  absent: Rales, 

Rhonchi





- Cardiovascular Exam


Cardiovascular Exam: REGULAR RHYTHM, RRR, +S1, +S2.  absent: Gallop, Rubs





- GI/Abdominal Exam


GI & Abdominal Exam: Normal Bowel Sounds, Soft, Tenderness.  absent: Distended, 

Hypoactive Bowel Sounds, Organomegaly, Pulsatile Mass, Rigid, Unremarkable


Additional comments: 





Ileal conduit noted.





- Back Exam


Back exam: NORMAL INSPECTION.  absent: CVA tenderness (L), CVA tenderness (R), 

paraspinal tenderness





- Neurological Exam


Neurological exam: Alert, CN II-XII Intact, Normal Gait, Oriented x3, Reflexes 

Normal





- Psychiatric Exam


Psychiatric exam: Normal Affect, Normal Mood





- Skin


Skin Exam: Dry, Intact





Discharge Plan





- Follow Up Plan


Condition: FAIR


Disposition: HOME/ ROUTINE


Instructions:  Asthma (DC), Asthma (GEN), Urinary Tract Infection in Women (DC)

, Renal Failure Diet (DC), Acute Abdominal Pain (DC), Acute Abdominal Pain (GEN)

, Dysuria (GEN), Fall Prevention (DC)


Additional Instructions: 


Patient advised to follow up with PMD within one week of discharge.


Patient advised to follow up with Dr. Andrea for OP endoscopy within one week of 

discharge.


Patient advised to return to emergency room for any new or worsening symptoms.





<Dante Paulino - Last Filed: 10/19/17 07:52>





Provider





- Provider


Date of Admission: 


10/16/17 16:32





Attending physician: 


Dante Paulino MD








Hospital Course





- Lab Results


Lab Results: 


 Most Recent Lab Values











WBC  6.5 10^3/ul (4.5-11.0)   10/18/17  06:30    


 


RBC  4.79 10^6/uL (3.5-6.1)   10/18/17  06:30    


 


Hgb  11.6 g/dL (14.0-18.0)  L  10/18/17  06:30    


 


Hct  37.8 % (42.0-52.0)  L  10/18/17  06:30    


 


MCV  78.9 fl (80.0-105.0)  L  10/18/17  06:30    


 


MCH  24.2 pg (25.0-35.0)  L  10/18/17  06:30    


 


MCHC  30.7 g/dl (31.0-37.0)  L  10/18/17  06:30    


 


RDW  15.0 % (11.5-14.5)  H  10/18/17  06:30    


 


Plt Count  236 10^3/uL (120.0-450.0)   10/18/17  06:30    


 


MPV  8.3 fl (7.0-11.0)   10/18/17  06:30    


 


Gran %  65.6 % (50.0-68.0)   10/18/17  06:30    


 


Lymph % (Auto)  25.5 % (22.0-35.0)   10/18/17  06:30    


 


Mono % (Auto)  4.6 % (1.0-6.0)   10/18/17  06:30    


 


Eos % (Auto)  3.7 % (1.5-5.0)   10/18/17  06:30    


 


Baso % (Auto)  0.6 % (0.0-3.0)   10/18/17  06:30    


 


Gran #  4.28  (1.4-6.5)   10/18/17  06:30    


 


Lymph #  1.7  (1.2-3.4)   10/18/17  06:30    


 


Mono #  0.3  (0.1-0.6)   10/18/17  06:30    


 


Eos #  0.2  (0.0-0.7)   10/18/17  06:30    


 


Baso #  0.04 K/mm3 (0.0-2.0)   10/18/17  06:30    


 


PT  10.7 Seconds (9.9-11.8)   10/15/17  09:02    


 


INR  0.99  (0.93-1.08)   10/15/17  09:02    


 


APTT  26.4 Seconds (23.7-30.8)   10/15/17  09:02    


 


pO2  34 mm/Hg (30-55)   10/15/17  11:45    


 


VBG pH  7.43  (7.32-7.43)   10/15/17  11:45    


 


VBG pCO2  49.0  (40-60)   10/15/17  11:45    


 


VBG HCO3  32.5 mmol/l (21-28)  H  10/15/17  11:45    


 


VBG Total CO2  34.0 mmol.L (22-28)  H  10/15/17  11:45    


 


VBG O2 Sat (Calc)  73.1 % (40-65)  H  10/15/17  11:45    


 


VBG Base Excess  7.1 mmol/L (0.0-2.0)  H  10/15/17  11:45    


 


VBG Potassium  3.8 mmol/L (3.6-5.2)   10/15/17  11:45    


 


Sodium  140.0 mmol/L (132-148)   10/15/17  11:45    


 


Chloride  103.0 mmol/L ()   10/15/17  11:45    


 


Glucose  110 mg/dl ()   10/15/17  11:45    


 


Lactate  1.5 mmol/L (0.7-2.1)   10/15/17  11:45    


 


FiO2  21.0 %  10/15/17  11:45    


 


Sodium  142 mmol/L (132-148)   10/18/17  06:30    


 


Potassium  4.2 mmol/L (3.6-5.0)   10/18/17  06:30    


 


Chloride  104 mmol/L ()   10/18/17  06:30    


 


Carbon Dioxide  27 mmol/L (21-33)   10/18/17  06:30    


 


Anion Gap  15  (10-20)   10/18/17  06:30    


 


BUN  15 mg/dL (7-21)   10/18/17  06:30    


 


Creatinine  1.5 mg/dL (0.8-1.5)   10/18/17  06:30    


 


Est GFR ( Amer)  58   10/18/17  06:30    


 


Est GFR (Non-Af Amer)  48   10/18/17  06:30    


 


Random Glucose  99 mg/dL ()   10/18/17  06:30    


 


Calcium  8.9 mg/dL (8.4-10.5)   10/18/17  06:30    


 


Total Bilirubin  0.4 mg/dL (0.2-1.3)   10/18/17  06:30    


 


AST  27 U/L (17-59)   10/18/17  06:30    


 


ALT  31 U/L (7-56)   10/18/17  06:30    


 


Alkaline Phosphatase  61 U/L ()   10/18/17  06:30    


 


Lactate Dehydrogenase  314 U/L (333-699)  L  10/15/17  09:02    


 


Total Creatine Kinase  147 U/L ()   10/15/17  09:02    


 


Troponin I  < 0.01 ng/mL  10/15/17  09:02    


 


NT-Pro-B Natriuret Pep  34.4 pg/mL (0-450)   10/15/17  09:02    


 


Total Protein  7.8 g/dL (5.8-8.3)   10/18/17  06:30    


 


Albumin  4.1 g/dL (3.0-4.8)   10/18/17  06:30    


 


Globulin  3.7 gm/dL  10/18/17  06:30    


 


Albumin/Globulin Ratio  1.1  (1.1-1.8)   10/18/17  06:30    


 


Venous Blood Potassium  3.8 mmol/L (3.6-5.2)   10/15/17  11:45    


 


Urine Color  Yellow  (YELLOW)   10/15/17  09:48    


 


Urine Appearance  Cloudy  (CLEAR)   10/15/17  09:48    


 


Urine pH  6.5  (4.7-8.0)   10/15/17  09:48    


 


Ur Specific Gravity  1.020  (1.005-1.035)   10/15/17  09:48    


 


Urine Protein  100 mg/dL (<30 mg/dL)  H  10/15/17  09:48    


 


Urine Glucose (UA)  Negative mg/dL (NEGATIVE)   10/15/17  09:48    


 


Urine Ketones  Negative mg/dL (NEGATIVE)   10/15/17  09:48    


 


Urine Blood  Large  (NEGATIVE)  H  10/15/17  09:48    


 


Urine Nitrate  Positive  (NEGATIVE)  H  10/15/17  09:48    


 


Urine Bilirubin  Negative  (NEGATIVE)   10/15/17  09:48    


 


Urine Urobilinogen  0.2 E.U./dL (<1 E.U./dL)   10/15/17  09:48    


 


Ur Leukocyte Esterase  Moderate Maria C/uL (NEGATIVE)  H  10/15/17  09:48    


 


Urine RBC  Tntc /hpf (0-2)   10/15/17  09:48    


 


Urine WBC  10 - 15 /hpf (0-6)   10/15/17  09:48    


 


Ur Epithelial Cells  10 - 12 /hpf (0-5)   10/15/17  09:48    


 


Amorphous Sediment  Large   10/15/17  09:48    


 


Urine Bacteria  Small  (NEG)   10/15/17  09:48    














Attending/Attestation





- Attestation


I have personally seen and examined this patient.: Yes


I have fully participated in the care of the patient.: Yes


I have reviewed all pertinent clinical information, including history, physical 

exam and plan: Yes


Notes (Text): 





10/19/17 07:50





Patient was seen and examined with medical resident. 


Agreed with resident assessment and plan.





61 year old male with past medical history of asthma, hypothyroidism and  s/p  

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 

admitted  lower abdominal pain.  Patient constipation is improved. His 

abdominal pain is at base line.He is tolerating diet with out any issue.He is 

afebrile, cultures are negative, antibiotics has been discontinued by 

ID.Patient was evaluated by Urology, plan for endoscopy of conduit as out 

patient for the evaluation of minor calcification in the conduit.








Management plan was discussed in detail with patient


Education was provided.

## 2017-10-20 ENCOUNTER — HOSPITAL ENCOUNTER (EMERGENCY)
Dept: HOSPITAL 42 - ED | Age: 62
Discharge: HOME | End: 2017-10-20
Payer: COMMERCIAL

## 2017-10-20 VITALS — BODY MASS INDEX: 25.8 KG/M2

## 2017-10-20 VITALS
RESPIRATION RATE: 19 BRPM | HEART RATE: 53 BPM | OXYGEN SATURATION: 100 % | DIASTOLIC BLOOD PRESSURE: 62 MMHG | SYSTOLIC BLOOD PRESSURE: 104 MMHG

## 2017-10-20 VITALS — TEMPERATURE: 98.2 F

## 2017-10-20 DIAGNOSIS — R10.9: Primary | ICD-10-CM

## 2017-10-20 DIAGNOSIS — N30.10: ICD-10-CM

## 2017-10-20 DIAGNOSIS — Z90.6: ICD-10-CM

## 2017-10-20 LAB
ALBUMIN/GLOB SERPL: 1.2 {RATIO} (ref 1.1–1.8)
ALP SERPL-CCNC: 62 U/L (ref 38–126)
ALT SERPL-CCNC: 37 U/L (ref 7–56)
AMORPH SED URNS QL MICRO: (no result)
AMYLASE SERPL-CCNC: 76 U/L (ref 35–125)
APPEARANCE UR: (no result)
AST SERPL-CCNC: 34 U/L (ref 17–59)
BACTERIA #/AREA URNS HPF: (no result) /[HPF]
BASOPHILS # BLD AUTO: 0.05 K/MM3 (ref 0–2)
BASOPHILS NFR BLD: 1 % (ref 0–3)
BILIRUB SERPL-MCNC: 0.4 MG/DL (ref 0.2–1.3)
BILIRUB UR-MCNC: NEGATIVE MG/DL
BUN SERPL-MCNC: 25 MG/DL (ref 7–21)
CALCIUM SERPL-MCNC: 9.5 MG/DL (ref 8.4–10.5)
CHLORIDE SERPL-SCNC: 103 MMOL/L (ref 95–110)
CO2 SERPL-SCNC: 30 MMOL/L (ref 21–33)
COLOR UR: (no result)
EOSINOPHIL # BLD: 0.2 10*3/UL (ref 0–0.7)
EOSINOPHIL NFR BLD: 5 % (ref 1.5–5)
EPI CELLS #/AREA URNS HPF: (no result) /HPF (ref 0–5)
ERYTHROCYTE [DISTWIDTH] IN BLOOD BY AUTOMATED COUNT: 14.9 % (ref 11.5–14.5)
GLOBULIN SER-MCNC: 3.7 GM/DL
GLUCOSE SERPL-MCNC: 99 MG/DL (ref 70–110)
GLUCOSE UR STRIP-MCNC: NEGATIVE MG/DL
GRANULOCYTES # BLD: 2.51 10*3/UL (ref 1.4–6.5)
GRANULOCYTES NFR BLD: 52.1 % (ref 50–68)
HCT VFR BLD CALC: 34.5 % (ref 42–52)
KETONES UR STRIP-MCNC: NEGATIVE MG/DL
LEUKOCYTE ESTERASE UR-ACNC: (no result) LEU/UL
LIPASE SERPL-CCNC: 81 U/L (ref 23–300)
LYMPHOCYTES # BLD: 1.7 10*3/UL (ref 1.2–3.4)
LYMPHOCYTES NFR BLD AUTO: 35.3 % (ref 22–35)
MAGNESIUM SERPL-MCNC: 2.2 MG/DL (ref 1.7–2.2)
MCH RBC QN AUTO: 24.5 PG (ref 25–35)
MCHC RBC AUTO-ENTMCNC: 31 G/DL (ref 31–37)
MCV RBC AUTO: 79.1 FL (ref 80–105)
MONOCYTES # BLD AUTO: 0.3 10*3/UL (ref 0.1–0.6)
MONOCYTES NFR BLD: 6.6 % (ref 1–6)
PH UR STRIP: 6 [PH] (ref 4.7–8)
PHOSPHATE SERPL-MCNC: 4.5 MG/DL (ref 2.5–4.5)
PLATELET # BLD: 226 10^3/UL (ref 120–450)
PMV BLD AUTO: 8.3 FL (ref 7–11)
POTASSIUM SERPL-SCNC: 3.8 MMOL/L (ref 3.6–5)
PROT SERPL-MCNC: 8.1 G/DL (ref 5.8–8.3)
PROT UR STRIP-MCNC: 100 MG/DL
RBC # UR STRIP: (no result) /UL
RBC #/AREA URNS HPF: (no result) /HPF (ref 0–2)
SODIUM SERPL-SCNC: 143 MMOL/L (ref 132–148)
SP GR UR STRIP: 1.02 (ref 1–1.03)
UROBILINOGEN UR STRIP-ACNC: 0.2 E.U./DL
WBC # BLD AUTO: 4.8 10^3/UL (ref 4.5–11)
WBC #/AREA URNS HPF: (no result) /HPF (ref 0–6)

## 2017-10-20 PROCEDURE — 85025 COMPLETE CBC W/AUTO DIFF WBC: CPT

## 2017-10-20 PROCEDURE — 87040 BLOOD CULTURE FOR BACTERIA: CPT

## 2017-10-20 PROCEDURE — 96365 THER/PROPH/DIAG IV INF INIT: CPT

## 2017-10-20 PROCEDURE — 96375 TX/PRO/DX INJ NEW DRUG ADDON: CPT

## 2017-10-20 PROCEDURE — 81001 URINALYSIS AUTO W/SCOPE: CPT

## 2017-10-20 PROCEDURE — 80053 COMPREHEN METABOLIC PANEL: CPT

## 2017-10-20 PROCEDURE — 82150 ASSAY OF AMYLASE: CPT

## 2017-10-20 PROCEDURE — 84100 ASSAY OF PHOSPHORUS: CPT

## 2017-10-20 PROCEDURE — 87086 URINE CULTURE/COLONY COUNT: CPT

## 2017-10-20 PROCEDURE — 99284 EMERGENCY DEPT VISIT MOD MDM: CPT

## 2017-10-20 PROCEDURE — 74176 CT ABD & PELVIS W/O CONTRAST: CPT

## 2017-10-20 PROCEDURE — 93975 VASCULAR STUDY: CPT

## 2017-10-20 PROCEDURE — 96361 HYDRATE IV INFUSION ADD-ON: CPT

## 2017-10-20 PROCEDURE — 83690 ASSAY OF LIPASE: CPT

## 2017-10-20 PROCEDURE — 83735 ASSAY OF MAGNESIUM: CPT

## 2017-10-20 NOTE — US
HISTORY:

testicular pain



TECHNIQUE:

Realtime sonography through the scrotum with color and doppler flow.



COMPARISON:

None Available.



FINDINGS:



RIGHT TESTICLE:

Measures 4.8 x 2.5 x 4.7 cm. Homogeneous echotexture.  No mass. 

Normal flow.



RIGHT EPIDIDYMIS:

Normal size, morphology and vascularity. 



LEFT TESTICLE:

Measures 4.8 x 3.5 x 3.1 cm. Mildly enlarged.  Homogeneous 

echotexture.  Diffusely increased vascularity consistent with 

orchitis. No abscess.



LEFT EPIDIDYMIS:

Normal size, morphology and vascularity. 



HYDROCELE:

Large left hydrocele. No right hydrocele.



VARICOCELE:

None.



OTHER FINDINGS:

None. 



IMPRESSION:





Findings consistent with left orchitis without accompanying 

epididymitis.  Left hydrocele. No evidence of testicular torsion.

## 2017-10-20 NOTE — ED PDOC
Physical Exam


Vital Signs Reviewed: Yes


Vital Signs











  Temp Pulse Resp BP Pulse Ox


 


 10/20/17 11:34   53 L  19  104/62  100


 


 10/20/17 08:57   50 L  17  129/74  97


 


 10/20/17 06:13  98.2 F  59 L  18  136/67  100











Temperature: Afebrile


Blood Pressure: Normal


Pulse: Regular


Respiratory Rate: Tachypneic


Appearance: Positive for: Well-Appearing, Non-Toxic, Comfortable


Pain Distress: None


Mental Status: Positive for: Alert and Oriented X 3





Medical Decision Making


ED Course and Treatment: 


10/20/17 07:13: Case endorsed to me by Dr. Artis. Reevaluate and Disposition.





10/20/17 09:50: Discussed case with Dr. Floers and Dr. Paulino.  Dr. Flores states pt 

d/c home on antibiotics. 








CT Abdomen and Pelvis without intravenous contrast


Dictator : Quinn Godoy MD


Report Date : 10/20/2017 08:42:04


IMPRESSION: Mild bilateral hydronephrosis and hydroureter.  Status post right 

lower quadrant ileal conduit.  Extensive coarse calcification within the ileal 

conduit of uncertain significance but unchanged from prior examinations.  Small 

calculi dependently within the dilated portion of the distal right ureter, 

nonobstructing.  Status post cystectomy. Stable retroperitoneal lymph nodes as 

described.








TESTICULAR ULTRASOUND


Dictator : Quinn Godoy MD


Report Date : 10/20/2017 09:22:14


IMPRESSION: Findings consistent with left orchitis without accompanying 

epididymitis.  Left hydrocele. No evidence of testicular torsion.








- Lab Interpretations


Lab Results: 








 10/20/17 06:43 





 10/20/17 06:43 





 Lab Results





10/20/17 08:11: Urine Color Light yellow, Urine Appearance Cloudy, Urine pH 6.0

, Ur Specific Gravity 1.020, Urine Protein 100 H, Urine Glucose (UA) Negative, 

Urine Ketones Negative, Urine Blood Large H, Urine Nitrate Positive H, Urine 

Bilirubin Negative, Urine Urobilinogen 0.2, Ur Leukocyte Esterase Moderate H, 

Urine RBC Tntc, Urine WBC Tntc, Ur Epithelial Cells None, Amorphous Sediment Few

, Urine Bacteria Many, Urine Other Uyeast


10/20/17 06:43: Sodium 143, Potassium 3.8, Chloride 103, Carbon Dioxide 30, 

Anion Gap 14, BUN 25 H, Creatinine 1.5, Est GFR (African Amer) 58, Est GFR (Non-

Af Amer) 48, Random Glucose 99, Calcium 9.5, Phosphorus 4.5, Magnesium 2.2, 

Total Bilirubin 0.4, AST 34, ALT 37, Alkaline Phosphatase 62, Total Protein 8.1

, Albumin 4.4, Globulin 3.7, Albumin/Globulin Ratio 1.2, Amylase 76, Lipase 81


10/20/17 06:43: WBC 4.8  D, RBC 4.36, Hgb 10.7 L, Hct 34.5 L, MCV 79.1 L, MCH 

24.5 L, MCHC 31.0, RDW 14.9 H, Plt Count 226, MPV 8.3, Gran % 52.1, Lymph % (

Auto) 35.3 H, Mono % (Auto) 6.6 H, Eos % (Auto) 5.0, Baso % (Auto) 1.0, Gran # 

2.51, Lymph # 1.7, Mono # 0.3, Eos # 0.2, Baso # 0.05











- RAD Interpretation


Radiology Orders: 








10/20/17 07:15


ABD & PELVIS W/O PO OR IV CONT [CT] Stat 


TESTES DUPLEX COMPLETE [US] Stat 














- Medication Orders


Current Medication Orders: 











Discontinued Medications





Famotidine (Pepcid)  20 mg IVP STAT STA


   Stop: 10/20/17 06:40


   Last Admin: 10/20/17 06:57  Dose: 20 mg





IVP Administration


 Document     10/20/17 06:57  NATACHA  (Rec: 10/20/17 06:57  NATACHA CARTER-PC)


     Charges for Administration


      # of IVP Administrations                   1





Sodium Chloride (Sodium Chloride 0.9%)  500 mls @ 999 mls/hr IV .Q31M STA


   Stop: 10/20/17 07:11


   Last Admin: 10/20/17 06:57  Dose: 999 mls/hr





eMAR Start Stop


 Document     10/20/17 06:57  JOL  (Rec: 10/20/17 06:58  NATACHA CARTER-PC)


     Intravenous Solution


      Start Date                                 10/20/17


      Start Time                                 06:58


      End Date                                   10/20/17


      End time                                   07:30


      Total Infusion Time                        32





Ceftriaxone Sodium (Rocephin 1 Gram Ivpb)  1 gm in 100 mls @ 200 mls/hr IVPB 

STAT STA


   PRN Reason: Protocol


   Stop: 10/20/17 10:01


   Last Admin: 10/20/17 09:51  Dose: 200 mls/hr





eMAR Start Stop


 Document     10/20/17 09:51  MR  (Rec: 10/20/17 09:52  MR  [id number])


     Intravenous Solution


      Start Date                                 10/20/17


      Start Time                                 09:51


      End Date                                   10/20/17


      End time                                   10:21


      Total Infusion Time                        30





Ketorolac Tromethamine (Toradol)  30 mg IVP STAT STA


   Stop: 10/20/17 06:40


   Last Admin: 10/20/17 06:57  Dose: 30 mg





MAR Pain Assessment


 Document     10/20/17 06:57  JOL  (Rec: 10/20/17 06:57  JOL  [id number])


     Pain Reassessment


      Is this a pain reassessment?               No


     Sleep


      Is patient sleeping during reassessment?   No


     Presence of Pain


      Presence of Pain                           Yes


     Pain Scale Used


      Pain Scale Used                            Numeric


     Location


      Pain Location Body Site                    Abdomen


     Description


      Intensity of Pain at present               6


IVP Administration


 Document     10/20/17 06:57  JOL  (Rec: 10/20/17 06:57  JOL  [id number])


     Charges for Administration


      # of IVP Administrations                   1





Ondansetron HCl (Zofran Inj)  4 mg IVP STAT STA


   Stop: 10/20/17 08:54


   Last Admin: 10/20/17 09:09  Dose: 4 mg





IVP Administration


 Document     10/20/17 09:09    (Rec: 10/20/17 09:09  MR  [id number])


     Charges for Administration


      # of IVP Administrations                   1














- Scribe Statement


The provider has reviewed the documentation as recorded by the Treasure Malloy 





Provider Scribe Attestation:


All medical record entries made by the Scribmargarita were at my direction and 

personally dictated by me. I have reviewed the chart and agree that the record 

accurately reflects my personal performance of the history, physical exam, 

medical decision making, and the department course for this patient. I have 

also personally directed, reviewed, and agree with the discharge instructions 

and disposition.








Disposition/Present on Arrival





- Present on Arrival


Any Indicators Present on Arrival: No


History of DVT/PE: No


History of Uncontrolled Diabetes: No


Urinary Catheter: Yes (urostomy)


History of Decub. Ulcer: No


History Surgical Site Infection Following: None





- Disposition


Have Diagnosis and Disposition been Completed?: Yes


Diagnosis: 


 Abdominal pain, Orchitis





Disposition: HOME/ ROUTINE


Disposition Time: 12:00


Condition: STABLE


Discharge Instructions (ExitCare):  Epididymo-orchitis (ED), Acute Abdominal 

Pain (ED)


Additional Instructions: 


please follow up with your doctor and dr flores. return to er with worsening 

symptoms or concerns. 


Prescriptions: 


Cefpodoxime [Vantin] 100 mg PO BID #20 tab


Ibuprofen [Motrin Tab] 600 mg PO Q6 PRN #20 tab


 PRN Reason: Pain, Mild (1-3)


Forms:  [facility] (English)

## 2017-10-20 NOTE — ED PDOC
Arrival/HPI





- General


Chief Complaint: Abdominal Pain


Time Seen by Provider: 10/20/17 06:07





- History of Present Illness


Narrative History of Present Illness (Text): 





10/20/17 06:31


Patient is a 62 y/o M with hx of asthma, interstitial hemorrhagic cystitis s/p 

ileal conduit and cystectomy, and hypothyroidism, and hx of multiple hernia 

repairs, presenting with suprapubic pain, vomiting and diarrhea.  He reports 

that the pain started last night and has not improved with the use of immodium.

  Reports cloudy urine.    Patient was admitted to ED on 10/16 for abdominal 

pain/uti and was discharged after cleared by urology and ID.  He was given 

ceftriaxone in Ed and then meropenem,  He had ucx that showed likely 

contamination.  He had negative ct abd/pelvis.  Scrotal ultrasound showed large 

L hydrocele and small R hydrocele.  Patient was instructed to follow-up with 

Dr. Andrea.





10/20/17 06:42





10/20/17 06:45








Past Medical History





- Infectious Disease


Hx of Infectious Diseases: None





- Tetanus Immunization


Tetanus Immunization: Unknown





- Reproductive


Currently Pregnant: No





- Cardiac


Hx Cardiac Disorders: No


Hx Pacemaker: No





- Pulmonary


Hx Respiratory Disorders: Yes


Hx Asthma: Yes





- Neurological


Hx Neurological Disorder: No


Hx Paralysis: No





- HEENT


Hx HEENT Disorder: Yes


Other/Comment: difficulty hearing





- Renal


Hx Renal Disorder: Yes (Interstitial Cystitis)


Other/Comment: urinary bladder removed, has urostomy





- Endocrine/Metabolic


Hx Endocrine Disorders: Yes





- Hematological/Oncological


Hx Blood Disorders: No


Hx Blood Transfusions: No


Hx Blood Transfusion Reaction: No





- Integumentary


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





- Musculoskeletal/Rheumatological


Hx Musculoskeletal Disorders: No





- Gastrointestinal


Hx Gastrointestinal Disorders: Yes (esophageal stricture,HERNIORHAPPHY 4 X)


Hx Gastroesophageal Reflux: Yes


Hx Liver Failure: Yes (HEPATOMEGALY)


HX Swallowing Problems: Yes





- Genitourinary/Gynecological


Hx Genitourinary Disorders: Yes (interstital cystitis,  urostomy 2009)


Hx Hematuria: Yes


Hx Prostate Problems: Yes (LASER SX-DYSURIA, enlarged 2004)


Hx Urinary Tract Infection: Yes


Other/Comment: right abd  urostomy tube





- Psychiatric


Hx Anxiety: Yes


Hx Depression: Yes


Hx Emotional Abuse: No


Hx Physical Abuse: No


Hx Substance Use: No





- Surgical History


Other/Comment: Hernia repair, urostomy, mesh removal, cyst removal, cystectomy





- Anesthesia


Hx Anesthesia Reactions: No


Hx Malignant Hyperthermia: No





- Suicidal Assessment


Feels Threatened In Home Enviroment: No





Family/Social History


Family/Social History: No Known Family HX


Smoking Status: Never Smoked


Hx Alcohol Use: No


Hx Substance Use: No


Hx Substance Use Treatment: No





Allergies/Home Meds


Allergies/Adverse Reactions: 


Allergies





No Known Allergies Allergy (Verified 10/20/17 06:20)


 








Home Medications: 


 Home Meds











 Medication  Instructions  Recorded  Confirmed


 


Albuterol HFA [Ventolin HFA 90 2 puff IH [id number] PRN 05/10/17 10/20/17





mcg/actuation (8 g)]   


 


Omeprazole 20 mg PO DAILY 05/10/17 10/20/17


 


ALPRAZolam [Xanax] 0.25 mg PO DAILY 10/20/17 10/20/17














Review of Systems





- Review of Systems


Constitutional: absent: Fatigue, Weight Change, Fevers


Respiratory: absent: SOB, Cough, Sputum, Wheezing


Cardiovascular: absent: Chest Pain, Palpitations, Edema, Calf Pain, MCNULTY, 

Orthopnea


Gastrointestinal: Abdominal Pain, Diarrhea, Nausea, Vomiting.  absent: 

Constipation


Genitourinary Male: Frequency, Urinary Output Changes


Musculoskeletal: absent: Arthralgias


Skin: absent: Rash





Physical Exam


Vital Signs











  Temp Pulse Resp BP Pulse Ox


 


 10/20/17 06:13  98.2 F  59 L  18  136/67  100











Temperature: Afebrile


Blood Pressure: Normal


Pulse: Regular


Respiratory Rate: Normal


Appearance: Positive for: Well-Appearing, Non-Toxic, Comfortable


Pain Distress: None


Mental Status: Positive for: Alert and Oriented X 3





- Systems Exam


Head: Present: Atraumatic, Normocephalic


Pupils: Present: PERRL


Extroacular Muscles: Present: EOMI


Conjunctiva: Present: Normal


Respiratory/Chest: Present: Clear to Auscultation, Good Air Exchange.  No: 

Respiratory Distress


Cardiovascular: Present: Regular Rate and Rhythm, Normal S1, S2.  No: Murmurs


Abdomen: Present: Tenderness (suprapubic)


Genitourinary Male: Present: Circumcised Penis, Other (scrotal swelling L>R, 

scant tenderness).  No: Penile Discharge


Upper Extremity: Present: Normal Inspection


Lower Extremity: Present: Normal Inspection


Neurological: Present: GCS=15, CN II-XII Intact


Psychiatric: Present: Alert, Oriented x 3





Medical Decision Making


ED Course and Treatment: 





10/20/17 06:38


Will get labs and ua.  Will likely need CT to evaluate futher.  Will sign out 

to Dr. Arriaza to follow-up labs, ua, and reevaluate


10/20/17 07:01








- Medication Orders


Current Medication Orders: 








Sodium Chloride (Sodium Chloride 0.9%)  500 mls @ 999 mls/hr IV .Q31M STA


   Stop: 10/20/17 07:11


   Last Admin: 10/20/17 06:57  Dose: 999 mls/hr





eMAR Start Stop


 Document     10/20/17 06:57  JOL  (Rec: 10/20/17 06:58  JOL  [id number])


     Intravenous Solution


      Start Date                                 10/20/17


      Start Time                                 06:58


      End Date                                   10/20/17


      End time                                   07:30


      Total Infusion Time                        32








Discontinued Medications





Famotidine (Pepcid)  20 mg IVP STAT STA


   Stop: 10/20/17 06:40


   Last Admin: 10/20/17 06:57  Dose: 20 mg





IVP Administration


 Document     10/20/17 06:57  JOL  (Rec: 10/20/17 06:57  JOL  [id number])


     Charges for Administration


      # of IVP Administrations                   1





Ketorolac Tromethamine (Toradol)  30 mg IVP STAT STA


   Stop: 10/20/17 06:40


   Last Admin: 10/20/17 06:57  Dose: 30 mg





MAR Pain Assessment


 Document     10/20/17 06:57  JOL  (Rec: 10/20/17 06:57  JOL  [id number])


     Pain Reassessment


      Is this a pain reassessment?               No


     Sleep


      Is patient sleeping during reassessment?   No


     Presence of Pain


      Presence of Pain                           Yes


     Pain Scale Used


      Pain Scale Used                            Numeric


     Location


      Pain Location Body Site                    Abdomen


     Description


      Intensity of Pain at present               6


IVP Administration


 Document     10/20/17 06:57  JOL  (Rec: 10/20/17 06:57  JOL  [id number])


     Charges for Administration


      # of IVP Administrations                   1














Disposition/Present on Arrival





- Present on Arrival


Any Indicators Present on Arrival: No


History of DVT/PE: No


History of Uncontrolled Diabetes: No


Urinary Catheter: Yes (urostomy)


History of Decub. Ulcer: No


History Surgical Site Infection Following: None





- Disposition


Have Diagnosis and Disposition been Completed?: Yes


Diagnosis: 


 Abdominal pain





Disposition Time: 07:01


Condition: FAIR


Forms:  [employer] (English)

## 2017-10-20 NOTE — CT
PROCEDURE:  CT Abdomen and Pelvis without intravenous contrast



HISTORY:

diffuse abd pain



COMPARISON:

10/15/2017



TECHNIQUE:

Without contrast.. 



Contrast Dose: 0



Radiation dose:



Total exam DLP = 525.98 mGy-cm.



This CT exam was performed using one or more of the following dose 

reduction techniques: Automated exposure control, adjustment of the 

mA and/or kV according to patient size, and/or use of iterative 

reconstruction technique.



FINDINGS:



LOWER THORAX:

Unremarkable. 



LIVER:

Normal size and contour.  Several rounded low-attenuation masses 

corresponding to mass is more clearly demonstrated on contrast 

enhanced CT examination of 5/10/2017. Contiguity is poor due to lack 

of intravenous contrast administration. No biliary dilatation.  



GALLBLADDER AND BILE DUCTS:

Unremarkable. 



PANCREAS:

Unremarkable. No gross lesion or ductal dilatation.



SPLEEN:

Unremarkable. 



ADRENALS:

Unremarkable. No mass. 



KIDNEYS AND URETERS:

Bilateral mild hydronephrosis and hydroureter.  More extensive 

dilatation of the distal right ureter proximal to the anastomosis 

with the ileal conduit.  Extensive calcification again identified 

within the ileal conduit, uncertain significance. There are small 

dependent calculi seen within the dilated portion of the right ureter 

in the right lower quadrant (series 2, image 118 through 122). No 

renal mass or calculus. 



VASCULATURE:

Unremarkable. No aortic aneurysm. 



BOWEL:

Unremarkable. No obstruction. No gross mural thickening. 



APPENDIX:

Unremarkable. Normal appendix. 



PERITONEUM:

Unremarkable. No free fluid. No free air. 



LYMPH NODES:

Several enlarged retroperitoneal nodes are identified.  There is a 

precaval node measuring 11 mm in short axis.  There is a 2nd precaval 

node measuring 12 mm in short axis.  There is a celiac axis nodes 

superior to the pancreatic body measuring 16 mm short axis. These 

nodes are stable compared to multiple prior CT examinations dating 

back to 7/29/2015. . 



BLADDER:

Status post cystectomy 



REPRODUCTIVE:

Normal prostate 



BONES:

No acute fracture. 



OTHER FINDINGS:

None.



IMPRESSION:

Mild bilateral hydronephrosis and hydroureter.  Status post right 

lower quadrant ileal conduit.  Extensive coarse calcification within 

the ileal conduit of uncertain significance but unchanged from prior 

examinations.  Small calculi dependently within the dilated portion 

of the distal right ureter, nonobstructing.  Status post cystectomy. 

Stable retroperitoneal lymph nodes as described.

## 2017-11-12 ENCOUNTER — HOSPITAL ENCOUNTER (INPATIENT)
Dept: HOSPITAL 42 - ED | Age: 62
LOS: 3 days | Discharge: HOME | DRG: 312 | End: 2017-11-15
Attending: INTERNAL MEDICINE | Admitting: INTERNAL MEDICINE
Payer: COMMERCIAL

## 2017-11-12 VITALS — BODY MASS INDEX: 25.8 KG/M2

## 2017-11-12 DIAGNOSIS — K22.2: ICD-10-CM

## 2017-11-12 DIAGNOSIS — E03.9: ICD-10-CM

## 2017-11-12 DIAGNOSIS — I67.9: ICD-10-CM

## 2017-11-12 DIAGNOSIS — Z87.440: ICD-10-CM

## 2017-11-12 DIAGNOSIS — Z90.6: ICD-10-CM

## 2017-11-12 DIAGNOSIS — N42.9: ICD-10-CM

## 2017-11-12 DIAGNOSIS — N18.9: ICD-10-CM

## 2017-11-12 DIAGNOSIS — R55: Primary | ICD-10-CM

## 2017-11-12 DIAGNOSIS — K58.9: ICD-10-CM

## 2017-11-12 DIAGNOSIS — N39.0: ICD-10-CM

## 2017-11-12 DIAGNOSIS — N13.30: ICD-10-CM

## 2017-11-12 DIAGNOSIS — N17.9: ICD-10-CM

## 2017-11-12 DIAGNOSIS — G89.29: ICD-10-CM

## 2017-11-12 DIAGNOSIS — E78.5: ICD-10-CM

## 2017-11-12 DIAGNOSIS — K21.9: ICD-10-CM

## 2017-11-12 DIAGNOSIS — F41.9: ICD-10-CM

## 2017-11-12 DIAGNOSIS — J45.909: ICD-10-CM

## 2017-11-12 DIAGNOSIS — I13.10: ICD-10-CM

## 2017-11-12 LAB
ALBUMIN/GLOB SERPL: 1 {RATIO} (ref 1.1–1.8)
ALP SERPL-CCNC: 65 U/L (ref 38–126)
ALT SERPL-CCNC: 25 U/L (ref 7–56)
APPEARANCE UR: (no result)
APTT BLD: 29.4 SECONDS (ref 25.1–36.5)
AST SERPL-CCNC: 21 U/L (ref 17–59)
BACTERIA #/AREA URNS HPF: (no result) /[HPF]
BASE EXCESS BLDV CALC-SCNC: 5.2 MMOL/L (ref 0–2)
BASOPHILS # BLD AUTO: 0.02 K/MM3 (ref 0–2)
BASOPHILS NFR BLD: 0.3 % (ref 0–3)
BILIRUB SERPL-MCNC: 0.5 MG/DL (ref 0.2–1.3)
BILIRUB UR-MCNC: NEGATIVE MG/DL
BUN SERPL-MCNC: 23 MG/DL (ref 7–21)
CALCIUM SERPL-MCNC: 9.5 MG/DL (ref 8.4–10.5)
CHLORIDE SERPL-SCNC: 100 MMOL/L (ref 98–107)
CO2 SERPL-SCNC: 31 MMOL/L (ref 21–33)
COLOR UR: YELLOW
EOSINOPHIL # BLD: 0.2 10*3/UL (ref 0–0.7)
EOSINOPHIL NFR BLD: 2.8 % (ref 1.5–5)
EPI CELLS #/AREA URNS HPF: (no result) /HPF (ref 0–5)
ERYTHROCYTE [DISTWIDTH] IN BLOOD BY AUTOMATED COUNT: 14.9 % (ref 11.5–14.5)
GLOBULIN SER-MCNC: 4.1 GM/DL
GLUCOSE SERPL-MCNC: 112 MG/DL (ref 70–110)
GLUCOSE UR STRIP-MCNC: NEGATIVE MG/DL
GRANULOCYTES # BLD: 4.01 10*3/UL (ref 1.4–6.5)
GRANULOCYTES NFR BLD: 65.9 % (ref 50–68)
HCT VFR BLD CALC: 34.9 % (ref 42–52)
INR PPP: 1.09 (ref 0.93–1.08)
KETONES UR STRIP-MCNC: NEGATIVE MG/DL
LEUKOCYTE ESTERASE UR-ACNC: (no result) LEU/UL
LYMPHOCYTES # BLD: 1.5 10*3/UL (ref 1.2–3.4)
LYMPHOCYTES NFR BLD AUTO: 24.8 % (ref 22–35)
MCH RBC QN AUTO: 25.1 PG (ref 25–35)
MCHC RBC AUTO-ENTMCNC: 31.2 G/DL (ref 31–37)
MCV RBC AUTO: 80.2 FL (ref 80–105)
MONOCYTES # BLD AUTO: 0.4 10*3/UL (ref 0.1–0.6)
MONOCYTES NFR BLD: 6.2 % (ref 1–6)
PH BLDV: 7.39 [PH] (ref 7.32–7.43)
PH UR STRIP: 6.5 [PH] (ref 4.7–8)
PLATELET # BLD: 212 10^3/UL (ref 120–450)
PMV BLD AUTO: 8.6 FL (ref 7–11)
POTASSIUM SERPL-SCNC: 3.8 MMOL/L (ref 3.6–5)
PROT SERPL-MCNC: 8.4 G/DL (ref 5.8–8.3)
PROT UR STRIP-MCNC: 100 MG/DL
RBC # UR STRIP: (no result) /UL
RBC #/AREA URNS HPF: (no result) /HPF (ref 0–2)
SODIUM SERPL-SCNC: 140 MMOL/L (ref 132–148)
SP GR UR STRIP: 1.02 (ref 1–1.03)
T4 FREE SERPL-MCNC: 0.53 NG/DL (ref 0.78–2.19)
T4 FREE SERPL-MCNC: 0.54 NG/DL (ref 0.78–2.19)
TROPONIN I SERPL-MCNC: < 0.01 NG/ML
TSH SERPL-ACNC: 79.2 MIU/ML (ref 0.46–4.68)
TSH SERPL-ACNC: 84.2 MIU/ML (ref 0.46–4.68)
UROBILINOGEN UR STRIP-ACNC: 0.2 E.U./DL
WBC # BLD AUTO: 6.1 10^3/UL (ref 4.5–11)
WBC #/AREA URNS HPF: (no result) /HPF (ref 0–6)

## 2017-11-12 RX ADMIN — VANCOMYCIN HYDROCHLORIDE SCH MLS/HR: 1 INJECTION, POWDER, LYOPHILIZED, FOR SOLUTION INTRAVENOUS at 13:57

## 2017-11-12 RX ADMIN — PIPERACILLIN AND TAZOBACTAM SCH MLS/HR: 3; .375 INJECTION, POWDER, LYOPHILIZED, FOR SOLUTION INTRAVENOUS; PARENTERAL at 17:00

## 2017-11-12 RX ADMIN — MORPHINE SULFATE PRN MG: 2 INJECTION, SOLUTION INTRAMUSCULAR; INTRAVENOUS at 13:57

## 2017-11-12 RX ADMIN — MORPHINE SULFATE PRN MG: 2 INJECTION, SOLUTION INTRAMUSCULAR; INTRAVENOUS at 20:18

## 2017-11-12 RX ADMIN — PIPERACILLIN AND TAZOBACTAM SCH MLS/HR: 3; .375 INJECTION, POWDER, LYOPHILIZED, FOR SOLUTION INTRAVENOUS; PARENTERAL at 23:15

## 2017-11-12 RX ADMIN — PIPERACILLIN AND TAZOBACTAM SCH MLS/HR: 3; .375 INJECTION, POWDER, LYOPHILIZED, FOR SOLUTION INTRAVENOUS; PARENTERAL at 12:23

## 2017-11-12 NOTE — CP.PCM.HP
<RianJabari - Last Filed: 11/12/17 13:02>





History of Present Illness





- History of Present Illness


History of Present Illness: 





Jabari Modi D.O. PGY-2, Internal Medicine History and Physical 





61 year old male with a PMH interstitial hemorrhagic cystitis, ileal conduit, s/

p cystectomy, recurrent urinary infections, chronic abdominal pain with 

multiple hernia repairs, irritable bowel syndrome, abdominal wall abscess s/p I&

D, and most recently elevated PSA who presents to [facility] ER on 11/12/17 with 

complaints of a syncopal episode a few days ago as well as worsening abdominal 

pain and "not feeling right." Patient is unsure when he had his syncopal episode

, possibly last night vs the night before, states was sitting at his desk and 

then he felt lightheaded and woke while sitting at his desk many hours later. 

Patient also notes lower abdominal pain, 9/10, non-radiating, associated with 

some nausea but no vomiting, that has been progressively been getting worse 

over many days. Patient also notes generalized malaise, feeling like his speech 

is different, some vertigo but no focal weakness, facial droop, or otherwise. 





PMH: as above


PSH: as above


SH: denies EtOH, tobacco or illicits


FH: denies


Meds: reviewed


Allergies: NKA








Present on Admission





- Present on Admission


Any Indicators Present on Admission: No





Review of Systems





- Constitutional


Constitutional: Malaise.  absent: Headache, Night Sweats





- EENT


Eyes: Blurred Vision (intermittent).  absent: Blind Spots, Sees Flashes, Spots 

in Vision


Ears: absent: Ear Discharge, Ear Pain


Nose/Mouth/Throat: absent: Nose Pain, Sinus Pain, Mouth Pain





- Cardiovascular


Cardiovascular: absent: Edema, Pain Radiating to Arm/Neck/Jaw, Orthopnea





- Respiratory


Respiratory: absent: Cough, Dyspnea





- Gastrointestinal


Gastrointestinal: Abdominal Pain, Nausea.  absent: Diarrhea, Heartburn





- Genitourinary


Genitourinary: Hx /Renal Surgery





- Musculoskeletal


Musculoskeletal: absent: Back Pain, Tingling





- Integumentary


Integumentary: absent: Pruritus, Rash





- Neurological


Neurological: Vertigo.  absent: Focal Weakness, Loss of Vision, Syncope





Past Patient History





- Infectious Disease


Hx of Infectious Diseases: None





- Tetanus Immunizations


Tetanus Immunization: Unknown





- Past Medical History & Family History


Past Medical History?: Yes





- Past Social History


Smoking Status: Never Smoked





- CARDIAC


Hx Cardiac Disorders: No


Hx Pacemaker: No





- PULMONARY


Hx Respiratory Disorders: Yes


Hx Asthma: Yes





- NEUROLOGICAL


Hx Neurological Disorder: No


Hx Paralysis: No





- HEENT


Hx HEENT Problems: Yes


Other/Comment: difficulty hearing





- RENAL


Hx Chronic Kidney Disease: Yes (Interstitial Cystitis)


Other/Comment: urinary bladder removed, has urostomy





- ENDOCRINE/METABOLIC


Hx Endocrine Disorders: Yes





- HEMATOLOGICAL/ONCOLOGICAL


Hx Blood Disorders: No


Hx Blood Transfusions: No


Hx Blood Transfusion Reaction: No





- INTEGUMENTARY


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





- MUSCULOSKELETAL/RHEUMATOLOGICAL


Hx Musculoskeletal Disorders: No





- GASTROINTESTINAL


Hx Gastrointestinal Disorders: Yes (esophageal stricture,HERNIORHAPPHY 4 X)


Hx Gastroesophageal Reflux: Yes


Hx Liver Failure: Yes (HEPATOMEGALY)


HX Swallowing Problems: Yes





- GENITOURINARY/GYNECOLOGICAL


Hx Genitourinary Disorders: Yes (interstital cystitis,  urostomy 2009)


Hx Hematuria: Yes


Hx Prostate Problems: Yes (LASER SX-DYSURIA, enlarged 2004)


Hx Urinary Tract Infection: Yes


Other/Comment: right abd  urostomy tube





- PSYCHIATRIC


Hx Anxiety: Yes


Hx Depression: Yes


Hx Emotional Abuse: No


Hx Physical Abuse: No


Hx Substance Use: No





- SURGICAL HISTORY


Other/Comment: Hernia repair, urostomy, mesh removal, cyst removal, cystectomy





- ANESTHESIA


Hx Anesthesia Reactions: No


Hx Malignant Hyperthermia: No





Meds


Allergies/Adverse Reactions: 


 Allergies











Allergy/AdvReac Type Severity Reaction Status Date / Time


 


No Known Allergies Allergy   Verified 11/12/17 08:20














Physical Exam





- Constitutional


Appears: Non-toxic, No Acute Distress





- Head Exam


Head Exam: ATRAUMATIC, NORMOCEPHALIC


Additional comments: 





developing alopecia





- Eye Exam


Eye Exam: EOMI, PERRL.  absent: Scleral icterus





- ENT Exam


ENT Exam: Mucous Membranes Moist, Normal Oropharynx





- Neck Exam


Additional comments: 





soft, supple








- Respiratory Exam


Respiratory Exam: Wheezes (mild, R>L bases).  absent: Rales, Rhonchi





- Cardiovascular Exam


Cardiovascular Exam: RRR, +S1, +S2.  absent: Gallop, Rubs





- GI/Abdominal Exam


GI & Abdominal Exam: Distended (mild), Normal Bowel Sounds, Soft, Tenderness (

diffuse, worse on R groin)


Additional comments: 





multiple well healed post surgical scars, RLQ urostomy





- Extremities Exam


Extremities exam: Negative for: calf tenderness, pedal edema





- Back Exam


Back exam: absent: rash noted, tenderness





- Neurological Exam


Neurological exam: Alert, CN II-XII Intact, Oriented x3


Additional comments: 





nonfocal exam





- Skin


Skin Exam: Dry, Warm





Results





- Vital Signs


Recent Vital Signs: 





 Last Vital Signs











Temp  98.1 F   11/12/17 08:24


 


Pulse  72   11/12/17 10:11


 


Resp  18   11/12/17 10:11


 


BP  136/86   11/12/17 10:11


 


Pulse Ox  98   11/12/17 10:11














- Labs


Result Diagrams: 


 11/12/17 08:45





 11/12/17 08:45





Assessment & Plan





- Assessment and Plan (Free Text)


Assessment: 





61 year old male with a PMH interstitial hemorrhagic cystitis, ileal conduit, s/

p cystectomy, recurrent urinary infections, chronic abdominal pain with 

multiple hernia repairs, irritable bowel syndrome, abdominal wall abscess s/p I&

D, and most recently elevated PSA who presents with complaints of a syncopal 

episode a few days ago as well as worsening abdominal pain


Plan: 





1. Syncopal episode


Placed in obs in telemetry


Multiple possible etiologies, infectious from complicated UTI vs neurogenic vs 

endocrine vs cardiogenic/arrythmia


Will trend troponins


Neuro Dr. Jaeger consulted


Neurochecks Q4x8


Global precautions


EKG reviewed, NSR, no axis deviation, no ST-T wave changes


Head CT showed R temporal lobe 9mm hypoattenuating focus


Vitals q4h


PT ordered





2. Abdominal pain


History of multiple abd surgeries, poss adhesions vs 2/2 IBS vs recurrent hernia


Surgery consulted


Morphine PRN ordered





3. Complicated urinary tract infection


Started on vanco and zosyn D1


ID consulted


UCx ordered





4. Hypothyroidism


Cont home levothyroxine


Thyroid profile ordered





5. CARLY on CKD


Started NS @ 100


F/U CMP tomorrow AM





6. Anxiety


Cont home alprazolam





GI/DVT ppx: protonix/SCDs





Patient was seen and examined and case was discussed at length with attending 

physician.





- Date & Time


Date: 11/12/17


Time: 12:30





<Tosha Hoover - Last Filed: 11/15/17 12:22>





Results





- Vital Signs


Recent Vital Signs: 





 Last Vital Signs











Temp  98.1 F   11/15/17 06:00


 


Pulse  63   11/15/17 09:07


 


Resp  94 H  11/15/17 06:00


 


BP  122/79   11/15/17 06:00


 


Pulse Ox  98   11/15/17 00:01














- Labs


Result Diagrams: 


 11/15/17 06:00





 11/15/17 06:00


Labs: 





 Laboratory Results - last 24 hr











  11/14/17 11/15/17 11/15/17





  05:30 06:00 06:00


 


WBC   4.0 L D 


 


RBC   3.99 


 


Hgb   9.9 L 


 


Hct   32.0 L 


 


MCV   80.2 


 


MCH   24.8 L 


 


MCHC   30.9 L 


 


RDW   14.9 H 


 


Plt Count   178 


 


MPV   8.5 


 


Gran %   57.4 


 


Lymph % (Auto)   29.0 


 


Mono % (Auto)   7.8 H 


 


Eos % (Auto)   5.0 


 


Baso % (Auto)   0.8 


 


Gran #   2.30 


 


Lymph #   1.2 


 


Mono #   0.3 


 


Eos #   0.2 


 


Baso #   0.03 


 


Sodium    142


 


Potassium    3.7


 


Chloride    106


 


Carbon Dioxide    29


 


Anion Gap    11


 


BUN    11


 


Creatinine    1.7 H


 


Est GFR ( Amer)    50


 


Est GFR (Non-Af Amer)    41


 


Random Glucose    88


 


Calcium    8.1 L


 


Phosphorus    3.5


 


Magnesium    2.2


 


Total Bilirubin    0.5


 


AST    24


 


ALT    22


 


Alkaline Phosphatase    42


 


Total Protein    6.7


 


Albumin    3.3


 


Globulin    3.4


 


Albumin/Globulin Ratio    1.0 L


 


Cortisol AM Sample  3.0 L  














Attending/Attestation





- Attestation


I have personally seen and examined this patient.: Yes


I have fully participated in the care of the patient.: Yes


I have reviewed all pertinent clinical information: Yes


Notes (Text): 





11/15/17 12:16


61 year old male with past medical history of interstitial hemorrhagic cystitis

, ileal conduit s/p cystectomy, recurrent UTI, hernia repairs, asthma and 

hypothyroidism who presented with complaint of lower abdominal pain and ?

syncopal episode day prior to admission.


CT head and CT abd/pelvis were reviewed as above.  Neurology evaluation is 

requested.  Will check carotid dopplers and echocardiogram.


+UA.  Continue with iv antibiotics for UTI while awaiting urine culture.  ID 

and urology evaluations are requested.


He is on synthroid for hypothyroidism.  TSH is very elevated; will repeat and 

adjust dose accordingly if still elevated.


Continue with iv fluids for acute on chronic renal failure.


Continue with duonebs for asthma.





Tosha Hoover MD


Hospitalist.

## 2017-11-12 NOTE — RAD
PROCEDURE:  CHEST RADIOGRAPH, 1 VIEW



HISTORY:

syncope



COMPARISON:

Comparison is made to 10/15/2017



FINDINGS:



LUNGS:

Clear.



PLEURA:

No pneumothorax or pleural fluid seen.



CARDIOVASCULAR:

Normal.



OSSEOUS STRUCTURES:

No significant abnormalities.



VISUALIZED UPPER ABDOMEN:

Normal.



OTHER FINDINGS:

None. 



IMPRESSION:

No active disease.

## 2017-11-12 NOTE — CP.PCM.CON
History of Present Illness





- History of Present Illness


History of Present Illness: 





Surgery Consult: Dr. Crawford





Pt is a 61M with PMHx significant for interstitial cystitis s/p cystectomy & 

ileal conduit creation who is well known to our service from previous 

admissions. Pt presents today s/p syncopal episode and abdominal pain on and 

off that won't improve. Pt states he continues to have an urge to urinate with 

pressure like feeling that is very uncomfortable. He states he has had this 

issue for a while now and has been seeing his urologist Dr. Andrea who is 

planning to do a cysto/biopsy on Dec 1st due to pt having elevated PSA levels. 

He states his ileal conduit is working fine and he is having regular bowel 

movements. However, he does admit to a loose BM this morning but denies any 

blood in his stool. Admits to some nausea but denies vomiting. Denies F/C, 

chest pain or SOB. 


Of note, pt states on a recent admission he was found to have lesions on his 

liver for which he saw a specialist at [facility], and was told they 

are benign. He also states that he was told by Dr. Andrea that he will be 

referred to a Uro-oncologist for calcifications seen around his ileal conduit. 





Review of Systems





- Review of Systems


All systems: reviewed and no additional remarkable complaints except (as per HPI

)





Past Patient History





- Infectious Disease


Hx of Infectious Diseases: None





- Tetanus Immunizations


Tetanus Immunization: Unknown





- Past Medical History & Family History


Past Medical History?: Yes





- Past Social History


Smoking Status: Never Smoked





- CARDIAC


Hx Cardiac Disorders: No


Hx Pacemaker: No





- PULMONARY


Hx Respiratory Disorders: Yes


Hx Asthma: Yes





- NEUROLOGICAL


Hx Neurological Disorder: No


Hx Paralysis: No





- HEENT


Hx HEENT Problems: Yes


Other/Comment: difficulty hearing





- RENAL


Hx Chronic Kidney Disease: Yes (Interstitial Cystitis)


Other/Comment: urinary bladder removed, has urostomy





- ENDOCRINE/METABOLIC


Hx Endocrine Disorders: Yes





- HEMATOLOGICAL/ONCOLOGICAL


Hx Blood Disorders: No


Hx Blood Transfusions: No


Hx Blood Transfusion Reaction: No





- INTEGUMENTARY


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





- MUSCULOSKELETAL/RHEUMATOLOGICAL


Hx Musculoskeletal Disorders: No





- GASTROINTESTINAL


Hx Gastrointestinal Disorders: Yes (esophageal stricture)


Hx Gastroesophageal Reflux: Yes





- GENITOURINARY/GYNECOLOGICAL


Hx Genitourinary Disorders: Yes (interstital cystitis,  urostomy 2009)


Hx Hematuria: Yes


Hx Prostate Problems: Yes (LASER SX-DYSURIA, enlarged 2004)


Hx Urinary Tract Infection: Yes





- PSYCHIATRIC


Hx Anxiety: Yes


Hx Depression: Yes


Hx Physical Abuse: No


Hx Substance Use: No





- SURGICAL HISTORY


Hx Surgeries: Yes


Other/Comment: Hernia repair, Cystectomy & ileal conduit creation, cyst removal

, cystectomy





- ANESTHESIA


Hx Anesthesia: Yes


Hx Anesthesia Reactions: No


Hx Malignant Hyperthermia: No





Meds


Allergies/Adverse Reactions: 


 Allergies











Allergy/AdvReac Type Severity Reaction Status Date / Time


 


No Known Allergies Allergy   Verified 11/12/17 08:20














- Medications


Medications: 


 Current Medications





Albuterol Sulfate (Albuterol 0.083% Inhal Sol (2.5 Mg/3 Ml) Ud)  2.5 mg IH 

[id number] PRN


   PRN Reason: Cough and congestion


Alprazolam (Xanax)  0.25 mg PO DAILY MARIA D


   PRN Reason: Protocol


   Stop: 11/20/17 10:01


Vancomycin HCl (Vancomycin 1gm)  1 gm in 250 mls @ 167 mls/hr IVPB DAILY MARIA D


   PRN Reason: Protocol


   Last Admin: 11/12/17 13:57 Dose:  167 mls/hr


Piperacillin Sod/Tazobactam Sod (Zosyn 3.375 In Ns 100ml)  100 mls @ 200 mls/hr 

IVPB Q6 MARIA D


   PRN Reason: Protocol


   Stop: 11/19/17 12:01


   Last Admin: 11/12/17 17:00 Dose:  200 mls/hr


Sodium Chloride (Sodium Chloride 0.9%)  1,000 mls @ 100 mls/hr IV .Q10H MARIA D


   Last Admin: 11/12/17 12:25 Dose:  100 mls/hr


Levothyroxine Sodium (Synthroid)  100 mcg PO DAILY [facility]


Morphine Sulfate (Morphine)  2 mg IVP Q6H PRN


   PRN Reason: Pain, moderate (4-7)


   Last Admin: 11/12/17 13:57 Dose:  2 mg


Pantoprazole Sodium (Protonix Ec Tab)  40 mg PO DAILY [facility]


   Last Admin: 11/12/17 12:23 Dose:  40 mg











Physical Exam





- Constitutional


Appears: Well, No Acute Distress





- Head Exam


Head Exam: ATRAUMATIC, NORMOCEPHALIC





- Eye Exam


Eye Exam: Normal appearance





- ENT Exam


ENT Exam: Mucous Membranes Moist





- Respiratory Exam


Respiratory Exam: Clear to Auscultation Bilateral





- Cardiovascular Exam


Cardiovascular Exam: RRR





- GI/Abdominal Exam


GI & Abdominal Exam: Soft, Tenderness (LLQ/Suprapubic region, ileal conduit in 

place with urine in bag ).  absent: Distended, Guarding, Rebound





- Extremities Exam


Extremities exam: Positive for: normal inspection, pedal pulses present





- Neurological Exam


Neurological exam: Alert, Oriented x3





- Skin


Skin Exam: Dry, Intact, Warm





Results





- Vital Signs


Recent Vital Signs: 


 Last Vital Signs











Temp  98.7 F   11/12/17 17:18


 


Pulse  50 L  11/12/17 17:18


 


Resp  19   11/12/17 17:18


 


BP  130/75   11/12/17 17:18


 


Pulse Ox  99   11/12/17 17:18














- Labs


Result Diagrams: 


 11/12/17 08:45





 11/12/17 08:45


Labs: 


 Laboratory Results - last 24 hr











  11/12/17 11/12/17 11/12/17





  13:40 14:00 14:30


 


pO2  32  


 


VBG pH  7.39  


 


VBG pCO2  52.0  


 


VBG HCO3  31.5 H  


 


VBG Total CO2  33.1 H  


 


VBG O2 Sat (Calc)  65.6 H  


 


VBG Base Excess  5.2 H  


 


VBG Potassium  3.6  


 


Sodium  138.0  


 


Chloride  102.0  


 


Glucose  131 H  


 


Lactate  1.3  


 


FiO2  21.0  


 


Troponin I    < 0.01


 


Total T3   1.03 


 


Venous Blood Potassium  3.6  














- Imaging and Cardiology


  ** CT scan - abdomen


Status: Image reviewed by me, Report reviewed by me





Assessment & Plan





- Assessment and Plan (Free Text)


Assessment: 





61M with hx of interstitial cystitis s/p cystectomy & ileal conduit creation in 

2009, presenting with suprapubic pain 


Plan: 





- pt's LLQ/suprapubic pain is similar to episodes in the past and seems to be 

related to his issue with the ileal conduit/urinary symptoms


- currently no surgical intervention 


- monitor abdominal pain


- f/u Urology recs


- d/w Dr. Yvette Jamison, PGY-3


Surgery

## 2017-11-12 NOTE — ED PDOC
Arrival/HPI





- General


Chief Complaint: Abdominal Pain


Time Seen by Provider: 11/12/17 08:22


Historian: Patient





- History of Present Illness


Narrative History of Present Illness (Text): 





11/12/17 08:40


Dmitri Hickey is a 61 year old male, whose past medical history includes 

asthma, interstitial hemorrhagic cystitis s/p ileal conduit and cystectomy, and 

hypothyroidism, who presets to the emergency department complaining of lower 

abdominal pain and having a possible syncopal episode last night. Patient 

states he was using his computer sitting down when he felt like passing out and 

remembers waking up later on in the night. He reports the abdominal pain is 

chronic and uses an ostomy bag. Patient denies fever, headache, chest pain, 

shortness of breath, or other complaints.





PMD: Dr. Banks


Nephrologist:  Dr. Calhoun 


Urologist: Dr. Andrea


Time/Duration: 24 hours


Symptom Onset: Gradual


Symptom Course: Unchanged


Quality: Pressure


Activities at Onset: Rest


Context: Home





Past Medical History





- Provider Review


Nursing Documentation Reviewed: Yes





- Infectious Disease


Hx of Infectious Diseases: None





- Tetanus Immunization


Tetanus Immunization: Unknown





- Reproductive


Currently Pregnant: No





- Cardiac


Hx Cardiac Disorders: No


Hx Pacemaker: No





- Pulmonary


Hx Respiratory Disorders: Yes


Hx Asthma: Yes





- Neurological


Hx Neurological Disorder: No


Hx Paralysis: No





- HEENT


Hx HEENT Disorder: Yes


Other/Comment: difficulty hearing





- Renal


Hx Renal Disorder: Yes (Interstitial Cystitis)


Other/Comment: urinary bladder removed, has urostomy





- Endocrine/Metabolic


Hx Endocrine Disorders: Yes





- Hematological/Oncological


Hx Blood Disorders: No


Hx Blood Transfusions: No


Hx Blood Transfusion Reaction: No





- Integumentary


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





- Musculoskeletal/Rheumatological


Hx Musculoskeletal Disorders: No





- Gastrointestinal


Hx Gastrointestinal Disorders: Yes (esophageal stricture,HERNIORHAPPHY 4 X)


Hx Gastroesophageal Reflux: Yes


Hx Liver Failure: Yes (HEPATOMEGALY)


HX Swallowing Problems: Yes





- Genitourinary/Gynecological


Hx Genitourinary Disorders: Yes (interstital cystitis,  urostomy 2009)


Hx Hematuria: Yes


Hx Prostate Problems: Yes (LASER SX-DYSURIA, enlarged 2004)


Hx Urinary Tract Infection: Yes


Other/Comment: right abd  urostomy tube





- Psychiatric


Hx Anxiety: Yes


Hx Depression: Yes


Hx Emotional Abuse: No


Hx Physical Abuse: No


Hx Substance Use: No





- Surgical History


Other/Comment: Hernia repair, urostomy, mesh removal, cyst removal, cystectomy





- Anesthesia


Hx Anesthesia Reactions: No


Hx Malignant Hyperthermia: No





- Suicidal Assessment


Feels Threatened In Home Enviroment: No





Family/Social History





- Physician Review


Nursing Documentation Reviewed: Yes


Family/Social History: Unknown Family HX


Smoking Status: Never Smoked


Hx Alcohol Use: No


Hx Substance Use: No


Hx Substance Use Treatment: No





Allergies/Home Meds


Allergies/Adverse Reactions: 


Allergies





No Known Allergies Allergy (Verified 11/12/17 08:20)


 








Home Medications: 


 Home Meds











 Medication  Instructions  Recorded  Confirmed


 


Albuterol HFA [Ventolin HFA 90 2 puff IH [id number] PRN 05/10/17 11/12/17





mcg/actuation (8 g)]   


 


Omeprazole 20 mg PO DAILY 05/10/17 11/12/17


 


ALPRAZolam [Xanax] 0.25 mg PO DAILY 10/20/17 11/12/17














Review of Systems





- Review of Systems


Constitutional: Fatigue.  absent: Fevers


Eyes: absent: Eye Pain


Respiratory: absent: SOB, Cough


Cardiovascular: Syncope, Other (possible syncope)


Gastrointestinal: Abdominal Pain (lower abdomen ).  absent: Diarrhea, Nausea, 

Vomiting


Genitourinary Male: absent: Dysuria, Frequency, Hematuria


Musculoskeletal: absent: Back Pain, Neck Pain


Skin: absent: Rash


Neurological: absent: Headache


Endocrine: absent: Polyuria


Hemo/Lymphatic: absent: Easy Bleeding





Physical Exam





- Physical Exam


Narrative Physical Exam (Text): 





11/12/17 


Head:  Atraumatic.  Normocephalic. 


Eyes:  PERRL.  EOMI.  Conjunctivae are not pale.


ENT:  Mucous membranes are moist and intact.  Oropharynx is clear and 

symmetric. 


Neck:  Supple.  Full ROM.  No JVD.  No lymphadenopathy.


Cardiovascular:  Regular rate.  Regular rhythm.  No murmurs, rubs, or gallops.  

Distal pulses are 2+ and symmetric.


Pulmonary/Chest:  No evidence of respiratory distress.  Clear to auscultation 

bilaterally.  No wheezing, rales or rhonchi.


Abdominal:  Distended, soft, hernia noted around ostomy site. There is clear 

yellow urine in ostomy site. NO incarceration noted.


Back:  No CVA tenderness.


Extremities:  Nonpitting bilateral edema.   No cyanosis.  No clubbing.  Full 

range of motion in all extremities.  No calf tenderness. Distal pulses intact.


Skin:  Skin is warm and dry.  No petechiae.  No purpura. 


Neurological:  Alert, awake, and oriented to person, place, time, and 

situation.  Normal speech. No meningeal signs. Motor and sensory exam intact.


Psychiatric:  Good eye contact.  Appears anxious.











Vital Signs Reviewed: Yes


Vital Signs











  Temp Pulse Resp BP Pulse Ox


 


 11/12/17 12:46   72  18  136/86 


 


 11/12/17 10:11   72  18  136/86  98


 


 11/12/17 08:24  98.1 F  65  18  138/88  96











Temperature: Afebrile


Blood Pressure: Normal


Pulse: Regular


Respiratory Rate: Normal


Appearance: Positive for: Well-Appearing, Non-Toxic, Comfortable


Pain Distress: None


Mental Status: Positive for: Alert and Oriented X 3





Medical Decision Making


ED Course and Treatment: 





11/12/17 


Impression:


61 year old male with lower abdominal pain, has history of recurrent abdominal 

pain. Also describes syncope yesterday, currently neuro intact. 








Plan:


-- EKG


-- Chest X-ray


-- CT abdomen and pelvis


-- CT Head


-- Labs


-- Urinalysis 


-- Reassess and disposition





Prior Visits:


Notes and results from previous visits were reviewed. Patient was last seen in 

the emergency department on 10/20/2017 for abdominal pain. 





Progress Notes:





On exam, patient with diffuse abdominal pain around ostomy. There is palpable 

hernia although not incarcerated. No vomiting noted. No difficulty with bowel 

movements. Initial exam not consistent with bowel obstruction or peritonitis.


Given reported severity of pain, discussed risks of ct abdomen/pelvis with 

patient, CT ordered and obtained.


He describes syncope yesterday, currently no chest pain and neuro intact. BP 

stable. EKG obtained.





EKG:


Ordered, reviewed, and independently interpreted the EKG.


Rate : 60 BPM


Rhythm : NSR


Interpretation : No ST-segment elevations or depressions, no T-wave inversions, 

normal intervals.


 


11/12/17 09:55


CT abdomen and chest: Creator : Kurt Richardson MD


FINDINGS:


LOWER THORAX: Unremarkable. 


LIVER:Re- demonstration of focal slightly low-attenuation at the inferior 

aspect of the right liver lobe image 57 series 2 measures approximately 11.8 

millimeter.  The possibility of metastasis in the liver is not totally 

excluded. The assessment for liver lesion is suboptimal without IV contrast 

administration.  


GALLBLADDER AND BILE DUCTS: The gallbladder is contracted. 


PANCREAS: Unremarkable. No gross lesion or ductal dilatation.


SPLEEN: Unremarkable. 


ADRENALS: Unremarkable. No mass. 


KIDNEYS AND URETERS: Again seen is mild bilateral hydronephrosis and 

hydroureter without significant change since the previous exam. Again seen are 

foci of coarse calcification at the right lower abdomen ileal conduit. 


VASCULATURE: Unremarkable. No aortic aneurysm. 


BOWEL: Unremarkable. No obstruction. No gross mural thickening. 


APPENDIX: There is no evidence of appendicitis . 


PERITONEUM: Unremarkable. No free fluid. No free air. 


LYMPH NODES: Stable mildly enlarged upper abdomen lymph nodes are again noted. 


BLADDER: The patient status post cystectomy. There is right lower quadrant 

ileal conduit contains foci of calcification. Adjacent postsurgical changes in 

the bowel are again noted. 


REPRODUCTIVE: [id number] enlarged prostate is again noted. 


BONES: No acute fracture. 


OTHER FINDINGS: None.


IMPRESSION: Re- demonstration of mild hydronephrosis and hydroureter up to the 

right lower abdomen ileal conduit. Foci of coarse calcification are again noted 

in the ileal conduit. No evidence of significant interval change compared to 

the previous exam.


 


11/12/17 10:00


CT Head: Creator: Kurt Richardson MD


FINDINGS:


HEMORRHAGE: No intracranial hemorrhage. 


BRAIN: No mass effect or edema. There is well defined low-attenuation focus at 

the medial aspect of the right temporal lobe white matter image 18 series 2 may 

represent dilated perivascular space. No atrophy or chronic microvascular 

ischemic changes.


VENTRICLES: Unremarkable. No hydrocephalus. 


CALVARIUM: Unremarkable.


PARANASAL SINUSES: Unremarkable as visualized. No significant inflammatory 

changes.


MASTOID AIR CELLS: Unremarkable as visualized. No inflammatory changes.


OTHER FINDINGS: None.


IMPRESSION: No evidence of acute intracranial hemorrhage intracranial 

collection mass effect or midline shift.  9 millimeter hypo attenuation focus 

at the right temporal lobe likely represent dilated perivascular space.








11/12/17 11:28


On re-examination, there is persistent pain around ostomy site that appears to 

be chronic and there is no sign of incarceration on exam. Patient


does have urine noted in his urostomy bag that is clear. He is currently 

neurologically intact and patient will be admitted to a telemetry bed for 

further monitoring after noted syncopal episode and accepted by hospital by Dr. Banks.





Urinarlysis reviewed. Currently afebrile, exam NOT consistent with sepsis.


Case d/w hospitalist team, iv antibiotics ordered for possible UTI, will admit 

for serial exams, monitoring of symptoms.


11/12/17 17:31








- Lab Interpretations


Lab Results: 








 11/12/17 08:45 





 11/12/17 08:45 





 Lab Results





11/12/17 08:45: Sodium 140, Potassium 3.8, Chloride 100, Carbon Dioxide 31, 

Anion Gap 13, BUN 23 H, Creatinine 1.7 H, Est GFR ( Amer) 50, Est GFR (

Non-Af Amer) 41, Random Glucose 112 H, Calcium 9.5, Total Bilirubin 0.5, AST 21

, ALT 25, Alkaline Phosphatase 65, Lactate Dehydrogenase 335, Total Creatine 

Kinase 103, Troponin I < 0.01, Total Protein 8.4 H, Albumin 4.3, Globulin 4.1, 

Albumin/Globulin Ratio 1.0 L


11/12/17 08:45: Urine Color Yellow, Urine Appearance Cloudy, Urine pH 6.5, Ur 

Specific Gravity 1.020, Urine Protein 100 H, Urine Glucose (UA) Negative, Urine 

Ketones Negative, Urine Blood Large H, Urine Nitrate Positive H, Urine 

Bilirubin Negative, Urine Urobilinogen 0.2, Ur Leukocyte Esterase Moderate H, 

Urine RBC 15 - 20, Urine WBC Tntc, Ur Epithelial Cells 0 - 2, Urine Bacteria 

Many


11/12/17 08:45: PT 12.0, INR 1.09 H, APTT 29.4


11/12/17 08:45: WBC 6.1  D, RBC 4.35, Hgb 10.9 L, Hct 34.9 L, MCV 80.2, MCH 25.1

, MCHC 31.2, RDW 14.9 H, Plt Count 212, MPV 8.6, Gran % 65.9, Lymph % (Auto) 

24.8, Mono % (Auto) 6.2 H, Eos % (Auto) 2.8, Baso % (Auto) 0.3, Gran # 4.01, 

Lymph # 1.5, Mono # 0.4, Eos # 0.2, Baso # 0.02


11/12/17 08:00: Free T4 0.54 L, TSH 3rd Generation 79.20 H








I have reviewed the lab results: Yes





- RAD Interpretation


Radiology Orders: 








11/12/17 08:44


CHEST ONE VIEW [RAD] Stat 





11/12/17 08:45


ABD & PELVIS W/O PO OR IV CONT [CT] Stat 


HEAD W/O CONTRAST [CT] Stat 











: Radiologist





- EKG Interpretation


Interpreted by ED Physician: Yes


Type: 12 lead EKG





- Medication Orders


Current Medication Orders: 








Albuterol Sulfate (Albuterol 0.083% Inhal Sol (2.5 Mg/3 Ml) Ud)  2.5 mg IH 

[id number] PRN


   PRN Reason: Cough and congestion


Alprazolam (Xanax)  0.25 mg PO DAILY MARIA D


   PRN Reason: Protocol


   Stop: 11/20/17 10:01


Vancomycin HCl (Vancomycin 1gm)  1 gm in 250 mls @ 167 mls/hr IVPB DAILY [facility]IA D


   PRN Reason: Protocol


   Last Admin: 11/12/17 13:57  Dose: 167 mls/hr





eMAR Start Stop


 Document     11/12/17 13:57  JFR  (Rec: 11/12/17 13:58  JFR  [id number])


     Intravenous Solution


      Start Date                                 11/12/17


      Start Time                                 13:58





Piperacillin Sod/Tazobactam Sod (Zosyn 3.375 In Ns 100ml)  100 mls @ 200 mls/hr 

IVPB Q6 MARIA D


   PRN Reason: Protocol


   Stop: 11/19/17 12:01


   Last Admin: 11/12/17 17:00  Dose: 200 mls/hr





Sodium Chloride (Sodium Chloride 0.9%)  1,000 mls @ 100 mls/hr IV .Q10H MARIA D


   Last Admin: 11/12/17 12:25  Dose: 100 mls/hr





eMAR Start Stop


 Document     11/12/17 12:25  EWO  (Rec: 11/12/17 12:25  EWO  [id number])


     Intravenous Solution


      Start Date                                 11/12/17


      Start Time                                 12:25





Levothyroxine Sodium (Synthroid)  100 mcg PO DAILY MARIA D


Morphine Sulfate (Morphine)  2 mg IVP Q6H PRN


   PRN Reason: Pain, moderate (4-7)


   Last Admin: 11/12/17 13:57  Dose: 2 mg





MAR Pain Assessment


 Document     11/12/17 13:57  JFR  (Rec: 11/12/17 13:57  R  [id number])


     Pain Reassessment


      Is this a pain reassessment?               No


     Presence of Pain


      Presence of Pain                           Yes


IVP Administration


 Document     11/12/17 13:57  JFR  (Rec: 11/12/17 13:57  JFR  [id number])


     Charges for Administration


      # of IVP Administrations                   1


Re-Assess: MAR Pain Assessment


 Document     11/12/17 14:57  JFR  (Rec: 11/12/17 15:02  [facility]  [id number])


     Pain Reassessment


      Is this a pain reassessment?               Yes


     Sleep


      Is patient sleeping during reassessment?   Yes





Pantoprazole Sodium (Protonix Ec Tab)  40 mg PO DAILY [facility]


   Last Admin: 11/12/17 12:23  Dose: 40 mg





Discontinued Medications





Acetaminophen (Tylenol 325mg Tab)  650 mg PO ONCE STA


   Stop: 11/12/17 11:22


   Last Admin: 11/12/17 11:35  Dose: 650 mg





MAR Pain/Vitals


 Document     11/12/17 11:35  EWO  (Rec: 11/12/17 11:35  EWO  [id number])


     Pain Reassessment


      Is This A Pain ReAssessment?               No


     Sleep


      Is patient sleeping during reassessment?   No


     Presence of Pain


      Presence of Pain                           Yes


     Pain Scale Used


      Pain Scale Used                            Numeric


     Location


      Pain Location Body Site                    Abdomen


      Description                                Intermittent


      Intensity                                  3


      Scale Used                                 Numeric


      Pain Behavior                              Guarding


Re-Assess: MAR Pain/Vitals


 Document     11/12/17 12:35  JFR  (Rec: 11/12/17 13:26  [facility]  [id number])


     Pain Reassessment


      Is This A Pain ReAssessment?               Yes


     Presence of Pain


      Presence of Pain                           No





Albuterol (Ventolin Hfa 90 Mcg/Actuation (8 G))  2 puff IH [id number] PRN


   PRN Reason: Wheezing


Alprazolam (Xanax)  0.25 mg PO ONCE STA


   PRN Reason: Protocol


   Stop: 11/12/17 16:23


   Last Admin: 11/12/17 16:58  Dose: 0.25 mg





Behavioural


 Document     11/12/17 16:58  JFR  (Rec: 11/12/17 16:58  [facility]  [id number])


     Maintenance


      Maintenance Dose                           No


     Nonmedicinal


      Nonmedicinal Interventions                 Therapeutic Communication


     Behavior


      Behavior for Medication:                   Anxiety














- Scribe Statement


The provider has reviewed the documentation as recorded by the Scribe





Danielle Wu





Provider Scribe Attestation:


All medical record entries made by the Scribe were at my direction and 

personally dictated by me. I have reviewed the chart and agree that the record 

accurately reflects my personal performance of the history, physical exam, 

medical decision making, and the department course for this patient. I have 

also personally directed, reviewed, and agree with the discharge instructions 

and disposition. 





Disposition/Present on Arrival





- Present on Arrival


Any Indicators Present on Arrival: Yes


History of DVT/PE: No


History of Uncontrolled Diabetes: No


Urinary Catheter: Yes (urostomy)


History of Decub. Ulcer: No


History Surgical Site Infection Following: None





- Disposition


Have Diagnosis and Disposition been Completed?: Yes


Diagnosis: 


 Abdominal pain, Syncope, UTI (urinary tract infection)





Disposition: HOSPITALIZED


Disposition Time: 10:45


Patient Plan: Admission, Telemetry


Patient Problems: 


 Current Active Problems











Problem Status Onset


 


Syncope Acute  


 


Abdominal pain Chronic  











Condition: FAIR

## 2017-11-12 NOTE — CT
PROCEDURE:  CT HEAD WITHOUT CONTRAST.



HISTORY:

syncope



COMPARISON:

None available. 



TECHNIQUE:

Axial computed tomography images were obtained through the head/brain 

without intravenous contrast.  



Radiation dose:



Total exam DLP = 775.01 mGy-cm.



This CT exam was performed using one or more of the following dose 

reduction techniques: Automated exposure control, adjustment of the 

mA and/or kV according to patient size, and/or use of iterative 

reconstruction technique.



FINDINGS:



HEMORRHAGE:

No intracranial hemorrhage. 



BRAIN:

No mass effect or edema. There is well defined low-attenuation focus 

at the medial aspect of the right temporal lobe white matter image 18 

series 2 may represent dilated perivascular space. No atrophy or 

chronic microvascular ischemic changes.



VENTRICLES:

Unremarkable. No hydrocephalus. 



CALVARIUM:

Unremarkable.



PARANASAL SINUSES:

Unremarkable as visualized. No significant inflammatory changes.



MASTOID AIR CELLS:

Unremarkable as visualized. No inflammatory changes.



OTHER FINDINGS:

None.



IMPRESSION:

No evidence of acute intracranial hemorrhage intracranial collection 

mass effect or midline shift.  9 millimeter hypo attenuation focus at 

the right temporal lobe likely represent dilated perivascular space.

## 2017-11-12 NOTE — CT
PROCEDURE:  CT Abdomen and Pelvis without intravenous contrast



HISTORY:

abdominal pain, hernia



COMPARISON:

Comparison is made to the previous study dated 10/20/2017



TECHNIQUE:

Axial and reformatted coronal and sagittal CT images of the abdomen 

were obtained without IV or oral contrast administration.. 



Contrast Dose: 0



Radiation dose:



Total exam DLP = 469.69 mGy-cm.



This CT exam was performed using one or more of the following dose 

reduction techniques: Automated exposure control, adjustment of the 

mA and/or kV according to patient size, and/or use of iterative 

reconstruction technique.



FINDINGS:



LOWER THORAX:

Unremarkable. 



LIVER:

Re- demonstration of focal slightly low-attenuation at the inferior 

aspect of the right liver lobe image 57 series 2 measures 

approximately 11.8 millimeter.  The possibility of metastasis in the 

liver is not totally excluded. The assessment for liver lesion is 

suboptimal without IV contrast administration.  



GALLBLADDER AND BILE DUCTS:

The gallbladder is contracted. 



PANCREAS:

Unremarkable. No gross lesion or ductal dilatation.



SPLEEN:

Unremarkable. 



ADRENALS:

Unremarkable. No mass. 



KIDNEYS AND URETERS:

Again seen is mild bilateral hydronephrosis and hydroureter without 

significant change since the previous exam. Again seen are foci of 

coarse calcification at the right lower abdomen ileal conduit. 



VASCULATURE:

Unremarkable. No aortic aneurysm. 



BOWEL:

Unremarkable. No obstruction. No gross mural thickening. 



APPENDIX:

There is no evidence of appendicitis . 



PERITONEUM:

Unremarkable. No free fluid. No free air. 



LYMPH NODES:

Stable mildly enlarged upper abdomen lymph nodes are again noted. 



BLADDER:

The patient status post cystectomy. There is right lower quadrant 

ileal conduit contains foci of calcification. Adjacent postsurgical 

changes in the bowel are again noted. 



REPRODUCTIVE:

[id number] enlarged prostate is again noted. 



BONES:

No acute fracture. 



OTHER FINDINGS:

None.



IMPRESSION:

Re- demonstration of mild hydronephrosis and hydroureter up to the 

right lower abdomen ileal conduit. Foci of coarse calcification are 

again noted in the ileal conduit. 



No evidence of significant interval change compared to the previous 

exam.

## 2017-11-13 LAB
ALBUMIN/GLOB SERPL: 1 {RATIO} (ref 1.1–1.8)
ALP SERPL-CCNC: 53 U/L (ref 38–126)
ALT SERPL-CCNC: 23 U/L (ref 7–56)
AST SERPL-CCNC: 22 U/L (ref 17–59)
BASOPHILS # BLD AUTO: 0.03 K/MM3 (ref 0–2)
BASOPHILS NFR BLD: 0.6 % (ref 0–3)
BILIRUB SERPL-MCNC: 1 MG/DL (ref 0.2–1.3)
BUN SERPL-MCNC: 18 MG/DL (ref 7–21)
CALCIUM SERPL-MCNC: 8.4 MG/DL (ref 8.4–10.5)
CHLORIDE SERPL-SCNC: 105 MMOL/L (ref 98–107)
CO2 SERPL-SCNC: 27 MMOL/L (ref 21–33)
EOSINOPHIL # BLD: 0.2 10*3/UL (ref 0–0.7)
EOSINOPHIL NFR BLD: 4.3 % (ref 1.5–5)
ERYTHROCYTE [DISTWIDTH] IN BLOOD BY AUTOMATED COUNT: 15.1 % (ref 11.5–14.5)
GLOBULIN SER-MCNC: 3.6 GM/DL
GLUCOSE SERPL-MCNC: 97 MG/DL (ref 70–110)
GRANULOCYTES # BLD: 3.23 10*3/UL (ref 1.4–6.5)
GRANULOCYTES NFR BLD: 65.8 % (ref 50–68)
HCT VFR BLD CALC: 34.4 % (ref 42–52)
LYMPHOCYTES # BLD: 1.1 10*3/UL (ref 1.2–3.4)
LYMPHOCYTES NFR BLD AUTO: 23.2 % (ref 22–35)
MCH RBC QN AUTO: 24.9 PG (ref 25–35)
MCHC RBC AUTO-ENTMCNC: 31.1 G/DL (ref 31–37)
MCV RBC AUTO: 80 FL (ref 80–105)
MONOCYTES # BLD AUTO: 0.3 10*3/UL (ref 0.1–0.6)
MONOCYTES NFR BLD: 6.1 % (ref 1–6)
PLATELET # BLD: 192 10^3/UL (ref 120–450)
PMV BLD AUTO: 8.6 FL (ref 7–11)
POTASSIUM SERPL-SCNC: 4.2 MMOL/L (ref 3.6–5)
PROT SERPL-MCNC: 7.1 G/DL (ref 5.8–8.3)
SODIUM SERPL-SCNC: 142 MMOL/L (ref 132–148)
WBC # BLD AUTO: 4.9 10^3/UL (ref 4.5–11)

## 2017-11-13 RX ADMIN — VANCOMYCIN HYDROCHLORIDE SCH MLS/HR: 1 INJECTION, POWDER, LYOPHILIZED, FOR SOLUTION INTRAVENOUS at 09:35

## 2017-11-13 RX ADMIN — MORPHINE SULFATE PRN MG: 2 INJECTION, SOLUTION INTRAMUSCULAR; INTRAVENOUS at 21:02

## 2017-11-13 RX ADMIN — PIPERACILLIN AND TAZOBACTAM SCH MLS/HR: 3; .375 INJECTION, POWDER, LYOPHILIZED, FOR SOLUTION INTRAVENOUS; PARENTERAL at 11:43

## 2017-11-13 RX ADMIN — PANTOPRAZOLE SODIUM SCH MG: 40 TABLET, DELAYED RELEASE ORAL at 05:00

## 2017-11-13 RX ADMIN — MORPHINE SULFATE PRN MG: 2 INJECTION, SOLUTION INTRAMUSCULAR; INTRAVENOUS at 04:59

## 2017-11-13 RX ADMIN — PIPERACILLIN AND TAZOBACTAM SCH MLS/HR: 3; .375 INJECTION, POWDER, LYOPHILIZED, FOR SOLUTION INTRAVENOUS; PARENTERAL at 19:30

## 2017-11-13 RX ADMIN — PIPERACILLIN AND TAZOBACTAM SCH MLS/HR: 3; .375 INJECTION, POWDER, LYOPHILIZED, FOR SOLUTION INTRAVENOUS; PARENTERAL at 23:54

## 2017-11-13 RX ADMIN — MORPHINE SULFATE PRN MG: 2 INJECTION, SOLUTION INTRAMUSCULAR; INTRAVENOUS at 11:43

## 2017-11-13 RX ADMIN — PIPERACILLIN AND TAZOBACTAM SCH MLS/HR: 3; .375 INJECTION, POWDER, LYOPHILIZED, FOR SOLUTION INTRAVENOUS; PARENTERAL at 05:00

## 2017-11-13 NOTE — CP.PCM.PN
Subjective





- Date & Time of Evaluation


Date of Evaluation: 11/13/17


Time of Evaluation: 14:00





- Subjective


Subjective: 





General Surgery - Dr. Crawford





Pt S&E.  JOSEPH.  PT complains of persistent suprapubic/lower abdominal 

discomfort.  He states this is relatively unchanged from prior episodes.  He 

denies any N/V, F/C, SOB/CP. Ileal Conduit with 1300cc urine/24hrs.





Objective





- Vital Signs/Intake and Output


Vital Signs (last 24 hours): 


 











Temp Pulse Resp BP Pulse Ox


 


 98.2 F   51 L  20   97/61 L  99 


 


 11/13/17 12:00  11/13/17 12:00  11/13/17 12:00  11/13/17 12:00  11/13/17 06:00








Intake and Output: 


 











 11/13/17 11/13/17





 06:59 18:59


 


Intake Total 1500 240


 


Output Total  1300


 


Balance 1500 -1060














- Medications


Medications: 


 Current Medications





Albuterol Sulfate (Albuterol 0.083% Inhal Sol (2.5 Mg/3 Ml) Ud)  2.5 mg IH 

[id number] PRN


   PRN Reason: Cough and congestion


Alprazolam (Xanax)  0.25 mg PO DAILY MARIA D


   PRN Reason: Protocol


   Stop: 11/20/17 10:01


   Last Admin: 11/13/17 09:35 Dose:  0.25 mg


Vancomycin HCl (Vancomycin 1gm)  1 gm in 250 mls @ 167 mls/hr IVPB DAILY MARIA D


   PRN Reason: Protocol


   Last Admin: 11/13/17 09:35 Dose:  167 mls/hr


Piperacillin Sod/Tazobactam Sod (Zosyn 3.375 In Ns 100ml)  100 mls @ 200 mls/hr 

IVPB Q6 MARIA D


   PRN Reason: Protocol


   Stop: 11/19/17 12:01


   Last Admin: 11/13/17 11:43 Dose:  200 mls/hr


Sodium Chloride (Sodium Chloride 0.9%)  1,000 mls @ 100 mls/hr IV .Q10H MARIA D


   Last Admin: 11/13/17 11:43 Dose:  100 mls/hr


Levothyroxine Sodium (Synthroid)  100 mcg PO ACB MARIA D


   Last Admin: 11/13/17 07:41 Dose:  100 mcg


Morphine Sulfate (Morphine)  2 mg IVP Q6H PRN


   PRN Reason: Pain, moderate (4-7)


   Last Admin: 11/13/17 11:43 Dose:  2 mg


Pantoprazole Sodium (Protonix Ec Tab)  40 mg PO 0600 MARIA D


   Last Admin: 11/13/17 05:00 Dose:  40 mg


Polyethylene Glycol (Miralax)  17 gm PO DAILY MARIA D











- Labs


Labs: 


 





 11/13/17 05:30 





 11/13/17 05:30 





 











PT  12.0 SECONDS (9.4-12.5)   11/12/17  08:45    


 


INR  1.09  (0.93-1.08)  H  11/12/17  08:45    


 


APTT  29.4 Seconds (25.1-36.5)   11/12/17  08:45    














- Constitutional


Appears: No Acute Distress





- Head Exam


Head Exam: ATRAUMATIC, NORMAL INSPECTION, NORMOCEPHALIC





- Eye Exam


Eye Exam: Normal appearance





- Respiratory Exam


Respiratory Exam: NORMAL BREATHING PATTERN.  absent: Respiratory Distress





- GI/Abdominal Exam


GI & Abdominal Exam: Soft, Tenderness (mild ttp suprapubic region).  absent: 

Distended, Firm, Guarding, Rigid, Hernia, Rebound


Additional comments: 





ileal conduit rlq with clear yellow urine





- Neurological Exam


Neurological Exam: Alert, Oriented x3





- Psychiatric Exam


Psychiatric exam: Normal Affect, Normal Mood





- Skin


Skin Exam: Dry, Intact





Assessment and Plan





- Assessment and Plan (Free Text)


Assessment: 





61M w/ hx of interstitial cystitis s/p cystectomy & ileal conduit in 2009, with 

suprapubic pain 





Plan: 


-Pain control prn


-F/U Urology eval.


-No surgical issues at this time





D/w Dr. Yvette Leal PGY3

## 2017-11-13 NOTE — CARD
--------------- APPROVED REPORT --------------





EKG Measurement

Heart [id number]

TX [id number]

[id number]

[id number]

[id number]



<Conclusion>

Normal sinus rhythm

Normal ECG

## 2017-11-13 NOTE — CP.PCM.CON
History of Present Illness





- History of Present Illness


History of Present Illness: 





Mr. Hickey is a 61-year-old man with a past medical history of interstitial 

hemorrhagic cystitis, ileal conduit, s/p cystectomy, recurrent urinary 

infections, chronic abdominal pain with multiple hernia repairs, irritable 

bowel syndrome, abdominal wall abscess s/p I&D, who states that he has had 

multiple episodes during which he feels light-headed and as if he will "pass out

".  His vision becomes dark and he then loses consciousness.  According to the 

patient, he also has episodes of seeing flashing lights and abnormal sensation 

in his lower extremities.  He states that his legs feel numb at times.  The 

episode he described that was most concerning to him happened the evening 

before presentation.  He was sitting at the computer at around 7:30 PM at night

, and he felt light-headed and had the feeling that he would lose 

consciousness.  The next thing he remembers is waking up at around 9PM.  He 

admits that he has been feeling lethargic and sleepy recently.  He does not 

know if he snores at night and denies waking up with headaches.   





Review of Systems





- Review of Systems


All systems: reviewed and no additional remarkable complaints except





Past Patient History





- Infectious Disease


Hx of Infectious Diseases: None





- Tetanus Immunizations


Tetanus Immunization: Unknown





- Past Medical History & Family History


Past Medical History?: Yes





- Past Social History


Smoking Status: Never Smoked





- CARDIAC


Hx Cardiac Disorders: No


Hx Pacemaker: No





- PULMONARY


Hx Respiratory Disorders: Yes


Hx Asthma: Yes





- NEUROLOGICAL


Hx Neurological Disorder: No


Hx Paralysis: No





- HEENT


Hx HEENT Problems: Yes


Other/Comment: difficulty hearing





- RENAL


Hx Chronic Kidney Disease: Yes (Interstitial Cystitis)


Other/Comment: urinary bladder removed, has urostomy





- ENDOCRINE/METABOLIC


Hx Endocrine Disorders: Yes





- HEMATOLOGICAL/ONCOLOGICAL


Hx Blood Disorders: No


Hx Blood Transfusions: No


Hx Blood Transfusion Reaction: No





- INTEGUMENTARY


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





- MUSCULOSKELETAL/RHEUMATOLOGICAL


Hx Musculoskeletal Disorders: No





- GASTROINTESTINAL


Hx Gastrointestinal Disorders: Yes (esophageal stricture,HERNIORHAPPHY 4 X)


Hx Gastroesophageal Reflux: Yes


Hx Liver Failure: Yes (HEPATOMEGALY)


HX Swallowing Problems: Yes





- GENITOURINARY/GYNECOLOGICAL


Hx Genitourinary Disorders: Yes (interstital cystitis,  urostomy 2009)


Hx Hematuria: Yes


Hx Prostate Problems: Yes (LASER SX-DYSURIA, enlarged 2004)


Hx Urinary Tract Infection: Yes


Other/Comment: right abd  urostomy tube





- PSYCHIATRIC


Hx Anxiety: Yes


Hx Depression: Yes


Hx Emotional Abuse: No


Hx Physical Abuse: No


Hx Substance Use: No





- SURGICAL HISTORY


Other/Comment: Hernia repair, urostomy, mesh removal, cyst removal, cystectomy





- ANESTHESIA


Hx Anesthesia Reactions: No


Hx Malignant Hyperthermia: No





Meds


Allergies/Adverse Reactions: 


 Allergies











Allergy/AdvReac Type Severity Reaction Status Date / Time


 


No Known Allergies Allergy   Verified 17 08:20














- Medications


Medications: 


 Current Medications





Albuterol Sulfate (Albuterol 0.083% Inhal Sol (2.5 Mg/3 Ml) Ud)  2.5 mg IH 

[id number] PRN


   PRN Reason: Cough and congestion


Alprazolam (Xanax)  0.25 mg PO DAILY MARIA D


   PRN Reason: Protocol


   Stop: 17 10:01


   Last Admin: 17 09:35 Dose:  0.25 mg


Vancomycin HCl (Vancomycin 1gm)  1 gm in 250 mls @ 167 mls/hr IVPB DAILY MARIA D


   PRN Reason: Protocol


   Last Admin: 17 09:35 Dose:  167 mls/hr


Piperacillin Sod/Tazobactam Sod (Zosyn 3.375 In Ns 100ml)  100 mls @ 200 mls/hr 

IVPB Q6 MARIA D


   PRN Reason: Protocol


   Stop: 17 12:01


   Last Admin: 17 11:43 Dose:  200 mls/hr


Sodium Chloride (Sodium Chloride 0.9%)  1,000 mls @ 100 mls/hr IV .Q10H MARIA D


   Last Admin: 17 11:43 Dose:  100 mls/hr


Levothyroxine Sodium (Synthroid)  100 mcg PO ACB MARIA D


   Last Admin: 17 07:41 Dose:  100 mcg


Morphine Sulfate (Morphine)  2 mg IVP Q6H PRN


   PRN Reason: Pain, moderate (4-7)


   Last Admin: 17 11:43 Dose:  2 mg


Pantoprazole Sodium (Protonix Ec Tab)  40 mg PO 0600 MARIA D


   Last Admin: 17 05:00 Dose:  40 mg


Polyethylene Glycol (Miralax)  17 gm PO DAILY MARIA D











Physical Exam





- Constitutional


Appears: Well





- Head Exam


Head Exam: ATRAUMATIC, NORMAL INSPECTION, NORMOCEPHALIC





- Eye Exam


Eye Exam: EOMI, Normal appearance, PERRL





- ENT Exam


ENT Exam: Mucous Membranes Moist, Normal Exam





- Neck Exam


Neck exam: Positive for: Normal Inspection





- Respiratory Exam


Respiratory Exam: NORMAL BREATHING PATTERN





- Cardiovascular Exam


Cardiovascular Exam: REGULAR RHYTHM, +S1, +S2





- GI/Abdominal Exam


GI & Abdominal Exam: Normal Bowel Sounds, Tenderness





- Rectal Exam


Rectal Exam: Deferred





- Neurological Exam


Neurological exam: Abnormal Gait, Alert, CN II-XII Intact, Reflexes Normal


Additional comments: 





Past pointing noted on finger to nose testing on the right side.  Strength was 

otherwise symmetrical.  Sensation was intact and reflexes were normal.  He had 

difficulty with bilateral lower extremity heal to shin testing. 





- Psychiatric Exam


Psychiatric exam: Normal Affect, Normal Mood





- Skin


Additional comments: 





vitiligo of bilateral upper extremities. 





Results





- Vital Signs


Recent Vital Signs: 


 Last Vital Signs











Temp  98.2 F   17 12:00


 


Pulse  51 L  17 12:00


 


Resp  20   17 12:00


 


BP  97/61 L  17 12:00


 


Pulse Ox  99   17 06:00














- Labs


Result Diagrams: 


 17 05:30





 17 05:30


Labs: 


 Laboratory Results - last 24 hr











  17





  13:40 14:00 14:30


 


WBC   


 


RBC   


 


Hgb   


 


Hct   


 


MCV   


 


MCH   


 


MCHC   


 


RDW   


 


Plt Count   


 


MPV   


 


Gran %   


 


Lymph % (Auto)   


 


Mono % (Auto)   


 


Eos % (Auto)   


 


Baso % (Auto)   


 


Gran #   


 


Lymph #   


 


Mono #   


 


Eos #   


 


Baso #   


 


pO2  32  


 


VBG pH  7.39  


 


VBG pCO2  52.0  


 


VBG HCO3  31.5 H  


 


VBG Total CO2  33.1 H  


 


VBG O2 Sat (Calc)  65.6 H  


 


VBG Base Excess  5.2 H  


 


VBG Potassium  3.6  


 


Sodium  138.0  


 


Chloride  102.0  


 


Glucose  131 H  


 


Lactate  1.3  


 


FiO2  21.0  


 


Potassium   


 


Carbon Dioxide   


 


Anion Gap   


 


BUN   


 


Creatinine   


 


Est GFR ( Amer)   


 


Est GFR (Non-Af Amer)   


 


Random Glucose   


 


Calcium   


 


Total Bilirubin   


 


AST   


 


ALT   


 


Alkaline Phosphatase   


 


Troponin I    < 0.01


 


Total Protein   


 


Albumin   


 


Globulin   


 


Albumin/Globulin Ratio   


 


Free T4   


 


Total T3   1.03 


 


TSH 3rd Generation   


 


Venous Blood Potassium  3.6  














  1717





  19:25 21:30 05:30


 


WBC    4.9


 


RBC    4.30


 


Hgb    10.7 L


 


Hct    34.4 L


 


MCV    80.0


 


MCH    24.9 L


 


MCHC    31.1


 


RDW    15.1 H


 


Plt Count    192


 


MPV    8.6


 


Gran %    65.8


 


Lymph % (Auto)    23.2


 


Mono % (Auto)    6.1 H


 


Eos % (Auto)    4.3


 


Baso % (Auto)    0.6


 


Gran #    3.23


 


Lymph #    1.1 L


 


Mono #    0.3


 


Eos #    0.2


 


Baso #    0.03


 


pO2   


 


VBG pH   


 


VBG pCO2   


 


VBG HCO3   


 


VBG Total CO2   


 


VBG O2 Sat (Calc)   


 


VBG Base Excess   


 


VBG Potassium   


 


Sodium   


 


Chloride   


 


Glucose   


 


Lactate   


 


FiO2   


 


Potassium   


 


Carbon Dioxide   


 


Anion Gap   


 


BUN   


 


Creatinine   


 


Est GFR ( Amer)   


 


Est GFR (Non-Af Amer)   


 


Random Glucose   


 


Calcium   


 


Total Bilirubin   


 


AST   


 


ALT   


 


Alkaline Phosphatase   


 


Troponin I   < 0.01 


 


Total Protein   


 


Albumin   


 


Globulin   


 


Albumin/Globulin Ratio   


 


Free T4  0.53 L  


 


Total T3   


 


TSH 3rd Generation  84.20 H  


 


Venous Blood Potassium   














  17





  05:30


 


WBC 


 


RBC 


 


Hgb 


 


Hct 


 


MCV 


 


MCH 


 


MCHC 


 


RDW 


 


Plt Count 


 


MPV 


 


Gran % 


 


Lymph % (Auto) 


 


Mono % (Auto) 


 


Eos % (Auto) 


 


Baso % (Auto) 


 


Gran # 


 


Lymph # 


 


Mono # 


 


Eos # 


 


Baso # 


 


pO2 


 


VBG pH 


 


VBG pCO2 


 


VBG HCO3 


 


VBG Total CO2 


 


VBG O2 Sat (Calc) 


 


VBG Base Excess 


 


VBG Potassium 


 


Sodium  142


 


Chloride  105


 


Glucose 


 


Lactate 


 


FiO2 


 


Potassium  4.2


 


Carbon Dioxide  27


 


Anion Gap  14


 


BUN  18


 


Creatinine  1.8 H


 


Est GFR ( Amer)  47


 


Est GFR (Non-Af Amer)  39


 


Random Glucose  97


 


Calcium  8.4


 


Total Bilirubin  1.0


 


AST  22


 


ALT  23


 


Alkaline Phosphatase  53


 


Troponin I 


 


Total Protein  7.1


 


Albumin  3.5


 


Globulin  3.6


 


Albumin/Globulin Ratio  1.0 L


 


Free T4 


 


Total T3 


 


TSH 3rd Generation 


 


Venous Blood Potassium 














Assessment & Plan


(1) Syncope


Assessment and Plan: 


Could be neuro-cardiogenic in origin as it is described.  The history of 

abdominal pain and issues with intra-abdominal surgeries put him at higher risk 

of vaso-vagal syncope.  There may also be changes in his renal function and may 

be losing excessive fluids with dehydration.  I recommend the followin. CTA of the head/neck


2. EEG for 30 mins awake and drowsy


3. Telemetry


4. Echocardiogram with bubble study


5. PT/OT eval and treatment


6. Aspirin 81 mg daily 


7. Check electrolytes, vitamin D, B12, folate, lipid panel


8. Hydration to maintain cerebral perfusion with NS at 75 mL/hr





Thank you. 


Status: Acute   Priority: High

## 2017-11-13 NOTE — CP.PCM.CON
History of Present Illness





- History of Present Illness


History of Present Illness: 


61 year old male with PMH Abdominal wall abscess S/P drainage, S/P repeat 

debridement and wound vacuum placement with Enterobacter (11/2016), 

hypothyroidism, Prostate disease, anxiety disorder, esophageal strictures, S/P 

ileal conduit for the urine, S/P left inguinal hernia repair came in to [facility] complaining of abdominal pain which seems to be worsening over 

the past several days. He also had a possible syncopal episode where he felt 

lightheaded and woke up on his desk several hours later. He denies fever or 

chills, no nausea or vomiting, no chest pain or palpitations, no diarrhea, no 

cough or colds, no sore throat, no dysphagia or odynohagia. Infectious Diseases 

consult is requested to further evaluate and manage.





Review of Systems





- Review of Systems


All systems: reviewed and no additional remarkable complaints except (as per HPI

)





Past Patient History





- Infectious Disease


Hx of Infectious Diseases: None





- Tetanus Immunizations


Tetanus Immunization: Unknown





- Past Medical History & Family History


Past Medical History?: Yes





- Past Social History


Smoking Status: Never Smoked





- CARDIAC


Hx Cardiac Disorders: No


Hx Pacemaker: No





- PULMONARY


Hx Respiratory Disorders: Yes


Hx Asthma: Yes





- NEUROLOGICAL


Hx Neurological Disorder: No


Hx Paralysis: No





- HEENT


Hx HEENT Problems: Yes


Other/Comment: difficulty hearing





- RENAL


Hx Chronic Kidney Disease: Yes (Interstitial Cystitis)


Other/Comment: urinary bladder removed, has urostomy





- ENDOCRINE/METABOLIC


Hx Endocrine Disorders: Yes





- HEMATOLOGICAL/ONCOLOGICAL


Hx Blood Disorders: No


Hx Blood Transfusions: No


Hx Blood Transfusion Reaction: No





- INTEGUMENTARY


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





- MUSCULOSKELETAL/RHEUMATOLOGICAL


Hx Musculoskeletal Disorders: No





- GASTROINTESTINAL


Hx Gastrointestinal Disorders: Yes (esophageal stricture,HERNIORHAPPHY 4 X)


Hx Gastroesophageal Reflux: Yes


Hx Liver Failure: Yes (HEPATOMEGALY)


HX Swallowing Problems: Yes





- GENITOURINARY/GYNECOLOGICAL


Hx Genitourinary Disorders: Yes (interstital cystitis,  urostomy 2009)


Hx Hematuria: Yes


Hx Prostate Problems: Yes (LASER SX-DYSURIA, enlarged 2004)


Hx Urinary Tract Infection: Yes


Other/Comment: right abd  urostomy tube





- PSYCHIATRIC


Hx Anxiety: Yes


Hx Depression: Yes


Hx Emotional Abuse: No


Hx Physical Abuse: No


Hx Substance Use: No





- SURGICAL HISTORY


Other/Comment: Hernia repair, urostomy, mesh removal, cyst removal, cystectomy





- ANESTHESIA


Hx Anesthesia Reactions: No


Hx Malignant Hyperthermia: No





Meds


Allergies/Adverse Reactions: 


 Allergies











Allergy/AdvReac Type Severity Reaction Status Date / Time


 


No Known Allergies Allergy   Verified 11/12/17 08:20














- Medications


Medications: 


 Current Medications





Albuterol Sulfate (Albuterol 0.083% Inhal Sol (2.5 Mg/3 Ml) Ud)  2.5 mg IH 

[id number] PRN


   PRN Reason: Cough and congestion


Alprazolam (Xanax)  0.25 mg PO DAILY MARIA D


   PRN Reason: Protocol


   Stop: 11/20/17 10:01


Vancomycin HCl (Vancomycin 1gm)  1 gm in 250 mls @ 167 mls/hr IVPB DAILY MARIA D


   PRN Reason: Protocol


Piperacillin Sod/Tazobactam Sod (Zosyn 3.375 In Ns 100ml)  100 mls @ 200 mls/hr 

IVPB Q6 MARIA D


   PRN Reason: Protocol


   Stop: 11/19/17 12:01


   Last Admin: 11/12/17 12:23 Dose:  200 mls/hr


Sodium Chloride (Sodium Chloride 0.9%)  1,000 mls @ 100 mls/hr IV .Q10H MARIA D


   Last Admin: 11/12/17 12:25 Dose:  100 mls/hr


Levothyroxine Sodium (Synthroid)  100 mcg PO DAILY [facility]


Morphine Sulfate (Morphine)  2 mg IVP Q6H PRN


   PRN Reason: Pain, moderate (4-7)


Pantoprazole Sodium (Protonix Ec Tab)  40 mg PO DAILY [facility]


   Last Admin: 11/12/17 12:23 Dose:  40 mg











Physical Exam





- Constitutional


Appears: Non-toxic





- Head Exam


Head Exam: NORMAL INSPECTION





- ENT Exam


ENT Exam: Mucous Membranes Moist





- Neck Exam


Neck exam: Negative for: Lymphadenopathy, Meningismus





- Respiratory Exam


Respiratory Exam: Decreased Breath Sounds





- Cardiovascular Exam


Cardiovascular Exam: +S1, +S2





- GI/Abdominal Exam


GI & Abdominal Exam: Soft, Tenderness (diffuse).  absent: Distended, Firm, 

Guarding, Rigid


Additional comments: 





ileostomy in place





Results





- Vital Signs


Recent Vital Signs: 


 Last Vital Signs











Temp  98.1 F   11/12/17 08:24


 


Pulse  72   11/12/17 12:46


 


Resp  18   11/12/17 12:46


 


BP  136/86   11/12/17 12:46


 


Pulse Ox  98   11/12/17 10:11














- Labs


Result Diagrams: 


 11/13/17 05:30





 11/13/17 05:30


Labs: 


 Laboratory Results - last 24 hr











  11/12/17





  13:40


 


pO2  32


 


VBG pH  7.39


 


VBG pCO2  52.0


 


VBG HCO3  31.5 H


 


VBG Total CO2  33.1 H


 


VBG O2 Sat (Calc)  65.6 H


 


VBG Base Excess  5.2 H


 


VBG Potassium  3.6


 


Sodium  138.0


 


Chloride  102.0


 


Glucose  131 H


 


Lactate  1.3


 


FiO2  21.0


 


Venous Blood Potassium  3.6














Assessment & Plan





- Assessment and Plan (Free Text)


Plan: 





Assessment


abdominal pain R/O due to constipation, R/O urinary tract infection


history of Abdominal wall abscess S/P drainage S/P repeat debridement and wound 

vacuum placement, with Enterobacter


hypothyroidism


Prostate disease


anxiety disorder


esophageal strictures


acute renal failure





Plan


follow up blood and urine cx; reviewed CT A/P which did not show acute 

pathology but still showed the calcification around the ileal conduit site


follow up urology evaluation


patient started on Vancomycin and Zosyn

## 2017-11-13 NOTE — CON
ENDOCRINOLOGY CONSULT



DATE:  11/13/2017



LOCATION:  In room 264.



HISTORY OF PRESENT ILLNESS:  This is a 61-year-old male with known history

of hypothyroidism currently, using levothyroxine at 100 mcg daily and

admitted here with an apparent syncopal episode and supervening abdominal

pain and is now being referred for endocrine evaluation because of

persistent hypothyroidism despite adherence to his levothyroxine medication

as given.



PAST MEDICAL HISTORY:  As mentioned above, history of hypothyroidism,

currently on levothyroxine given as 100 mcg daily; history of hypertensive

cardiovascular disease and dyslipidemia; history of interstitial cystitis

with a prior cystectomy and ileal conduit procedure and has had multiple

admissions for recurrent abdominal pain as noted.  He also then had a

recent evaluation for hepatic lesions and was evaluated to be benign at

[facility] as noted.



FAMILY HISTORY:  Positive for diabetes and hypertension.



SOCIAL HISTORY:  The patient has supportive family.  No known substance

use.



REVIEW OF SYSTEMS:  As mentioned above.  Admits to episodic bouts of

dizziness and lightheadedness with an apparent syncopal episode today as

noted.  Also admits to easy fatigability and tiredness with suboptimal

energy level and increasing bouts of lethargy and hypersomnolence, worse in

the last few weeks prior to admission.  No chest pains or palpitations but

admits to progressive shortness of breath especially on exertion.  His oral

intake has been variable with nausea and dyspepsia and supervening

abdominal pain worsened on the day of admission.  His ileal conduit is

apparently working and admits to regular bowel movements as noted.



PHYSICAL EXAMINATION:

GENERAL:  This is an average built male, in no apparent distress.

VITAL SIGNS:  Blood pressure of 140/80, pulse of 70 beats per minute

regular, temperature 98, respirations 20, height is 5 feet 7 inches, weight

is 166 pounds.

HEENT:  Head is normocephalic.  Eyes anicteric with pink conjunctivae. 

Funduscopy not possible at this time.  Ears, nose and throat otherwise

normal.

NECK:  Supple.  Thyroid gland is normal in size.  No carotid bruits or any

cervical adenopathy.

CARDIOPULMONARY:  Some adynamic precordium.  S1, S2 is rapid and regular.

LUNGS:  Clear to auscultation.

ABDOMEN:  Flat, soft with positive bowel sounds.

EXTREMITIES:  No pitting edema.  Pulses are +2 bilaterally.



LABORATORY DATA:  His chemistry showed a BUN of 18, sodium 142, potassium

4.2, chloride 105, CO2 of 27, glucose 97 and creatinine 1.8.  His thyroid

study showed a free T4 of 0.53 with a TSH of 84.20.



ASSESSMENT:  This is a 61-year-old male with overt hypothyroidism both

historically, clinically and biochemically most likely related to some kind

of malabsorptive process related to the intercurrent ileal conduit

procedure and as the patient has been adherent to his daily levothyroxine

intake as noted.  He most likely has a so-called autoimmune thyroiditis

with overt hypothyroidism and we will clearly require higher increments of

his medications to over right the malabsorptive process as noted.



PLAN OF MANAGEMENT:  We will concur with the levothyroxine given as stat

dose of 200 mcg IV push today as given.  We will give him daily

levothyroxine given as 100 mcg every morning for the next 3-4 days to fully

replenish the referral depletion of his levothyroxine sores as noted.  We

will assist TSH levels improve, then we will start him on oral

levothyroxine medications with a much higher dose of 150 mcg daily as

ordered.  We will titrate incrementally as indicated to optimize metabolic

control.  We will also obtain thyroid peroxidase and thyroglobulin

antibodies to confirm and/or indicate the presence of underlying thyroid

autoimmunity.  We will obtain a more comprehensive thyroid hormonal profile

tomorrow and detailed orders have been given.  We will follow with you.





__________________________________________

Cheryl Quinteros MD





DD:  11/13/2017 18:08:27

DT:  11/13/2017 18:16:26

Job # [id number]

## 2017-11-13 NOTE — CP.PCM.PN
<Tigre Bass - Last Filed: 11/13/17 19:48>





Subjective





- Date & Time of Evaluation


Date of Evaluation: 11/13/17


Time of Evaluation: 08:00





- Subjective


Subjective: 





IM Progress Note for Hospitalist





Patient seen and examined at bedside.  Complains of continued significant 

abdominal pain.  Denies emesis, chest pain, vision changes, room spinning while 

lying in bed, or focal weakness.  Does complain of intermittent numbness in feet

, which he reports he has had for a couple months, and states that he was told 

that he was borderline diabetic previously (doesn't recall who told him this).  

Describes his syncopal episode at home prior to presentation as sitting in his 

chair with abdominal pain, suddenly feeling lightheaded and dizzy, knowing he 

was going to pass out, and then waking up on the floor approximately 30 minutes 

to 1 hour later (determined by checking his watch).  Denies tongue bitting (but 

reports having no teeth) or bowel incontinence (can't have urinary incontinence

, s/p cystectomy).  No history of seizures.





Objective





- Vital Signs/Intake and Output


Vital Signs (last 24 hours): 


 











Temp Pulse Resp BP Pulse Ox


 


 97.7 F   52 L  18   104/65   99 


 


 11/13/17 06:00  11/13/17 06:00  11/13/17 06:00  11/13/17 06:00  11/13/17 06:00








Intake and Output: 


 











 11/13/17 11/13/17





 06:59 18:59


 


Intake Total 1500 240


 


Output Total  1300


 


Balance 1500 -1060














- Medications


Medications: 


 Current Medications





Albuterol Sulfate (Albuterol 0.083% Inhal Sol (2.5 Mg/3 Ml) Ud)  2.5 mg IH 

[id number] PRN


   PRN Reason: Cough and congestion


Alprazolam (Xanax)  0.25 mg PO DAILY [facility]


   PRN Reason: Protocol


   Stop: 11/20/17 10:01


   Last Admin: 11/13/17 09:35 Dose:  0.25 mg


Vancomycin HCl (Vancomycin 1gm)  1 gm in 250 mls @ 167 mls/hr IVPB DAILY [facility]


   PRN Reason: Protocol


   Last Admin: 11/13/17 09:35 Dose:  167 mls/hr


Piperacillin Sod/Tazobactam Sod (Zosyn 3.375 In Ns 100ml)  100 mls @ 200 mls/hr 

IVPB Q6 [facility] D


   PRN Reason: Protocol


   Stop: 11/19/17 12:01


   Last Admin: 11/13/17 05:00 Dose:  200 mls/hr


Sodium Chloride (Sodium Chloride 0.9%)  1,000 mls @ 100 mls/hr IV .Q10H [facility]


   Last Admin: 11/12/17 23:14 Dose:  100 mls/hr


Levothyroxine Sodium (Synthroid)  100 mcg PO ACB MARIA D


   Last Admin: 11/13/17 07:41 Dose:  100 mcg


Morphine Sulfate (Morphine)  2 mg IVP Q6H PRN


   PRN Reason: Pain, moderate (4-7)


   Last Admin: 11/13/17 04:59 Dose:  2 mg


Pantoprazole Sodium (Protonix Ec Tab)  40 mg PO 0600 [facility]


   Last Admin: 11/13/17 05:00 Dose:  40 mg











- Labs


Labs: 


 





 11/13/17 05:30 





 11/13/17 05:30 





 











PT  12.0 SECONDS (9.4-12.5)   11/12/17  08:45    


 


INR  1.09  (0.93-1.08)  H  11/12/17  08:45    


 


APTT  29.4 Seconds (25.1-36.5)   11/12/17  08:45    














- Constitutional


Appears: Non-toxic, No Acute Distress, Chronically Ill





- Head Exam


Head Exam: ATRAUMATIC, NORMAL INSPECTION, NORMOCEPHALIC





- Eye Exam


Eye Exam: EOMI, Normal appearance, PERRL.  absent: Conjunctival injection, 

Scleral icterus


Pupil Exam: NORMAL ACCOMODATION, PERRL.  absent: Fixed, Irregular, Unequal





- ENT Exam


ENT Exam: Mucous Membranes Moist.  absent: Mucous Membranes Dry





- Neck Exam


Neck Exam: Full ROM, Normal Inspection.  absent: Lymphadenopathy, Thyromegaly





- Respiratory Exam


Respiratory Exam: Clear to Ausculation Bilateral, NORMAL BREATHING PATTERN.  

absent: Accessory Muscle Use, Chest Wall Tenderness, Decreased Breath Sounds, 

Rales, Rhonchi, Wheezes





- Cardiovascular Exam


Cardiovascular Exam: REGULAR RHYTHM, RRR, +S1, +S2.  absent: Bradycardia, 

Tachycardia, Irregular Rhythm, JVD (unable to assess if hepatojugular reflux, 

abd too tender to deeply palpate), +S4





- GI/Abdominal Exam


GI & Abdominal Exam: Guarding, Soft (except at RLQ bordering R groin), 

Tenderness (moderate diffuse tenderness, equisitely tender to palpation at RLQ 

bordering R groin region), Normal Bowel Sounds.  absent: Rigid


Additional comments: 





RLQ colectomy bag with some clear yellow urine present





-  Exam


 Exam: Scrotal Swelling





- Extremities Exam


Extremities Exam: Normal Capillary Refill, Normal Inspection.  absent: Calf 

Tenderness, Pedal Edema, Tenderness


Additional comments: 





Reports intermittent numbness in bilateral LE, but full ROM, no gross deficits 

in sided-movement





- Neurological Exam


Neurological Exam: Alert, Awake, Oriented x3


Neuro motor strength exam: Left Upper Extremity: 4, Right Upper Extremity: 4, 

Left Lower Extremity: 4, Right Lower Extremity: 4





- Psychiatric Exam


Psychiatric exam: Normal Affect, Normal Mood





- Skin


Skin Exam: Dry, Intact, Normal Color, Warm





Assessment and Plan





- Assessment and Plan (Free Text)


Assessment: 





61 year old male with a PMH interstitial hemorrhagic cystitis, ileal conduit, s/

p cystectomy, recurrent urinary infections, chronic abdominal pain with 

multiple hernia repairs, irritable bowel syndrome, abdominal wall abscess s/p I&

D, and most recently elevated PSA who presents with complaints of a syncopal 

episode a few days ago as well as worsening abdominal pain.  Also was 

incidentally found to have elevated TSH (confirmed on repeat labs), prompting 

additional thyroid workup.


Plan: 





1) Syncopal episode


-Multiple possible etiologies, infectious from complicated UTI vs neurogenic vs 

endocrine vs cardiogenic/arrythmia


-Trops negative x3


-Orthostatics this /64 lying, 108/68 sitting, 112/53 standing, and 

episode occurred while sitting, so not orthostatic hypotension


-Neuro (Dr. Burden) consulted, appreciate all recs


-Neurochecks Q4x8


-Global precautions


-EKG reviewed, NSR, no axis deviation, no ST-T wave changes


-Head CT showed R temporal lobe 9mm hypoattenuating focus


-Vitals q4h


-PT ordered





2) Abdominal pain


-History of multiple abd surgeries, poss adhesions vs 2/2 IBS vs recurrent 

hernia, likely constipation component


-Surgery consulted, appreciate all recs


-Urology consulted due to calcification of the cystectomy conduit on CT


-Morphine PRN ordered


-Patient complaining of needing to move bowels but feeling unable to, Dulcolax 

suppository x1 and Miralax 17g Daily, will follow up





3) Complicated urinary tract infection


-continue vanco and zosyn (day 2 each), pending ID recs


-ID consulted


-Blood and urine cultures received, pending results





4) Hypothyroidism


-Cont home levothyroxine


-Initial TSH elevated at 79.2, recheck at 84.2; Free T4 0.53 (low), Total T3 

1.03


-Given 200mcg Synthroid IV x1


-continue 100mcg PO daily synthroid (patient reports compliance), will likely 

need to adjust dosage


-Endo (Dr. Quinteros) consulted, appreciate all recs





5) CARLY on CKD


-continue NS @ 100


-Cr 1.7 today (was 1.8), BUN 18 (was 23)


-continue to monitor





6) Anxiety


-Cont home alprazolam





Dispo: Telemetry, pending Neuro/Urology/ID/Surgery/Endo recs, pending culture 

results


FEN: Liquid Diet


Access: Peripheral IV


Consults: Neuro, Urology, Surgery, ID, Endo


Ppx: Protonix for GI, SCDs for DVT





Patient seen, reviewed, and discussed with attending, Dr. Paulino.





<Dante Paulino - Last Filed: 11/14/17 16:08>





Objective





- Vital Signs/Intake and Output


Vital Signs (last 24 hours): 


 











Temp Pulse Resp BP Pulse Ox


 


 98.5 F   51 L  18   111/52 L  96 


 


 11/14/17 12:00  11/14/17 14:00  11/14/17 12:00  11/14/17 12:00  11/14/17 05:53








Intake and Output: 


 











 11/14/17 11/14/17





 06:59 18:59


 


Intake Total 1560 960


 


Output Total 1700 500


 


Balance -140 460














- Medications


Medications: 


 Current Medications





Albuterol Sulfate (Albuterol 0.083% Inhal Sol (2.5 Mg/3 Ml) Ud)  2.5 mg IH 

[id number] PRN


   PRN Reason: Cough and congestion


Alprazolam (Xanax)  0.25 mg PO DAILY [facility] D


   PRN Reason: Protocol


   Stop: 11/20/17 10:01


   Last Admin: 11/14/17 09:31 Dose:  0.25 mg


Aspirin (Ecotrin)  81 mg PO DAILY [facility]


   Last Admin: 11/14/17 14:08 Dose:  Not Given


Aspirin (Aspirin Chewable)  81 mg PO DAILY [facility]


   Last Admin: 11/14/17 09:29 Dose:  81 mg


Clopidogrel Bisulfate (Plavix)  75 mg PO DAILY [facility]


   Last Admin: 11/14/17 09:30 Dose:  75 mg


Vancomycin HCl (Vancomycin 1gm)  1 gm in 250 mls @ 167 mls/hr IVPB DAILY [facility]


   PRN Reason: Protocol


   Last Admin: 11/14/17 09:30 Dose:  167 mls/hr


Piperacillin Sod/Tazobactam Sod (Zosyn 3.375 In Ns 100ml)  100 mls @ 200 mls/hr 

IVPB Q6 [facility]


   PRN Reason: Protocol


   Stop: 11/19/17 12:01


   Last Admin: 11/14/17 14:09 Dose:  200 mls/hr


Sodium Chloride (Sodium Chloride 0.9%)  1,000 mls @ 100 mls/hr IV .Q10H [facility]


   Last Admin: 11/14/17 03:16 Dose:  Not Given


Levothyroxine Sodium (Synthroid)  100 mcg IVP DAILY [facility]


   Last Admin: 11/14/17 09:30 Dose:  100 mcg


Morphine Sulfate (Morphine)  2 mg IVP Q6H PRN


   PRN Reason: Pain, moderate (4-7)


   Last Admin: 11/14/17 03:15 Dose:  2 mg


Pantoprazole Sodium (Protonix Ec Tab)  40 mg PO 0600 [facility]


   Last Admin: 11/14/17 05:13 Dose:  40 mg


Polyethylene Glycol (Miralax)  17 gm PO DAILY [facility]


   Last Admin: 11/14/17 09:29 Dose:  17 gm











- Labs


Labs: 


 





 11/14/17 06:30 





 11/14/17 05:30 





 











PT  12.0 SECONDS (9.4-12.5)   11/12/17  08:45    


 


INR  1.09  (0.93-1.08)  H  11/12/17  08:45    


 


APTT  29.4 Seconds (25.1-36.5)   11/12/17  08:45    














Attending/Attestation





- Attestation


I have personally seen and examined this patient.: Yes


I have fully participated in the care of the patient.: Yes


I have reviewed all pertinent clinical information, including history, physical 

exam and plan: Yes


Notes (Text): 





11/14/17 16:07


Patient was seen and examined with medical resident. 


Agreed with resident assessment and plan.





Management plan was discussed in detail with patient


Education was provided.

## 2017-11-13 NOTE — CON
DATE:  11/13/2017



 CONSULTATION



CHIEF COMPLAINT:  Syncope and abdominal pain.



HISTORY OF PRESENT ILLNESS:  The patient is a 61-year-old male, who had a

past history of interstitial cystitis and had a total cystectomy done at

[facility] approximately 10 years ago.  He developed a parastomal hernia, which

was repaired.  He now complains of suprapubic pain.  His conduit is working

well, but he has this abdominal discomfort.  CAT scan was done, no masses

were seen.  He did have some type of calcification in his conduit that

wants to be investigated by Dr. Mittal, a urologic oncologist.  The

patient's PSA was 7 and also this was be evaluated also with a transrectal

biopsy of the prostate.  A CAT scan done on this admission demonstrated

mild bilateral hydro, which I do not think is significant and unchanged

from any prior CAT scan, they saw some foci of coarse calcification in the

ileal conduit itself.  The right lobe of liver showed low attenuation area

mentioned in the right lobe, measuring 11.8 mm.  There was no significant

change from the prior CAT scan that was done approximately 3 weeks prior. 

This admission is for syncope.  There has been no hematuria.  No weight

loss.



PAST MEDICAL HISTORY:  Significant _____.



ALLERGIES:  HE HAS NO ALLERGIES.



SOCIAL HISTORY:  He does not smoke or drink.



He has no prior cardiac history.  The date of his cystectomy was 2009; I

was able to pinpoint when it was done.  He does have a history of anxiety

and depression.



REVIEW OF SYSTEMS:  Currently, no symptoms referable to the head, eyes,

ears, nose, or throat.  No cardiac or respiratory symptoms.  He does

complain of some suprapubic discomfort.  No mass was seen.  He also has no

symptoms of dermatologic.  Psychiatric is only anxiety.  No musculoskeletal

symptoms.



PHYSICAL EXAMINATION:

VITAL SIGNS:  He is afebrile, blood pressure 104/65, pulse 52, respirations

18.

HEENT:  Normocephalic.  Sclerae clear.  Conjunctivae non-injected.

NECK:  No CVA pain.

ABDOMEN:  No hepatosplenomegaly, rebound or guarding.  No suprapubic

masses.  The urostomy is draining clear urine.

SKIN:  He has no purpura or edema.

NEUROLOGIC:  Well oriented x3.



LABORATORY DATA:  White count 4900, hemoglobin 10.7.  Chemistry show

creatinine 1.8 with a BUN of 18.  His CO2 is 27.



IMPRESSION:  Abdominal pain, again was told to make an appointment to see

Dr. Damaso Mittal, who I have already spoken too, he was given his phone

number again.  He will do a ureteroscopy, prostate biopsy and evaluation of

the conduit and if he can determine what is being done, some point he may

even need laparoscopy to see there is anything intraabdominal going on. 

The patient is aware of the plan and says he will call me and make the

appointment.







__________________________________________

Jerson Adnrea MD





DD:  11/13/2017 9:28:43

DT:  11/13/2017 10:37:07

Job # [id number]

## 2017-11-14 LAB
ALBUMIN/GLOB SERPL: 1 {RATIO} (ref 1.1–1.8)
ALP SERPL-CCNC: 52 U/L (ref 38–126)
ALT SERPL-CCNC: 21 U/L (ref 7–56)
AST SERPL-CCNC: 26 U/L (ref 17–59)
BASOPHILS # BLD AUTO: 0.03 K/MM3 (ref 0–2)
BASOPHILS NFR BLD: 0.6 % (ref 0–3)
BILIRUB SERPL-MCNC: 0.8 MG/DL (ref 0.2–1.3)
BUN SERPL-MCNC: 12 MG/DL (ref 7–21)
CALCIUM SERPL-MCNC: 8.2 MG/DL (ref 8.4–10.5)
CHLORIDE SERPL-SCNC: 107 MMOL/L (ref 95–110)
CO2 SERPL-SCNC: 26 MMOL/L (ref 21–33)
EOSINOPHIL # BLD: 0.2 10*3/UL (ref 0–0.7)
EOSINOPHIL NFR BLD: 4.6 % (ref 1.5–5)
ERYTHROCYTE [DISTWIDTH] IN BLOOD BY AUTOMATED COUNT: 14.8 % (ref 11.5–14.5)
GLOBULIN SER-MCNC: 3.5 GM/DL
GLUCOSE SERPL-MCNC: 95 MG/DL (ref 70–110)
GRANULOCYTES # BLD: 3.53 10*3/UL (ref 1.4–6.5)
GRANULOCYTES NFR BLD: 67.6 % (ref 50–68)
HCT VFR BLD CALC: 32.3 % (ref 42–52)
LYMPHOCYTES # BLD: 1.1 10*3/UL (ref 1.2–3.4)
LYMPHOCYTES NFR BLD AUTO: 20.7 % (ref 22–35)
MCH RBC QN AUTO: 24.6 PG (ref 25–35)
MCHC RBC AUTO-ENTMCNC: 31 G/DL (ref 31–37)
MCV RBC AUTO: 79.6 FL (ref 80–105)
MONOCYTES # BLD AUTO: 0.3 10*3/UL (ref 0.1–0.6)
MONOCYTES NFR BLD: 6.5 % (ref 1–6)
PLATELET # BLD: 179 10^3/UL (ref 120–450)
PMV BLD AUTO: 8.8 FL (ref 7–11)
POTASSIUM SERPL-SCNC: 3.7 MMOL/L (ref 3.6–5)
PROT SERPL-MCNC: 6.8 G/DL (ref 5.8–8.3)
SODIUM SERPL-SCNC: 142 MMOL/L (ref 132–148)
T4 FREE SERPL-MCNC: 0.92 NG/DL (ref 0.78–2.19)
T4 SERPL-MCNC: 5 UG/DL (ref 5.5–11)
TSH SERPL-ACNC: 43.8 MIU/ML (ref 0.46–4.68)
WBC # BLD AUTO: 5.2 10^3/UL (ref 4.5–11)

## 2017-11-14 RX ADMIN — LEVOTHYROXINE SODIUM ANHYDROUS SCH MCG: 100 INJECTION, POWDER, LYOPHILIZED, FOR SOLUTION INTRAVENOUS at 09:30

## 2017-11-14 RX ADMIN — VANCOMYCIN HYDROCHLORIDE SCH MLS/HR: 1 INJECTION, POWDER, LYOPHILIZED, FOR SOLUTION INTRAVENOUS at 09:30

## 2017-11-14 RX ADMIN — POLYETHYLENE GLYCOL 3350 SCH GM: 17 POWDER, FOR SOLUTION ORAL at 09:29

## 2017-11-14 RX ADMIN — PIPERACILLIN AND TAZOBACTAM SCH MLS/HR: 3; .375 INJECTION, POWDER, LYOPHILIZED, FOR SOLUTION INTRAVENOUS; PARENTERAL at 23:36

## 2017-11-14 RX ADMIN — PIPERACILLIN AND TAZOBACTAM SCH MLS/HR: 3; .375 INJECTION, POWDER, LYOPHILIZED, FOR SOLUTION INTRAVENOUS; PARENTERAL at 05:13

## 2017-11-14 RX ADMIN — PIPERACILLIN AND TAZOBACTAM SCH MLS/HR: 3; .375 INJECTION, POWDER, LYOPHILIZED, FOR SOLUTION INTRAVENOUS; PARENTERAL at 14:09

## 2017-11-14 RX ADMIN — MORPHINE SULFATE PRN MG: 2 INJECTION, SOLUTION INTRAMUSCULAR; INTRAVENOUS at 03:15

## 2017-11-14 RX ADMIN — PANTOPRAZOLE SODIUM SCH MG: 40 TABLET, DELAYED RELEASE ORAL at 05:13

## 2017-11-14 RX ADMIN — MORPHINE SULFATE PRN MG: 2 INJECTION, SOLUTION INTRAMUSCULAR; INTRAVENOUS at 23:34

## 2017-11-14 RX ADMIN — PIPERACILLIN AND TAZOBACTAM SCH MLS/HR: 3; .375 INJECTION, POWDER, LYOPHILIZED, FOR SOLUTION INTRAVENOUS; PARENTERAL at 17:10

## 2017-11-14 NOTE — MRI
PROCEDURE:  MR Angiography of the neck without contrast



HISTORY:

r/o seizures



COMPARISON:

None available. 



TECHNIQUE:

3D Time-of-flight angiography of the neck was performed. Rotating 

maximum intensity projection images of the cervical carotid and 

vertebral arteries were generated. The origins of the common carotid 

arteries were not visualized, which is a limitation inherent to the 

non-contrast time of flight technique.



Multiplanar multisequence MR images of the neck were obtained without 

gadolinium enhancement. The study was limited to 1 time-of-flight 

sequence. The patient could not tolerate any further scanning 



FINDINGS:



RIGHT CAROTID ARTERIES:

Common Carotid Artery: Normal.



Carotid Bifurcation: Normal.



Internal Carotid Artery:Normal.



External Carotid Artery (proximal branches): Normal.



LEFT CAROTID ARTERIES:

Common Carotid Artery: Normal.



Carotid Bifurcation: Normal.



Internal Carotid Artery:Normal.



External Carotid Artery (proximal branches): Normal.



VERTEBRAL ARTERIES:

Right Vertebral Artery: Normal.



Left Vertebral Artery: Dominant



OTHER FINDINGS:

None.



IMPRESSION:

No significant stenosis or occlusion.

## 2017-11-14 NOTE — CP.PCM.PN
Subjective





- Date & Time of Evaluation


Date of Evaluation: 11/14/17


Time of Evaluation: 10:35





- Subjective


Subjective: 





Still with abdominal pain, no fevers, not in distress.





Objective





- Vital Signs/Intake and Output


Vital Signs (last 24 hours): 


 











Temp Pulse Resp BP Pulse Ox


 


 98.2 F   51 L  20   102/60   96 


 


 11/14/17 05:53  11/14/17 05:53  11/14/17 05:53  11/14/17 05:53  11/14/17 05:53








Intake and Output: 


 











 11/14/17 11/14/17





 06:59 18:59


 


Intake Total 1560 


 


Output Total 1700 


 


Balance -140 














- Medications


Medications: 


 Current Medications





Albuterol Sulfate (Albuterol 0.083% Inhal Sol (2.5 Mg/3 Ml) Ud)  2.5 mg IH 

[id number] PRN


   PRN Reason: Cough and congestion


Alprazolam (Xanax)  0.25 mg PO DAILY [facility]


   PRN Reason: Protocol


   Stop: 11/20/17 10:01


   Last Admin: 11/13/17 09:35 Dose:  0.25 mg


Aspirin (Ecotrin)  81 mg PO DAILY [facility]


Aspirin (Aspirin Chewable)  81 mg PO DAILY [facility]


Clopidogrel Bisulfate (Plavix)  75 mg PO DAILY [facility]


Vancomycin HCl (Vancomycin 1gm)  1 gm in 250 mls @ 167 mls/hr IVPB DAILY [facility]


   PRN Reason: Protocol


   Last Admin: 11/13/17 09:35 Dose:  167 mls/hr


Piperacillin Sod/Tazobactam Sod (Zosyn 3.375 In Ns 100ml)  100 mls @ 200 mls/hr 

IVPB Q6 [facility]


   PRN Reason: Protocol


   Stop: 11/19/17 12:01


   Last Admin: 11/14/17 05:13 Dose:  200 mls/hr


Sodium Chloride (Sodium Chloride 0.9%)  1,000 mls @ 100 mls/hr IV .Q10H [facility]


   Last Admin: 11/14/17 03:16 Dose:  Not Given


Levothyroxine Sodium (Synthroid)  100 mcg IVP DAILY [facility]


Morphine Sulfate (Morphine)  2 mg IVP Q6H PRN


   PRN Reason: Pain, moderate (4-7)


   Last Admin: 11/14/17 03:15 Dose:  2 mg


Pantoprazole Sodium (Protonix Ec Tab)  40 mg PO 0600 [facility]


   Last Admin: 11/14/17 05:13 Dose:  40 mg


Polyethylene Glycol (Miralax)  17 gm PO DAILY [facility]











- Labs


Labs: 


 





 11/14/17 06:30 





 11/14/17 05:30 





 











PT  12.0 SECONDS (9.4-12.5)   11/12/17  08:45    


 


INR  1.09  (0.93-1.08)  H  11/12/17  08:45    


 


APTT  29.4 Seconds (25.1-36.5)   11/12/17  08:45    














- Constitutional


Appears: Non-toxic





- Head Exam


Head Exam: NORMAL INSPECTION





- Respiratory Exam


Respiratory Exam: Decreased Breath Sounds





- Cardiovascular Exam


Cardiovascular Exam: +S1, +S2





- GI/Abdominal Exam


GI & Abdominal Exam: Soft, Tenderness (mild).  absent: Distended, Firm, Guarding

, Rigid


Additional comments: 





ileostomy in place





Assessment and Plan





- Assessment and Plan (Free Text)


Plan: 





Assessment


abdominal pain R/O due to constipation, R/O urinary tract infection


history of Abdominal wall abscess S/P drainage S/P repeat debridement and wound 

vacuum placement, with Enterobacter


hypothyroidism


Prostate disease


anxiety disorder


esophageal strictures


acute renal failure





Plan


follow up blood and urine cx; reviewed CT A/P which did not show acute 

pathology but still showed the calcification around the ileal conduit site


follow up urology evaluation


patient started on Vancomycin and Zosyn day 2

## 2017-11-14 NOTE — CP.PCM.PN
Subjective





- Date & Time of Evaluation


Date of Evaluation: 11/14/17


Time of Evaluation: 07:10





- Subjective


Subjective: 





General Surgery: Dr. Crawford





Pt seen and examined at beside.  No acute events overnight. Pt reports 

persistent suprapubic/lower abdominal discomfort.  Pt denies nausea, vomiting, 

fever, chills, SOB, or chest pain.  Ileal conduit with 1700 ml in past 12 hours

; 3000 ml in past 24 hours. Tolerating diet.








Objective





- Vital Signs/Intake and Output


Vital Signs (last 24 hours): 


 











Temp Pulse Resp BP Pulse Ox


 


 98.5 F   51 L  18   111/52 L  96 


 


 11/14/17 12:00  11/14/17 12:00  11/14/17 12:00  11/14/17 12:00  11/14/17 05:53








Intake and Output: 


 











 11/14/17 11/14/17





 06:59 18:59


 


Intake Total 1560 


 


Output Total 1700 


 


Balance -140 














- Medications


Medications: 


 Current Medications





Albuterol Sulfate (Albuterol 0.083% Inhal Sol (2.5 Mg/3 Ml) Ud)  2.5 mg IH 

[id number] PRN


   PRN Reason: Cough and congestion


Alprazolam (Xanax)  0.25 mg PO DAILY [facility]


   PRN Reason: Protocol


   Stop: 11/20/17 10:01


   Last Admin: 11/14/17 09:31 Dose:  0.25 mg


Aspirin (Ecotrin)  81 mg PO DAILY [facility]


Aspirin (Aspirin Chewable)  81 mg PO DAILY [facility]


   Last Admin: 11/14/17 09:29 Dose:  81 mg


Clopidogrel Bisulfate (Plavix)  75 mg PO DAILY [facility]


   Last Admin: 11/14/17 09:30 Dose:  75 mg


Vancomycin HCl (Vancomycin 1gm)  1 gm in 250 mls @ 167 mls/hr IVPB DAILY [facility]


   PRN Reason: Protocol


   Last Admin: 11/14/17 09:30 Dose:  167 mls/hr


Piperacillin Sod/Tazobactam Sod (Zosyn 3.375 In Ns 100ml)  100 mls @ 200 mls/hr 

IVPB Q6 [facility]


   PRN Reason: Protocol


   Stop: 11/19/17 12:01


   Last Admin: 11/14/17 05:13 Dose:  200 mls/hr


Sodium Chloride (Sodium Chloride 0.9%)  1,000 mls @ 100 mls/hr IV .Q10H [facility]


   Last Admin: 11/14/17 03:16 Dose:  Not Given


Levothyroxine Sodium (Synthroid)  100 mcg IVP DAILY [facility]


   Last Admin: 11/14/17 09:30 Dose:  100 mcg


Morphine Sulfate (Morphine)  2 mg IVP Q6H PRN


   PRN Reason: Pain, moderate (4-7)


   Last Admin: 11/14/17 03:15 Dose:  2 mg


Pantoprazole Sodium (Protonix Ec Tab)  40 mg PO 0600 [facility]


   Last Admin: 11/14/17 05:13 Dose:  40 mg


Polyethylene Glycol (Miralax)  17 gm PO DAILY [facility]


   Last Admin: 11/14/17 09:29 Dose:  17 gm











- Labs


Labs: 


 





 11/14/17 06:30 





 11/14/17 05:30 





 











PT  12.0 SECONDS (9.4-12.5)   11/12/17  08:45    


 


INR  1.09  (0.93-1.08)  H  11/12/17  08:45    


 


APTT  29.4 Seconds (25.1-36.5)   11/12/17  08:45    














- Constitutional


Appears: No Acute Distress





- Head Exam


Head Exam: ATRAUMATIC, NORMOCEPHALIC





- Eye Exam


Eye Exam: Normal appearance





- ENT Exam


ENT Exam: Mucous Membranes Moist





- Respiratory Exam


Respiratory Exam: NORMAL BREATHING PATTERN.  absent: Respiratory Distress





- GI/Abdominal Exam


GI & Abdominal Exam: Soft, Tenderness.  absent: Distended, Firm, Guarding, Rigid


Additional comments: 





mild tenderness to palpation to suprapubic region; ileal conduit in place with 

clear yellow urine





- Neurological Exam


Neurological Exam: Alert, Awake, Oriented x3





- Psychiatric Exam


Psychiatric exam: Normal Affect, Normal Mood





Assessment and Plan





- Assessment and Plan (Free Text)


Assessment: 





60y/o male with PMHx of interstitial cystitis s/p cystectomy and ileal conduit 

in 2009 with suprapubic and lower abdominal discomfort


Plan: 





- Pain control as needed


- No surgical intervention at this time; please recall as needed 


- Follow up with Urology as recommended 


- Discussed with Dr. Yvette Jamison, PGY-3


Surgery

## 2017-11-14 NOTE — CP.PCM.PN
Subjective





- Date & Time of Evaluation


Date of Evaluation: 11/14/17


Time of Evaluation: 07:01





- Subjective


Subjective: 





Mr. Hickey was seen and examined at the bedside. He remains alert, oriented 

in all spheres. He is able to answer questions appropriately and follows 

commands.He states of experiencing  diarrhea overnight. He further states of 

receiving lactulose yesterday. He denies any headache, lightheadedness, 

dizziness, nausea, or vomiting. 





Objective





- Vital Signs/Intake and Output


Vital Signs (last 24 hours): 


 











Temp Pulse Resp BP Pulse Ox


 


 98.2 F   51 L  20   102/60   96 


 


 11/14/17 05:53  11/14/17 05:53  11/14/17 05:53  11/14/17 05:53  11/14/17 05:53








Intake and Output: 


 











 11/14/17 11/14/17





 06:59 18:59


 


Intake Total 1560 


 


Output Total 1700 


 


Balance -140 














- Medications


Medications: 


 Current Medications





Albuterol Sulfate (Albuterol 0.083% Inhal Sol (2.5 Mg/3 Ml) Ud)  2.5 mg IH 

[id number] PRN


   PRN Reason: Cough and congestion


Alprazolam (Xanax)  0.25 mg PO DAILY [facility]


   PRN Reason: Protocol


   Stop: 11/20/17 10:01


   Last Admin: 11/13/17 09:35 Dose:  0.25 mg


Aspirin (Ecotrin)  81 mg PO DAILY [facility]


Clopidogrel Bisulfate (Plavix)  75 mg PO DAILY [facility]


Vancomycin HCl (Vancomycin 1gm)  1 gm in 250 mls @ 167 mls/hr IVPB DAILY [facility]


   PRN Reason: Protocol


   Last Admin: 11/13/17 09:35 Dose:  167 mls/hr


Piperacillin Sod/Tazobactam Sod (Zosyn 3.375 In Ns 100ml)  100 mls @ 200 mls/hr 

IVPB Q6 [facility]


   PRN Reason: Protocol


   Stop: 11/19/17 12:01


   Last Admin: 11/14/17 05:13 Dose:  200 mls/hr


Sodium Chloride (Sodium Chloride 0.9%)  1,000 mls @ 100 mls/hr IV .Q10H [facility]


   Last Admin: 11/14/17 03:16 Dose:  Not Given


Levothyroxine Sodium (Synthroid)  100 mcg IVP DAILY [facility]


Morphine Sulfate (Morphine)  2 mg IVP Q6H PRN


   PRN Reason: Pain, moderate (4-7)


   Last Admin: 11/14/17 03:15 Dose:  2 mg


Pantoprazole Sodium (Protonix Ec Tab)  40 mg PO 0600 [facility]


   Last Admin: 11/14/17 05:13 Dose:  40 mg


Polyethylene Glycol (Miralax)  17 gm PO DAILY [facility]











- Labs


Labs: 


 











PT  12.0 SECONDS (9.4-12.5)   11/12/17  08:45    


 


INR  1.09  (0.93-1.08)  H  11/12/17  08:45    


 


APTT  29.4 Seconds (25.1-36.5)   11/12/17  08:45    














- Constitutional


Appears: No Acute Distress





- Head Exam


Head Exam: ATRAUMATIC





- Neurological Exam


Neurological Exam: Alert, Awake, CN II-XII Intact, Oriented x3


Neuro motor strength exam: Left Upper Extremity: 5, Right Upper Extremity: 5, 

Left Lower Extremity: 5, Right Lower Extremity: 5


Additional comments: 





He remains with past pointing noted on finger to nose testing on the right 

side.  Strength was otherwise symmetrical.  Sensation was intact and reflexes 

were normal.  He had difficulty with bilateral lower extremity heal to shin 

testing. 





Assessment and Plan


(1) Syncope


Assessment & Plan: 


Case discussed with Dr. Burden, follow up echocardiogram with bubble study and 

electrolytes result. continue all current medical, physical, and occupational 

therapies. 


Status: Acute

## 2017-11-14 NOTE — CARD
--------------- APPROVED REPORT --------------





EXAM: Two-dimensional and M-mode echocardiogram with Doppler and 

color Doppler.



INDICATION

Syncope 



2D DIMENSIONS 

Left Atrium (2D)3.9   (1.6-4.0cm)IVSd1.1   (0.7-1.1cm)

LVDd4.9   (3.9-5.9cm)PWd1.0   (0.7-1.1cm)

LVDs3.2   (2.5-4.0cm)FS (%) 34.6   %

LVEF (%)63.7   (>50%)



M-Mode DIMENSIONS 

Aortic Root3.10   (2.2-3.7cm)Aortic Cusp Exc.1.80   (1.5-2.0cm)



Aortic Valve

AoV Peak Lgyocrrj857.0cm/Reynaldo Peak GR.11mmHg



Mitral Valve

MV E Ydfsdlle19.5cm/sMV A Pocdiogg41.5cm/sE/A ratio1.2



TDI

E/Lateral E'0.0E/Medial E'0.0



Tricuspid Valve

TR Peak Bnghfvwo578lg/sRAP JTAASARL94toTqIY Peak Gr.22mmHg

[id number]



 LEFT VENTRICLE 

The left ventricle is normal size. There is normal left ventricular 

wall thickness. The left ventricular function is normal. The left 

ventricular ejection fraction is within the normal range. There is 

normal LV segmental wall motion. Transmitral Doppler flow pattern is 

Grade I-abnormal relaxation pattern.



 RIGHT VENTRICLE 

The right ventricle is normal size. There is normal right ventricular 

wall thickness. The right ventricular systolic function is normal.



 ATRIA 

The left atrium size is normal. The right atrium size is normal.



 AORTIC VALVE 

The aortic valve is thickened but opens well. There is mild aortic 

regurgitation.



 MITRAL VALVE 

Mitral regurgitation is trace.



 TRICUSPID VALVE 

There is trace tricuspid regurgitation.



 PULMONIC VALVE 

There is trace pulmonic valvular regurgitation. 



 GREAT VESSELS 

The aortic root is normal in size. The IVC is normal in size and 

collapses >50% with inspiration.



 PERICARDIAL EFFUSION 

There is no pericardial effusion.



<Conclusion>

The left ventricle is normal size.

There is normal left ventricular wall thickness.

The left ventricular function is normal.

The left ventricular ejection fraction is within the normal range.

There is normal LV segmental wall motion.

Transmitral Doppler flow pattern is Grade I-abnormal relaxation 

pattern.

There is mild aortic regurgitation.

## 2017-11-14 NOTE — CP.PCM.PN
<Tigre Bass - Last Filed: 11/14/17 15:09>





Subjective





- Date & Time of Evaluation


Date of Evaluation: 11/14/17


Time of Evaluation: 07:00





- Subjective


Subjective: 





IM Progress Note for Hospitalist





Patient seen and examined at bedside.  Still complains of abdominal pain, now 

more left sided and crampy as opposed to original presentation.  Reports 

multiple bowel movements overnight after dulcolax suppository.  Overnight, was 

found to have moderate irregularity and narrowing (without occlusion) of left 

M1 segment on head MRA, suggestive of a partial thrombus.  Neuro was notified, 

and as per their instructions, patient was given a loading dose of Plavix (300mg

) at that time, and started on daily Plavix (75mg).  He continues to denies any 

stroke like symptoms, including lateralized weakness or numbness, and has full 

control over his speech.  Denies chest pain, shortness of breath, emesis, 

diarrhea, hematuria in cystectomy bag, or room-spinning sensation.





Objective





- Vital Signs/Intake and Output


Vital Signs (last 24 hours): 


 











Temp Pulse Resp BP Pulse Ox


 


 98.5 F   51 L  18   111/52 L  96 


 


 11/14/17 12:00  11/14/17 14:00  11/14/17 12:00  11/14/17 12:00  11/14/17 05:53








Intake and Output: 


 











 11/14/17 11/14/17





 06:59 18:59


 


Intake Total 1560 


 


Output Total 1700 


 


Balance -140 














- Medications


Medications: 


 Current Medications





Albuterol Sulfate (Albuterol 0.083% Inhal Sol (2.5 Mg/3 Ml) Ud)  2.5 mg IH 

[id number] PRN


   PRN Reason: Cough and congestion


Alprazolam (Xanax)  0.25 mg PO DAILY [facility]


   PRN Reason: Protocol


   Stop: 11/20/17 10:01


   Last Admin: 11/14/17 09:31 Dose:  0.25 mg


Aspirin (Ecotrin)  81 mg PO DAILY [facility]


   Last Admin: 11/14/17 14:08 Dose:  Not Given


Aspirin (Aspirin Chewable)  81 mg PO DAILY [facility]


   Last Admin: 11/14/17 09:29 Dose:  81 mg


Clopidogrel Bisulfate (Plavix)  75 mg PO DAILY [facility]


   Last Admin: 11/14/17 09:30 Dose:  75 mg


Vancomycin HCl (Vancomycin 1gm)  1 gm in 250 mls @ 167 mls/hr IVPB DAILY [facility]


   PRN Reason: Protocol


   Last Admin: 11/14/17 09:30 Dose:  167 mls/hr


Piperacillin Sod/Tazobactam Sod (Zosyn 3.375 In Ns 100ml)  100 mls @ 200 mls/hr 

IVPB Q6 [facility]


   PRN Reason: Protocol


   Stop: 11/19/17 12:01


   Last Admin: 11/14/17 14:09 Dose:  200 mls/hr


Sodium Chloride (Sodium Chloride 0.9%)  1,000 mls @ 100 mls/hr IV .Q10H [facility]


   Last Admin: 11/14/17 03:16 Dose:  Not Given


Levothyroxine Sodium (Synthroid)  100 mcg IVP DAILY [facility]


   Last Admin: 11/14/17 09:30 Dose:  100 mcg


Morphine Sulfate (Morphine)  2 mg IVP Q6H [facility]


   PRN Reason: Pain, moderate (4-7)


   Last Admin: 11/14/17 03:15 Dose:  2 mg


Pantoprazole Sodium (Protonix Ec Tab)  40 mg PO 0600 [facility]


   Last Admin: 11/14/17 05:13 Dose:  40 mg


Polyethylene Glycol (Miralax)  17 gm PO DAILY [facility]


   Last Admin: 11/14/17 09:29 Dose:  17 gm











- Labs


Labs: 


 





 11/14/17 06:30 





 11/14/17 05:30 





 











PT  12.0 SECONDS (9.4-12.5)   11/12/17  08:45    


 


INR  1.09  (0.93-1.08)  H  11/12/17  08:45    


 


APTT  29.4 Seconds (25.1-36.5)   11/12/17  08:45    














- Additional Findings


Additional findings: 





- Constitutional


Appears: Non-toxic, No Acute Distress, Chronically Ill





- Head Exam


Head Exam: ATRAUMATIC, NORMAL INSPECTION, NORMOCEPHALIC





- Eye Exam


Eye Exam: EOMI, Normal appearance, PERRL.  absent: Conjunctival injection, 

Scleral icterus


Pupil Exam: NORMAL ACCOMODATION, PERRL.  absent: Fixed, Irregular, Unequal





- ENT Exam


ENT Exam: Mucous Membranes Moist.  absent: Mucous Membranes Dry





- Neck Exam


Neck Exam: Full ROM, Normal Inspection.  absent: Lymphadenopathy, Thyromegaly





- Respiratory Exam


Respiratory Exam: Clear to Ausculation Bilateral, NORMAL BREATHING PATTERN.  

absent: Accessory Muscle Use, Chest Wall Tenderness, Decreased Breath Sounds, 

Rales, Rhonchi, Wheezes





- Cardiovascular Exam


Cardiovascular Exam: REGULAR RHYTHM, RRR, +S1, +S2.  absent: Bradycardia, 

Tachycardia, Irregular Rhythm, JVD (unable to assess if hepatojugular reflux, 

abd too tender to deeply palpate), +S4





- GI/Abdominal Exam


GI & Abdominal Exam: Guarding, Soft (except at RLQ bordering R groin), 

Tenderness (moderate diffuse tenderness, more prominent on left side, most 

tender at LLQ along pathing of descending and sigmoid colon), Normal Bowel 

Sounds.  absent: Rigid


RLQ colectomy bag with some clear yellow urine present





- Extremities Exam


Extremities Exam: Normal Capillary Refill, Normal Inspection.  absent: Calf 

Tenderness, Pedal Edema, Tenderness


Reports intermittent numbness in bilateral LE, but full ROM, no gross deficits 

in sided-movement





- Neurological Exam


Neurological Exam: Alert, Awake, Oriented x3


Neuro motor strength exam: Left Upper Extremity: 4, Right Upper Extremity: 4, 

Left Lower Extremity: 4, Right Lower Extremity: 4





- Psychiatric Exam


Psychiatric exam: Normal Affect, Normal Mood





- Skin


Skin Exam: Dry, Intact, Normal Color, Warm





Assessment and Plan





- Assessment and Plan (Free Text)


Assessment: 





61 year old male with a PMH interstitial hemorrhagic cystitis, ileal conduit, s/

p cystectomy, recurrent urinary infections, chronic abdominal pain with 

multiple hernia repairs, irritable bowel syndrome, abdominal wall abscess s/p I&

D, and most recently elevated PSA who presents with complaints of a syncopal 

episode a few days ago as well as worsening abdominal pain.  Also was 

incidentally found to have elevated TSH (confirmed on repeat labs), prompting 

additional thyroid workup.


Plan: 





1) Syncopal episode


-Multiple possible etiologies, infectious from complicated UTI vs neurogenic vs 

endocrine vs cardiogenic/arrythmia


-Trops negative x3


-Orthostatics 104/64 lying, 108/68 sitting, 112/53 standing, and episode 

occurred while sitting, so not orthostatic hypotension


-Neuro (Dr. Burden) consulted, appreciate all recs; Neurocardio vs vaso-vagal, 

head/neck MRA and brain MRI obtained, notable for suspected partial thrombus of 

left M1 segment, loaded with Plavix and now on daily plavix and aspirin


-EKG reviewed, NSR, no axis deviation, no ST-T wave changes


-Head CT showed R temporal lobe 9mm hypoattenuating focus


-PT ordered





2) Abdominal pain


-History of multiple abd surgeries, poss adhesions vs 2/2 IBS vs recurrent 

hernia, likely constipation component


-Surgery consulted, appreciate all recs; nothing to do from surgical 

perspective at this time


-Urology consulted due to calcification of the cystectomy conduit on CT; recs 

outpatient followup with Urologic oncologist, card provided to patient


-Morphine PRN ordered


-Constipation improved, but now having moderate left sided and inferior 

abdominal pain, likely 2/2 multiple bowel movements from dulcolax suppository, 

continue to monitor


-Advance diet as tolerated





3) Complicated urinary tract infection


-continue vanco and zosyn (day 3 each) as per ID


-ID consulted, appreciate all recs


-Blood cx negative at 24 hours; urine culture likely contaminated, repeat 

ordered





4) Hypothyroidism


-Cont home levothyroxine


-Initial TSH elevated at 79.2, recheck at 84.2, 43.8 today; Free T4 was 0.53, 

now 0.92; Total T3 1.03


-Given 200mcg Synthroid IV x1


-As per Endo, continue the 100mcg dosage for 3-4 days, reassess as TSH levels 

improve, then will increase to 150mcg dosing, pending thyroid peroxidase and 

thyroglobulin antibodies


-Endo (Dr. Quinteros) consulted, appreciate all recs





5) CARLY on CKD


-continue NS @ 100


-Cr 1.7 today (was 1.8), BUN 12 (was 18)


-continue to monitor





6) Anxiety


-Cont home alprazolam





Dispo: Telemetry, pending normalization of TSH, pending repeat urine cx, 

receiving IV antibiotics and Asa/Plavix


FEN: Regular soft diet


Access: Peripheral IV, Cystectomy bag


Consults: Neuro, Urology, Surgery, ID, Endo


Ppx: Protonix for GI, SCDs for DVT





Patient seen, reviewed, and discussed with attending, Dr. Paulino.





<Dante Paulino - Last Filed: 11/14/17 16:11>





Objective





- Vital Signs/Intake and Output


Vital Signs (last 24 hours): 


 











Temp Pulse Resp BP Pulse Ox


 


 98.5 F   51 L  18   111/52 L  96 


 


 11/14/17 12:00  11/14/17 14:00  11/14/17 12:00  11/14/17 12:00  11/14/17 05:53








Intake and Output: 


 











 11/14/17 11/14/17





 06:59 18:59


 


Intake Total 1560 960


 


Output Total 1700 500


 


Balance -140 460














- Medications


Medications: 


 Current Medications





Albuterol Sulfate (Albuterol 0.083% Inhal Sol (2.5 Mg/3 Ml) Ud)  2.5 mg IH 

[id number] PRN


   PRN Reason: Cough and congestion


Alprazolam (Xanax)  0.25 mg PO DAILY [facility]


   PRN Reason: Protocol


   Stop: 11/20/17 10:01


   Last Admin: 11/14/17 09:31 Dose:  0.25 mg


Aspirin (Ecotrin)  81 mg PO DAILY [facility]


   Last Admin: 11/14/17 14:08 Dose:  Not Given


Aspirin (Aspirin Chewable)  81 mg PO DAILY [facility]


   Last Admin: 11/14/17 09:29 Dose:  81 mg


Clopidogrel Bisulfate (Plavix)  75 mg PO DAILY [facility]


   Last Admin: 11/14/17 09:30 Dose:  75 mg


Vancomycin HCl (Vancomycin 1gm)  1 gm in 250 mls @ 167 mls/hr IVPB DAILY [facility]


   PRN Reason: Protocol


   Last Admin: 11/14/17 09:30 Dose:  167 mls/hr


Piperacillin Sod/Tazobactam Sod (Zosyn 3.375 In Ns 100ml)  100 mls @ 200 mls/hr 

IVPB Q6 [facility]


   PRN Reason: Protocol


   Stop: 11/19/17 12:01


   Last Admin: 11/14/17 14:09 Dose:  200 mls/hr


Sodium Chloride (Sodium Chloride 0.9%)  1,000 mls @ 100 mls/hr IV .Q10H [facility]


   Last Admin: 11/14/17 03:16 Dose:  Not Given


Levothyroxine Sodium (Synthroid)  100 mcg IVP DAILY [facility]


   Last Admin: 11/14/17 09:30 Dose:  100 mcg


Morphine Sulfate (Morphine)  2 mg IVP Q6H [facility]


   PRN Reason: Pain, moderate (4-7)


   Last Admin: 11/14/17 03:15 Dose:  2 mg


Pantoprazole Sodium (Protonix Ec Tab)  40 mg PO 0600 [facility]


   Last Admin: 11/14/17 05:13 Dose:  40 mg


Polyethylene Glycol (Miralax)  17 gm PO DAILY [facility]


   Last Admin: 11/14/17 09:29 Dose:  17 gm











- Labs


Labs: 


 





 11/14/17 06:30 





 11/14/17 05:30 





 











PT  12.0 SECONDS (9.4-12.5)   11/12/17  08:45    


 


INR  1.09  (0.93-1.08)  H  11/12/17  08:45    


 


APTT  29.4 Seconds (25.1-36.5)   11/12/17  08:45    














Attending/Attestation





- Attestation


I have personally seen and examined this patient.: Yes


I have fully participated in the care of the patient.: Yes


I have reviewed all pertinent clinical information, including history, physical 

exam and plan: Yes


Notes (Text): 





11/14/17 16:10


Patient was seen and examined with medical resident. 


Agreed with resident assessment and plan.





Patient abdominal pain is improving.He denies any dizziness, chest pain or 

palpitation.


No definite thrombus on official reading of MRA of head, Neurology is following.


Patient is afebrile, cultures are negative, urine is likely contaminated.


We will follow up result of Echo.





Management plan was discussed in detail with patient


Education was provided.

## 2017-11-14 NOTE — MRI
PROCEDURE:  MRI BRAIN WITHOUT CONTRAST



HISTORY:

r/o seizures



COMPARISON:

None. 



TECHNIQUE:

Multiplanar, multisequence MR images of the brain were obtained 

without intravenous contrast enhancement. Additional thin section 

coronal images were performed through the temporal lobes 



FINDINGS:



HEMORRHAGE:

None



DWI:

No evidence of an acute or early subacute infarction.



BRAIN PARENCHYMA:

No mass effect or edema. Dilated perivascular spaces can be seen in 

the right basal ganglia. This is a normal variation. There are no 

abnormalities seen in the temporal lobe 



VENTRICLES:

Unremarkable. No hydrocephalus.



CRANIUM:

Unremarkable.



ORBITS:

Grossly unremarkable.



PARANASAL SINUSES/MASTOIDS:

There is opacification of the ethmoid and frontal sinuses and mucosal 

thickening in the maxillary and sphenoid sinuses



VASCULAR SYSTEM:

Skull base flow voids intact.



OTHER FINDINGS:

The report concurs with the preliminary [facility] report 



IMPRESSION:

No acute intracranial findings

## 2017-11-14 NOTE — MRI
PROCEDURE:  Magnetic Resonance Angiography Brain



HISTORY:

r/o seizures







COMPARISON:

None available. 



TECHNIQUE:

3D time of flight MR angiography of the intracranial arteries was 

performed. Rotating maximum intensity projection images were 

generated.



FINDINGS:



INTERNAL CAROTID ARTERIES:

Unremarkable. The skull base, petrous, cavernous and supraclinoid 

segments are bilaterally widely patient. 



ANTERIOR CEREBRAL ARTERIES:

Unremarkable. A1 and A2 segments are widely patent. Smaller distal 

branches unremarkable, as visualized.



MIDDLE CEREBRAL ARTERIES:

There is some asymmetry of the middle cerebral arteries left smaller 

than right.  There is minimal irregularity. No significant stenosis



POSTERIOR CIRCULATION:

Basilar Artery: Unremarkable.



Distal Vertebral Arteries: Unremarkable.



Posterior Cerebral Arteries: Unremarkable.



Posterior Inferior Cerebellar Arteries: Unremarkable.



ANEURYSM/ VASCULAR MALFORMATIONS:

None.



OTHER FINDINGS:

The report concurs with the preliminary [facility] report 



IMPRESSION:

No significant stenosis or occlusion

## 2017-11-14 NOTE — PN
ENDOCRINOLOGY FOLLOWUP NOTE



DATE:



LOCATION:  Room 375.



SUBJECTIVE:  This is a 61-year-old male with recent admission for syncopal

event and currently undergoing neurological workup at this time and is also

being followed closely for metabolic management.  He also has marked

hypothyroidism noted historically and biochemically, although he remains

clinically euthyroid at this time.



His latest chemistry showed BUN of 12, sodium 142, potassium 3.7, chloride

107, CO2 26, glucose 95, and creatinine 1.7.  His repeat thyroid showed a

T4 of 5.0 with a TSH of 33.80 as noted thereof.



So at this time, we will continue the levothyroxine given parenterally with

a bolus of 100 mcg IV push once daily in the morning as ordered.  This will

help to replenish the depleted _____.  We will also obtain serial thyroid

studies and titrate his dose regimen accordingly.  We will obtain serial

chemistries and supplement accordingly as needed.  We will follow with you.





__________________________________________

Cheryl Quinteros MD



DD:  11/14/2017 14:22:14

DT:  11/14/2017 16:20:59

Job # [id number]

## 2017-11-15 VITALS
HEART RATE: 55 BPM | SYSTOLIC BLOOD PRESSURE: 101 MMHG | TEMPERATURE: 98.6 F | DIASTOLIC BLOOD PRESSURE: 63 MMHG | RESPIRATION RATE: 18 BRPM

## 2017-11-15 VITALS — OXYGEN SATURATION: 98 %

## 2017-11-15 LAB
ALBUMIN/GLOB SERPL: 1 {RATIO} (ref 1.1–1.8)
ALP SERPL-CCNC: 42 U/L (ref 38–126)
ALT SERPL-CCNC: 22 U/L (ref 7–56)
AST SERPL-CCNC: 24 U/L (ref 17–59)
BASOPHILS # BLD AUTO: 0.03 K/MM3 (ref 0–2)
BASOPHILS NFR BLD: 0.8 % (ref 0–3)
BILIRUB SERPL-MCNC: 0.5 MG/DL (ref 0.2–1.3)
BUN SERPL-MCNC: 11 MG/DL (ref 7–21)
CALCIUM SERPL-MCNC: 8.1 MG/DL (ref 8.4–10.5)
CHLORIDE SERPL-SCNC: 106 MMOL/L (ref 98–107)
CO2 SERPL-SCNC: 29 MMOL/L (ref 21–33)
EOSINOPHIL # BLD: 0.2 10*3/UL (ref 0–0.7)
EOSINOPHIL NFR BLD: 5 % (ref 1.5–5)
ERYTHROCYTE [DISTWIDTH] IN BLOOD BY AUTOMATED COUNT: 14.9 % (ref 11.5–14.5)
GLOBULIN SER-MCNC: 3.4 GM/DL
GLUCOSE SERPL-MCNC: 88 MG/DL (ref 70–110)
GRANULOCYTES # BLD: 2.3 10*3/UL (ref 1.4–6.5)
GRANULOCYTES NFR BLD: 57.4 % (ref 50–68)
HCT VFR BLD CALC: 32 % (ref 42–52)
LYMPHOCYTES # BLD: 1.2 10*3/UL (ref 1.2–3.4)
LYMPHOCYTES NFR BLD AUTO: 29 % (ref 22–35)
MAGNESIUM SERPL-MCNC: 2.2 MG/DL (ref 1.7–2.2)
MCH RBC QN AUTO: 24.8 PG (ref 25–35)
MCHC RBC AUTO-ENTMCNC: 30.9 G/DL (ref 31–37)
MCV RBC AUTO: 80.2 FL (ref 80–105)
MONOCYTES # BLD AUTO: 0.3 10*3/UL (ref 0.1–0.6)
MONOCYTES NFR BLD: 7.8 % (ref 1–6)
PHOSPHATE SERPL-MCNC: 3.5 MG/DL (ref 2.5–4.5)
PLATELET # BLD: 178 10^3/UL (ref 120–450)
PMV BLD AUTO: 8.5 FL (ref 7–11)
POTASSIUM SERPL-SCNC: 3.7 MMOL/L (ref 3.6–5)
PROT SERPL-MCNC: 6.7 G/DL (ref 5.8–8.3)
SODIUM SERPL-SCNC: 142 MMOL/L (ref 132–148)
WBC # BLD AUTO: 4 10^3/UL (ref 4.5–11)

## 2017-11-15 RX ADMIN — ALBUTEROL SULFATE PRN MG: 2.5 SOLUTION RESPIRATORY (INHALATION) at 13:32

## 2017-11-15 RX ADMIN — MORPHINE SULFATE PRN MG: 2 INJECTION, SOLUTION INTRAMUSCULAR; INTRAVENOUS at 09:41

## 2017-11-15 RX ADMIN — PIPERACILLIN AND TAZOBACTAM SCH MLS/HR: 3; .375 INJECTION, POWDER, LYOPHILIZED, FOR SOLUTION INTRAVENOUS; PARENTERAL at 05:27

## 2017-11-15 RX ADMIN — LEVOTHYROXINE SODIUM ANHYDROUS SCH MCG: 100 INJECTION, POWDER, LYOPHILIZED, FOR SOLUTION INTRAVENOUS at 09:38

## 2017-11-15 RX ADMIN — PANTOPRAZOLE SODIUM SCH MG: 40 TABLET, DELAYED RELEASE ORAL at 05:27

## 2017-11-15 RX ADMIN — ALBUTEROL SULFATE PRN MG: 2.5 SOLUTION RESPIRATORY (INHALATION) at 08:23

## 2017-11-15 RX ADMIN — VANCOMYCIN HYDROCHLORIDE SCH MLS/HR: 1 INJECTION, POWDER, LYOPHILIZED, FOR SOLUTION INTRAVENOUS at 09:39

## 2017-11-15 RX ADMIN — POLYETHYLENE GLYCOL 3350 SCH GM: 17 POWDER, FOR SOLUTION ORAL at 09:37

## 2017-11-15 NOTE — CP.PCM.PN
Subjective





- Date & Time of Evaluation


Date of Evaluation: 11/15/17


Time of Evaluation: 11:15





- Subjective


Subjective: 





Still has occasional abdominal pain but not as bad as on admission, no fevers 

overnight.





Objective





- Vital Signs/Intake and Output


Vital Signs (last 24 hours): 


 











Temp Pulse Resp BP Pulse Ox


 


 98.1 F   63   94 H  122/79   98 


 


 11/15/17 06:00  11/15/17 09:07  11/15/17 06:00  11/15/17 06:00  11/15/17 00:01








Intake and Output: 


 











 11/15/17 11/15/17





 06:59 18:59


 


Intake Total 300 


 


Output Total 1600 


 


Balance -1300 














- Medications


Medications: 


 Current Medications





Albuterol Sulfate (Albuterol 0.083% Inhal Sol (2.5 Mg/3 Ml) Ud)  2.5 mg IH 

[id number] PRN


   PRN Reason: Cough and congestion


   Last Admin: 11/15/17 08:23 Dose:  2.5 mg


Alprazolam (Xanax)  0.25 mg PO DAILY [facility]


   PRN Reason: Protocol


   Stop: 11/20/17 10:01


   Last Admin: 11/15/17 09:39 Dose:  0.25 mg


Aspirin (Ecotrin)  81 mg PO DAILY [facility]


   Last Admin: 11/15/17 09:37 Dose:  Not Given


Aspirin (Aspirin Chewable)  81 mg PO DAILY [facility]


   Last Admin: 11/15/17 09:36 Dose:  81 mg


Clopidogrel Bisulfate (Plavix)  75 mg PO DAILY [facility]


   Last Admin: 11/15/17 09:37 Dose:  75 mg


Sodium Chloride (Sodium Chloride 0.9%)  1,000 mls @ 100 mls/hr IV .Q10H [facility]


   Last Admin: 11/15/17 09:37 Dose:  100 mls/hr


Levothyroxine Sodium (Synthroid)  100 mcg IVP DAILY [facility]


   Last Admin: 11/15/17 09:38 Dose:  100 mcg


Morphine Sulfate (Morphine)  2 mg IVP Q6H PRN


   PRN Reason: Pain, moderate (4-7)


   Last Admin: 11/15/17 09:41 Dose:  2 mg


Pantoprazole Sodium (Protonix Ec Tab)  40 mg PO 0600 [facility]


   Last Admin: 11/15/17 05:27 Dose:  40 mg


Polyethylene Glycol (Miralax)  17 gm PO DAILY [facility]


   Last Admin: 11/15/17 09:37 Dose:  17 gm











- Labs


Labs: 


 





 11/15/17 06:00 





 11/15/17 06:00 





 











PT  12.0 SECONDS (9.4-12.5)   11/12/17  08:45    


 


INR  1.09  (0.93-1.08)  H  11/12/17  08:45    


 


APTT  29.4 Seconds (25.1-36.5)   11/12/17  08:45    














- Constitutional


Appears: Non-toxic





- Head Exam


Head Exam: NORMAL INSPECTION





- ENT Exam


ENT Exam: Mucous Membranes Moist





- Neck Exam


Neck Exam: absent: Meningismus





- Respiratory Exam


Respiratory Exam: Decreased Breath Sounds





- Cardiovascular Exam


Cardiovascular Exam: +S1, +S2





- GI/Abdominal Exam


GI & Abdominal Exam: Soft.  absent: Tenderness


Additional comments: 





ileostomy in place





Assessment and Plan





- Assessment and Plan (Free Text)


Plan: 





Assessment


abdominal pain R/O due to constipation, less likely urinary tract infection


history of Abdominal wall abscess S/P drainage S/P repeat debridement and wound 

vacuum placement, with Enterobacter


hypothyroidism


Prostate disease


anxiety disorder


esophageal strictures


acute renal failure





Plan


blood cx are negative; urine cx only shows contamination; reviewed CT A/P which 

did not show acute pathology but still showed the calcification around the 

ileal conduit site


follow up urology evaluation


will d/c vancomycin and Zosyn and observe

## 2017-11-15 NOTE — CP.PCM.PN
Subjective





- Date & Time of Evaluation


Date of Evaluation: 11/15/17


Time of Evaluation: 07:44





- Subjective


Subjective: 





Mr. Hickey was seen and examined at the bedside. He is alert, oriented in 

all spheres. He denies headache, dizziness,lightheadedness, nausea, or 

vomiting. He denies any episode of syncope while in the hospital. He further 

claims of feeling numbness in his toes for a long time. He is also being seen 

by an outside physician for his borderline diabetes and kidney function. There 

was no untoward events overnight.





Objective





- Vital Signs/Intake and Output


Vital Signs (last 24 hours): 


 











Temp Pulse Resp BP Pulse Ox


 


 98.1 F   49 L  94 H  122/79   98 


 


 11/15/17 06:00  11/15/17 06:00  11/15/17 06:00  11/15/17 06:00  11/15/17 00:01








Intake and Output: 


 











 11/15/17 11/15/17





 06:59 18:59


 


Intake Total 300 


 


Output Total 1600 


 


Balance -1300 














- Medications


Medications: 


 Current Medications





Albuterol Sulfate (Albuterol 0.083% Inhal Sol (2.5 Mg/3 Ml) Ud)  2.5 mg IH 

[id number] PRN


   PRN Reason: Cough and congestion


Alprazolam (Xanax)  0.25 mg PO DAILY [facility]


   PRN Reason: Protocol


   Stop: 11/20/17 10:01


   Last Admin: 11/14/17 09:31 Dose:  0.25 mg


Aspirin (Ecotrin)  81 mg PO DAILY [facility]


   Last Admin: 11/14/17 14:08 Dose:  Not Given


Aspirin (Aspirin Chewable)  81 mg PO DAILY [facility]


   Last Admin: 11/14/17 09:29 Dose:  81 mg


Clopidogrel Bisulfate (Plavix)  75 mg PO DAILY [facility]


   Last Admin: 11/14/17 09:30 Dose:  75 mg


Vancomycin HCl (Vancomycin 1gm)  1 gm in 250 mls @ 167 mls/hr IVPB DAILY [facility]


   PRN Reason: Protocol


   Last Admin: 11/14/17 09:30 Dose:  167 mls/hr


Piperacillin Sod/Tazobactam Sod (Zosyn 3.375 In Ns 100ml)  100 mls @ 200 mls/hr 

IVPB Q6 [facility]


   PRN Reason: Protocol


   Stop: 11/19/17 12:01


   Last Admin: 11/15/17 05:27 Dose:  200 mls/hr


Sodium Chloride (Sodium Chloride 0.9%)  1,000 mls @ 100 mls/hr IV .Q10H [facility]


   Last Admin: 11/14/17 17:09 Dose:  100 mls/hr


Levothyroxine Sodium (Synthroid)  100 mcg IVP DAILY [facility]


   Last Admin: 11/14/17 09:30 Dose:  100 mcg


Morphine Sulfate (Morphine)  2 mg IVP Q6H PRN


   PRN Reason: Pain, moderate (4-7)


   Last Admin: 11/14/17 23:34 Dose:  2 mg


Pantoprazole Sodium (Protonix Ec Tab)  40 mg PO 0600 [facility]


   Last Admin: 11/15/17 05:27 Dose:  40 mg


Polyethylene Glycol (Miralax)  17 gm PO DAILY [facility]


   Last Admin: 11/14/17 09:29 Dose:  17 gm











- Labs


Labs: 


 





 11/15/17 06:00 





 11/15/17 06:00 





 











PT  12.0 SECONDS (9.4-12.5)   11/12/17  08:45    


 


INR  1.09  (0.93-1.08)  H  11/12/17  08:45    


 


APTT  29.4 Seconds (25.1-36.5)   11/12/17  08:45    














- Constitutional


Appears: No Acute Distress





- Head Exam


Head Exam: ATRAUMATIC





- Neurological Exam


Neurological Exam: Alert, Awake, Oriented x3


Neuro motor strength exam: Left Upper Extremity: 5, Right Upper Extremity: 5, 

Left Lower Extremity: 5, Right Lower Extremity: 5


Additional comments: 





He remains with right arm past pointing finger to nose testing on the right 

side.  Strength was otherwise symmetrical.  Sensation was intact and reflexes 

were normal. 





Assessment and Plan


(1) Syncope


Assessment & Plan: 


Case discussed with Dr. Burden, pending EEG results. continue all current medical

, physical, and occupational therapies.


Status: Acute

## 2017-11-15 NOTE — CP.PCM.DIS
<Tigre Bass - Last Filed: 11/15/17 16:41>





Provider





- Provider


Date of Admission: 


11/13/17 14:51





Attending physician: 


Dante Paulino MD





Primary care physician: 


NO PRIMARY CARE PROVIDER





Consults: 





Endo: Cam


Neuro: Jobyya


ID: [id number]


Urology: Emre


[facility]





Time Spent in preparation of Discharge (in minutes): 45





Diagnosis





- Discharge Diagnosis


(1) Vaso vagal episode


Status: Resolved   Priority: High   





(2) History of partial cystectomy


Status: Chronic   Priority: Medium   





(3) IBS (irritable bowel syndrome)


Status: Chronic   Priority: Medium   





(4) Cerebrovascular disease


Status: Acute   Priority: High   


Comment: Partial thrombus found on head MRA, now on Aspirin and Plavix   





(5) Abdominal pain


Status: Chronic   Priority: Low   





(6) Hypothyroidism


Status: Acute   Priority: High   


Comment: exacerbation of hypothyroidism   





Hospital Course





- Lab Results


Lab Results: 


 Most Recent Lab Values











WBC  4.0 10^3/ul (4.5-11.0)  L D 11/15/17  06:00    


 


RBC  3.99 10^6/uL (3.5-6.1)   11/15/17  06:00    


 


Hgb  9.9 g/dL (14.0-18.0)  L  11/15/17  06:00    


 


Hct  32.0 % (42.0-52.0)  L  11/15/17  06:00    


 


MCV  80.2 fl (80.0-105.0)   11/15/17  06:00    


 


MCH  24.8 pg (25.0-35.0)  L  11/15/17  06:00    


 


MCHC  30.9 g/dl (31.0-37.0)  L  11/15/17  06:00    


 


RDW  14.9 % (11.5-14.5)  H  11/15/17  06:00    


 


Plt Count  178 10^3/uL (120.0-450.0)   11/15/17  06:00    


 


MPV  8.5 fl (7.0-11.0)   11/15/17  06:00    


 


Gran %  57.4 % (50.0-68.0)   11/15/17  06:00    


 


Lymph % (Auto)  29.0 % (22.0-35.0)   11/15/17  06:00    


 


Mono % (Auto)  7.8 % (1.0-6.0)  H  11/15/17  06:00    


 


Eos % (Auto)  5.0 % (1.5-5.0)   11/15/17  06:00    


 


Baso % (Auto)  0.8 % (0.0-3.0)   11/15/17  06:00    


 


Gran #  2.30  (1.4-6.5)   11/15/17  06:00    


 


Lymph #  1.2  (1.2-3.4)   11/15/17  06:00    


 


Mono #  0.3  (0.1-0.6)   11/15/17  06:00    


 


Eos #  0.2  (0.0-0.7)   11/15/17  06:00    


 


Baso #  0.03 K/mm3 (0.0-2.0)   11/15/17  06:00    


 


PT  12.0 SECONDS (9.4-12.5)   11/12/17  08:45    


 


INR  1.09  (0.93-1.08)  H  11/12/17  08:45    


 


APTT  29.4 Seconds (25.1-36.5)   11/12/17  08:45    


 


pO2  32 mm/Hg (30-55)   11/12/17  13:40    


 


VBG pH  7.39  (7.32-7.43)   11/12/17  13:40    


 


VBG pCO2  52.0  (40-60)   11/12/17  13:40    


 


VBG HCO3  31.5 mmol/l (21-28)  H  11/12/17  13:40    


 


VBG Total CO2  33.1 mmol.L (22-28)  H  11/12/17  13:40    


 


VBG O2 Sat (Calc)  65.6 % (40-65)  H  11/12/17  13:40    


 


VBG Base Excess  5.2 mmol/L (0.0-2.0)  H  11/12/17  13:40    


 


VBG Potassium  3.6 mmol/L (3.6-5.2)   11/12/17  13:40    


 


Sodium  138.0 mmol/L (132-148)   11/12/17  13:40    


 


Chloride  102.0 mmol/L ()   11/12/17  13:40    


 


Glucose  131 mg/dl ()  H  11/12/17  13:40    


 


Lactate  1.3 mmol/L (0.7-2.1)   11/12/17  13:40    


 


FiO2  21.0 %  11/12/17  13:40    


 


Sodium  142 mmol/L (132-148)   11/15/17  06:00    


 


Potassium  3.7 mmol/L (3.6-5.0)   11/15/17  06:00    


 


Chloride  106 mmol/L ()   11/15/17  06:00    


 


Carbon Dioxide  29 mmol/L (21-33)   11/15/17  06:00    


 


Anion Gap  11  (10-20)   11/15/17  06:00    


 


BUN  11 mg/dL (7-21)   11/15/17  06:00    


 


Creatinine  1.7 mg/dl (0.8-1.5)  H  11/15/17  06:00    


 


Est GFR ( Amer)  50   11/15/17  06:00    


 


Est GFR (Non-Af Amer)  41   11/15/17  06:00    


 


Random Glucose  88 mg/dL ()   11/15/17  06:00    


 


Calcium  8.1 mg/dL (8.4-10.5)  L  11/15/17  06:00    


 


Phosphorus  3.5 mg/dL (2.5-4.5)   11/15/17  06:00    


 


Magnesium  2.2 mg/dL (1.7-2.2)   11/15/17  06:00    


 


Total Bilirubin  0.5 mg/dL (0.2-1.3)   11/15/17  06:00    


 


AST  24 U/L (17-59)   11/15/17  06:00    


 


ALT  22 U/L (7-56)   11/15/17  06:00    


 


Alkaline Phosphatase  42 U/L ()   11/15/17  06:00    


 


Lactate Dehydrogenase  335 U/L (333-699)   11/12/17  08:45    


 


Total Creatine Kinase  103 U/L ()   11/12/17  08:45    


 


Troponin I  < 0.01 ng/mL  11/12/17  21:30    


 


Total Protein  6.7 g/dL (5.8-8.3)   11/15/17  06:00    


 


Albumin  3.3 g/dL (3.0-4.8)   11/15/17  06:00    


 


Globulin  3.4 gm/dL  11/15/17  06:00    


 


Albumin/Globulin Ratio  1.0  (1.1-1.8)  L  11/15/17  06:00    


 


Free T4  0.92 ng/dL (0.78-2.19)   11/14/17  05:30    


 


Thyroxine (T4)  5.0 ug/dL (5.5-11.0)  L  11/14/17  05:30    


 


Total T3  1.03 ng/mL (0.97-1.69)   11/12/17  14:00    


 


TSH 3rd Generation  43.80 mIU/mL (0.46-4.68)  H  11/14/17  05:30    


 


Cortisol AM Sample  3.0 ug/dL (4.46-22.7)  L  11/14/17  05:30    


 


Venous Blood Potassium  3.6 mmol/L (3.6-5.2)   11/12/17  13:40    


 


Urine Color  Yellow  (YELLOW)   11/12/17  08:45    


 


Urine Appearance  Cloudy  (CLEAR)   11/12/17  08:45    


 


Urine pH  6.5  (4.7-8.0)   11/12/17  08:45    


 


Ur Specific Gravity  1.020  (1.005-1.035)   11/12/17  08:45    


 


Urine Protein  100 mg/dL (<30 mg/dL)  H  11/12/17  08:45    


 


Urine Glucose (UA)  Negative mg/dL (NEGATIVE)   11/12/17  08:45    


 


Urine Ketones  Negative mg/dL (NEGATIVE)   11/12/17  08:45    


 


Urine Blood  Large  (NEGATIVE)  H  11/12/17  08:45    


 


Urine Nitrate  Positive  (NEGATIVE)  H  11/12/17  08:45    


 


Urine Bilirubin  Negative  (NEGATIVE)   11/12/17  08:45    


 


Urine Urobilinogen  0.2 E.U./dL (<1 E.U./dL)   11/12/17  08:45    


 


Ur Leukocyte Esterase  Moderate Maria C/uL (NEGATIVE)  H  11/12/17  08:45    


 


Urine RBC  15 - 20 /hpf (0-2)   11/12/17  08:45    


 


Urine WBC  Tntc /hpf (0-6)   11/12/17  08:45    


 


Ur Epithelial Cells  0 - 2 /hpf (0-5)   11/12/17  08:45    


 


Urine Bacteria  Many  (NEG)   11/12/17  08:45    














- Hospital Course


Hospital Course: 





This is a 61 year old male with a PMH interstitial hemorrhagic cystitis, ileal 

conduit, s/p cystectomy, recurrent urinary infections, chronic abdominal pain 

with multiple hernia repairs, irritable bowel syndrome, abdominal wall abscess s

/p I&D, and most recently elevated PSA who presents with complaints of a 

syncopal episode a few days ago as well as worsening abdominal pain.  He was 

also incidentally found to have a significantly elevated TSH (confirmed on 

recheck), and was found to have a partial thrombus on Head MRA.  While here, he 

was seen by Neuro, Surgery, Endocrine, and Urology.  As per Endocrine, he was 

given IV synthroid to replete his stores, and is to be discharged on 150mg PO 

Synthroid (increase from his home dose of 100mg PO daily).  His low AM Cortisol 

is falsely low, and should be rechecked in a month.  As per Urology, patient is 

to follow up with Dr. Mittal (Urologic Oncologist) as an outpatient to obtain 

uteroscopy and possible prostate biopsy.  As per Surgery, no surgical 

intervention indicated at this time.  As per Neuro, the patient should continue 

on Plavix 75mg daily and Aspirin 81mg daily, and should follow up in the clinic 

the following Monday (11/20/17).  His syncopal episode is likely Vaso-vagal 

syncope.  He was instructed to follow up with his PMD within 1 week, Neurology 

next Monday (11/20/17), Dr. Mittal as instructed by Urology, and Endocrine in 1 

month.  Changes in Synthroid dosing and new medications were reviewed with 

patient.  Scripts for new medications and increased Synthroid were 

electronically transmitted to the in-house outpatient pharmacy to be ready on 

discharge.  Patient expressed understanding and agreement with all instructions

, all questions were answered, and then patient was discharged to home.





Patient seen, reviewed, and discussed with attending, Dr. Paulino.





Discharge Exam





- Head Exam


Head Exam: NORMAL INSPECTION





- Additional Findings


Additional findings: 





- Constitutional


Appears: Non-toxic, No Acute Distress, Chronically Ill





- Head Exam


Head Exam: ATRAUMATIC, NORMAL INSPECTION, NORMOCEPHALIC





- Eye Exam


Eye Exam: EOMI, Normal appearance, PERRL.  absent: Conjunctival injection, 

Scleral icterus


Pupil Exam: NORMAL ACCOMODATION, PERRL.  absent: Fixed, Irregular, Unequal





- ENT Exam


ENT Exam: Mucous Membranes Moist.  absent: Mucous Membranes Dry





- Neck Exam


Neck Exam: Full ROM, Normal Inspection.  absent: Lymphadenopathy, Thyromegaly





- Respiratory Exam


Respiratory Exam: Clear to Ausculation Bilateral, NORMAL BREATHING PATTERN.  

absent: Accessory Muscle Use, Chest Wall Tenderness, Decreased Breath Sounds, 

Rales, Rhonchi, Wheezes





- Cardiovascular Exam


Cardiovascular Exam: REGULAR RHYTHM, RRR, +S1, +S2.  absent: Bradycardia, 

Tachycardia, Irregular Rhythm, JVD (unable to assess if hepatojugular reflux, 

abd too tender to deeply palpate), +S4





- GI/Abdominal Exam


GI & Abdominal Exam: Guarding, Soft (except at RLQ bordering R groin), 

Tenderness (moderate diffuse tenderness, more prominent on left side, most 

tender at LLQ along pathing of descending and sigmoid colon), Normal Bowel 

Sounds.  absent: Rigid


RLQ colectomy bag with some clear yellow urine present





- Extremities Exam


Extremities Exam: Normal Capillary Refill, Normal Inspection.  absent: Calf 

Tenderness, Pedal Edema, Tenderness


Reports intermittent numbness in bilateral LE, but full ROM, no gross deficits 

in sided-movement





- Neurological Exam


Neurological Exam: Alert, Awake, Oriented x3


Neuro motor strength exam: Left Upper Extremity: 4, Right Upper Extremity: 4, 

Left Lower Extremity: 4, Right Lower Extremity: 4





- Psychiatric Exam


Psychiatric exam: Normal Affect, Normal Mood





- Skin


Skin Exam: Dry, Intact, Normal Color, Warm








Discharge Plan





- Discharge Medications


Prescriptions: 


Aspirin [Aspirin Chewable] 81 mg PO DAILY #30 ctb


Clopidogrel [Plavix] 75 mg PO DAILY #30 tab


Levothyroxine [Synthroid] 150 mcg PO DAILY #30 tab





- Follow Up Plan


Condition: FAIR


Disposition: HOME/ ROUTINE


Instructions:  Syncope (DC), Hypothyroidism (DC), Acute Abdominal Pain (DC)


Additional Instructions: 


-You have been given prescriptions for Aspirin, Plavix, and a new dosage for 

Synthroid (all transmitted to our outpatient pharmacy to be ready for pickup on 

discharge).  Please take as directed.


-Please resume all other home medications (excluding you previous dose of 

synthroid 100mcg daily)


-Please follow up with your PMD within 1 week.


-Please call to schedule an outpatient appointment with Dr. Burden (Neurology) 

in his clinic next Monday (11/20/17).  A referral has been provided.


-Please follow up with the Urologic Oncologist, Dr. Mittal, as instructed.


-Please follow up with the Endocrinologist (Dr. Quinteros) within 1 month for repeat 

testing of your thyroid and adrenal glands.


-If you experience any worsening or concerning symptoms, please return to a 

hospital.


Referrals: 


Cheryl Quinteros MD [Medical Doctor] - 


Jt Burden MD [Staff Provider] - 


JENNIFER JEAN BAPTISTE [Primary Care Provider] - 


Bill Mittal MD [Medical Doctor] - 





<Dante Paulino - Last Filed: 11/16/17 13:23>





Provider





- Provider


Date of Admission: 


11/13/17 14:51





Attending physician: 


Dante Paulino MD





Primary care physician: 


JENNIFER PRIMARY CARE PROVIDER








Hospital Course





- Lab Results


Lab Results: 


 Micro Results





11/15/17 06:47   Urine   Urine Culture - Final


                            No Growth (<1,000 CFU/ML)





 Most Recent Lab Values











WBC  4.0 10^3/ul (4.5-11.0)  L D 11/15/17  06:00    


 


RBC  3.99 10^6/uL (3.5-6.1)   11/15/17  06:00    


 


Hgb  9.9 g/dL (14.0-18.0)  L  11/15/17  06:00    


 


Hct  32.0 % (42.0-52.0)  L  11/15/17  06:00    


 


MCV  80.2 fl (80.0-105.0)   11/15/17  06:00    


 


MCH  24.8 pg (25.0-35.0)  L  11/15/17  06:00    


 


MCHC  30.9 g/dl (31.0-37.0)  L  11/15/17  06:00    


 


RDW  14.9 % (11.5-14.5)  H  11/15/17  06:00    


 


Plt Count  178 10^3/uL (120.0-450.0)   11/15/17  06:00    


 


MPV  8.5 fl (7.0-11.0)   11/15/17  06:00    


 


Gran %  57.4 % (50.0-68.0)   11/15/17  06:00    


 


Lymph % (Auto)  29.0 % (22.0-35.0)   11/15/17  06:00    


 


Mono % (Auto)  7.8 % (1.0-6.0)  H  11/15/17  06:00    


 


Eos % (Auto)  5.0 % (1.5-5.0)   11/15/17  06:00    


 


Baso % (Auto)  0.8 % (0.0-3.0)   11/15/17  06:00    


 


Gran #  2.30  (1.4-6.5)   11/15/17  06:00    


 


Lymph #  1.2  (1.2-3.4)   11/15/17  06:00    


 


Mono #  0.3  (0.1-0.6)   11/15/17  06:00    


 


Eos #  0.2  (0.0-0.7)   11/15/17  06:00    


 


Baso #  0.03 K/mm3 (0.0-2.0)   11/15/17  06:00    


 


PT  12.0 SECONDS (9.4-12.5)   11/12/17  08:45    


 


INR  1.09  (0.93-1.08)  H  11/12/17  08:45    


 


APTT  29.4 Seconds (25.1-36.5)   11/12/17  08:45    


 


pO2  32 mm/Hg (30-55)   11/12/17  13:40    


 


VBG pH  7.39  (7.32-7.43)   11/12/17  13:40    


 


VBG pCO2  52.0  (40-60)   11/12/17  13:40    


 


VBG HCO3  31.5 mmol/l (21-28)  H  11/12/17  13:40    


 


VBG Total CO2  33.1 mmol.L (22-28)  H  11/12/17  13:40    


 


VBG O2 Sat (Calc)  65.6 % (40-65)  H  11/12/17  13:40    


 


VBG Base Excess  5.2 mmol/L (0.0-2.0)  H  11/12/17  13:40    


 


VBG Potassium  3.6 mmol/L (3.6-5.2)   11/12/17  13:40    


 


Sodium  138.0 mmol/L (132-148)   11/12/17  13:40    


 


Chloride  102.0 mmol/L ()   11/12/17  13:40    


 


Glucose  131 mg/dl ()  H  11/12/17  13:40    


 


Lactate  1.3 mmol/L (0.7-2.1)   11/12/17  13:40    


 


FiO2  21.0 %  11/12/17  13:40    


 


Sodium  142 mmol/L (132-148)   11/15/17  06:00    


 


Potassium  3.7 mmol/L (3.6-5.0)   11/15/17  06:00    


 


Chloride  106 mmol/L ()   11/15/17  06:00    


 


Carbon Dioxide  29 mmol/L (21-33)   11/15/17  06:00    


 


Anion Gap  11  (10-20)   11/15/17  06:00    


 


BUN  11 mg/dL (7-21)   11/15/17  06:00    


 


Creatinine  1.7 mg/dl (0.8-1.5)  H  11/15/17  06:00    


 


Est GFR ( Amer)  50   11/15/17  06:00    


 


Est GFR (Non-Af Amer)  41   11/15/17  06:00    


 


Random Glucose  88 mg/dL ()   11/15/17  06:00    


 


Calcium  8.1 mg/dL (8.4-10.5)  L  11/15/17  06:00    


 


Phosphorus  3.5 mg/dL (2.5-4.5)   11/15/17  06:00    


 


Magnesium  2.2 mg/dL (1.7-2.2)   11/15/17  06:00    


 


Total Bilirubin  0.5 mg/dL (0.2-1.3)   11/15/17  06:00    


 


AST  24 U/L (17-59)   11/15/17  06:00    


 


ALT  22 U/L (7-56)   11/15/17  06:00    


 


Alkaline Phosphatase  42 U/L ()   11/15/17  06:00    


 


Lactate Dehydrogenase  335 U/L (333-699)   11/12/17  08:45    


 


Total Creatine Kinase  103 U/L ()   11/12/17  08:45    


 


Troponin I  < 0.01 ng/mL  11/12/17  21:30    


 


Total Protein  6.7 g/dL (5.8-8.3)   11/15/17  06:00    


 


Albumin  3.3 g/dL (3.0-4.8)   11/15/17  06:00    


 


Globulin  3.4 gm/dL  11/15/17  06:00    


 


Albumin/Globulin Ratio  1.0  (1.1-1.8)  L  11/15/17  06:00    


 


Free T4  0.92 ng/dL (0.78-2.19)   11/14/17  05:30    


 


Thyroxine (T4)  5.0 ug/dL (5.5-11.0)  L  11/14/17  05:30    


 


Total T3  1.03 ng/mL (0.97-1.69)   11/12/17  14:00    


 


TSH 3rd Generation  43.80 mIU/mL (0.46-4.68)  H  11/14/17  05:30    


 


Cortisol AM Sample  3.0 ug/dL (4.46-22.7)  L  11/14/17  05:30    


 


Venous Blood Potassium  3.6 mmol/L (3.6-5.2)   11/12/17  13:40    


 


Urine Color  Yellow  (YELLOW)   11/12/17  08:45    


 


Urine Appearance  Cloudy  (CLEAR)   11/12/17  08:45    


 


Urine pH  6.5  (4.7-8.0)   11/12/17  08:45    


 


Ur Specific Gravity  1.020  (1.005-1.035)   11/12/17  08:45    


 


Urine Protein  100 mg/dL (<30 mg/dL)  H  11/12/17  08:45    


 


Urine Glucose (UA)  Negative mg/dL (NEGATIVE)   11/12/17  08:45    


 


Urine Ketones  Negative mg/dL (NEGATIVE)   11/12/17  08:45    


 


Urine Blood  Large  (NEGATIVE)  H  11/12/17  08:45    


 


Urine Nitrate  Positive  (NEGATIVE)  H  11/12/17  08:45    


 


Urine Bilirubin  Negative  (NEGATIVE)   11/12/17  08:45    


 


Urine Urobilinogen  0.2 E.U./dL (<1 E.U./dL)   11/12/17  08:45    


 


Ur Leukocyte Esterase  Moderate Maria C/uL (NEGATIVE)  H  11/12/17  08:45    


 


Urine RBC  15 - 20 /hpf (0-2)   11/12/17  08:45    


 


Urine WBC  Tntc /hpf (0-6)   11/12/17  08:45    


 


Ur Epithelial Cells  0 - 2 /hpf (0-5)   11/12/17  08:45    


 


Urine Bacteria  Many  (NEG)   11/12/17  08:45    


 


Thyroperoxidase Ab  3 IU/mL (<9)   11/14/17  05:30    


 


Thyroglobulin Antibody  263 IU/mL (< OR = 1)  H  11/14/17  05:30    














Attending/Attestation





- Attestation


I have personally seen and examined this patient.: Yes


I have fully participated in the care of the patient.: Yes


I have reviewed all pertinent clinical information, including history, physical 

exam and plan: Yes


Notes (Text): 





11/16/17 13:18





Patient was seen and examined with medical resident. 


Agreed with resident assessment and plan.





61 year old male with past medical history of asthma, hypothyroidism and  s/p  

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 

admitted lose of consciousness  ? , etiology is unclear, there was no focal 

deficit.Patient work up CT scan of head,MRI brain was unremarkable, however MRA 

showed some asymmetry of distal left MCA, coul be partial thrombus.Patient was 

loaded with plavix and was started on plavix inaddition to aspirin on the 

recommendation of Neurology.Patient was totally asymptomatic. 





He has chronic lower abdominal pain.  Patient constipation is improved. His 

abdominal pain is at base line.He is tolerating diet with out any issue.He is 

afebrile, cultures are negative, antibiotics has been discontinued by 

ID.Patient was evaluated by Urology, plan for endoscopy of conduit as out 

patient for the evaluation of minor calcification in the conduit as out patient,





His synthroid dose is increased to 150 mcg daily, he will follow up with 

endocrinology.











Management plan was discussed in detail with patient


Education was provided.

## 2017-11-15 NOTE — PN
ENDO FOLLOWUP NOTE



LOCATION:  Room 375.



This is a 61-year-old male with recent overt hypothyroidism presenting here

with diffuse abdominal pain and recurrent diarrhea, and is now undergoing

GI workup at this time and is also being followed closely for metabolic

management.  He is receiving parenteral levothyroxine replacement therapy

at this time.  His latest thyroid study showed a T4 of 5.0 with a TSH of

43.80 and a free T4 of 0.92.  His latest chemistry showed a BUN of 11,

sodium 142, potassium 3.7, chloride 106, CO2 of 29, glucose 88 and

creatinine 1.7.  So at this time, we will continue the ongoing

levothyroxine given as 100 mcg IV push once daily as ordered.  We will

repeat the thyroid studies in the morning and titrate his dose regimen

accordingly.  There is a strong significant factor for possible

malabsorption with his previous surgical procedures which can contribute to

the impaired absorption of levothyroxine therapy as noted.  We will obtain

serum chemistries and supplement accordingly as needed.  We will follow.







__________________________________________

Cheryl Quinteros MD





DD:  11/15/2017 14:53:52

DT:  11/15/2017 15:00:40

Job # [id number]

## 2017-12-07 ENCOUNTER — HOSPITAL ENCOUNTER (EMERGENCY)
Dept: HOSPITAL 42 - ED | Age: 62
Discharge: HOME | End: 2017-12-07
Payer: COMMERCIAL

## 2017-12-07 VITALS — TEMPERATURE: 98.3 F | OXYGEN SATURATION: 98 %

## 2017-12-07 VITALS — DIASTOLIC BLOOD PRESSURE: 80 MMHG | RESPIRATION RATE: 16 BRPM | SYSTOLIC BLOOD PRESSURE: 126 MMHG | HEART RATE: 80 BPM

## 2017-12-07 VITALS — BODY MASS INDEX: 26.6 KG/M2

## 2017-12-07 DIAGNOSIS — R10.9: Primary | ICD-10-CM

## 2017-12-07 DIAGNOSIS — E03.9: ICD-10-CM

## 2017-12-07 LAB
ALBUMIN/GLOB SERPL: 1 {RATIO} (ref 1.1–1.8)
ALP SERPL-CCNC: 59 U/L (ref 38–126)
ALT SERPL-CCNC: 18 U/L (ref 7–56)
APPEARANCE UR: (no result)
APTT BLD: 26.4 SECONDS (ref 25.1–36.5)
AST SERPL-CCNC: 41 U/L (ref 17–59)
BACTERIA #/AREA URNS HPF: (no result) /[HPF]
BASOPHILS # BLD AUTO: 0.03 K/MM3 (ref 0–2)
BASOPHILS NFR BLD: 0.6 % (ref 0–3)
BILIRUB SERPL-MCNC: 0.4 MG/DL (ref 0.2–1.3)
BILIRUB UR-MCNC: NEGATIVE MG/DL
BUN SERPL-MCNC: 19 MG/DL (ref 7–21)
CALCIUM SERPL-MCNC: 9.4 MG/DL (ref 8.4–10.5)
CHLORIDE SERPL-SCNC: 99 MMOL/L (ref 98–107)
CO2 SERPL-SCNC: 29 MMOL/L (ref 21–33)
COLOR UR: YELLOW
EOSINOPHIL # BLD: 0.2 10*3/UL (ref 0–0.7)
EOSINOPHIL NFR BLD: 3.3 % (ref 1.5–5)
ERYTHROCYTE [DISTWIDTH] IN BLOOD BY AUTOMATED COUNT: 14.7 % (ref 11.5–14.5)
GLOBULIN SER-MCNC: 4.1 GM/DL
GLUCOSE SERPL-MCNC: 108 MG/DL (ref 70–110)
GLUCOSE UR STRIP-MCNC: NEGATIVE MG/DL
GRANULOCYTES # BLD: 3.14 10*3/UL (ref 1.4–6.5)
GRANULOCYTES NFR BLD: 57.6 % (ref 50–68)
HCT VFR BLD CALC: 35.1 % (ref 42–52)
INR PPP: 1.04 (ref 0.93–1.08)
KETONES UR STRIP-MCNC: NEGATIVE MG/DL
LEUKOCYTE ESTERASE UR-ACNC: (no result) LEU/UL
LIPASE SERPL-CCNC: 118 U/L (ref 23–300)
LYMPHOCYTES # BLD: 1.8 10*3/UL (ref 1.2–3.4)
LYMPHOCYTES NFR BLD AUTO: 32.4 % (ref 22–35)
MCH RBC QN AUTO: 25 PG (ref 25–35)
MCHC RBC AUTO-ENTMCNC: 31.1 G/DL (ref 31–37)
MCV RBC AUTO: 80.5 FL (ref 80–105)
MONOCYTES # BLD AUTO: 0.3 10*3/UL (ref 0.1–0.6)
MONOCYTES NFR BLD: 6.1 % (ref 1–6)
PH UR STRIP: 7 [PH] (ref 4.7–8)
PLATELET # BLD: 212 10^3/UL (ref 120–450)
PMV BLD AUTO: 8.4 FL (ref 7–11)
POTASSIUM SERPL-SCNC: 3.9 MMOL/L (ref 3.6–5)
PROT SERPL-MCNC: 8.4 G/DL (ref 5.8–8.3)
PROT UR STRIP-MCNC: 100 MG/DL
RBC # UR STRIP: (no result) /UL
SODIUM SERPL-SCNC: 140 MMOL/L (ref 132–148)
SP GR UR STRIP: 1.01 (ref 1–1.03)
UROBILINOGEN UR STRIP-ACNC: 0.2 E.U./DL
WBC # BLD AUTO: 5.4 10^3/UL (ref 4.5–11)
WBC #/AREA URNS HPF: (no result) /HPF (ref 0–6)

## 2017-12-07 PROCEDURE — 80053 COMPREHEN METABOLIC PANEL: CPT

## 2017-12-07 PROCEDURE — 81001 URINALYSIS AUTO W/SCOPE: CPT

## 2017-12-07 PROCEDURE — 96374 THER/PROPH/DIAG INJ IV PUSH: CPT

## 2017-12-07 PROCEDURE — 83690 ASSAY OF LIPASE: CPT

## 2017-12-07 PROCEDURE — 99283 EMERGENCY DEPT VISIT LOW MDM: CPT

## 2017-12-07 PROCEDURE — 87086 URINE CULTURE/COLONY COUNT: CPT

## 2017-12-07 PROCEDURE — 85730 THROMBOPLASTIN TIME PARTIAL: CPT

## 2017-12-07 PROCEDURE — 85610 PROTHROMBIN TIME: CPT

## 2017-12-07 PROCEDURE — 74176 CT ABD & PELVIS W/O CONTRAST: CPT

## 2017-12-07 PROCEDURE — 85025 COMPLETE CBC W/AUTO DIFF WBC: CPT

## 2017-12-07 NOTE — CT
PROCEDURE:  CT Abdomen and Pelvis without intravenous contrast



HISTORY:

flank pain h/o of ileal conduit



COMPARISON:

11/12/2017



TECHNIQUE:

CT scan of the abdomen and pelvis was performed without 

administration of intravenous contrast.  Oral contrast was not 

administered.  Coronal and sagittal reformatted images were obtained. 



Radiation dose:



Total exam DLP = 623.72 mGy-cm.



This CT exam was performed using one or more of the following dose 

reduction techniques: Automated exposure control, adjustment of the 

mA and/or kV according to patient size, and/or use of iterative 

reconstruction technique.



FINDINGS:



LOWER THORAX:

There is bibasilar subsegmental atelectasis. 



LIVER:

Normal in size. Redemonstration of low-attenuation area in the 

inferior right hepatic lobe not completely characterized on this 

examination.  



GALLBLADDER AND BILE DUCTS:

The gallbladder is contracted. 



PANCREAS:

Mild fatty atrophy of the pancreas. No gross lesion or ductal 

dilatation.



SPLEEN:

Normal in size. 



ADRENALS:

No discrete nodule. 



KIDNEYS AND URETERS:

Normal in size without nephrolithiasis or hydronephrosis. The ureters 

are not dilated. 



VASCULATURE:

No evidence of aortic aneurysm. 



BOWEL:

The small bowel loops are normal in caliber. There is redemonstration 

of a right lower quadrant ileal conduit with linear areas of 

increased attenuation posterior to the abdominal wall.  There is also 

abnormal soft-tissue in the right lower quadrant abdominal wall. 

There are postsurgical changes in the adjacent small bowel.



APPENDIX:

Normal appendix. 



PERITONEUM:

No free fluid. No free air. 



LYMPH NODES:

No enlarged lymph nodes. 



BLADDER:

Status post cystectomy. 



REPRODUCTIVE:

There is moderate enlargement of the prostate gland. 



BONES:

No acute fracture. 



OTHER FINDINGS:

None.



IMPRESSION:

No evidence of hydronephrosis or nephrolithiasis.



Stable postsurgical changes of right lower quadrant ileal conduit 

with persistent abnormal soft tissue in the lower abdominal wall. No 

significant interval change since the prior examination.

## 2017-12-07 NOTE — ED PDOC
Arrival/HPI





- General


Time Seen by Provider: 12/07/17 07:38





- History of Present Illness


Narrative History of Present Illness (Text): 


12/07/17 07:52





A 62 year old male, whose past medical history includes asthma, interstitial 

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 

presents to the emergency department for bilateral flank pain radiating to his 

lower abdomen with nausea and 2 episodes of vomiting, which began two days ago. 

The patient reports the pain began on his lower back "where his kidneys are" 

and radiated to his lower abdomen. He denies any trauma, fever, chest pain, or 

any other complaints at this time. 





Time/Duration: < week (x 2 days )


Symptom Onset: Sudden


Symptom Course: Unchanged


Activities at Onset: Light


Context: Home





Past Medical History





- Provider Review


Nursing Documentation Reviewed: Yes





- Infectious Disease


Hx of Infectious Diseases: None





- Tetanus Immunization


Tetanus Immunization: Unknown





- Reproductive


Currently Pregnant: No





- Cardiac


Hx Cardiac Disorders: No


Hx Pacemaker: No





- Pulmonary


Hx Respiratory Disorders: Yes


Hx Asthma: Yes





- Neurological


Hx Neurological Disorder: No


Hx Paralysis: No





- HEENT


Hx HEENT Disorder: Yes


Other/Comment: difficulty hearing





- Renal


Hx Renal Disorder: Yes (Interstitial Cystitis)


Other/Comment: urinary bladder removed, has urostomy





- Endocrine/Metabolic


Hx Endocrine Disorders: Yes





- Hematological/Oncological


Hx Blood Disorders: No


Hx Blood Transfusions: No


Hx Blood Transfusion Reaction: No





- Integumentary


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





- Musculoskeletal/Rheumatological


Hx Musculoskeletal Disorders: No





- Gastrointestinal


Hx Gastrointestinal Disorders: Yes (esophageal stricture,HERNIORHAPPHY 4 X)


Hx Gastroesophageal Reflux: Yes


Hx Liver Failure: Yes (HEPATOMEGALY)


HX Swallowing Problems: Yes





- Genitourinary/Gynecological


Hx Genitourinary Disorders: Yes (interstital cystitis,  urostomy 2009)


Hx Hematuria: Yes


Hx Prostate Problems: Yes (LASER SX-DYSURIA, enlarged 2004)


Hx Urinary Tract Infection: Yes


Other/Comment: right abd  urostomy tube





- Psychiatric


Hx Anxiety: Yes


Hx Depression: Yes


Hx Emotional Abuse: No


Hx Physical Abuse: No


Hx Substance Use: No





- Surgical History


Other/Comment: Hernia repair, urostomy, mesh removal, cyst removal, cystectomy





- Anesthesia


Hx Anesthesia Reactions: No


Hx Malignant Hyperthermia: No





- Suicidal Assessment


Feels Threatened In Home Enviroment: No





Family/Social History





- Physician Review


Nursing Documentation Reviewed: Yes


Family/Social History: No Known Family HX


Smoking Status: Never Smoked


Hx Alcohol Use: No


Hx Substance Use: No


Hx Substance Use Treatment: No





Allergies/Home Meds


Allergies/Adverse Reactions: 


Allergies





No Known Allergies Allergy (Verified 11/12/17 08:20)


 








Home Medications: 


 Home Meds











 Medication  Instructions  Recorded  Confirmed


 


Albuterol HFA [Ventolin HFA 90 2 puff IH [id number] PRN 05/10/17 12/07/17





mcg/actuation (8 g)]   


 


Omeprazole 20 mg PO DAILY 05/10/17 12/07/17


 


ALPRAZolam [Xanax] 0.25 mg PO DAILY 10/20/17 12/07/17














Review of Systems





- Review of Systems


Constitutional: absent: Fevers


Cardiovascular: absent: Chest Pain


Gastrointestinal: Abdominal Pain, Nausea, Vomiting


Musculoskeletal: Back Pain (bilateral flank pain )





Physical Exam


Vital Signs Reviewed: Yes


Vital Signs











  Temp Pulse Resp BP Pulse Ox


 


 12/07/17 09:49   80  16  126/80  98


 


 12/07/17 07:40  98.3 F  61  18  124/81  98











Temperature: Afebrile


Blood Pressure: Normal


Pulse: Regular


Respiratory Rate: Normal


Appearance: Positive for: Well-Appearing, Non-Toxic, Comfortable


Pain Distress: None


Mental Status: Positive for: Alert and Oriented X 3





- Systems Exam


Head: Present: Atraumatic, Normocephalic


Pupils: Present: PERRL


Extroacular Muscles: Present: EOMI


Conjunctiva: Present: Normal


Mouth: Present: Moist Mucous Membranes


Neck: Present: Normal Range of Motion


Respiratory/Chest: Present: Clear to Auscultation, Good Air Exchange.  No: 

Respiratory Distress, Accessory Muscle Use


Cardiovascular: Present: Regular Rate and Rhythm, Normal S1, S2.  No: Murmurs


Abdomen: Present: Tenderness (non focal tenderness ), Other (ileal conduit 

draining urine )


Back: Present: Other (bilateral flank tenderness )


Upper Extremity: Present: Normal Inspection.  No: Cyanosis, Edema


Lower Extremity: Present: Normal Inspection.  No: Edema


Neurological: Present: GCS=15, CN II-XII Intact, Speech Normal


Skin: Present: Warm, Dry, Normal Color.  No: Rashes


Psychiatric: Present: Alert, Oriented x 3, Normal Insight, Normal Concentration





Medical Decision Making


ED Course and Treatment: 


12/07/17 07:49


Impression: A 62 year old male with bilateral flank pain radiating to the lower 

abdominal. 





Differential Diagnosis included but are not limited to:  





Plan:


-- Abd & Pel CT


-- Labs


-- Zofran, IV Fluids


-- Urinalysis


-- Reassess and disposition





Progress Notes:


12/07/17 09:24


Case discussed with Dr. Card who is covering for Dr. Flores, who states the 

patient has a history of chronic pain and is advised to follow up in his 

office. He also notes the patient has a baseline UTI secondary to ileal conduit

; the patient has no leukocytosis and fever. Dr. Card does not advise to treat 

UTI. 





- Lab Interpretations


Lab Results: 








 12/07/17 08:00 





 12/07/17 08:00 





 Lab Results





12/07/17 08:19: Urine Color Yellow, Urine Appearance Turbid, Urine pH 7.0, Ur 

Specific Gravity 1.010, Urine Protein 100 H, Urine Glucose (UA) Negative, Urine 

Ketones Negative, Urine Blood Large H, Urine Nitrate Positive H, Urine 

Bilirubin Negative, Urine Urobilinogen 0.2, Ur Leukocyte Esterase Moderate H, 

Urine RBC 2 - 5, Urine WBC Tntc, Urine Bacteria Few


12/07/17 08:00: Sodium 140, Potassium 3.9, Chloride 99, Carbon Dioxide 29, 

Anion Gap 16, BUN 19, Creatinine 1.5, Est GFR (African Amer) 57, Est GFR (Non-

Af Amer) 47, Random Glucose 108, Calcium 9.4, Total Bilirubin 0.4, AST 41, ALT 

18, Alkaline Phosphatase 59, Total Protein 8.4 H, Albumin 4.3, Globulin 4.1, 

Albumin/Globulin Ratio 1.0 L, Lipase 118


12/07/17 08:00: PT 11.5, INR 1.04, APTT 26.4


12/07/17 08:00: WBC 5.4, RBC 4.36, Hgb 10.9 L, Hct 35.1 L, MCV 80.5, MCH 25.0, 

MCHC 31.1, RDW 14.7 H, Plt Count 212, MPV 8.4, Gran % 57.6, Lymph % (Auto) 32.4

, Mono % (Auto) 6.1 H, Eos % (Auto) 3.3, Baso % (Auto) 0.6, Gran # 3.14, Lymph 

# 1.8, Mono # 0.3, Eos # 0.2, Baso # 0.03











- RAD Interpretation


Radiology Orders: 








12/07/17 07:56


ABD & PELVIS W/O PO OR IV CONT [CT] Stat 














- Medication Orders


Current Medication Orders: 











Discontinued Medications





Sodium Chloride (Sodium Chloride 0.9%)  1,000 mls @ 999 mls/hr IV .Q1H1M STA


   Stop: 12/07/17 08:56


   Last Admin: 12/07/17 08:15  Dose: 999 mls/hr





eMAR Start Stop


 Document     12/07/17 08:15  CNR  (Rec: 12/07/17 08:22  CNR  [id number])


     Intravenous Solution


      Start Date                                 12/07/17


      Start Time                                 08:15





Ondansetron HCl (Zofran Inj)  4 mg IVP STAT STA


   Stop: 12/07/17 07:57


   Last Admin: 12/07/17 08:15  Dose: 4 mg





IVP Administration


 Document     12/07/17 08:15  CNR  (Rec: 12/07/17 08:21  CNR  [id number])


     Charges for Administration


      # of IVP Administrations                   1














- Scribe Statement


The provider has reviewed the documentation as recorded by the Vilmaibe


Apurva Hatch





Provider Scribe Attestation:


All medical record entries made by the Scribe were at my direction and 

personally dictated by me. I have reviewed the chart and agree that the record 

accurately reflects my personal performance of the history, physical exam, 

medical decision making, and the department course for this patient. I have 

also personally directed, reviewed, and agree with the discharge instructions 

and disposition.








Disposition/Present on Arrival





- Present on Arrival


Any Indicators Present on Arrival: No


History of DVT/PE: No


History of Uncontrolled Diabetes: No


Urinary Catheter: Yes (urostomy)


History Surgical Site Infection Following: None





- Disposition


Have Diagnosis and Disposition been Completed?: Yes


Diagnosis: 


 Abdominal pain, Flank pain





Disposition: HOME/ ROUTINE


Disposition Time: 02:00


Condition: STABLE


Discharge Instructions (ExitCare):  Acute Abdominal Pain (ED), Flank Pain (ED)


Additional Instructions: 


please follow up dr flores or dr card. return to er with worsening symptoms


Referrals: 


Jerson Flores MD [Staff Provider] - Follow up with primary


Anthony Card MD [Staff Provider] - Follow up with primary


Forms:  [employer] (English)

## 2018-06-22 ENCOUNTER — HOSPITAL ENCOUNTER (EMERGENCY)
Dept: HOSPITAL 42 - ED | Age: 63
Discharge: HOME | End: 2018-06-22
Payer: COMMERCIAL

## 2018-06-22 VITALS — RESPIRATION RATE: 18 BRPM

## 2018-06-22 VITALS
DIASTOLIC BLOOD PRESSURE: 72 MMHG | OXYGEN SATURATION: 100 % | HEART RATE: 80 BPM | TEMPERATURE: 98.2 F | SYSTOLIC BLOOD PRESSURE: 130 MMHG

## 2018-06-22 VITALS — BODY MASS INDEX: 21.9 KG/M2

## 2018-06-22 DIAGNOSIS — K58.9: Primary | ICD-10-CM

## 2018-06-22 DIAGNOSIS — R10.9: ICD-10-CM

## 2018-06-22 DIAGNOSIS — K63.2: ICD-10-CM

## 2018-06-22 DIAGNOSIS — F41.9: ICD-10-CM

## 2018-06-22 DIAGNOSIS — E03.9: ICD-10-CM

## 2018-06-22 LAB
ALBUMIN SERPL-MCNC: 4.6 G/DL (ref 3–4.8)
ALBUMIN/GLOB SERPL: 1.1 {RATIO} (ref 1.1–1.8)
ALT SERPL-CCNC: 43 U/L (ref 7–56)
APPEARANCE UR: (no result)
APTT BLD: 27.2 SECONDS (ref 25.1–36.5)
AST SERPL-CCNC: 46 U/L (ref 17–59)
BACTERIA #/AREA URNS HPF: (no result) /[HPF]
BASOPHILS # BLD AUTO: 0.03 K/MM3 (ref 0–2)
BASOPHILS NFR BLD: 0.3 % (ref 0–3)
BILIRUB UR-MCNC: NEGATIVE MG/DL
BUN SERPL-MCNC: 30 MG/DL (ref 7–21)
CALCIUM SERPL-MCNC: 10.1 MG/DL (ref 8.4–10.5)
COLOR UR: (no result)
EOSINOPHIL # BLD: 0.1 10*3/UL (ref 0–0.7)
EOSINOPHIL NFR BLD: 1 % (ref 1.5–5)
EPI CELLS #/AREA URNS HPF: (no result) /HPF (ref 0–5)
ERYTHROCYTE [DISTWIDTH] IN BLOOD BY AUTOMATED COUNT: 14.5 % (ref 11.5–14.5)
GFR NON-AFRICAN AMERICAN: 41
GLUCOSE UR STRIP-MCNC: NEGATIVE MG/DL
GRANULOCYTES # BLD: 6.98 10*3/UL (ref 1.4–6.5)
GRANULOCYTES NFR BLD: 79.9 % (ref 50–68)
HGB BLD-MCNC: 11.8 G/DL (ref 14–18)
INR PPP: 1.05 (ref 0.93–1.08)
LEUKOCYTE ESTERASE UR-ACNC: (no result) LEU/UL
LIPASE SERPL-CCNC: 139 U/L (ref 23–300)
LYMPHOCYTES # BLD: 1.1 10*3/UL (ref 1.2–3.4)
LYMPHOCYTES NFR BLD AUTO: 12.3 % (ref 22–35)
MCH RBC QN AUTO: 28.4 PG (ref 25–35)
MCHC RBC AUTO-ENTMCNC: 33.9 G/DL (ref 31–37)
MCV RBC AUTO: 83.7 FL (ref 80–105)
MONOCYTES # BLD AUTO: 0.6 10*3/UL (ref 0.1–0.6)
MONOCYTES NFR BLD: 6.5 % (ref 1–6)
PH UR STRIP: 7 [PH] (ref 4.7–8)
PLATELET # BLD: 262 10^3/UL (ref 120–450)
PMV BLD AUTO: 7.9 FL (ref 7–11)
PROT UR STRIP-MCNC: 30 MG/DL
PROTHROMBIN TIME: 12 SECONDS (ref 9.4–12.5)
RBC # BLD AUTO: 4.16 10^6/UL (ref 3.5–6.1)
RBC # UR STRIP: (no result) /UL
RBC #/AREA URNS HPF: (no result) /HPF (ref 0–2)
SP GR UR STRIP: 1.01 (ref 1–1.03)
URINE HYALINE CAST: (no result) /HPF
UROBILINOGEN UR STRIP-ACNC: 0.2 E.U./DL
WBC # BLD AUTO: 8.8 10^3/UL (ref 4.5–11)
WBC #/AREA URNS HPF: (no result) /HPF (ref 0–6)

## 2018-06-22 PROCEDURE — 96375 TX/PRO/DX INJ NEW DRUG ADDON: CPT

## 2018-06-22 PROCEDURE — 85610 PROTHROMBIN TIME: CPT

## 2018-06-22 PROCEDURE — 85025 COMPLETE CBC W/AUTO DIFF WBC: CPT

## 2018-06-22 PROCEDURE — 74176 CT ABD & PELVIS W/O CONTRAST: CPT

## 2018-06-22 PROCEDURE — 87086 URINE CULTURE/COLONY COUNT: CPT

## 2018-06-22 PROCEDURE — 84100 ASSAY OF PHOSPHORUS: CPT

## 2018-06-22 PROCEDURE — 96361 HYDRATE IV INFUSION ADD-ON: CPT

## 2018-06-22 PROCEDURE — 96374 THER/PROPH/DIAG INJ IV PUSH: CPT

## 2018-06-22 PROCEDURE — 83735 ASSAY OF MAGNESIUM: CPT

## 2018-06-22 PROCEDURE — 83690 ASSAY OF LIPASE: CPT

## 2018-06-22 PROCEDURE — 99285 EMERGENCY DEPT VISIT HI MDM: CPT

## 2018-06-22 PROCEDURE — 80053 COMPREHEN METABOLIC PANEL: CPT

## 2018-06-22 PROCEDURE — 85730 THROMBOPLASTIN TIME PARTIAL: CPT

## 2018-06-22 PROCEDURE — 81001 URINALYSIS AUTO W/SCOPE: CPT

## 2018-06-22 NOTE — ED PDOC
Arrival/HPI





- History of Present Illness


Time/Duration: Prior to Arrival


Symptom Onset: Sudden


Symptom Course: Unchanged


Quality: Burning


Severity Level: 10





<Juaquin Olmedo - Last Filed: 06/22/18 17:38>





<Jamal Chahal - Last Filed: 06/22/18 21:21>





- General


Chief Complaint: GI Problem


Time Seen by Provider: 06/22/18 17:27





- History of Present Illness


Narrative History of Present Illness (Text): 





A 62 year old male, whose past medical history includes asthma, interstitial 

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 

fistulas, anxiety issues, and nephritis who presents to the emergency 

department for evaluation and treatment of a headache and lightheadedness. 

States he was recently treated for his chronic fistulas at [facility] for 

approximately 3 months and spent another month in a rehabilitation center. He 

was released from the rehab center today. Believes that his headache and 

lightheadedness were caused by decreased PO intake. Admits to 10/10 burning 

pain on his skin near his pubic region.  Denies fever, chills, chest pain, SOB, 

nausea, and vomiting. 





PMD: Dr. Stein 








06/22/18 17:39





06/22/18 17:52


 (Juaquin Olmedo)





Past Medical History





- Provider Review


Nursing Documentation Reviewed: Yes





- Travel History


Have you recently traveled outside [place] w/in the past 3 mons?: No





- Infectious Disease


Hx of Infectious Diseases: None





- Tetanus Immunization


Tetanus Immunization: Unknown





- Cardiac


Hx Cardiac Disorders: No


Hx Pacemaker: No





- Pulmonary


Hx Respiratory Disorders: Yes


Hx Asthma: Yes





- Neurological


Hx Neurological Disorder: No


Hx Paralysis: No





- HEENT


Hx HEENT Disorder: Yes


Other/Comment: difficulty hearing





- Renal


Hx Renal Disorder: Yes (Interstitial Cystitis)


Other/Comment: urinary bladder removed, has urostomy





- Endocrine/Metabolic


Hx Endocrine Disorders: Yes





- Hematological/Oncological


Hx Blood Disorders: No


Hx Blood Transfusions: No


Hx Blood Transfusion Reaction: No





- Integumentary


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





- Musculoskeletal/Rheumatological


Hx Musculoskeletal Disorders: No





- Gastrointestinal


Hx Gastrointestinal Disorders: Yes (esophageal stricture,HERNIORHAPPHY 4 X)


Hx Gastroesophageal Reflux: Yes


Hx Liver Failure: Yes (HEPATOMEGALY)


HX Swallowing Problems: Yes


Other/Comment: + colostomy





- Genitourinary/Gynecological


Hx Genitourinary Disorders: Yes (interstital cystitis,  urostomy 2009)


Hx Hematuria: Yes


Hx Prostate Problems: Yes (LASER SX-DYSURIA, enlarged 2004)


Hx Urinary Tract Infection: Yes


Other/Comment: right abd  urostomy tube





- Psychiatric


Hx Anxiety: Yes


Hx Depression: Yes


Hx Emotional Abuse: No


Hx Physical Abuse: No


Hx Substance Use: No





- Surgical History


Other/Comment: Hernia repair, urostomy, mesh removal, cyst removal, cystectomy





- Anesthesia


Hx Anesthesia: Yes


Hx Anesthesia Reactions: No


Hx Malignant Hyperthermia: No





- Suicidal Assessment


Feels Threatened In Home Enviroment: No





<Juaquin Olmedo - Last Filed: 06/22/18 17:38>





Family/Social History


Family/Social History: Unknown Family HX


Smoking Status: Never Smoked


Hx Alcohol Use: No


Hx Substance Use: No


Hx Substance Use Treatment: No





<Juaquin Olmedo - Last Filed: 06/22/18 17:38>





- Physician Review


Nursing Documentation Reviewed: Yes





<Jamal Chahal - Last Filed: 06/22/18 21:21>





Allergies/Home Meds





<Juaquin Olmedo - Last Filed: 06/22/18 17:38>





<Jamal Chahal - Last Filed: 06/22/18 21:21>


Allergies/Adverse Reactions: 


Allergies





No Known Allergies Allergy (Verified 11/12/17 08:20)


 








Home Medications: 


 Home Meds











 Medication  Instructions  Recorded  Confirmed


 


Albuterol HFA [Ventolin HFA 90 2 puff IH [id number] PRN 05/10/17 06/22/18





mcg/actuation (8 g)]   


 


Omeprazole 20 mg PO DAILY 05/10/17 06/22/18


 


ALPRAZolam [Xanax] 0.25 mg PO DAILY 10/20/17 06/22/18














Review of Systems





- Review of Systems


Constitutional: Normal


Eyes: Normal


ENT: Normal


Respiratory: Normal


Cardiovascular: Normal


Gastrointestinal: Other.  absent: Nausea, Vomiting


Genitourinary Male: Other (burning of skin in pubic region)


Musculoskeletal: Normal


Skin: Other (redness near areas with tape on abdomen)


Neurological: Normal


Endocrine: Normal


Hemo/Lymphatic: Normal


Psychiatric: Anxiety





<Juaquin Olmedo - Last Filed: 06/22/18 17:38>





Physical Exam


Temperature: Afebrile


Blood Pressure: Normal


Pulse: Regular


Respiratory Rate: Normal


Appearance: Positive for: Well-Appearing, Non-Toxic, Comfortable


Pain Distress: None


Mental Status: Positive for: Alert and Oriented X 3





- Systems Exam


Head: Present: Atraumatic, Normocephalic


Pupils: Present: PERRL


Extroacular Muscles: Present: EOMI


Conjunctiva: Present: Normal


Mouth: Present: Dry


Nose (External): Present: Atraumatic


Respiratory/Chest: Present: Clear to Auscultation, Good Air Exchange.  No: 

Respiratory Distress, Accessory Muscle Use


Cardiovascular: Present: Regular Rate and Rhythm, Normal S1, S2


Abdomen: Present: Tenderness, Other (colostomy bag and urostomy bag noted)


Upper Extremity: Present: Normal Inspection


Lower Extremity: Present: Normal Inspection


Neurological: Present: GCS=15, Speech Normal


Skin: Present: Warm, Dry.  No: Normal Color


Psychiatric: Present: Alert, Oriented x 3





<Juaquin Olmedo - Last Filed: 06/22/18 17:38>


Vital Signs











  Temp Pulse Resp BP Pulse Ox


 


 06/22/18 21:14   84  18  132/76  99


 


 06/22/18 18:50     130/81 


 


 06/22/18 18:32   82  17  135/78  99


 


 06/22/18 17:04  98.3 F  85  18  138/75  98














Medical Decision Making





<Juaquin Olmedo - Last Filed: 06/22/18 17:38>





<Jamal Chahal - Last Filed: 06/22/18 21:21>


ED Course and Treatment: 





Assessment and Plan:


A 62 year old male, whose past medical history includes asthma, interstitial 

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 

fistulas, and nephritis who presents to the emergency department for evaluation 

and treatment of a headache and lightheadedness.





06/22/18 17:55


Headache, Lightheadedness


- CBC, CMP, Mag, Phos, Lipase


- UA


- IVF NS 1 Liter bolus





Abdominal Fistulas


- CT of abd/pelvis





Anxiety


- ativan





 (Juaquin Olmedo)





06/22/2018 20:59


Abd/Pelvis CT


IMPRESSION: 


1. Bilateral mild hydronephrosis.


2. There is left lower quadrant ostomy noted. There is a cystic structures in 

the region of the lower


right lower quadrant ostomy and within the anterior abdominal wall soft tissue 

density and cystic


structure as well on the right side. linear metallic density is noted within it

, question within the bowel


it measures 1.4 cm.


The stomach wall is thickened significantly


Dictator: Mary Mercado MD


 (Jamal Chahal)





- Lab Interpretations


Lab Results: 








 06/22/18 18:11 





 06/22/18 18:11 





 Lab Results





06/22/18 18:11: PT 12.0, INR 1.05, APTT 27.2


06/22/18 18:11: WBC 8.8  D, RBC 4.16, Hgb 11.8 L, Hct 34.8 L, MCV 83.7, MCH 28.4

, MCHC 33.9, RDW 14.5, Plt Count 262, MPV 7.9, Gran % 79.9 H, Lymph % (Auto) 

12.3 L, Mono % (Auto) 6.5 H, Eos % (Auto) 1.0 L, Baso % (Auto) 0.3, Gran # 6.98 

H, Lymph # (Auto) 1.1 L, Mono # (Auto) 0.6, Eos # (Auto) 0.1, Baso # (Auto) 0.03


06/22/18 18:11: Sodium 141, Potassium 4.5, Chloride 108 H, Carbon Dioxide 16 L, 

Anion Gap 21 H, BUN 30 H, Creatinine 1.7 H, Est GFR ( Amer) 50, Est GFR (

Non-Af Amer) 41, Random Glucose 106, Calcium 10.1, Phosphorus 3.8, Magnesium 1.9

, Total Bilirubin 0.2, AST 46, ALT 43, Alkaline Phosphatase 107, Total Protein 

8.8 H, Albumin 4.6, Globulin 4.2, Albumin/Globulin Ratio 1.1, Lipase 139











- RAD Interpretation


Radiology Orders: 








06/22/18 18:43


ABD & PELVIS W/O PO OR IV CONT [CT] Stat 














- Medication Orders


Current Medication Orders: 











Discontinued Medications





Sodium Chloride (Sodium Chloride 0.9%)  1,000 mls @ 999 mls/hr IV .Q1H1M STA


   Stop: 06/22/18 18:37


   Last Admin: 06/22/18 17:50  Dose: 999 mls/hr





eMAR Start Stop


 Document     06/22/18 17:50  SF  (Rec: 06/22/18 17:50  SF  [email or web address]


     Intravenous Solution


      Start Date                                 06/22/18


      Start Time                                 17:50


      End Date                                   06/22/18


      End time                                   18:51


      Total Infusion Time                        61





Lorazepam (Ativan)  1 mg IVP STAT STA


   PRN Reason: Protocol


   Stop: 06/22/18 17:38


   Last Admin: 06/22/18 17:55  Dose: 1 mg





IVP Administration


 Document     06/22/18 17:55  SF  (Rec: 06/22/18 17:55  SF  [email or web address]


     Charges for Administration


      # of IVP Administrations                   1





Morphine Sulfate (Morphine)  4 mg IVP STAT STA


   Stop: 06/22/18 18:50


   Last Admin: 06/22/18 19:00  Dose: 4 mg





MAR Pain Assessment


 Document     06/22/18 19:00  SF  (Rec: 06/22/18 19:00  SF  [email or web address]


     Pain Reassessment


      Is this a pain reassessment?               Yes


     Sleep


      Is patient sleeping during reassessment?   No


     Presence of Pain


      Presence of Pain                           Yes


IVP Administration


 Document     06/22/18 19:00  SF  (Rec: 06/22/18 19:00  SF  [email or web address]


     Charges for Administration


      # of IVP Administrations                   1





Ondansetron HCl (Zofran Inj)  4 mg IVP STAT STA


   Stop: 06/22/18 18:50


   Last Admin: 06/22/18 19:00  Dose: 4 mg





IVP Administration


 Document     06/22/18 19:00  SF  (Rec: 06/22/18 19:00  SF  [email or web address]


     Charges for Administration


      # of IVP Administrations                   1





Tramadol HCl (Ultram)  50 mg PO STAT STA


   Stop: 06/22/18 18:12


   Last Admin: 06/22/18 18:52 Dose:  Not Given


   Non-Admin Reason: Patient Refused











- Scribe Statement


The provider has reviewed the documentation as recorded by the Scribe





<Jamal Chahal - Last Filed: 06/22/18 21:21>





Disposition/Present on Arrival





- Present on Arrival


History of DVT/PE: No


History of Uncontrolled Diabetes: No


Urinary Catheter: Yes (urostomy)


History of Decub. Ulcer: No


History Surgical Site Infection Following: None





<Juaquin Olmedo - Last Filed: 06/22/18 17:38>





- Present on Arrival


Any Indicators Present on Arrival: Yes


History of DVT/PE: No


History of Uncontrolled Diabetes: No


Urinary Catheter: Yes


History of Decub. Ulcer: No


History Surgical Site Infection Following: None





- Disposition


Have Diagnosis and Disposition been Completed?: Yes


Disposition Time: 21:17


Patient Plan: Discharge





<Jamal Chahal - Last Filed: 06/22/18 21:21>





- Disposition


Diagnosis: 


 Abdominal fistula, Abdominal pain, Anxiety, IBS (irritable bowel syndrome), 

Hypothyroidism





Disposition: HOME/ ROUTINE


Patient Problems: 


 Current Active Problems











Problem Status Onset


 


Abdominal pain Chronic  


 


Hypothyroidism Acute  


 


IBS (irritable bowel syndrome) Chronic  


 


Abdominal fistula Acute  


 


Anxiety Acute  











Condition: GOOD


Discharge Instructions (ExitCare):  Chronic Pain (DC), Anxiety, Adult (DC), 

Enteric (Including Enterocutaneous) Fistula, Acute Abdomen (Belly Pain), Adult (

DC)


Additional Instructions: 


Mr Hickey -





I am sorry that you are having to go through all this.  Your test results are 

stable.  Please follow up with your regular doctors early next week.  Take your 

medicines as prescribed.








Best-


Dr. Jamal Chahal


Referrals: 


Ihsan Stein MD [Primary Care Provider] - Follow up with primary


Forms:  [employer] (English)

## 2018-06-23 NOTE — CT
PROCEDURE:  CT scan abdomen pelvis 06/22/2018. 



HISTORY:

pain



COMPARISON:

None.



TECHNIQUE:

Axial images of the abdomen and pelvis performed without oral or 

intravenous contrast material.  Additional 2D sagittal and coronal. 

Coronal and sagittal reformats were generated.



Contrast dose: 



Radiation dose:



Total exam DLP = 339.65 mGy-cm.



This CT exam was performed using one or more of the following dose 

reduction techniques: Automated exposure control, adjustment of the 

mA and/or kV according to patient size, and/or use of iterative 

reconstruction technique.



FINDINGS:



LOWER THORAX:

Lung bases are clear. No infiltrate effusion or basilar pneumothorax. 



Heart size normal. No significant pericardial effusion. 



Small hiatal hernia. 



LIVER:

Unenhanced liver exhibits normal size and attenuation without masses 

collections or calcifications.  The 



GALLBLADDER AND BILE DUCTS:

Gallbladder incompletely distended. No evidence of intraluminal 

gallbladder calculi. 



PANCREAS:

The pancreas is slightly atrophic and fatty replaced however 

otherwise unremarkable without mass collection or calcification. 



SPLEEN:

Spleen not exhibits normal size and attenuation pattern. 



ADRENALS:

No adrenal lesions. 



KIDNEYS AND URETERS:

Kidneys demonstrate relatively symmetric size. There is mild 

dilatation of both ureters. . 



VASCULATURE:

Unremarkable. No aortic aneurysm. 



BOWEL:

Marked wall thickening of the stomach.  Rule out gastritis versus 

intrinsic/invasive wall lesion including gastric carcinoma.



Radiopaque stool material within the entire colon which could be 

secondary radiopaque foodstuffs however rule out oral contrast 

material from prior recent CT scan for upper GI series with 3. .



APPENDIX:

Normal appendix 



PERITONEUM:

See below 



Scarring changes seen mid anterior abdominal wall. 



LYMPH NODES:

Unremarkable. No enlarged lymph nodes. 



BLADDER:

Cystectomy with ileal conduit. Previously noted elliptical shaped 

soft tissue density with granular calcifications that was located in 

the right upper pelvis abutting the peritoneal surface of the right 

anterolateral abdominal wall is no longer visible and may have been 

resected. Previously noted old ostomy right lower pelvis appears to 

have been closed with a new osteomy sight located in the left 

anterolateral lower abdominal wall. .  There is a 2 small fluid 

collections 1 in the intra-abdominal compartment right lower abdomen 

and another slightly smaller collection located in overlying 

subcutaneous tissues and adjacent to the prior since closed 

right-sided ostomy.  The larger intra-abdominal collection contains 

and approximately 1.4 cm linear opaque density of uncertain etiology 

though possibly representing pain catheter remnant. No obvious 

surrounding infiltration seen to suggest superinfection however 

clinical correlation with surgical history, physical exam and 

laboratory values



REPRODUCTIVE:

Apparent scrotal hydroceles 



BONES:

Mild multilevel degenerative spondylosis of the lower thoracic and 

lumbar spine. 



OTHER FINDINGS:

None.



IMPRESSION:

Cystectomy with ileal conduit. Previously noted elliptical shaped 

soft tissue density with granular calcifications that was located in 

the right upper pelvis abutting the peritoneal surface of the right 

anterolateral abdominal wall is no longer visible and may have been 

resected. Previously noted old ostomy right lower pelvis appears to 

have been closed with a new osteomy sight located in the left 

anterolateral lower abdominal wall. .  There is a 2 small fluid 

collections, one located within the intraperitoneal compartment right 

lower abdomen and another slightly smaller collection located in 

overlying subcutaneous tissues and adjacent to the prior since closed 

right-sided ostomy.  The larger intra-abdominal collection contains 

and approximately 1.4 cm linear opaque density of uncertain etiology 

though possibly representing pain catheter remnant. No obvious 

surrounding infiltration seen to suggest superinfection however 

clinical correlation with surgical history, physical exam and 

laboratory values



Mild dilatation both ureters. 



Apparent scrotal hydroceles. 



See above discussion for additional details and findings.

## 2018-06-24 ENCOUNTER — HOSPITAL ENCOUNTER (INPATIENT)
Dept: HOSPITAL 42 - ED | Age: 63
LOS: 17 days | Discharge: NURSING, REHAB OR LONG-TERM CARE | DRG: 330 | End: 2018-07-11
Attending: INTERNAL MEDICINE | Admitting: INTERNAL MEDICINE
Payer: COMMERCIAL

## 2018-06-24 VITALS — BODY MASS INDEX: 18.8 KG/M2

## 2018-06-24 DIAGNOSIS — L03.311: ICD-10-CM

## 2018-06-24 DIAGNOSIS — F32.9: ICD-10-CM

## 2018-06-24 DIAGNOSIS — E03.9: ICD-10-CM

## 2018-06-24 DIAGNOSIS — R00.1: ICD-10-CM
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
## 2018-06-24 DIAGNOSIS — Z16.21: ICD-10-CM

## 2018-06-24 DIAGNOSIS — Z90.6: ICD-10-CM

## 2018-06-24 DIAGNOSIS — K56.7: ICD-10-CM

## 2018-06-24 DIAGNOSIS — R64: ICD-10-CM

## 2018-06-24 DIAGNOSIS — I35.1: ICD-10-CM

## 2018-06-24 DIAGNOSIS — K22.2: ICD-10-CM

## 2018-06-24 DIAGNOSIS — B95.2: ICD-10-CM

## 2018-06-24 DIAGNOSIS — K66.0: ICD-10-CM

## 2018-06-24 DIAGNOSIS — J44.9: ICD-10-CM

## 2018-06-24 DIAGNOSIS — T40.2X5A: ICD-10-CM

## 2018-06-24 DIAGNOSIS — F06.4: ICD-10-CM

## 2018-06-24 DIAGNOSIS — K63.2: Primary | ICD-10-CM

## 2018-06-24 DIAGNOSIS — D64.9: ICD-10-CM

## 2018-06-24 DIAGNOSIS — Z93.3: ICD-10-CM

## 2018-06-24 DIAGNOSIS — N42.9: ICD-10-CM

## 2018-06-24 DIAGNOSIS — L29.9: ICD-10-CM

## 2018-06-24 DIAGNOSIS — K91.89: ICD-10-CM

## 2018-06-24 DIAGNOSIS — F06.30: ICD-10-CM

## 2018-06-24 DIAGNOSIS — N17.9: ICD-10-CM

## 2018-06-24 DIAGNOSIS — E46: ICD-10-CM

## 2018-06-24 DIAGNOSIS — E78.00: ICD-10-CM

## 2018-06-24 DIAGNOSIS — R73.9: ICD-10-CM

## 2018-06-24 LAB
ALBUMIN SERPL-MCNC: 4.1 G/DL (ref 3–4.8)
ALBUMIN/GLOB SERPL: 1.1 {RATIO} (ref 1.1–1.8)
ALT SERPL-CCNC: 41 U/L (ref 7–56)
APPEARANCE UR: (no result)
AST SERPL-CCNC: 57 U/L (ref 17–59)
BACTERIA #/AREA URNS HPF: (no result) /[HPF]
BASE EXCESS BLDV CALC-SCNC: -7.1 MMOL/L (ref 0–2)
BASOPHILS # BLD AUTO: 0.03 K/MM3 (ref 0–2)
BASOPHILS NFR BLD: 0.5 % (ref 0–3)
BILIRUB UR-MCNC: NEGATIVE MG/DL
BUN SERPL-MCNC: 34 MG/DL (ref 7–21)
CALCIUM SERPL-MCNC: 10.5 MG/DL (ref 8.4–10.5)
COLOR UR: YELLOW
EOSINOPHIL # BLD: 0.2 10*3/UL (ref 0–0.7)
EOSINOPHIL NFR BLD: 3.1 % (ref 1.5–5)
EPI CELLS #/AREA URNS HPF: (no result) /HPF (ref 0–5)
ERYTHROCYTE [DISTWIDTH] IN BLOOD BY AUTOMATED COUNT: 14.7 % (ref 11.5–14.5)
GFR NON-AFRICAN AMERICAN: 41
GLUCOSE UR STRIP-MCNC: NEGATIVE MG/DL
GRANULOCYTES # BLD: 3.81 10*3/UL (ref 1.4–6.5)
GRANULOCYTES NFR BLD: 63.1 % (ref 50–68)
HGB BLD-MCNC: 10.6 G/DL (ref 14–18)
INR PPP: 1.07 (ref 0.93–1.08)
LEUKOCYTE ESTERASE UR-ACNC: (no result) LEU/UL
LYMPHOCYTES # BLD: 1.6 10*3/UL (ref 1.2–3.4)
LYMPHOCYTES NFR BLD AUTO: 26.2 % (ref 22–35)
MCH RBC QN AUTO: 28 PG (ref 25–35)
MCHC RBC AUTO-ENTMCNC: 33.8 G/DL (ref 31–37)
MCV RBC AUTO: 82.8 FL (ref 80–105)
MONOCYTES # BLD AUTO: 0.4 10*3/UL (ref 0.1–0.6)
MONOCYTES NFR BLD: 7.1 % (ref 1–6)
PH BLDV: 7.3 [PH] (ref 7.32–7.43)
PH UR STRIP: 7 [PH] (ref 4.7–8)
PLATELET # BLD: 258 10^3/UL (ref 120–450)
PMV BLD AUTO: 7.8 FL (ref 7–11)
PROT UR STRIP-MCNC: (no result) MG/DL
PROTHROMBIN TIME: 12.2 SECONDS (ref 9.4–12.5)
RBC # BLD AUTO: 3.79 10^6/UL (ref 3.5–6.1)
RBC # UR STRIP: (no result) /UL
SP GR UR STRIP: 1.01 (ref 1–1.03)
UROBILINOGEN UR STRIP-ACNC: 0.2 E.U./DL
VENOUS BLOOD FIO2: 21 %
VENOUS BLOOD GAS PCO2: 38 (ref 40–60)
VENOUS BLOOD GAS PO2: 35 MM/HG (ref 30–55)
WBC # BLD AUTO: 6 10^3/UL (ref 4.5–11)
WBC #/AREA URNS HPF: (no result) /HPF (ref 0–6)

## 2018-06-24 NOTE — ED PDOC
Arrival/HPI





- General


Historian: Patient





- History of Present Illness


Time/Duration: < week


Symptom Onset: Sudden


Symptom Course: Unchanged


Context: Home





- General


Chief Complaint: Abdominal Pain


Time Seen by Provider: 06/24/18 17:11





- History of Present Illness


Narrative History of Present Illness (Text): 





06/24/18 17:30


62 year old male, whose PMH includes asthma, interstitial hemorrhagic, cystitis 

s/p ileal conduit, and cystectomy, hypothyroidism, GI fistulas, anxiety issues, 

and nephritis who presents to the emergency department for evaluation and 

treatment of leaking wound package that was placed 2 dyas ago in the ER when 

being evaluated for abdomen pain. States he was recently treated for his 

chronic fistulas at [facility] for approximately 3 months and spent another 

month in a rehabilitation center. He was released from the rehab center 2 days 

ago and complaints of discomfort of the leaking wound packet due to it 

containing pus and poop. Denies fever, chills, chest pain, SOB, nausea, and 

vomiting. 


 (Jamal Chahal)





Past Medical History





- Provider Review


Nursing Documentation Reviewed: Yes





- Infectious Disease


Hx of Infectious Diseases: None





- Tetanus Immunization


Tetanus Immunization: Unknown





- Cardiac


Hx Cardiac Disorders: No


Hx Pacemaker: No





- Pulmonary


Hx Respiratory Disorders: Yes


Hx Asthma: Yes





- Neurological


Hx Neurological Disorder: No


Hx Paralysis: No





- HEENT


Hx HEENT Disorder: Yes


Other/Comment: difficulty hearing





- Renal


Hx Renal Disorder: Yes (Interstitial Cystitis)


Other/Comment: urinary bladder removed, has urostomy





- Endocrine/Metabolic


Hx Endocrine Disorders: Yes





- Hematological/Oncological


Hx Blood Disorders: No


Hx Blood Transfusions: No


Hx Blood Transfusion Reaction: No





- Integumentary


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





- Musculoskeletal/Rheumatological


Hx Musculoskeletal Disorders: No





- Gastrointestinal


Hx Gastrointestinal Disorders: Yes (esophageal stricture,HERNIORHAPPHY 4 X)


Hx Gastroesophageal Reflux: Yes


Hx Liver Failure: Yes (HEPATOMEGALY)


HX Swallowing Problems: Yes


Other/Comment: + colostomy





- Genitourinary/Gynecological


Hx Genitourinary Disorders: Yes (interstital cystitis,  urostomy 2009)


Hx Hematuria: Yes


Hx Prostate Problems: Yes (LASER SX-DYSURIA, enlarged 2004)


Hx Urinary Tract Infection: Yes


Other/Comment: right abd  urostomy tube





- Psychiatric


Hx Anxiety: Yes


Hx Depression: Yes


Hx Emotional Abuse: No


Hx Physical Abuse: No


Hx Substance Use: No





- Surgical History


Other/Comment: Hernia repair, urostomy, mesh removal, cyst removal, cystectomy





- Anesthesia


Hx Anesthesia: Yes


Hx Anesthesia Reactions: No


Hx Malignant Hyperthermia: No





- Suicidal Assessment


Feels Threatened In Home Enviroment: No





Family/Social History





- Physician Review


Nursing Documentation Reviewed: Yes


Family/Social History: Unknown Family HX


Smoking Status: Never Smoked


Hx Alcohol Use: No


Hx Substance Use: No


Hx Substance Use Treatment: No





Allergies/Home Meds


Allergies/Adverse Reactions: 


Allergies





No Known Allergies Allergy (Verified 06/24/18 17:11)


 








Home Medications: 


 Home Meds











 Medication  Instructions  Recorded  Confirmed


 


Albuterol HFA [Ventolin HFA 90 2 puff IH [id number] PRN 05/10/17 06/24/18





mcg/actuation (8 g)]   


 


Omeprazole 20 mg PO DAILY 05/10/17 06/24/18


 


ALPRAZolam [Xanax] 0.25 mg PO DAILY 10/20/17 06/24/18














Review of Systems





- Review of Systems


Constitutional: absent: Fevers


Respiratory: absent: SOB


Cardiovascular: absent: Chest Pain


Gastrointestinal: absent: Abdominal Pain


Genitourinary Male: absent: Dysuria


Musculoskeletal: absent: Back Pain


Skin: Other (multiple fistulas in abdomen )


Neurological: absent: Headache


Endocrine: absent: Diaphoresis





Physical Exam


Vital Signs Reviewed: Yes


Temperature: Afebrile


Blood Pressure: Normal


Pulse: Regular


Respiratory Rate: Normal


Appearance: Positive for: Well-Appearing, Non-Toxic, Comfortable


Pain Distress: None


Mental Status: Positive for: Alert and Oriented X 3





- Systems Exam


Head: Present: Atraumatic, Normocephalic


Pupils: Present: PERRL


Extroacular Muscles: Present: EOMI


Conjunctiva: Present: Normal


Respiratory/Chest: Present: Clear to Auscultation, Good Air Exchange.  No: 

Respiratory Distress, Accessory Muscle Use, Wheezes, Decreased Breath Sounds, 

Rales, Rhonchi


Cardiovascular: Present: Regular Rate and Rhythm, Normal S1, S2.  No: Murmurs


Abdomen: Present: Normal Bowel Sounds.  No: Distention, Peritoneal Signs, 

Rebound, Guarding


Neurological: Present: GCS=15, CN II-XII Intact, Speech Normal


Psychiatric: Present: Alert, Oriented x 3, Normal Insight, Normal Concentration





Vital Signs











  Temp Pulse Resp BP Pulse Ox


 


 06/24/18 22:46  98 F  67  19  129/52 L  99


 


 06/24/18 17:30  98.1 F  70  18  111/69  100














Medical Decision Making


ED Course and Treatment: 





06/24/18 


Impression:


62 year old male with multiple fistulas on abdomen 





Plan:


-- Labs


-- Urinalysis 


-- Sodium Chloride 


-- Reassess and disposition





Progress Notes:


 





06/24/18 19:51


Surgical resident saw patient for Dr. Rogers and advised for patient to be 

observed, but due to PMH of patient, they asked for medical admission and Dr. Carver agreed to admit patient and take under service.  (Jamal Chahal)





- Lab Interpretations


Lab Results: 











 06/24/18 18:18 





 06/24/18 18:18 





 Lab Results





06/24/18 18:18: Sodium 143, Chloride 110 H, Potassium 4.0, Carbon Dioxide 18 L, 

Anion Gap 19, BUN 34 H, Creatinine 1.7 H, Est GFR ( Amer) 50, Est GFR (

Non-Af Amer) 41, Random Glucose 94, Calcium 10.5, Total Bilirubin 0.2, AST 57, 

ALT 41, Alkaline Phosphatase 93, Total Protein 8.0, Albumin 4.1, Globulin 3.9, 

Albumin/Globulin Ratio 1.1


06/24/18 18:18: pO2 35, VBG pH 7.30 L, VBG pCO2 38.0 L, VBG HCO3 18.7 L, VBG 

Total CO2 19.9 L, VBG O2 Sat (Calc) 71.7 H, VBG Base Excess -7.1 L, VBG 

Potassium 4.2, Sodium 141.0, Chloride 112.0 H, Glucose 93, Lactate 1.3, FiO2 

21.0, Venous Blood Potassium 4.2


06/24/18 18:18: PT 12.2, INR 1.07


06/24/18 18:18: WBC 6.0  D, RBC 3.79, Hgb 10.6 L, Hct 31.4 L, MCV 82.8, MCH 28.0

, MCHC 33.8, RDW 14.7 H, Plt Count 258, MPV 7.8, Gran % 63.1, Lymph % (Auto) 

26.2, Mono % (Auto) 7.1 H, Eos % (Auto) 3.1, Baso % (Auto) 0.5, Gran # 3.81, 

Lymph # (Auto) 1.6, Mono # (Auto) 0.4, Eos # (Auto) 0.2, Baso # (Auto) 0.03











- Medication Orders


Current Medication Orders: 











Albuterol/Ipratropium (Duoneb 3 Mg/0.5 Mg (3 Ml) Ud)  3 ml IH [id number] PRN


   PRN Reason: Sinus symptoms


Benzocaine/Menthol (Cepacol Sore Throat)  1 fernandez MT Q2H PRN


   PRN Reason: Sore Throat


   Last Admin: 06/25/18 09:48  Dose: 1 fernandez





Sodium Chloride (Sodium Chloride 0.9%)  1,000 mls @ 100 mls/hr IV .Q10H MARIA D


   Last Admin: 06/25/18 10:57  Dose: 100 mls/hr





eMAR Start Stop


 Document     06/25/18 10:57  MCV  (Rec: 06/25/18 10:57  MCV  [id number])


     Intravenous Solution


      Start Date                                 06/25/18


      Start Time                                 10:57





Amino Acids/Electrolytes/Dextrose (Clinimix 4.25/5 % "E" (2000 Ml))  2,000 mls 

@ 83 mls/hr IV .Q24H MARIA D


Fat Emulsion Intravenous (Intralipid 20%)  250 mls @ 21 mls/hr IV MWF@1800 MARIA D


Levothyroxine Sodium (Synthroid)  150 mcg PO 0600 MARIA D


Magnesium Hydroxide (Milk Of Magnesia)  30 ml PO DAILY MARIA D


Nystatin (Nystop Topical Powder)  0 gm TOP TID MARIA D


Octreotide Acetate (Sandostatin)  50 mcg SC Q8H MARIA D


   Last Admin: 06/25/18 10:54  Dose: 50 mcg





Subcutaneous Administrations


 Document     06/25/18 10:54  MCV  (Rec: 06/25/18 10:54  MCV  [id number])


     Injection Site


      MAR Injection Site                         Right Arm


     Charges for Administration


      # of Subcutaneous Administrations          1





Pantoprazole Sodium (Protonix Ec Tab)  20 mg PO 0600 MARIA D


Petrolatum (Desitin Maximum Strength Topical 40% Oint)  0 gm TOP Q4H PRN


   PRN Reason: Rash


   Last Admin: 06/25/18 02:59  Dose: 1 applic





Saliva Substitute (Saliva Substitute)  2 ml PO Q6H PRN


   PRN Reason: Dry mouth


   Last Admin: 06/25/18 10:55  Dose: 2 ml





Discontinued Medications





Alprazolam (Xanax)  0.25 mg PO ONCE ONE


   PRN Reason: Protocol


   Stop: 06/25/18 14:55


   Last Admin: 06/25/18 15:07  Dose: 0.25 mg





Behavioural


 Document     06/25/18 15:07  MCV  (Rec: 06/25/18 15:07  MCV  [id number])


     Behavior


      Behavior for Medication:                   Anxiety





Sodium Chloride (Sodium Chloride 0.9%)  1,000 mls @ 999 mls/hr IV .Q1H1M STA


   Stop: 06/24/18 18:35


   Last Admin: 06/24/18 18:57  Dose: 999 mls/hr





eMAR Start Stop


 Document     06/24/18 18:57  EQ  (Rec: 06/24/18 18:57  EQ  [id number])


     Intravenous Solution


      Start Date                                 06/24/18


      Start Time                                 18:57





Lorazepam (Ativan)  1 mg IVP ONCE ONE


   PRN Reason: Protocol


   Stop: 06/24/18 20:54


   Last Admin: 06/24/18 21:55  Dose: 1 mg





IVP Administration


 Document     06/24/18 21:55  GMI  (Rec: 06/24/18 21:55  GMI  [id number])


     Charges for Administration


      # of IVP Administrations                   1





Ondansetron HCl (Zofran Inj)  8 mg IVP STAT STA


   Stop: 06/24/18 20:54


   Last Admin: 06/24/18 21:55  Dose: 8 mg





Oxycodone/Acetaminophen (Percocet 5/325 Mg Tab)  1 tab PO ONCE ONE


   Stop: 06/25/18 09:32


   Last Admin: 06/25/18 09:48  Dose: 1 tab





MAR Pain Assessment


 Document     06/25/18 09:48  MCV  (Rec: 06/25/18 09:49  MCV  [id number])


     Pain Reassessment


      Is this a pain reassessment?               No


     Presence of Pain


      Presence of Pain                           Yes


     Pain Scale Used


      Pain Scale Used                            Numeric


     Location


      Pain Location Body Site                    Abdomen


     Description


      Description                                Burning


      Intensity of Pain at present               5


      Pain Behavior                              Moaning


                                                 Restlessness


      Alleviating Factors/Management             Medication


       Techniques                                





Pneumococcal Polyvalent Vaccine (Pneumovax 23 Vaccine)  0.5 ml IM .ONCE ONE


   Stop: 06/25/18 01:42


Saliva Substitute (Saliva Substitute)  2 ml PO Q2H PRN


   PRN Reason: Dry mouth











- Scribe Statement


The provider has reviewed the documentation as recorded by the Scribe





- Scribe Statement





Danielle Wu





Provider Scribe Attestation:


All medical record entries made by the Scribe were at my direction and 

personally dictated by me. I have reviewed the chart and agree that the record 

accurately reflects my personal performance of the history, physical exam, 

medical decision making, and the department course for this patient. I have 

also personally directed, reviewed, and agree with the discharge instructions 

and disposition.


 (Jamal Chahal)





Disposition/Present on Arrival





- Present on Arrival


Any Indicators Present on Arrival: Yes


History of DVT/PE: No


History of Uncontrolled Diabetes: No


Urinary Catheter: Yes (urostomy)


History of Decub. Ulcer: No


History Surgical Site Infection Following: None





- Disposition


Have Diagnosis and Disposition been Completed?: Yes


Disposition Time: 20:28


Patient Plan: Admission





- Disposition


Diagnosis: 


 Abdominal wall fistula, Dehydration





Disposition: HOSPITALIZED


Condition: FAIR





Addendum


Addendum: 





06/25/18 16:08


I spoke with Dr. Perez regarding urine culture, with multiple resistance, and 

VRE.  Dr. Perez stated that Dr. Khan is aware of the urine culture 

result. (Digna Forman)

## 2018-06-24 NOTE — CP.PCM.CON
History of Present Illness





- History of Present Illness


History of Present Illness: 


General Surgery Consult


Re: Abdominal wall fistulas





HPI: 62M presented to ED complaining of leaking ostomy devices, decreased urine 

output, and abdominal pain. He was here 2 days ago for similar reason and CT 

was done which showed no acute processes at that time. Pain is the same as it 

has been. States he was recently treated for his chronic fistulas at [facility] for 3-4 months and spent another month in a rehabilitation center. He 

was released from the rehab center 4 days ago and complaints of rash and 

discomfort due to the leaking dressing contents which he says he is unable to 

control. Denies fever, chills, chest pain, SOB, nausea, and vomiting. Reports 

feeling depressed but not suicidal.





PMH: Hemorrhagic cystitis, hypothyroid, asthma, liver lesion


PSH: Radical cystectomy, ileal conduit, urostomy, multiple para-ostomy hernia 

repairs, L inguinal hernia repair, wound debridements, Ileal conduit revision, 

nephrostomy tubes


Social: denies tobacco, alcohol, illicit drugs


FH: noncontributory


All: NKDA


Meds: See MAR








Review of Systems





- Review of Systems


All systems: reviewed and no additional remarkable complaints except (as per HPI

)





Past Patient History





- Infectious Disease


Hx of Infectious Diseases: None





- Tetanus Immunizations


Tetanus Immunization: Unknown





- Past Medical History & Family History


Past Medical History?: Yes





- Past Social History


Smoking Status: Never Smoked





- CARDIAC


Hx Cardiac Disorders: No


Hx Pacemaker: No





- PULMONARY


Hx Respiratory Disorders: Yes


Hx Asthma: Yes





- NEUROLOGICAL


Hx Neurological Disorder: No


Hx Paralysis: No





- HEENT


Hx HEENT Problems: Yes


Other/Comment: difficulty hearing





- RENAL


Hx Chronic Kidney Disease: Yes (Interstitial Cystitis)


Other/Comment: urinary bladder removed, has urostomy





- ENDOCRINE/METABOLIC


Hx Endocrine Disorders: Yes





- HEMATOLOGICAL/ONCOLOGICAL


Hx Blood Disorders: No


Hx Blood Transfusions: No


Hx Blood Transfusion Reaction: No





- INTEGUMENTARY


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





- MUSCULOSKELETAL/RHEUMATOLOGICAL


Hx Musculoskeletal Disorders: No





- GASTROINTESTINAL


Hx Gastrointestinal Disorders: Yes (esophageal stricture,HERNIORHAPPHY 4 X)


Hx Gastroesophageal Reflux: Yes


Hx Liver Failure: Yes (HEPATOMEGALY)


HX Swallowing Problems: Yes


Other/Comment: + colostomy





- GENITOURINARY/GYNECOLOGICAL


Hx Genitourinary Disorders: Yes (interstital cystitis,  urostomy 2009)


Hx Hematuria: Yes


Hx Prostate Problems: Yes (LASER SX-DYSURIA, enlarged 2004)


Hx Urinary Tract Infection: Yes


Other/Comment: right abd  urostomy tube





- PSYCHIATRIC


Hx Anxiety: Yes


Hx Depression: Yes


Hx Emotional Abuse: No


Hx Physical Abuse: No


Hx Substance Use: No





- SURGICAL HISTORY


Other/Comment: Hernia repair, urostomy, mesh removal, cyst removal, cystectomy





- ANESTHESIA


Hx Anesthesia: Yes


Hx Anesthesia Reactions: No


Hx Malignant Hyperthermia: No





Meds


Allergies/Adverse Reactions: 


 Allergies











Allergy/AdvReac Type Severity Reaction Status Date / Time


 


No Known Allergies Allergy   Verified 06/24/18 17:11














Physical Exam





- Constitutional


Appears: Non-toxic, Chronically Ill





- Head Exam


Head Exam: ATRAUMATIC, NORMOCEPHALIC





- Eye Exam


Eye Exam: EOMI.  absent: Scleral icterus





- ENT Exam


ENT Exam: Mucous Membranes Dry


Additional comments: 





trachea midline





- Neck Exam


Neck exam: Positive for: Full Rom





- Respiratory Exam


Respiratory Exam: NORMAL BREATHING PATTERN.  absent: Respiratory Distress





- Cardiovascular Exam


Cardiovascular Exam: +S1, +S2





- GI/Abdominal Exam


GI & Abdominal Exam: Soft, Tenderness (parafistula and paraostomy. ).  absent: 

Distended, Firm, Guarding, Hernia, Rigid


Additional comments: 


Excoriated rash from R to L side of fistula and extending L laterally to flank.


Also extends to scrotum and penis.





2 fistulas noted in the midline with copious tan/light green ileal output





- Rectal Exam


Rectal Exam: Deferred





- Extremities Exam


Extremities exam: Positive for: normal capillary refill, pedal pulses present.  

Negative for: calf tenderness, pedal edema





- Back Exam


Back exam: absent: CVA tenderness (L), CVA tenderness (R)





- Neurological Exam


Neurological exam: Alert, Oriented x3





- Skin


Skin Exam: Rash (abdomen L>R), Warm





Results





- Vital Signs


Recent Vital Signs: 


 Last Vital Signs











Temp  98.1 F   06/24/18 17:30


 


Pulse  70   06/24/18 17:30


 


Resp  18   06/24/18 17:30


 


BP  111/69   06/24/18 17:30


 


Pulse Ox  100   06/24/18 17:30














- Labs


Result Diagrams: 


 06/24/18 18:18





 06/24/18 18:18


Labs: 


 Laboratory Results - last 24 hr











  06/24/18 06/24/18 06/24/18





  18:18 18:18 18:18


 


WBC  6.0  D  


 


RBC  3.79  


 


Hgb  10.6 L  


 


Hct  31.4 L  


 


MCV  82.8  


 


MCH  28.0  


 


MCHC  33.8  


 


RDW  14.7 H  


 


Plt Count  258  


 


MPV  7.8  


 


Gran %  63.1  


 


Lymph % (Auto)  26.2  


 


Mono % (Auto)  7.1 H  


 


Eos % (Auto)  3.1  


 


Baso % (Auto)  0.5  


 


Gran #  3.81  


 


Lymph # (Auto)  1.6  


 


Mono # (Auto)  0.4  


 


Eos # (Auto)  0.2  


 


Baso # (Auto)  0.03  


 


PT   12.2 


 


INR   1.07 


 


pO2    35


 


VBG pH    7.30 L


 


VBG pCO2    38.0 L


 


VBG HCO3    18.7 L


 


VBG Total CO2    19.9 L


 


VBG O2 Sat (Calc)    71.7 H


 


VBG Base Excess    -7.1 L


 


VBG Potassium    4.2


 


Sodium    141.0


 


Chloride    112.0 H


 


Glucose    93


 


Lactate    1.3


 


FiO2    21.0


 


Potassium   


 


Carbon Dioxide   


 


Anion Gap   


 


BUN   


 


Creatinine   


 


Est GFR ( Amer)   


 


Est GFR (Non-Af Amer)   


 


Random Glucose   


 


Calcium   


 


Total Bilirubin   


 


AST   


 


ALT   


 


Alkaline Phosphatase   


 


Total Protein   


 


Albumin   


 


Globulin   


 


Albumin/Globulin Ratio   


 


Venous Blood Potassium    4.2














  06/24/18





  18:18


 


WBC 


 


RBC 


 


Hgb 


 


Hct 


 


MCV 


 


MCH 


 


MCHC 


 


RDW 


 


Plt Count 


 


MPV 


 


Gran % 


 


Lymph % (Auto) 


 


Mono % (Auto) 


 


Eos % (Auto) 


 


Baso % (Auto) 


 


Gran # 


 


Lymph # (Auto) 


 


Mono # (Auto) 


 


Eos # (Auto) 


 


Baso # (Auto) 


 


PT 


 


INR 


 


pO2 


 


VBG pH 


 


VBG pCO2 


 


VBG HCO3 


 


VBG Total CO2 


 


VBG O2 Sat (Calc) 


 


VBG Base Excess 


 


VBG Potassium 


 


Sodium  143


 


Chloride  110 H


 


Glucose 


 


Lactate 


 


FiO2 


 


Potassium  4.0


 


Carbon Dioxide  18 L


 


Anion Gap  19


 


BUN  34 H


 


Creatinine  1.7 H


 


Est GFR ( Amer)  50


 


Est GFR (Non-Af Amer)  41


 


Random Glucose  94


 


Calcium  10.5


 


Total Bilirubin  0.2


 


AST  57


 


ALT  41


 


Alkaline Phosphatase  93


 


Total Protein  8.0


 


Albumin  4.1


 


Globulin  3.9


 


Albumin/Globulin Ratio  1.1


 


Venous Blood Potassium 














- Imaging and Cardiology


  ** CT scan - prior (6/22) abdomen


Status: Image reviewed by me, Report reviewed by me





Assessment & Plan





- Assessment and Plan (Free Text)


Assessment: 


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


Plan: 


Desitin cream to rash


Need to make an ostomy device (or 2) seal well so we can measure output. Will 

need stoma paste/stomadhesive. May need to talk to wound care about options 

tomorrow.


Strict I&O's


Replace GI losses 1:1 with Lactated Ringers if fistula output is more than 500cc

/day.


D/W Dr. Yvette Pablo PGY4

## 2018-06-25 LAB
ALBUMIN SERPL-MCNC: 3.9 G/DL (ref 3–4.8)
ALBUMIN/GLOB SERPL: 1 {RATIO} (ref 1.1–1.8)
ALT SERPL-CCNC: 33 U/L (ref 7–56)
AST SERPL-CCNC: 26 U/L (ref 17–59)
BASOPHILS # BLD AUTO: 0.04 K/MM3 (ref 0–2)
BASOPHILS NFR BLD: 0.7 % (ref 0–3)
BUN SERPL-MCNC: 27 MG/DL (ref 7–21)
CALCIUM SERPL-MCNC: 9.6 MG/DL (ref 8.4–10.5)
EOSINOPHIL # BLD: 0.3 10*3/UL (ref 0–0.7)
EOSINOPHIL NFR BLD: 5.2 % (ref 1.5–5)
ERYTHROCYTE [DISTWIDTH] IN BLOOD BY AUTOMATED COUNT: 14.6 % (ref 11.5–14.5)
GFR NON-AFRICAN AMERICAN: 56
GRANULOCYTES # BLD: 3.53 10*3/UL (ref 1.4–6.5)
GRANULOCYTES NFR BLD: 65.1 % (ref 50–68)
HDLC SERPL-MCNC: 22 MG/DL (ref 29–60)
HGB BLD-MCNC: 10.1 G/DL (ref 14–18)
LDLC SERPL-MCNC: 68 MG/DL (ref 0–129)
LYMPHOCYTES # BLD: 1.3 10*3/UL (ref 1.2–3.4)
LYMPHOCYTES NFR BLD AUTO: 24.2 % (ref 22–35)
MCH RBC QN AUTO: 27.7 PG (ref 25–35)
MCHC RBC AUTO-ENTMCNC: 33.1 G/DL (ref 31–37)
MCV RBC AUTO: 83.6 FL (ref 80–105)
MONOCYTES # BLD AUTO: 0.3 10*3/UL (ref 0.1–0.6)
MONOCYTES NFR BLD: 4.8 % (ref 1–6)
PLATELET # BLD: 250 10^3/UL (ref 120–450)
PMV BLD AUTO: 7.7 FL (ref 7–11)
RBC # BLD AUTO: 3.65 10^6/UL (ref 3.5–6.1)
WBC # BLD AUTO: 5.4 10^3/UL (ref 4.5–11)

## 2018-06-25 PROCEDURE — 3E0436Z INTRODUCTION OF NUTRITIONAL SUBSTANCE INTO CENTRAL VEIN, PERCUTANEOUS APPROACH: ICD-10-PCS | Performed by: INTERNAL MEDICINE

## 2018-06-25 PROCEDURE — 3E03328 INTRODUCTION OF OXAZOLIDINONES INTO PERIPHERAL VEIN, PERCUTANEOUS APPROACH: ICD-10-PCS

## 2018-06-25 PROCEDURE — 02HV33Z INSERTION OF INFUSION DEVICE INTO SUPERIOR VENA CAVA, PERCUTANEOUS APPROACH: ICD-10-PCS | Performed by: INTERNAL MEDICINE

## 2018-06-25 RX ADMIN — MAGNESIUM HYDROXIDE SCH ML: 400 SUSPENSION ORAL at 16:10

## 2018-06-25 RX ADMIN — PIPERACILLIN AND TAZOBACTAM SCH MLS/HR: 3; .375 INJECTION, POWDER, LYOPHILIZED, FOR SOLUTION INTRAVENOUS; PARENTERAL at 17:42

## 2018-06-25 RX ADMIN — Medication PRN APPLIC: at 02:59

## 2018-06-25 RX ADMIN — Medication PRN ML: at 10:55

## 2018-06-25 RX ADMIN — PURIFIED WATER PRN LOZ: 99.05 LIQUID OPHTHALMIC at 23:58

## 2018-06-25 RX ADMIN — PIPERACILLIN AND TAZOBACTAM SCH MLS/HR: 3; .375 INJECTION, POWDER, LYOPHILIZED, FOR SOLUTION INTRAVENOUS; PARENTERAL at 23:53

## 2018-06-25 RX ADMIN — PURIFIED WATER PRN LOZ: 99.05 LIQUID OPHTHALMIC at 21:54

## 2018-06-25 RX ADMIN — LINEZOLID SCH MLS/HR: 600 INJECTION, SOLUTION INTRAVENOUS at 21:16

## 2018-06-25 RX ADMIN — NYSTATIN SCH DOSE: 100000 POWDER TOPICAL at 16:10

## 2018-06-25 RX ADMIN — PURIFIED WATER PRN LOZ: 99.05 LIQUID OPHTHALMIC at 09:48

## 2018-06-25 NOTE — RAD
HISTORY:

PICC placement  



COMPARISON:

02/08/2018 



FINDINGS:



LUNGS:

No active pulmonary disease.



PLEURA:

No significant pleural effusion identified, no pneumothorax apparent.



CARDIOVASCULAR:

 No radiographic findings to suggest acute or significant 

cardiovascular disease. PICC line in satisfactory position  the tip 

is in the SVC. 



OSSEOUS STRUCTURES:

No significant abnormalities.



VISUALIZED UPPER ABDOMEN:

Normal.



OTHER FINDINGS:

None.



IMPRESSION:

No pneumothorax, adverse findings following PICC line placement.  No 

active pulmonary disease.

## 2018-06-25 NOTE — CP.PCM.CON
History of Present Illness





- History of Present Illness


History of Present Illness: 


62 year old male with PMH of abdominal wall abscess S/P drainage, S/P repeat 

debridement and wound vacuum placement with Enterobacter (11/2016), 

hypothyroidism, Prostate disease, anxiety disorder, esophageal strictures, S/P 

ileal conduit for the urine, S/P left inguinal hernia repair [facility] 

complaining of leakage from the anterior abdomen area where the fistulas were 

which was operated on previously. He states that he is leaking urine and 

sanguinous material intermittently. He also has some abdominal pain which is 

vague. He denies fever or chills, no nausea or vomiting, no chest pain, no SOB, 

no headache or dizziness, no chest pain, no cough or rhinorrhea, no diarrhea. 

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 Infectious 

diseases consult is requested to further evaluate and manage.





Review of Systems





- Review of Systems


All systems: reviewed and no additional remarkable complaints except (as per HPI

)





Past Patient History





- Infectious Disease


Hx of Infectious Diseases: None





- Tetanus Immunizations


Tetanus Immunization: Unknown





- Past Medical History & Family History


Past Medical History?: Yes





- Past Social History


Smoking Status: Never Smoked





- CARDIAC


Hx Cardiac Disorders: No


Hx Pacemaker: No





- PULMONARY


Hx Respiratory Disorders: Yes


Hx Asthma: Yes





- NEUROLOGICAL


Hx Neurological Disorder: No





- HEENT


Hx HEENT Problems: Yes


Other/Comment: difficulty hearing





- RENAL


Hx Chronic Kidney Disease: Yes (Interstitial Cystitis)


Other/Comment: urinary bladder removed, has urostomy





- ENDOCRINE/METABOLIC


Hx Endocrine Disorders: Yes





- HEMATOLOGICAL/ONCOLOGICAL


Hx Blood Disorders: No





- INTEGUMENTARY


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





- MUSCULOSKELETAL/RHEUMATOLOGICAL


Hx Musculoskeletal Disorders: No


Hx Falls: Yes





- GASTROINTESTINAL


Hx Gastrointestinal Disorders: Yes (esophageal stricture,HERNIORHAPPHY 4 X)


Hx Gastroesophageal Reflux: Yes


Hx Liver Failure: Yes (HEPATOMEGALY)


HX Swallowing Problems: Yes


Other/Comment: + colostomy





- GENITOURINARY/GYNECOLOGICAL


Hx Genitourinary Disorders: Yes (interstital cystitis,  urostomy 2009)


Hx Hematuria: Yes


Hx Prostate Problems: Yes (LASER SX-DYSURIA, enlarged 2004)


Hx Urinary Tract Infection: Yes


Other/Comment: previous admission-right abd  urostomy tube; 04/18 urostomy 

moved to left mid lateral abdomen.





- PSYCHIATRIC


Hx Anxiety: Yes


Hx Depression: Yes


Hx Emotional Abuse: No


Hx Physical Abuse: No


Hx Substance Use: No





- SURGICAL HISTORY


Hx Surgeries: Yes


Other/Comment: Hernia repair, urostomy, mesh removal, cyst removal, cystectomy; 

04/18 urostomy and fustula repair





- ANESTHESIA


Hx Anesthesia: Yes


Hx Anesthesia Reactions: No


Hx Malignant Hyperthermia: No





Meds


Allergies/Adverse Reactions: 


 Allergies











Allergy/AdvReac Type Severity Reaction Status Date / Time


 


No Known Allergies Allergy   Verified 06/24/18 17:11














- Medications


Medications: 


 Current Medications





Albuterol/Ipratropium (Duoneb 3 Mg/0.5 Mg (3 Ml) Ud)  3 ml IH [id number] PRN


   PRN Reason: Sinus symptoms


Benzocaine/Menthol (Cepacol Sore Throat)  1 fernandez MT Q2H PRN


   PRN Reason: Sore Throat


   Last Admin: 06/25/18 09:48 Dose:  1 fernandez


Sodium Chloride (Sodium Chloride 0.9%)  1,000 mls @ 100 mls/hr IV .Q10H MARIA D


   Last Admin: 06/25/18 10:57 Dose:  100 mls/hr


Levothyroxine Sodium (Synthroid)  150 mcg PO 0600 MARIA D


Octreotide Acetate (Sandostatin)  50 mcg SC Q8H MARIA D


   Last Admin: 06/25/18 10:54 Dose:  50 mcg


Pantoprazole Sodium (Protonix Ec Tab)  20 mg PO 0600 MARIA D


Petrolatum (Desitin Maximum Strength Topical 40% Oint)  0 gm TOP Q4H PRN


   PRN Reason: Rash


   Last Admin: 06/25/18 02:59 Dose:  1 applic


Saliva Substitute (Saliva Substitute)  2 ml PO Q6H PRN


   PRN Reason: Dry mouth


   Last Admin: 06/25/18 10:55 Dose:  2 ml











Physical Exam





- Constitutional


Appears: Chronically Ill





- Head Exam


Head Exam: NORMAL INSPECTION





- ENT Exam


ENT Exam: Mucous Membranes Moist





- Neck Exam


Neck exam: Negative for: Lymphadenopathy, Meningismus





- Respiratory Exam


Respiratory Exam: Decreased Breath Sounds





- Cardiovascular Exam


Cardiovascular Exam: +S1, +S2





- GI/Abdominal Exam


GI & Abdominal Exam: Soft, Tenderness (mild).  absent: Distended, Firm, Guarding

, Rebound, Rigid


Additional comments: 





ileal conduit on the right side of abdomen now transferred to left side, median 

area with open abdominal wall with leaking sanguinous material and urine





Results





- Vital Signs


Recent Vital Signs: 


 Last Vital Signs











Temp  98 F   06/25/18 06:00


 


Pulse  56 L  06/25/18 06:00


 


Resp  20   06/25/18 06:00


 


BP  105/65   06/25/18 06:00


 


Pulse Ox  100   06/25/18 06:00














- Labs


Result Diagrams: 


 06/25/18 09:15





 06/25/18 09:15


Labs: 


 Laboratory Results - last 24 hr











  06/24/18 06/25/18 06/25/18





  23:00 09:15 09:15


 


WBC   5.4 


 


RBC   3.65 


 


Hgb   10.1 L 


 


Hct   30.5 L 


 


MCV   83.6 


 


MCH   27.7 


 


MCHC   33.1 


 


RDW   14.6 H 


 


Plt Count   250 


 


MPV   7.7 


 


Gran %   65.1 


 


Lymph % (Auto)   24.2 


 


Mono % (Auto)   4.8 


 


Eos % (Auto)   5.2 H 


 


Baso % (Auto)   0.7 


 


Gran #   3.53 


 


Lymph # (Auto)   1.3 


 


Mono # (Auto)   0.3 


 


Eos # (Auto)   0.3 


 


Baso # (Auto)   0.04 


 


Sodium    146


 


Potassium    3.7


 


Chloride    115 H


 


Carbon Dioxide    16 L


 


Anion Gap    19


 


BUN    27 H


 


Creatinine    1.3


 


Est GFR ( Amer)    > 60


 


Est GFR (Non-Af Amer)    56


 


Random Glucose    86


 


Calcium    9.6


 


Phosphorus    4.2


 


Magnesium    1.8


 


Total Bilirubin    0.3


 


AST    26


 


ALT    33


 


Alkaline Phosphatase    78


 


Total Protein    7.8


 


Albumin    3.9


 


Globulin    4.0


 


Albumin/Globulin Ratio    1.0 L


 


Prealbumin   


 


Triglycerides   


 


Cholesterol   


 


LDL Cholesterol Direct   


 


HDL Cholesterol   


 


Urine Color  Yellow  


 


Urine Appearance  Cloudy  


 


Urine pH  7.0  


 


Ur Specific Gravity  1.010  


 


Urine Protein  Trace H  


 


Urine Glucose (UA)  Negative  


 


Urine Ketones  Negative  


 


Urine Blood  Large H  


 


Urine Nitrate  Positive H  


 


Urine Bilirubin  Negative  


 


Urine Urobilinogen  0.2  


 


Ur Leukocyte Esterase  Large H  


 


Urine RBC  5 - 10  


 


Urine WBC  15 - 20  


 


Ur Epithelial Cells  0 - 2  


 


Urine Bacteria  Many  














  06/25/18 06/25/18





  09:15 09:47


 


WBC  


 


RBC  


 


Hgb  


 


Hct  


 


MCV  


 


MCH  


 


MCHC  


 


RDW  


 


Plt Count  


 


MPV  


 


Gran %  


 


Lymph % (Auto)  


 


Mono % (Auto)  


 


Eos % (Auto)  


 


Baso % (Auto)  


 


Gran #  


 


Lymph # (Auto)  


 


Mono # (Auto)  


 


Eos # (Auto)  


 


Baso # (Auto)  


 


Sodium  


 


Potassium  


 


Chloride  


 


Carbon Dioxide  


 


Anion Gap  


 


BUN  


 


Creatinine  


 


Est GFR ( Amer)  


 


Est GFR (Non-Af Amer)  


 


Random Glucose  


 


Calcium  


 


Phosphorus  


 


Magnesium  


 


Total Bilirubin  


 


AST  


 


ALT  


 


Alkaline Phosphatase  


 


Total Protein  


 


Albumin  


 


Globulin  


 


Albumin/Globulin Ratio  


 


Prealbumin  28.6 


 


Triglycerides   154


 


Cholesterol   126 L


 


LDL Cholesterol Direct   68


 


HDL Cholesterol   22 L


 


Urine Color  


 


Urine Appearance  


 


Urine pH  


 


Ur Specific Gravity  


 


Urine Protein  


 


Urine Glucose (UA)  


 


Urine Ketones  


 


Urine Blood  


 


Urine Nitrate  


 


Urine Bilirubin  


 


Urine Urobilinogen  


 


Ur Leukocyte Esterase  


 


Urine RBC  


 


Urine WBC  


 


Ur Epithelial Cells  


 


Urine Bacteria  














Assessment & Plan





- Assessment and Plan (Free Text)


Plan: 





Assessment


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 

far growing VRE


history of Abdominal wall abscess S/P drainage S/P repeat debridement and wound 

vacuum placement, with Enterobacter


hypothyroidism


Prostate disease


anxiety disorder


esophageal strictures


acute renal failure





Plan


will follow up blood, urine, wound cx


since there is VRE, will start Zyvox and will add Zosyn since the GI tract may 

be involved


follow up Surgery evaluation


will monitor clinically

## 2018-06-25 NOTE — CP.PCM.PN
Subjective





- Date & Time of Evaluation


Date of Evaluation: 06/25/18


Time of Evaluation: 06:50





- Subjective


Subjective: 





General Surgery Note for Dr. Schmid





Patient seen and examined at bedside. No acute event overnight. Patient still 

has output from fistula. He is complaining of pain on skin from excoriation. 

Patient denies fever/chills or nasuea/vomiting. He denies BM. Patient 

complaining of dry mouth. Patient to get PICC line today for TPN.





Objective





- Vital Signs/Intake and Output


Vital Signs (last 24 hours): 


 











Temp Pulse Resp BP Pulse Ox


 


 98.3 F   63   20   115/71   98 


 


 06/25/18 00:34  06/25/18 00:34  06/25/18 00:34  06/25/18 00:34  06/24/18 23:12








Intake and Output: 


 











 06/25/18 06/25/18





 06:59 18:59


 


Intake Total 300 


 


Output Total 600 


 


Balance -300 














- Medications


Medications: 


 Current Medications





Sodium Chloride (Sodium Chloride 0.9%)  1,000 mls @ 100 mls/hr IV .Q10H MARIA D


   Last Admin: 06/25/18 03:00 Dose:  100 mls/hr


Petrolatum (Desitin Maximum Strength Topical 40% Oint)  0 gm TOP Q4H PRN


   PRN Reason: Rash


   Last Admin: 06/25/18 02:59 Dose:  1 applic











- Labs


Labs: 


 











PT  12.2 SECONDS (9.4-12.5)   06/24/18  18:18    


 


INR  1.07  (0.93-1.08)   06/24/18  18:18    














- Constitutional


Appears: No Acute Distress





- Head Exam


Head Exam: ATRAUMATIC, NORMOCEPHALIC





- Eye Exam


Eye Exam: EOMI, Normal appearance


Pupil Exam: PERRL





- ENT Exam


ENT Exam: Mucous Membranes Dry





- Respiratory Exam


Respiratory Exam: NORMAL BREATHING PATTERN





- Cardiovascular Exam


Cardiovascular Exam: REGULAR RHYTHM





- GI/Abdominal Exam


GI & Abdominal Exam: Tenderness (from excoriations), Normal Bowel Sounds.  

absent: Distended


Additional comments: 





midline fistula with 200cc of bilious output since admission


urostomy left of midline producing 400 cc of urine output since admission


excoriations all over lower abdomen and genitals





- Extremities Exam


Extremities Exam: Normal Capillary Refill





- Back Exam


Back Exam: absent: CVA tenderness (L), CVA tenderness (R)





- Neurological Exam


Neurological Exam: Alert, Awake, Oriented x3





- Psychiatric Exam


Psychiatric exam: Normal Affect, Normal Mood





- Skin


Skin Exam: Warm


Additional comments: 





midline fistula 


urostomy left of midline


excoriations all over lower abdomen and genitals





Assessment and Plan





- Assessment and Plan (Free Text)


Assessment: 


62M with enterocutaneous fistula


Plan: 





NPO


PPN for now


TPN after PICC line insertion


Octreotide 50 mg SC Q8H


IV fluids


Wound Care - special ostomy appliance needed, stoma adhesive


Strict I&O's


Dietician referral


Milk of magnesia topical for excoriations


Replace GI losses 1:1 with Lactated Ringers if fistula output is more than 500cc

/day


Further recommendations as per Dr. Yvette Morris PGY1

## 2018-06-26 LAB
ALBUMIN SERPL-MCNC: 3.5 G/DL (ref 3–4.8)
ALBUMIN/GLOB SERPL: 0.9 {RATIO} (ref 1.1–1.8)
ALT SERPL-CCNC: 25 U/L (ref 7–56)
AST SERPL-CCNC: 28 U/L (ref 17–59)
BUN SERPL-MCNC: 27 MG/DL (ref 7–21)
CALCIUM SERPL-MCNC: 9.1 MG/DL (ref 8.4–10.5)
ERYTHROCYTE [DISTWIDTH] IN BLOOD BY AUTOMATED COUNT: 14.6 % (ref 11.5–14.5)
GFR NON-AFRICAN AMERICAN: 56
HGB BLD-MCNC: 9.6 G/DL (ref 14–18)
MCH RBC QN AUTO: 27.7 PG (ref 25–35)
MCHC RBC AUTO-ENTMCNC: 33.2 G/DL (ref 31–37)
MCV RBC AUTO: 83.3 FL (ref 80–105)
PLATELET # BLD: 208 10^3/UL (ref 120–450)
PMV BLD AUTO: 8.3 FL (ref 7–11)
RBC # BLD AUTO: 3.47 10^6/UL (ref 3.5–6.1)
TROPONIN I SERPL-MCNC: < 0.01 NG/ML
WBC # BLD AUTO: 6.6 10^3/UL (ref 4.5–11)

## 2018-06-26 RX ADMIN — MORPHINE SULFATE PRN MG: 2 INJECTION, SOLUTION INTRAMUSCULAR; INTRAVENOUS at 11:59

## 2018-06-26 RX ADMIN — LINEZOLID SCH MLS/HR: 600 INJECTION, SOLUTION INTRAVENOUS at 22:43

## 2018-06-26 RX ADMIN — PIPERACILLIN AND TAZOBACTAM SCH MLS/HR: 3; .375 INJECTION, POWDER, LYOPHILIZED, FOR SOLUTION INTRAVENOUS; PARENTERAL at 17:55

## 2018-06-26 RX ADMIN — PANTOPRAZOLE SODIUM SCH MG: 20 TABLET, DELAYED RELEASE ORAL at 06:02

## 2018-06-26 RX ADMIN — PURIFIED WATER PRN LOZ: 99.05 LIQUID OPHTHALMIC at 02:44

## 2018-06-26 RX ADMIN — NYSTATIN SCH DOSE: 100000 POWDER TOPICAL at 11:00

## 2018-06-26 RX ADMIN — NYSTATIN SCH DOSE: 100000 POWDER TOPICAL at 17:57

## 2018-06-26 RX ADMIN — NYSTATIN SCH DOSE: 100000 POWDER TOPICAL at 15:00

## 2018-06-26 RX ADMIN — PIPERACILLIN AND TAZOBACTAM SCH MLS/HR: 3; .375 INJECTION, POWDER, LYOPHILIZED, FOR SOLUTION INTRAVENOUS; PARENTERAL at 06:02

## 2018-06-26 RX ADMIN — PIPERACILLIN AND TAZOBACTAM SCH MLS/HR: 3; .375 INJECTION, POWDER, LYOPHILIZED, FOR SOLUTION INTRAVENOUS; PARENTERAL at 12:54

## 2018-06-26 RX ADMIN — MORPHINE SULFATE PRN MG: 2 INJECTION, SOLUTION INTRAMUSCULAR; INTRAVENOUS at 04:30

## 2018-06-26 RX ADMIN — MORPHINE SULFATE PRN MG: 2 INJECTION, SOLUTION INTRAMUSCULAR; INTRAVENOUS at 17:55

## 2018-06-26 RX ADMIN — MAGNESIUM HYDROXIDE SCH ML: 400 SUSPENSION ORAL at 09:47

## 2018-06-26 RX ADMIN — LINEZOLID SCH MLS/HR: 600 INJECTION, SOLUTION INTRAVENOUS at 09:45

## 2018-06-26 NOTE — CP.PCM.CON
<Yudy Adams - Last Filed: 06/26/18 13:28>





History of Present Illness





- History of Present Illness


History of Present Illness: 


GI Consult Note for MELISA Selby PGY2





This is 63yo male with past medical history of hemorrhagic cystitis s/p 

cystectomy and ileal conduit, multiple abdominal surgeries including hernia 

repairs who came to ED for abdominal pain and decreased urine output. Patient 

also noted that his ostomy was leaking as well. He went to ED on 6/22/18 and 

had a CT AP w/o contrast which showed possible intra-abdominal collections in 

the anterior abdominal wall. Patient states he was recently released from a 

rehab facility and noticed rash and leaking in his abdomen. He was tearful on 

exam, but denies suicidal or homicidal ideation. Patient had a liver biopsy at 

previous admission and pathology was non-diagnostic. Patient is also 

complaining of skin lesions on his scrotum that are painful. He denies chest 

pain, shortness of breath, nausea/vomiting/diarrhea, fever/chills, numbness/

tingling. Patient had colonoscopy in April 2017 which showed 3 8-20mm 

hyperplastic polyps. EGD in December 2016 showed benign polyp and gastritis. 





Past medical history: Hemorrhagic cystitis, hypothyroid, asthma, liver lesions (

path non-diagnostic)


Past surgical history: Radical cystectomy, ileal conduit, urostomy, multiple 

para-ostomy hernia repairs, L inguinal hernia repair, wound debridements, Ileal 

conduit revision, nephrostomy tubes


Home meds: Reviewed


Allergies: NKDA


Social history: Denies EtOH, drug or tobacco use 


Family history: Non-contributory 





Review of Systems





- Review of Systems


All systems: reviewed and no additional remarkable complaints except


Review of Systems: 





12 Point ROS reviewed as per HPI and is otherwise negative. 





Past Patient History





- Infectious Disease


Hx of Infectious Diseases: None





- Tetanus Immunizations


Tetanus Immunization: Unknown





- Past Medical History & Family History


Past Medical History?: Yes





- Past Social History


Smoking Status: Never Smoked





- CARDIAC


Hx Cardiac Disorders: No


Hx Pacemaker: No





- PULMONARY


Hx Respiratory Disorders: Yes


Hx Asthma: Yes





- NEUROLOGICAL


Hx Neurological Disorder: No





- HEENT


Hx HEENT Problems: Yes


Other/Comment: difficulty hearing





- RENAL


Hx Chronic Kidney Disease: Yes (Interstitial Cystitis)


Other/Comment: urinary bladder removed, has urostomy





- ENDOCRINE/METABOLIC


Hx Endocrine Disorders: Yes





- HEMATOLOGICAL/ONCOLOGICAL


Hx Blood Disorders: No





- INTEGUMENTARY


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





- MUSCULOSKELETAL/RHEUMATOLOGICAL


Hx Musculoskeletal Disorders: No


Hx Falls: Yes





- GASTROINTESTINAL


Hx Gastrointestinal Disorders: Yes (esophageal stricture,HERNIORHAPPHY 4 X)


Hx Gastroesophageal Reflux: Yes


Hx Liver Failure: Yes (HEPATOMEGALY)


HX Swallowing Problems: Yes


Other/Comment: + colostomy





- GENITOURINARY/GYNECOLOGICAL


Hx Genitourinary Disorders: Yes (interstital cystitis,  urostomy 2009)


Hx Hematuria: Yes


Hx Prostate Problems: Yes (LASER SX-DYSURIA, enlarged 2004)


Hx Urinary Tract Infection: Yes


Other/Comment: previous admission-right abd  urostomy tube; 04/18 urostomy 

moved to left mid lateral abdomen.





- PSYCHIATRIC


Hx Anxiety: Yes


Hx Depression: Yes


Hx Emotional Abuse: No


Hx Physical Abuse: No


Hx Substance Use: No





- SURGICAL HISTORY


Hx Surgeries: Yes


Other/Comment: Hernia repair, urostomy, mesh removal, cyst removal, cystectomy; 

04/18 urostomy and fustula repair





- ANESTHESIA


Hx Anesthesia: Yes


Hx Anesthesia Reactions: No


Hx Malignant Hyperthermia: No





Meds


Allergies/Adverse Reactions: 


 Allergies











Allergy/AdvReac Type Severity Reaction Status Date / Time


 


No Known Allergies Allergy   Verified 06/24/18 17:11














- Medications


Medications: 


 Current Medications





Albuterol/Ipratropium (Duoneb 3 Mg/0.5 Mg (3 Ml) Ud)  3 ml IH [id number] PRN


   PRN Reason: Sinus symptoms


Benzocaine/Menthol (Cepacol Sore Throat)  1 fernandez MT Q2H PRN


   PRN Reason: Sore Throat


   Last Admin: 06/26/18 02:44 Dose:  1 fernandez


Piperacillin Sod/Tazobactam Sod (Zosyn 3.375 In Ns 100ml)  100 mls @ 200 mls/hr 

IVPB Q6 [facility]IA D


   PRN Reason: Protocol


   Stop: 07/02/18 18:01


   Last Admin: 06/26/18 06:02 Dose:  200 mls/hr


Linezolid (Zyvox 600mg/300ml D5w)  600 mg in 300 mls @ 200 mls/hr IVPB Q12 MARIA D


   PRN Reason: Protocol


   Stop: 07/02/18 22:01


   Last Admin: 06/26/18 09:45 Dose:  200 mls/hr


Amino Acids/Electrolytes/Dextrose (Clinimix 5/20 % "E" (2000 Ml))  2,000 mls @ 

83.333 mls/hr IV .Q24H [facility]


   Stop: 06/29/18 17:59


Fat Emulsion Intravenous (Intralipid 20%)  250 mls @ 20.833 mls/hr IV MWF@1800 

[facility]


   Stop: 06/29/18 18:01


Levothyroxine Sodium (Synthroid)  150 mcg PO 0600 [facility]


   Last Admin: 06/26/18 06:01 Dose:  150 mcg


Magnesium Hydroxide (Milk Of Magnesia)  30 ml PO DAILY [facility]


   Last Admin: 06/26/18 09:47 Dose:  30 ml


Morphine Sulfate (Morphine)  2 mg IVP Q4H PRN


   PRN Reason: Pain, severe (8-10)


   Last Admin: 06/26/18 04:30 Dose:  2 mg


Nystatin (Nystop Topical Powder)  0 gm TOP TID [facility]


   Last Admin: 06/25/18 16:10 Dose:  1 dose


Octreotide Acetate (Sandostatin)  50 mcg SC Q8H [facility]


   Last Admin: 06/25/18 17:52 Dose:  50 mcg


Pantoprazole Sodium (Protonix Ec Tab)  20 mg PO 0600 [facility]


   Last Admin: 06/26/18 06:02 Dose:  20 mg


Petrolatum (Desitin Maximum Strength Topical 40% Oint)  0 gm TOP Q4H PRN


   PRN Reason: Rash


   Last Admin: 06/25/18 02:59 Dose:  1 applic


Saliva Substitute (Saliva Substitute)  2 ml PO Q6H PRN


   PRN Reason: Dry mouth


   Last Admin: 06/25/18 10:55 Dose:  2 ml











Physical Exam





- Constitutional


Appears: Cachectic, Chronically Ill





- Head Exam


Head Exam: ATRAUMATIC, NORMAL INSPECTION, NORMOCEPHALIC





- Eye Exam


Eye Exam: Normal appearance


Pupil Exam: NORMAL ACCOMODATION, PERRL





- ENT Exam


ENT Exam: Mucous Membranes Moist


Additional comments: 





poor dentition 





- Respiratory Exam


Respiratory Exam: Clear to Auscultation Bilateral, NORMAL BREATHING PATTERN.  

absent: Rales, Rhonchi, Wheezes





- Cardiovascular Exam


Cardiovascular Exam: REGULAR RHYTHM, +S1, +S2.  absent: Gallop, Rubs, Systolic 

Murmur





- GI/Abdominal Exam


GI & Abdominal Exam: Normal Bowel Sounds, Soft


Additional comments: 





L sided ileal conduit with some urine in bag. Midline abdominal wound vac with 

yellow drainage





- Extremities Exam


Extremities exam: Positive for: normal inspection.  Negative for: calf 

tenderness, pedal edema





- Neurological Exam


Neurological exam: Alert, CN II-XII Intact, Oriented x3





- Psychiatric Exam


Psychiatric exam: Depressed





- Skin


Skin Exam: Dry, Warm





Results





- Vital Signs


Recent Vital Signs: 


 Last Vital Signs











Temp  97.8 F   06/26/18 06:00


 


Pulse  61   06/26/18 06:00


 


Resp  20   06/26/18 06:00


 


BP  119/72   06/26/18 06:00


 


Pulse Ox  98   06/26/18 06:00














- Labs


Result Diagrams: 


 06/26/18 06:47





 06/26/18 06:47


Labs: 


 Laboratory Results - last 24 hr











  06/25/18 06/26/18 06/26/18





  09:15 06:31 06:47


 


WBC    6.6  D


 


RBC    3.47 L


 


Hgb    9.6 L


 


Hct    28.9 L


 


MCV    83.3


 


MCH    27.7


 


MCHC    33.2


 


RDW    14.6 H


 


Plt Count    208


 


MPV    8.3


 


Sodium   


 


Potassium   


 


Chloride   


 


Carbon Dioxide   


 


Anion Gap   


 


BUN   


 


Creatinine   


 


Est GFR ( Amer)   


 


Est GFR (Non-Af Amer)   


 


POC Glucose (mg/dL)   94 


 


Random Glucose   


 


Calcium   


 


Phosphorus   


 


Magnesium   


 


Total Bilirubin   


 


AST   


 


ALT   


 


Alkaline Phosphatase   


 


Total Protein   


 


Albumin   


 


Globulin   


 


Albumin/Globulin Ratio   


 


Prealbumin  28.6  














  06/26/18





  06:47


 


WBC 


 


RBC 


 


Hgb 


 


Hct 


 


MCV 


 


MCH 


 


MCHC 


 


RDW 


 


Plt Count 


 


MPV 


 


Sodium  143


 


Potassium  3.8


 


Chloride  113 H


 


Carbon Dioxide  19 L


 


Anion Gap  15


 


BUN  27 H


 


Creatinine  1.3


 


Est GFR ( Amer)  > 60


 


Est GFR (Non-Af Amer)  56


 


POC Glucose (mg/dL) 


 


Random Glucose  106


 


Calcium  9.1


 


Phosphorus  4.2


 


Magnesium  1.9


 


Total Bilirubin  0.2


 


AST  28


 


ALT  25


 


Alkaline Phosphatase  68


 


Total Protein  7.2


 


Albumin  3.5


 


Globulin  3.7


 


Albumin/Globulin Ratio  0.9 L


 


Prealbumin 














Assessment & Plan





- Assessment and Plan (Free Text)


Assessment: 


This is 63yo male with past medical history of anemia, hypothyroidism 

hemorrhagic cystitis s/p cystectomy and ileal conduit, multiple abdominal 

surgeries who is admitted for 





1. Abdominal wall wound


   - rule out intracutaneous fistula and abdominal wall abscess 


   - possible small bowel fistula 


2. Hemorrhagic cystitis s/p cystectomty and ileal condiut w/ revision 


3. UTI 


4. Hepatic lesion 


   - Previous biopsy was non-diagnostic 


5. Anemia


6. Hypothyroid


Plan: 


Recommend MRI of abdomen w/o contrast. Continue IV hydration and antibiotics. 

Will discuss with radiologist and surgery as well as PMD. 





Seen, reviewed and discussed with Dr. Ankit Adams PGY2





- Date & Time


Date: 06/26/18





<Racheal Pham - Last Filed: 06/26/18 23:34>





Meds





- Medications


Medications: 


 Current Medications





Albuterol/Ipratropium (Duoneb 3 Mg/0.5 Mg (3 Ml) Ud)  3 ml IH [id number] PRN


   PRN Reason: Sinus symptoms


Benzocaine/Menthol (Cepacol Sore Throat)  1 fernandez MT Q2H PRN


   PRN Reason: Sore Throat


   Last Admin: 06/26/18 02:44 Dose:  1 fernandez


Piperacillin Sod/Tazobactam Sod (Zosyn 3.375 In Ns 100ml)  100 mls @ 200 mls/hr 

IVPB Q6 MARIA D


   PRN Reason: Protocol


   Stop: 07/02/18 18:01


   Last Admin: 06/26/18 17:55 Dose:  200 mls/hr


Linezolid (Zyvox 600mg/300ml D5w)  600 mg in 300 mls @ 200 mls/hr IVPB Q12 MARIA D


   PRN Reason: Protocol


   Stop: 07/02/18 22:01


   Last Admin: 06/26/18 22:43 Dose:  200 mls/hr


Amino Acids/Electrolytes/Dextrose (Clinimix 5/20 % "E" (2000 Ml))  2,000 mls @ 

83.333 mls/hr IV .Q24H [facility] D


   Stop: 06/29/18 17:59


   Last Admin: 06/26/18 17:55 Dose:  83.333 mls/hr


Fat Emulsion Intravenous (Intralipid 20%)  250 mls @ 20.833 mls/hr IV MWF@1800 

[facility] D


   Stop: 06/29/18 18:01


Levothyroxine Sodium (Synthroid)  150 mcg PO 0600 [facility]


   Last Admin: 06/26/18 06:01 Dose:  150 mcg


Magnesium Hydroxide (Milk Of Magnesia)  30 ml PO DAILY [facility] D


   Last Admin: 06/26/18 09:47 Dose:  30 ml


Morphine Sulfate (Morphine)  2 mg IVP Q4H PRN


   PRN Reason: Pain, severe (8-10)


   Last Admin: 06/26/18 17:55 Dose:  2 mg


Nystatin (Nystop Topical Powder)  0 gm TOP TID [facility]


   Last Admin: 06/26/18 17:57 Dose:  1 dose


Octreotide Acetate (Sandostatin)  50 mcg SC Q8H [facility]


   Last Admin: 06/26/18 17:57 Dose:  Not Given


Pantoprazole Sodium (Protonix Ec Tab)  20 mg PO 0600 [facility]


   Last Admin: 06/26/18 06:02 Dose:  20 mg


Petrolatum (Desitin Maximum Strength Topical 40% Oint)  0 gm TOP Q4H PRN


   PRN Reason: Rash


   Last Admin: 06/25/18 02:59 Dose:  1 applic


Saliva Substitute (Saliva Substitute)  2 ml PO Q6H PRN


   PRN Reason: Dry mouth


   Last Admin: 06/25/18 10:55 Dose:  2 ml











Results





- Vital Signs


Recent Vital Signs: 


 Last Vital Signs











Temp  98.3 F   06/26/18 14:00


 


Pulse  45 L  06/26/18 14:00


 


Resp  20   06/26/18 14:00


 


BP  120/74   06/26/18 14:00


 


Pulse Ox  100   06/26/18 14:00














- Labs


Result Diagrams: 


 06/26/18 06:47





 06/26/18 06:47


Labs: 


 Laboratory Results - last 24 hr











  06/26/18 06/26/18 06/26/18





  06:31 06:47 06:47


 


WBC   6.6  D 


 


RBC   3.47 L 


 


Hgb   9.6 L 


 


Hct   28.9 L 


 


MCV   83.3 


 


MCH   27.7 


 


MCHC   33.2 


 


RDW   14.6 H 


 


Plt Count   208 


 


MPV   8.3 


 


Sodium    143


 


Potassium    3.8


 


Chloride    113 H


 


Carbon Dioxide    19 L


 


Anion Gap    15


 


BUN    27 H


 


Creatinine    1.3


 


Est GFR ( Amer)    > 60


 


Est GFR (Non-Af Amer)    56


 


POC Glucose (mg/dL)  94  


 


Random Glucose    106


 


Calcium    9.1


 


Phosphorus    4.2


 


Magnesium    1.9


 


Total Bilirubin    0.2


 


AST    28


 


ALT    25


 


Alkaline Phosphatase    68


 


Troponin I   


 


Total Protein    7.2


 


Albumin    3.5


 


Globulin    3.7


 


Albumin/Globulin Ratio    0.9 L














  06/26/18 06/26/18 06/26/18





  11:31 16:01 21:04


 


WBC   


 


RBC   


 


Hgb   


 


Hct   


 


MCV   


 


MCH   


 


MCHC   


 


RDW   


 


Plt Count   


 


MPV   


 


Sodium   


 


Potassium   


 


Chloride   


 


Carbon Dioxide   


 


Anion Gap   


 


BUN   


 


Creatinine   


 


Est GFR ( Amer)   


 


Est GFR (Non-Af Amer)   


 


POC Glucose (mg/dL)  151 H  119 H  122 H


 


Random Glucose   


 


Calcium   


 


Phosphorus   


 


Magnesium   


 


Total Bilirubin   


 


AST   


 


ALT   


 


Alkaline Phosphatase   


 


Troponin I   


 


Total Protein   


 


Albumin   


 


Globulin   


 


Albumin/Globulin Ratio   














  06/26/18





  22:35


 


WBC 


 


RBC 


 


Hgb 


 


Hct 


 


MCV 


 


MCH 


 


MCHC 


 


RDW 


 


Plt Count 


 


MPV 


 


Sodium 


 


Potassium 


 


Chloride 


 


Carbon Dioxide 


 


Anion Gap 


 


BUN 


 


Creatinine 


 


Est GFR ( Amer) 


 


Est GFR (Non-Af Amer) 


 


POC Glucose (mg/dL) 


 


Random Glucose 


 


Calcium 


 


Phosphorus 


 


Magnesium  2.0


 


Total Bilirubin 


 


AST 


 


ALT 


 


Alkaline Phosphatase 


 


Troponin I  < 0.01


 


Total Protein 


 


Albumin 


 


Globulin 


 


Albumin/Globulin Ratio 














Attending/Attestation





- Attestation


I have personally seen and examined this patient.: Yes


I have fully participated in the care of the patient.: Yes


I have reviewed all pertinent clinical information: Yes


Notes (Text): 


This is an addendum to GI consult  report dictated by the Medical Resident.The 

patient was seen and examined earlier.  Medical records, lab studies, imagings 

were reviewed.  Last 24 hours events reviewed.  Agreed with the above treatment 

plan as outlined in Medical Resident 's notes the with the addition of the 

following 





06/26/18 23:32

## 2018-06-26 NOTE — RAD
PROCEDURE:  Limited small bowel series



HISTORY:

enterocutaneous fistula



COMPARISON:

CT abdomen and pelvis from 06/22/2018



TECHNIQUE:

The patient ingested water-soluble contrast (Omnipaque 240 1:1 

dilution).



FINDINGS:

Oral contrast passed smoothly from the esophagus into the stomach and 

jejunum. On the lateral projection, oral contrast was seen flowing 

from the anterior jejunal loop into the colostomy bag. The visualized 

jejunal loops appear normal in caliber. 



Follow-up series for identification of the suspected 2nd 

enterocutaneous fistula could not be performed as the patient was not 

cooperative. 



The total fluoroscopic time was 8.3 minutes. 



IMPRESSION:

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 

jejunum.

## 2018-06-26 NOTE — HP
DATE OF EXAM:  06/25/2018



The patient is a 62-year-old male.



CHIEF COMPLAINT:  Abdominal pain.



HISTORY OF PRESENT ILLNESS:  Mr. Dmitri Hickey is a 62-year-old male with

past medical history of asthma, interstitial hemorrhage, cystitis, SP ileal

conduit, cystectomy, hypothyroidism, GI fistula, anxiety, and nephritis,

came to the emergency department for evaluation and treatment of leaking of

the wound package that was placed 2 days ago in the ER when being evaluated

for abdominal pain.  Stated he was recently treated for his chronic fistula

at [facility] for approximately 3 months and spent another month in the

rehab center [place] faraway place.  He was released from the rehab center 2

days ago and complains of discomfort and leaking wound, occurs due to

continued pus and poop.  Denies fever, chills, chest pain, shortness of

breath, nausea, vomiting.



PAST MEDICAL HISTORY:  As above.  History of asthma, 

interstitial cystitis, vertigo, vitiligo arms and hands, esophageal

stricture, herniorrhaphy, hepatomegaly, colostomy, right abdominal urostomy

tube, anxiety, hernia repair, Mesh removal, cyst removal, and cystectomy.



FAMILY HISTORY:  Father and mother, noncontributory.



HABITS:  No smoking.  No drugs.  No ethanol.



ALLERGIES:  THE PATIENT IS NOT ALLERGIC WITH ANY MEDICATIONS.



HOME MEDICATIONS:  Alprazolam, omeprazole,



REVIEW OF SYSTEMS:  The patient was seen and examined at the bedside in his

room, having abdominal pain.  No fever.  No chills.  No headache.  No

dizziness.  No hematuria or hematochezia.



PHYSICAL EXAMINATION:

VITAL SIGNS:  Temperature 98, pulse 66, blood pressure 105/65, respiratory

rate 20.

HEENT:  Head is normocephalic and atraumatic.  Eyes; PERRLA.  Extraocular

muscles are intact.  Conjunctivae are clear.  Nose is patent.  Mucous

membranes are moist.

NECK:  Supple.  No carotid bruit, JVD, or thyromegaly.

CHEST:  Bilaterally symmetrical.

HEART:  S1 and S2 positive.

LUNGS:  Clear to auscultation.

ABDOMEN:  Tender, has multiple bags, multiple scar tissues of surgeries.

EXTREMITIES:  No edema.  No cyanosis.

NEUROLOGIC:  The patient is awake and alert.  Moving all 4 extremities.  No

focal deficit.



LABORATORY DATA:  White blood cell is 5.4, hemoglobin 10.1, hematocrit

30.5, and platelets 250.  Sodium 146, potassium 3.5, BUN 27, creatinine

1.3, cholesterol 126.



ASSESSMENT AND PLAN:  Mr. Dmitri Hickey is a 62-year-old male with

anemia, hyperchloremia, renal insufficiency, proteinuria, hematuria and

urinary tract infection.  Seen by Infectious Disease, Dr. Mateusz Johnson. 

Has abdominal wall abscess, status post drainage, status post feet

debridement and wound vacuum placement with Enterobacter, hypothyroidism,

prostate disease, anxiety disorder, esophageal stricture, status post ileal

conduit for urine, left inguinal hernia repair, came with leakage from the

anterior abdominal area, which was operated, on previously.  According to him,

the fistula is leaking urine and sangous material intermittently, had

abdominal pain, had  infection associated with abdominal wall fistulas. growing 
vancomycin-resistant enterococcus history of abdominal

wall abscess, acute renal failure.  Will follow up blood, urine, and wound

culture.  Since this vancomycin-resistant enterococcus started on Zyvox,

added Zosyn , id  may be involved.  Dr. Crawford consult called, he did

surgery on this patient last time as per the patient.  Seen by the surgical

team.  Discussion done with the patient and the patient's nursing staff. 

We will follow up.





__________________________________________

Sandra Perez MD



DD:  06/25/2018 22:44:20

DT:  06/26/2018 0:45:55

Job # [id number]

MTDD

## 2018-06-26 NOTE — CP.PCM.PN
Subjective





- Date & Time of Evaluation


Date of Evaluation: 06/26/18


Time of Evaluation: 06:45





- Subjective


Subjective: 


General Surgery Note for Dr. Schmid





Patient seen and examined at bedside. Patient still complaining of pain on skin 

from excoriation and dry mouth. He has had output from enterocutaneous fistula 

but amount has not been recorded. He required multiple dressing changes 

overnight. Patient denies fever/chills or nasuea/vomiting. He will be getting 

an Upper GI series today.





Objective





- Vital Signs/Intake and Output


Vital Signs (last 24 hours): 


 











Temp Pulse Resp BP Pulse Ox


 


 98 F   56 L  20   105/65   100 


 


 06/25/18 06:00  06/25/18 06:00  06/25/18 06:00  06/25/18 06:00  06/25/18 06:00








Intake and Output: 


 











 06/26/18 06/26/18





 06:59 18:59


 


Intake Total 1788 


 


Output Total 800 


 


Balance 988 














- Medications


Medications: 


 Current Medications





Albuterol/Ipratropium (Duoneb 3 Mg/0.5 Mg (3 Ml) Ud)  3 ml IH [id number] PRN


   PRN Reason: Sinus symptoms


Benzocaine/Menthol (Cepacol Sore Throat)  1 fernandez MT Q2H PRN


   PRN Reason: Sore Throat


   Last Admin: 06/26/18 02:44 Dose:  1 fernandez


Amino Acids/Electrolytes/Dextrose (Clinimix 4.25/5 % "E" (2000 Ml))  2,000 mls 

@ 83 mls/hr IV .Q24H MARIA D


   Last Admin: 06/25/18 17:54 Dose:  83 mls/hr


Fat Emulsion Intravenous (Intralipid 20%)  250 mls @ 21 mls/hr IV MWF@1800 [facility]


   Last Admin: 06/25/18 17:54 Dose:  21 mls/hr


Piperacillin Sod/Tazobactam Sod (Zosyn 3.375 In Ns 100ml)  100 mls @ 200 mls/hr 

IVPB Q6 MARIA D


   PRN Reason: Protocol


   Stop: 07/02/18 18:01


   Last Admin: 06/26/18 06:02 Dose:  200 mls/hr


Linezolid (Zyvox 600mg/300ml D5w)  600 mg in 300 mls @ 200 mls/hr IVPB Q12 MARIA D


   PRN Reason: Protocol


   Stop: 07/02/18 22:01


   Last Admin: 06/25/18 21:16 Dose:  200 mls/hr


Levothyroxine Sodium (Synthroid)  150 mcg PO 0600 [facility]


   Last Admin: 06/26/18 06:01 Dose:  150 mcg


Magnesium Hydroxide (Milk Of Magnesia)  30 ml PO DAILY [facility]


   Last Admin: 06/25/18 16:10 Dose:  30 ml


Morphine Sulfate (Morphine)  2 mg IVP Q4H PRN


   PRN Reason: Pain, severe (8-10)


   Last Admin: 06/26/18 04:30 Dose:  2 mg


Nystatin (Nystop Topical Powder)  0 gm TOP TID [facility]


   Last Admin: 06/25/18 16:10 Dose:  1 dose


Octreotide Acetate (Sandostatin)  50 mcg SC Q8H [facility]


   Last Admin: 06/25/18 17:52 Dose:  50 mcg


Pantoprazole Sodium (Protonix Ec Tab)  20 mg PO 0600 [facility]


   Last Admin: 06/26/18 06:02 Dose:  20 mg


Petrolatum (Desitin Maximum Strength Topical 40% Oint)  0 gm TOP Q4H PRN


   PRN Reason: Rash


   Last Admin: 06/25/18 02:59 Dose:  1 applic


Saliva Substitute (Saliva Substitute)  2 ml PO Q6H PRN


   PRN Reason: Dry mouth


   Last Admin: 06/25/18 10:55 Dose:  2 ml











- Labs


Labs: 


 





 06/26/18 06:47 





 06/26/18 06:47 





 











PT  12.2 SECONDS (9.4-12.5)   06/24/18  18:18    


 


INR  1.07  (0.93-1.08)   06/24/18  18:18    














- Constitutional


Appears: No Acute Distress





- Head Exam


Head Exam: ATRAUMATIC, NORMOCEPHALIC





- Eye Exam


Eye Exam: EOMI, Normal appearance


Pupil Exam: PERRL





- ENT Exam


ENT Exam: Mucous Membranes Dry





- Respiratory Exam


Respiratory Exam: NORMAL BREATHING PATTERN





- Cardiovascular Exam


Cardiovascular Exam: REGULAR RHYTHM





- GI/Abdominal Exam


GI & Abdominal Exam: Soft, Tenderness (excoriated skin is TTP), Normal Bowel 

Sounds.  absent: Distended, Firm, Guarding, Rigid, Rebound


Additional comments: 





midline fistula with bilious output 


urostomy left of midline producing 800 cc of urine output


excoriations all over lower abdomen and genitals








- Extremities Exam


Extremities Exam: Normal Capillary Refill





- Back Exam


Back Exam: absent: CVA tenderness (L), CVA tenderness (R)





- Neurological Exam


Neurological Exam: Alert, Awake, CN II-XII Intact, Oriented x3





- Psychiatric Exam


Psychiatric exam: Normal Affect, Normal Mood





- Skin


Skin Exam: Dry, Warm


Additional comments: 


midline fistula 


urostomy left of midline


excoriations all over lower abdomen and genitals





Assessment and Plan





- Assessment and Plan (Free Text)


Assessment: 


62M with enterocutaneous fistulas


Plan: 


NPO


TPN


Octreotide 50 mg SC Q8H


IV fluids


Wound Care


Strict I&O's


Dietician referral


Replace GI losses 1:1 with Lactated Ringers if fistula output is more than 500cc

/day


Saliva substitute


Cepacol lozanges


Sips & chips


f/u Upper GI series


Fistulogram tomorrow


Further recommendations as per Dr. Yvette Morris PGY1

## 2018-06-26 NOTE — CP.PCM.PN
Subjective





- Date & Time of Evaluation


Date of Evaluation: 06/26/18


Time of Evaluation: 21:25





- Subjective


Subjective: 





Patient seen for his c/o chest pain which just started.He states it is located 

across the chest ,goes towards the flanks and is intermittent.


He denies any c/o associated symptoms like dizziness,SOB,nausea,vomiting,calf 

pain or any other symptoms.


Patient attributes his symptoms to his anxiety,says he is already feeling 

better.


Pt is admitted for leaking wound package that was placed 2 days before this 

admission.


He is S/P abdominal surgery,has multiple fistulas and a urostomy tube.


VS: /70  P 44 RR 20  T 98.3  Accuheck 122  O2 sat is 100% on O2 @ 2L/min 

by NC.





PMH:Asthma,interstitial hemmorrhagic cystitis,s/p ileal conduit and cystectomy,

hypothyroidism,GI fistulas,Anxiety and nephritis.





Objective





- Vital Signs/Intake and Output


Vital Signs (last 24 hours): 


 











Temp Pulse Resp BP Pulse Ox


 


 98.3 F   45 L  20   120/74   100 


 


 06/26/18 14:00  06/26/18 14:00  06/26/18 14:00  06/26/18 14:00  06/26/18 14:00








Intake and Output: 


 











 06/26/18 06/27/18





 18:59 06:59


 


Output Total 200 


 


Balance -200 














- Medications


Medications: 


 Current Medications





Albuterol/Ipratropium (Duoneb 3 Mg/0.5 Mg (3 Ml) Ud)  3 ml IH [id number] PRN


   PRN Reason: Sinus symptoms


Benzocaine/Menthol (Cepacol Sore Throat)  1 fernandez MT Q2H PRN


   PRN Reason: Sore Throat


   Last Admin: 06/26/18 02:44 Dose:  1 fernandez


Piperacillin Sod/Tazobactam Sod (Zosyn 3.375 In Ns 100ml)  100 mls @ 200 mls/hr 

IVPB Q6 MARIA D


   PRN Reason: Protocol


   Stop: 07/02/18 18:01


   Last Admin: 06/26/18 17:55 Dose:  200 mls/hr


Linezolid (Zyvox 600mg/300ml D5w)  600 mg in 300 mls @ 200 mls/hr IVPB Q12 MARIA D


   PRN Reason: Protocol


   Stop: 07/02/18 22:01


   Last Admin: 06/26/18 09:45 Dose:  200 mls/hr


Amino Acids/Electrolytes/Dextrose (Clinimix 5/20 % "E" (2000 Ml))  2,000 mls @ 

83.333 mls/hr IV .Q24H [facility]


   Stop: 06/29/18 17:59


   Last Admin: 06/26/18 17:55 Dose:  83.333 mls/hr


Fat Emulsion Intravenous (Intralipid 20%)  250 mls @ 20.833 mls/hr IV MWF@1800 

[facility]


   Stop: 06/29/18 18:01


Levothyroxine Sodium (Synthroid)  150 mcg PO 0600 [facility]


   Last Admin: 06/26/18 06:01 Dose:  150 mcg


Magnesium Hydroxide (Milk Of Magnesia)  30 ml PO DAILY [facility]


   Last Admin: 06/26/18 09:47 Dose:  30 ml


Morphine Sulfate (Morphine)  2 mg IVP Q4H PRN


   PRN Reason: Pain, severe (8-10)


   Last Admin: 06/26/18 17:55 Dose:  2 mg


Nystatin (Nystop Topical Powder)  0 gm TOP TID [facility]


   Last Admin: 06/26/18 17:57 Dose:  1 dose


Octreotide Acetate (Sandostatin)  50 mcg SC Q8H [facility]


   Last Admin: 06/26/18 17:57 Dose:  Not Given


Pantoprazole Sodium (Protonix Ec Tab)  20 mg PO 0600 [facility]


   Last Admin: 06/26/18 06:02 Dose:  20 mg


Petrolatum (Desitin Maximum Strength Topical 40% Oint)  0 gm TOP Q4H PRN


   PRN Reason: Rash


   Last Admin: 06/25/18 02:59 Dose:  1 applic


Saliva Substitute (Saliva Substitute)  2 ml PO Q6H PRN


   PRN Reason: Dry mouth


   Last Admin: 06/25/18 10:55 Dose:  2 ml











- Labs


Labs: 


 





 06/26/18 06:47 





 06/26/18 06:47 





 











PT  12.2 SECONDS (9.4-12.5)   06/24/18  18:18    


 


INR  1.07  (0.93-1.08)   06/24/18  18:18    














- Constitutional


Appears: No Acute Distress





- Head Exam


Head Exam: ATRAUMATIC, NORMAL INSPECTION, NORMOCEPHALIC





- Eye Exam


Eye Exam: PERRL





- ENT Exam


ENT Exam: Mucous Membranes Moist





- Neck Exam


Neck Exam: Normal Inspection





- Respiratory Exam


Respiratory Exam: Clear to Ausculation Bilateral, NORMAL BREATHING PATTERN





- Cardiovascular Exam


Cardiovascular Exam: Bradycardia, REGULAR RHYTHM, +S1, +S2





- GI/Abdominal Exam


GI & Abdominal Exam: Soft, Tenderness (mild tenderness around the pouches,one 

is for the fistula,the other is a urostomy), Normal Bowel Sounds





- Extremities Exam


Extremities Exam: Normal Inspection.  absent: Calf Tenderness, Pedal Edema





- Neurological Exam


Neurological Exam: Alert, Awake, Oriented x3





- Psychiatric Exam


Psychiatric exam: Anxious





- Skin


Skin Exam: Dry, Warm





Assessment and Plan





- Assessment and Plan (Free Text)


Assessment: 





Chest pain probably related to anxiety


Bradycardia


Plan: 





EKG done stat shows Sinus bradycardia.No acute or ischemic changes noted.


Troponin 1 ordered stat,then Q 8 h x 2,also ordered Mag level.


Dr Perez called ,message left on her voice mail.


Will transfer pt to Tele bed. Consult was requested with Dr Mays by Dr Perez.

## 2018-06-27 LAB
ALBUMIN SERPL-MCNC: 3.6 G/DL (ref 3–4.8)
ALBUMIN/GLOB SERPL: 1 {RATIO} (ref 1.1–1.8)
ALT SERPL-CCNC: 26 U/L (ref 7–56)
AST SERPL-CCNC: 30 U/L (ref 17–59)
BUN SERPL-MCNC: 31 MG/DL (ref 7–21)
CALCIUM SERPL-MCNC: 9.1 MG/DL (ref 8.4–10.5)
ERYTHROCYTE [DISTWIDTH] IN BLOOD BY AUTOMATED COUNT: 14.1 % (ref 11.5–14.5)
GFR NON-AFRICAN AMERICAN: > 60
HGB BLD-MCNC: 9.9 G/DL (ref 14–18)
MCH RBC QN AUTO: 27.3 PG (ref 25–35)
MCHC RBC AUTO-ENTMCNC: 33.3 G/DL (ref 31–37)
MCV RBC AUTO: 82 FL (ref 80–105)
PLATELET # BLD: 239 10^3/UL (ref 120–450)
PMV BLD AUTO: 8 FL (ref 7–11)
RBC # BLD AUTO: 3.62 10^6/UL (ref 3.5–6.1)
TROPONIN I SERPL-MCNC: < 0.01 NG/ML
WBC # BLD AUTO: 6.1 10^3/UL (ref 4.5–11)

## 2018-06-27 RX ADMIN — MORPHINE SULFATE PRN MG: 2 INJECTION, SOLUTION INTRAMUSCULAR; INTRAVENOUS at 12:06

## 2018-06-27 RX ADMIN — MAGNESIUM HYDROXIDE SCH: 400 SUSPENSION ORAL at 10:11

## 2018-06-27 RX ADMIN — Medication PRN ML: at 21:48

## 2018-06-27 RX ADMIN — PIPERACILLIN AND TAZOBACTAM SCH MLS/HR: 3; .375 INJECTION, POWDER, LYOPHILIZED, FOR SOLUTION INTRAVENOUS; PARENTERAL at 11:58

## 2018-06-27 RX ADMIN — LINEZOLID SCH MLS/HR: 600 INJECTION, SOLUTION INTRAVENOUS at 09:08

## 2018-06-27 RX ADMIN — NYSTATIN SCH DOSE: 100000 POWDER TOPICAL at 18:03

## 2018-06-27 RX ADMIN — NYSTATIN SCH DOSE: 100000 POWDER TOPICAL at 13:53

## 2018-06-27 RX ADMIN — PIPERACILLIN AND TAZOBACTAM SCH MLS/HR: 3; .375 INJECTION, POWDER, LYOPHILIZED, FOR SOLUTION INTRAVENOUS; PARENTERAL at 05:31

## 2018-06-27 RX ADMIN — PANTOPRAZOLE SODIUM SCH: 20 TABLET, DELAYED RELEASE ORAL at 05:36

## 2018-06-27 RX ADMIN — MAGNESIUM HYDROXIDE SCH ML: 400 SUSPENSION ORAL at 07:54

## 2018-06-27 RX ADMIN — NYSTATIN SCH: 100000 POWDER TOPICAL at 18:03

## 2018-06-27 RX ADMIN — PIPERACILLIN AND TAZOBACTAM SCH MLS/HR: 3; .375 INJECTION, POWDER, LYOPHILIZED, FOR SOLUTION INTRAVENOUS; PARENTERAL at 01:17

## 2018-06-27 RX ADMIN — LINEZOLID SCH MLS/HR: 600 INJECTION, SOLUTION INTRAVENOUS at 21:43

## 2018-06-27 RX ADMIN — PIPERACILLIN AND TAZOBACTAM SCH MLS/HR: 3; .375 INJECTION, POWDER, LYOPHILIZED, FOR SOLUTION INTRAVENOUS; PARENTERAL at 18:01

## 2018-06-27 RX ADMIN — PURIFIED WATER PRN LOZ: 99.05 LIQUID OPHTHALMIC at 21:43

## 2018-06-27 NOTE — PN
DATE:  06/26/2018



SUBJECTIVE:  Patient is 62-year-old male.  Patient is seen and examined at

bedside.  Patient is looking depressed, having abdominal pain.  No fever. 

No chills.  No nausea or vomiting.  No headache or dizziness.  No

hematuria.  No hematochezia.



PHYSICAL EXAMINATION:

VITAL SIGNS:  Temperature 98, pulse 86, blood pressure 105/65, respiratory

rate is 18.

HEENT:  Head:  Normocephalic, atraumatic.  Eyes:  PERRLA.  Extraocular

muscles intact.  Conjunctivae clear.  Nose:  Patent.  Mucosus membrane

moist.

NECK:  Supple, no carotid bruit.  No JVD or thyromegaly.

CHEST:  Bilaterally symmetrical.

HEART:  S1, S2 positive.

LUNGS:  Clear to auscultation.

ABDOMEN:  Tender, does have multiple scar tissues of the surgeries, in the

bag there, we can see part of the stool, part of the urine, serosanguineous

_____.

EXTREMITIES:  No edema, no cyanosis.

NEUROLOGICAL:  Patient is awake, alert, moving all 4 extremities, no focal

deficits.



LABORATORY DATA:  White blood cell is 5.4, hemoglobin 10.1, hematocrit

30.5, platelets 250.  Sodium 146, potassium 3.5, BUN 27, creatinine 1.3,

cholesterol 126.



ASSESSMENT AND PLAN:  Mr. Dmitri England is a 62-year-old male with

anemia, hypercholesterolemia, renal insufficiency, proteinuria, hematuria,

urinary tract infection, came with abdominal pain, has enterocutaneous

fistula.  The Surgery made patient n.p.o., TPN, octreotide 30 mg

subcutaneous every 8 hours, IV fluid, wound care, his inputs and outputs,

dietitian referral, replaced gastrointestinal losses, one-to-one with

lactated Ringer if fistula output is more than 500 mL per day.  Saliva

substitutes, Cepacol lozenges, sips and chips, fistulogram tomorrow. 

Appreciated Dr. Crawford's input.  GI notes reviewed.  Patient had

multiple surgeries, hemorrhagic cystitis, hypothyroidism, asthma, liver

lesion, the thyroid is nondiagnostic, has radical cystectomy, ileal

conduit, urostomy, multiple periosteotomy, left inguinal hernia repair,

wound debridement, ileal conduit revision, nephrostomy tube.  Consult

called with Dr. Crawford.  He saw the patient, appreciated; with

Infectious Disease, Dr. Mateusz Johnson;

Patient looks depressed today, put consult with Dr. Dee Gallego. 

Gastrointestinal and deep venous thrombosis prophylaxis.  Repeat labs.  We

will follow up.







__________________________________________

Sandra Perez MD



DD:  06/26/2018 20:47:33

DT:  06/26/2018 23:54:29

Job # [id number]

[email or web address]

## 2018-06-27 NOTE — CP.PCM.PN
Subjective





- Date & Time of Evaluation


Date of Evaluation: 06/27/18


Time of Evaluation: 09:03





- Subjective


Subjective: 





General surgery progress note for Dr. Crawford-Christelle Nair, PGY-1





Pt S & E at bedside at 0730





Pt reports improvement in skin pain with new skin protection measures.  Still 

with pain over abdomen/scrotum. Appliance for fistulas and ostomy were replaced 

yesterday- removed for imaging exam and now leaking at bedside.  Improved mood 

due to news that fistula is possibly fixable. Slept poorly overnight was 

transferred to [facility] from [facility] due to bradycardia as per house doctors 

request.  








Objective





- Vital Signs/Intake and Output


Vital Signs (last 24 hours): 


 











Temp Pulse Resp BP Pulse Ox


 


 98.4 F   55 L  19   106/67   99 


 


 06/27/18 06:00  06/27/18 06:00  06/27/18 06:00  06/27/18 06:00  06/27/18 06:00








Intake and Output: 


 











 06/27/18 06/27/18





 06:59 18:59


 


Intake Total 600 


 


Output Total 1300 


 


Balance -700 














- Medications


Medications: 


 Current Medications





Albuterol/Ipratropium (Duoneb 3 Mg/0.5 Mg (3 Ml) Ud)  3 ml IH [id number] PRN


   PRN Reason: Sinus symptoms


Benzocaine/Menthol (Cepacol Sore Throat)  1 fernandez MT Q2H PRN


   PRN Reason: Sore Throat


   Last Admin: 06/26/18 02:44 Dose:  1 fernandez


Piperacillin Sod/Tazobactam Sod (Zosyn 3.375 In Ns 100ml)  100 mls @ 200 mls/hr 

IVPB Q6 MARIA D


   PRN Reason: Protocol


   Stop: 07/02/18 18:01


   Last Admin: 06/27/18 05:31 Dose:  200 mls/hr


Linezolid (Zyvox 600mg/300ml D5w)  600 mg in 300 mls @ 200 mls/hr IVPB Q12 MARIA D


   PRN Reason: Protocol


   Stop: 07/02/18 22:01


   Last Admin: 06/26/18 22:43 Dose:  200 mls/hr


Amino Acids/Electrolytes/Dextrose (Clinimix 5/20 % "E" (2000 Ml))  2,000 mls @ 

83.333 mls/hr IV .Q24H [facility]


   Stop: 06/29/18 17:59


   Last Admin: 06/26/18 17:55 Dose:  83.333 mls/hr


Fat Emulsion Intravenous (Intralipid 20%)  250 mls @ 20.833 mls/hr IV MWF@1800 

[facility]


   Stop: 06/29/18 18:01


Levothyroxine Sodium (Synthroid)  150 mcg PO 0600 [facility]


   Last Admin: 06/27/18 05:36 Dose:  Not Given


Magnesium Hydroxide (Milk Of Magnesia)  30 ml PO DAILY [facility]


   Last Admin: 06/27/18 07:54 Dose:  30 ml


Morphine Sulfate (Morphine)  2 mg IVP Q4H PRN


   PRN Reason: Pain, severe (8-10)


   Last Admin: 06/26/18 17:55 Dose:  2 mg


Nystatin (Nystop Topical Powder)  0 gm TOP TID [facility]


   Last Admin: 06/26/18 17:57 Dose:  1 dose


Octreotide Acetate (Sandostatin)  50 mcg SC Q8H [facility]


   Last Admin: 06/27/18 01:29 Dose:  50 mcg


Pantoprazole Sodium (Protonix Ec Tab)  20 mg PO 0600 [facility]


   Last Admin: 06/27/18 05:36 Dose:  Not Given


Petrolatum (Desitin Maximum Strength Topical 40% Oint)  0 gm TOP Q4H PRN


   PRN Reason: Rash


   Last Admin: 06/25/18 02:59 Dose:  1 applic


Saliva Substitute (Saliva Substitute)  2 ml PO Q6H PRN


   PRN Reason: Dry mouth


   Last Admin: 06/25/18 10:55 Dose:  2 ml











- Labs


Labs: 


 





 06/27/18 06:30 





 06/27/18 06:30 





 











PT  12.2 SECONDS (9.4-12.5)   06/24/18  18:18    


 


INR  1.07  (0.93-1.08)   06/24/18  18:18    














- Constitutional


Appears: Non-toxic, No Acute Distress





- Head Exam


Head Exam: ATRAUMATIC, NORMAL INSPECTION, NORMOCEPHALIC





- Eye Exam


Eye Exam: EOMI, Normal appearance





- ENT Exam


ENT Exam: Mucous Membranes Moist, Normal Exam





- Neck Exam


Neck Exam: Full ROM, Normal Inspection





- Respiratory Exam


Respiratory Exam: NORMAL BREATHING PATTERN





- Cardiovascular Exam


Cardiovascular Exam: REGULAR RHYTHM





- GI/Abdominal Exam


GI & Abdominal Exam: Soft, Tenderness (over erythematous skin surrounding and 

below the urostomy and midline enterocutaneous fistulas).  absent: Distended, 

Firm, Guarding, Rigid


Additional comments: 





Midline appliance over fistulas with fenestrated nath cather in place- 

draining dark green fluid


Urostomy with ostomy bag in place- draining yellow fluid- leaking at lateral 

aspect


Diffuse excoriated erythematous skin surrounding fistulas and urostomy and as 

well at lower abdomen and scrotum





-  Exam


 Exam: absent: NORMAL INSPECTION (erythemaous penis and scrotum, tender to 

palpation)





- Extremities Exam


Extremities Exam: Normal Inspection





- Neurological Exam


Neurological Exam: Alert, Awake, CN II-XII Intact





- Psychiatric Exam


Psychiatric exam: Normal Affect, Normal Mood (mood improved from yesterday)





- Skin


Skin Exam: Erythema (surrouding urostomy, fistulas, diffusely over abdomen/

scrotum), Warm.  absent: Normal Color





Assessment and Plan





- Assessment and Plan (Free Text)


Assessment: 





62M with enterocutaneous fistulas & urostomy with skin irritation 





Plan: 





NPO


TPN


Octreotide 50 mg SC Q8H


IV fluids


Wound Care


Appliance to fistulas & urostomy- do not remove


Strict I&O's


Dietician referral


Replace GI losses 1:1 with Lactated Ringers if fistula output is more than 500cc

/day


Saliva substitute


Cepacol lozanges


Sips & chips


UGIS with Presumable enterocutaneous fistula likely at the level of the mid 

jejunum


FU Ab-xray tomorrow AM


FU fistulogram tomorrow


Further recommendations as per Dr. Yvette MORSE attending





Joanie, PGY-1

## 2018-06-27 NOTE — CON
DATE:  06/27/2018



HISTORY OF PRESENT ILLNESS:  In short, the patient is a 62-year-old

 male, multiple medical issues including asthma, interstitial

hemorrhagic cystitis with ileal conduit and cystectomy, hypothyroidism, GI

fistulous, anxiety, most likely which is related to medical issues, history

of nephritis, history of multiple hospitalizations.  The patient was

admitted on the medical side for evaluation of leaking wound package that

was placed 2 days ago in the emergency room.  Psych consult was called for

evaluation of anxiety and depressive symptoms.  The patient was seen and

examined, thin-build cachectic male which present to be very anxious and

tearful.  The patient reported that he is scared that he will never get

better.  Medical issues are concerning him, patient also reported that he

feels depressed but denied any thoughts of harming himself.  The patient

said that he has a girlfriend.  He wants to get better.  He has four kids

and he wants to get better and move on in his life.  The patient reported

that after talking with the surgical team, he has more hopes for the

future.  The patient denied history of mental illness, denied history of

depression, denied history of suicidal attempt, denied history of substance

abuse.



PHYSICAL EXAMINATION:

VITAL SIGNS:  Seems to be stable.  Blood pressure is low 99/62, pulse is

49.



MEDICATIONS:  Reviewed.  The patient is on DuoNeb, Clinimix, also Cepacol,

Klonopin was started but the patient is on n.p.o. and we will order IV push

of Ativan.  The patient is on Synthroid, milk of magnesia, morphine,

nystatin, Protonix, Desitin, Zosyn, Spiriva, and Sonata which was started

by this writer.



LABORATORY DATA:  Reviewed.  Most recent was from 27th, chemistry reviewed.

Urinalysis showed leukocyte esterase, large.



MENTAL STATUS EXAMINATION:  The patient presented to be alert.  Patient is

very pleasant, thankful, intermittent eye contact.  Speech was fast, low

volume.  Mood described as depressed and anxious.  Affect was tearful,

anxious and mood congruent.  Thought process seems to be coherent and goal

directed.  Thought content, the patient denied visual, auditory, or tactile

hallucinations.  Denied paranoid ideation.  The patient denied thoughts of

harming herself or others, but the patient is afraid to be dead.  The

patient wants to get better.  Insight and judgment seems to be fair. 

Impulses are well controlled.  Cognitive behavioral therapy technique

provided.  Supportive therapy is provided.  The patient responded well.



IMPRESSION:  Most likely patient has mood disorder, anxiety disorder due to

general medical condition.  To be honest, patient has a lot of things going

on with him from the medical standpoint, but the patient is not hopeless

but hopeful.



PLAN:  Klonopin will be started, but the patient is on n.p.o.  Meanwhile,

while the patient is on n.p.o., we will give IV push of Ativan 0.5 mg four

times a day as needed for anxiety, restlessness and insomnia.  Sonata was

started but the patient is on n.p.o.  The patient is not agitated, not

aggressive.  This writer will follow up on patient tomorrow and will advise

accordingly.



Thank you very much for letting me participate in care of your patient.





__________________________________________

Dee Gallego MD



DD:  06/27/2018 15:39:24

DT:  06/27/2018 15:43:13

Job # [id number]

## 2018-06-27 NOTE — CP.PCM.PN
Addendum entered and electronically signed by Yudy Adams DO  06/27/18 17:

43: 





Will do abdominal US to evaluate patient's liver lesions seen in the past. 





Original Note:








<Yudy Adams - Last Filed: 06/27/18 17:41>





Subjective





- Date & Time of Evaluation


Date of Evaluation: 06/27/18


Time of Evaluation: 07:00





- Subjective


Subjective: 


GI Progress Note for MELISA Selby PGY2





Patient seen and examined at bedside. Overnight patient was evaluated for chest 

pain. EKG showed bradycardia. Troponin was negative. Patient reports he feels 

more hopeful today. He states he still has abdominal pain at the wound vac site

, as well as itching around the urostomy and wound vac areas. He denies chest 

pain, shortness of breath, numbness/tingling, fever/chills. 





Objective





- Vital Signs/Intake and Output


Vital Signs (last 24 hours): 


 











Temp Pulse Resp BP Pulse Ox


 


 98.4 F   55 L  19   106/67   99 


 


 06/27/18 06:00  06/27/18 06:00  06/27/18 06:00  06/27/18 06:00  06/27/18 06:00








Intake and Output: 


 











 06/27/18 06/27/18





 06:59 18:59


 


Intake Total 600 


 


Output Total 1300 


 


Balance -700 














- Medications


Medications: 


 Current Medications





Albuterol/Ipratropium (Duoneb 3 Mg/0.5 Mg (3 Ml) Ud)  3 ml IH [id number] PRN


   PRN Reason: Sinus symptoms


Benzocaine/Menthol (Cepacol Sore Throat)  1 fernandez MT Q2H PRN


   PRN Reason: Sore Throat


   Last Admin: 06/26/18 02:44 Dose:  1 fernandez


Piperacillin Sod/Tazobactam Sod (Zosyn 3.375 In Ns 100ml)  100 mls @ 200 mls/hr 

IVPB Q6 MARIA D


   PRN Reason: Protocol


   Stop: 07/02/18 18:01


   Last Admin: 06/27/18 05:31 Dose:  200 mls/hr


Linezolid (Zyvox 600mg/300ml D5w)  600 mg in 300 mls @ 200 mls/hr IVPB Q12 MARIA D


   PRN Reason: Protocol


   Stop: 07/02/18 22:01


   Last Admin: 06/26/18 22:43 Dose:  200 mls/hr


Amino Acids/Electrolytes/Dextrose (Clinimix 5/20 % "E" (2000 Ml))  2,000 mls @ 

83.333 mls/hr IV .Q24H [facility]


   Stop: 06/29/18 17:59


   Last Admin: 06/26/18 17:55 Dose:  83.333 mls/hr


Fat Emulsion Intravenous (Intralipid 20%)  250 mls @ 20.833 mls/hr IV MWF@1800 

[facility]


   Stop: 06/29/18 18:01


Levothyroxine Sodium (Synthroid)  150 mcg PO 0600 [facility]


   Last Admin: 06/27/18 05:36 Dose:  Not Given


Magnesium Hydroxide (Milk Of Magnesia)  30 ml PO DAILY [facility]


   Last Admin: 06/27/18 07:54 Dose:  30 ml


Morphine Sulfate (Morphine)  2 mg IVP Q4H PRN


   PRN Reason: Pain, severe (8-10)


   Last Admin: 06/26/18 17:55 Dose:  2 mg


Nystatin (Nystop Topical Powder)  0 gm TOP TID [facility]


   Last Admin: 06/26/18 17:57 Dose:  1 dose


Octreotide Acetate (Sandostatin)  50 mcg SC Q8H [facility]


   Last Admin: 06/27/18 01:29 Dose:  50 mcg


Pantoprazole Sodium (Protonix Ec Tab)  20 mg PO 0600 [facility]


   Last Admin: 06/27/18 05:36 Dose:  Not Given


Petrolatum (Desitin Maximum Strength Topical 40% Oint)  0 gm TOP Q4H PRN


   PRN Reason: Rash


   Last Admin: 06/25/18 02:59 Dose:  1 applic


Saliva Substitute (Saliva Substitute)  2 ml PO Q6H PRN


   PRN Reason: Dry mouth


   Last Admin: 06/25/18 10:55 Dose:  2 ml











- Labs


Labs: 


 





 06/27/18 06:30 





 06/27/18 06:30 





 











PT  12.2 SECONDS (9.4-12.5)   06/24/18  18:18    


 


INR  1.07  (0.93-1.08)   06/24/18  18:18    














- Constitutional


Appears: No Acute Distress, Chronically Ill





- Head Exam


Head Exam: ATRAUMATIC, NORMAL INSPECTION, NORMOCEPHALIC





- Eye Exam


Eye Exam: Normal appearance, PERRL


Pupil Exam: NORMAL ACCOMODATION





- ENT Exam


ENT Exam: Mucous Membranes Moist





- Respiratory Exam


Respiratory Exam: Clear to Ausculation Bilateral, NORMAL BREATHING PATTERN.  

absent: Rales, Rhonchi, Wheezes





- Cardiovascular Exam


Cardiovascular Exam: REGULAR RHYTHM, +S1, +S2.  absent: Gallop, Rubs, Murmur





- GI/Abdominal Exam


GI & Abdominal Exam: Soft, Tenderness, Normal Bowel Sounds.  absent: Rigid, 

Rebound


Additional comments: 





L ileal condiut site clean and dry, draining yellow urine. Midline wound vac in 

place- draining green stool. Excoriations around lower abdomen. 





- Extremities Exam


Extremities Exam: absent: Calf Tenderness, Pedal Edema





- Neurological Exam


Neurological Exam: Alert, Awake, CN II-XII Intact, Oriented x3





- Skin


Skin Exam: Dry, Warm





Assessment and Plan





- Assessment and Plan (Free Text)


Assessment: 


This is 61yo male with past medical history of anemia, hypothyroidism 

hemorrhagic cystitis s/p cystectomy and ileal conduit, multiple abdominal 

surgeries who is admitted for 





1. Abdominal wall fistula 


   - GI series showed possible fistula from jejunum 


2. Hemorrhagic cystitis s/p cystectomty and ileal condiut w/ revision 


3. UTI 


4. Hepatic lesion 


   - Previous biopsy was non-diagnostic 


5. Anemia


6. Hypothyroid


Plan: 


 As per surgery, patient placed on TPN. He is on Octreotide 50q8. Continue 

antibiotics for UTI and abdominal wound. Patient will have fistulogram done. 

Monitor I&O as well as drain output. 





Case seen, discussed and reviewed with Dr. Pham. 


MELISA Adams PGY2





<Racheal Pham V - Last Filed: 06/27/18 23:17>





Objective





- Vital Signs/Intake and Output


Vital Signs (last 24 hours): 


 











Temp Pulse Resp BP Pulse Ox


 


 97.5 F L  47 L  18   120/52 L  99 


 


 06/27/18 17:43  06/27/18 22:00  06/27/18 17:43  06/27/18 17:43  06/27/18 06:00











- Medications


Medications: 


 Current Medications





Albuterol/Ipratropium (Duoneb 3 Mg/0.5 Mg (3 Ml) Ud)  3 ml IH [id number] PRN


   PRN Reason: Sinus symptoms


Benzocaine/Menthol (Cepacol Sore Throat)  1 fernandez MT Q2H PRN


   PRN Reason: Sore Throat


   Last Admin: 06/27/18 21:43 Dose:  1 fernandez


Clonazepam (Klonopin)  0.25 mg PO BID MARIA D


   PRN Reason: Protocol


   Last Admin: 06/27/18 18:01 Dose:  0.25 mg


Piperacillin Sod/Tazobactam Sod (Zosyn 3.375 In Ns 100ml)  100 mls @ 200 mls/hr 

IVPB Q6 MARIA D


   PRN Reason: Protocol


   Stop: 07/02/18 18:01


   Last Admin: 06/27/18 18:01 Dose:  200 mls/hr


Linezolid (Zyvox 600mg/300ml D5w)  600 mg in 300 mls @ 200 mls/hr IVPB Q12 MARIA D


   PRN Reason: Protocol


   Stop: 07/02/18 22:01


   Last Admin: 06/27/18 21:43 Dose:  200 mls/hr


Amino Acids/Electrolytes/Dextrose (Clinimix 5/20 % "E" (2000 Ml))  2,000 mls @ 

83.333 mls/hr IV .Q24H [facility]


   Stop: 06/29/18 17:59


   Last Admin: 06/27/18 18:02 Dose:  83.333 mls/hr


Fat Emulsion Intravenous (Intralipid 20%)  250 mls @ 20.833 mls/hr IV MWF@1800 

[facility]


   Stop: 06/29/18 18:01


   Last Admin: 06/27/18 18:02 Dose:  20.833 mls/hr


Iohexol (Omnipaque 300 100 Ml)  50 ml IJ ONCE ONE


   Stop: 06/28/18 08:01


Levothyroxine Sodium (Synthroid)  150 mcg PO 0600 [facility]


   Last Admin: 06/27/18 05:36 Dose:  Not Given


Lorazepam (Ativan)  0.5 mg IV Q6 PRN; Protocol


   PRN Reason: anxiety/insomnia


Magnesium Hydroxide (Milk Of Magnesia)  30 ml PO DAILY [facility]


   Last Admin: 06/27/18 10:11 Dose:  Not Given


Morphine Sulfate (Morphine)  2 mg IVP Q4H PRN


   PRN Reason: Pain, severe (8-10)


   Last Admin: 06/27/18 12:06 Dose:  2 mg


Nystatin (Nystop Topical Powder)  0 gm TOP TID [facility]


   Last Admin: 06/27/18 18:03 Dose:  1 dose


Octreotide Acetate (Sandostatin)  50 mcg SC Q8H [facility]


   Last Admin: 06/27/18 18:14 Dose:  50 mcg


Pantoprazole Sodium (Protonix Ec Tab)  20 mg PO 0600 [facility]


   Last Admin: 06/27/18 05:36 Dose:  Not Given


Petrolatum (Desitin Maximum Strength Topical 40% Oint)  0 gm TOP Q4H PRN


   PRN Reason: Rash


   Last Admin: 06/25/18 02:59 Dose:  1 applic


Saliva Substitute (Saliva Substitute)  2 ml PO Q6H PRN


   PRN Reason: Dry mouth


   Last Admin: 06/27/18 21:48 Dose:  2 ml


Zaleplon (Sonata)  5 mg PO HS PRN


   PRN Reason: Insomnia











- Labs


Labs: 


 





 06/27/18 06:30 





 06/27/18 06:30 





 











PT  12.2 SECONDS (9.4-12.5)   06/24/18  18:18    


 


INR  1.07  (0.93-1.08)   06/24/18  18:18    














Attending/Attestation





- Attestation


I have personally seen and examined this patient.: Yes


I have fully participated in the care of the patient.: Yes


I have reviewed all pertinent clinical information, including history, physical 

exam and plan: Yes


Notes (Text): 


This is an addendum to GI consult  report dictated by the Medical Resident.The 

patient was seen and examined earlier.  Medical records, lab studies, imagings 

were reviewed.  Last 24 hours events reviewed.  Agreed with the above treatment 

plan as outlined in Medical Resident 's notes the with the addition of the 

following 











06/27/18 23:16

## 2018-06-27 NOTE — CON
DATE:  06/27/2018



PULMONARY CONSULTATION



REFERRING PHYSICIAN:  Sandra Perez MD.



REASON FOR CONSULTATION:  Chronic obstructive lung disease, shortness of

breath, multiple GI fistula, abdominal wall cellulitis.



HISTORY OF PRESENT ILLNESS:  This is a 62-year-old gentleman with past

medical history significant for chronic obstructive lung disease, history

of interstitial cystitis requiring bladder surgery, end up with ileal

conduit, has multiple hernia repair; admitted with multiple GI fistulas

with multiple surgeries in the past, has ostomy working well.  Has multiple

drainage, some short of breath with exertion.  No cough, no sputum

production, no chest point, no leg pain, no leg swelling.



PAST MEDICAL HISTORY:  Chronic lung disease, interstitial cystitis, history

of bladder surgery, ostomy, history of esophageal stricture, hernia repair

in the past, has urostomy, anxiety disorder.



ALLERGIES :  NONE KNOWN.



SOCIAL HISTORY  Nonsmoker, nondrinker.



FAMILY HISTORY:  No significant cardiopulmonary disease reported.



MEDICATIONS:  He is on Cepacol lozenges every 2 hours p.r.n., IV Clinimix,

petroleum jelly at the affected area, DuoNeb every 6 hour p.r.n.,

Intralipid 20% IV, clonazepam 0.25 mg twice a day, milk of magnesia p.r.n.,

morphine 2 mg IV every 4 hour p.r.n., Protonix 20 mg daily, Sandostatin 50

mcg subcu every 8 hour, Ambien 5 mg at bedtime p.r.n., Synthroid 150 mcg

daily, Zosyn 3.375 g IV every six, Zyvox 600 mg every 12 hour.



REVIEW OF SYSTEMS:  No headache, no rhinitis, mild short of breath with

exertion.  No cough.  No chest pain. Multiple abdominal fistulas with

collecting bag ileostomy.  No dysuria.  No leg pain or leg swelling.



PHYSICAL EXAMINATION:

GENERAL:  Lying in the bed, in no acute distress.

VITAL SIGNS:  Temperature is 98, heart rate 55, respiratory rate is 20,

blood pressure 106/67, pulse ox 99% on nasal cannula.

HEENT:  Moist mucous membranes.  No ulcer or thrush.

NECK:  Supple.  No JVD.

LUNGS:  Have a fair airflow with a few rhonchi.

HEART:  S1 and S2.

ABDOMEN:  Has ileostomy, multiple fistula, diffuse erythema of the

abdominal wall, especially pelvic area.

EXTREMITIES:  There is no edema.

NEUROLOGIC:  Awake, alert, follows simple commands.



LABORATORY DATA:  Shows hemoglobin 9.9, hematocrit 29.7, WBC 6.1, platelet

is 239.  Sodium 142, potassium 3.9, chloride 109, bicarbonate 23, BUN 31,

creatinine 1.2, glucose 97, calcium is 9.1, phosphorus 4.4, magnesium 2. 

AST 30, ALT 26, alk phos is 59.  Albumin is 3.6.  Troponin less than 0.01. 

Urinalysis shows wbc is 15 to 20, rbc 5 to 10, nitrites are positive. 

Abdominal wall has gram-negative and gram-positive organisms.  Urine has

Klebsiella pneumoniae.  Blood culture has been negative.  Has a chest x-ray

done in the ER on admission, on 05/25/2018, which shows no pneumothorax, no

active pulmonary disease.



IMPRESSION AND PLAN:  Multiple abdominal surgeries with multiple

enterocutaneous fistulas, has ileostomy, cellulitis of the abdominal wall,

chronic obstructive lung disease, history of hypothyroid, hyperlipidemia,

renal insufficiency, malnourished.  Patient being seen by Infectious

Disease, also seen by Surgical team.  Pulmonary point of view, continue

inhaled bronchodilator.  Keep head at 45 degrees.  Continue total

parenteral nutrition, gastric prophylaxis.  Follow up labs in the morning. 

Spoke to the patient's father at bedside.  All the questions answered.



Thank you and we will follow with you.







__________________________________________

Dante Haque MD



DD:  06/27/2018 13:28:22

DT:  06/27/2018 13:31:47

Job # [id number]

## 2018-06-27 NOTE — CARD
--------------- APPROVED REPORT --------------





EKG Measurement

Heart [id number]

NC [id number]

[id number]

[id number]

[id number]



<Conclusion>

Marked sinus bradycardia

Abnormal ECG

## 2018-06-27 NOTE — PN
DATE:  06/26/2018



SUBJECTIVE:  The patient is in bed, in no acute distress, nontoxic.



PHYSICAL EXAMINATION:

VITAL SIGNS:  Temperature is 98, blood pressure is 105/60, respiratory rate

of 20.

HEENT:  Unremarkable.

NECK:  Supple.

LUNGS:  Have decreased breath sounds.

HEART:  Normal S1 and S2.

ABDOMEN:  Soft.



LABORATORY EXAMINATION:  Reveals a white count of 6.6, hemoglobin of 9. 

Chemistries reveals BUN of 27, creatinine of 1.3.  Urinalysis is noted. 

Microbiology reveals a gram-negative jama, Gram-positive Cocci.  Blood

cultures were negative.



ASSESSMENT AND PLAN:  This is a 62-year-old male who was seen earlier this

morning in room 569, bed 2 with abdominal wall infection associated with

abdominal wall fistulas growing vancomycin-resistant Enterococcus with

history of abdominal wall abscess, status post drainage and repeat

debridement and wound vacuum placement with Enterobacter, on Zyvox and

Zosyn and although patient appears weak, chronically ill, deteriorated. 

Review of orders reveals the Zyvox and Zosyn _____.  We will follow with

you.







__________________________________________

Margarito Khan MD



DD:  06/26/2018 20:05:56

DT:  06/27/2018 0:11:55

Job # [id number]

## 2018-06-27 NOTE — PN
DATE:  06/27/2018



SUBJECTIVE:  The patient was seen earlier this morning in 275, bed 2.  No

fevers and no chills.



PHYSICAL EXAMINATION:

VITAL SIGNS:  Temperature is 98, blood pressure is 106/90, and respiratory

rate of 18.

HEENT:  Unremarkable.

NECK:  Supple.

LUNGS:  Decreased breath sounds.

CARDIOPULMONARY:  Normal S1, S2.

ABDOMEN:  Soft, nontender.



LABORATORY EXAMINATION:  Reveals a white count of 6.1, hemoglobin of 9. 

Chemistries are reviewed.  BUN of 31, creatinine of 1.2.  Urinalysis is

noted and microbiology is reviewed.  There is Klebsiella pneumonia species

and Enterococcus faecium in the urine culture.  The Klebsiella is

relatively sensitive organism and the enterococcus is resistant to

ampicillin, also resistant to vancomycin and to VRE and that would be in

the urine.  The Gram-negative rods of abdominal culture and the

Gram-positive cocci from the abdominal culture with heavy growth, further

identification and sensitivity are pending.



ASSESSMENT AND PLAN:  A 62-year-old male who was seen earlier today with

abdominal wall infection associated with abdominal wall fistula, growing

vancomycin-resistant enterococcus and Klebsiella from the urine culture,

Gram-negative jama, currently on Zyvox and Zosyn, and the patient looks

chronically ill and debilitated.







__________________________________________

Margarito Khan MD



DD:  06/27/2018 10:52:56

DT:  06/27/2018 11:31:25

Job # [id number]

## 2018-06-27 NOTE — CON
DATE:  06/26/2018



GENITOURINARY CONSULTATION



CHIEF COMPLAINT:  Abdominal pain.



HISTORY OF PRESENT ILLNESS:  This is a very unfortunate 62-year-old male

who is well known to me.  Patient had a history of interstitial cystitis

with severe voiding symptoms.  He underwent a cystectomy with urinary

diversion many years ago at [facility].  Subsequent to that, patient has had

multiple problems after the surgery with multiple readmissions.  Recently,

patient was found to have a herniation of abdominal wall mesh into his

urinary conduit.  He had recent reconstructive surgery at [facility] to revise the conduit.  Patient spent a prolonged period of

time postoperatively in [facility].  He presented now

with multiple draining fistulas from his bowel.  He has a complete

breakdown of his abdominal wall.  He has a draining urostomy and now with

chronic fistulas.  He was reportedly in a rehabilitation center, he was

released a few days ago and presented now complaining of abdominal pain and

leaking from his wounds.  There is no reported fever or chills.  No chest

pain or shortness of breath, nausea, or vomiting.   consultation was

requested regarding the above.



PAST MEDICAL HISTORY:  As per the history of present illness.  Also history

of asthma, vertigo, vitiligo, esophageal stricture, multiple abdominal

abscess drainages, hepatomegaly, anxiety.



MEDICATIONS:  Currently include benzocaine, _____, DuoNeb, Intralipid, milk

of magnesia, morphine, nystatin powder, Protonix, Sandostatin, Synthroid,

Zosyn, Zyvox.



ALLERGIES:  NO KNOWN DRUG ALLERGIES.



FAMILY HISTORY:  Noncontributory for this admission.



SOCIAL HISTORY:  No smoking or EtOH use.



REVIEW OF SYSTEMS:  Positive for abdominal pain and draining abdominal

wounds, positive for anxiety and depression, positive for difficulty

ambulating.  Other systems are negative.



PHYSICAL EXAMINATION:

GENERAL:  Patient is awake, alert, and answering questions.  He is in no

acute distress although he is very anxious.

VITAL SIGNS:  He is afebrile.  Temperature of 97.8, pulse 61, /72,

respirations 20.

NECK:  Supple.  There is no adenopathy.

CHEST:  Revealed normal inspiratory effort.

CARDIAC:  Shows positive S1, S2.  There is no peripheral edema noted.

ABDOMEN:  The abdomen has multiple open wounds.  There is a urostomy

present.  There are multiple bags over fistulas which are draining enteric

contents, appears to be loose stool coming out through the abdominal wall.

EXTREMITIES:  There is no cyanosis or edema.



LABORATORY EXAM:  WBC count 6.6, hemoglobin 9.6, creatinine 1.3 with a GFR

of 56, chloride of 113, CO2 of 19.  Urinalysis positive for nitrites, 5 to

10 RBCs, 15 to 20 wbc.  pH of 7.



On radiologic exam, there is a small bowel series done today showing a

presumed enterocutaneous fistula at the level of the mid jejunum.  Patient

had an abdominopelvic CT scan done on 06/22/2018, which showed a marked

wall thickening of the stomach, stool within the entire colon which could

be secondary to fluid; however, possible oral contrast from a prior CT.  It

appears that the prior mesh that was seen in the conduit is now gone. 

There are a few small fluid collections, mild dilatation of both ureters,

apparent scrotal hydroceles.



IMPRESSION:  This is an unfortunate 62-year-old male who has had multiple

procedures done after a cystectomy for interstitial cystitis.  Patient had

a mesh erosion _____.  It appears that the ileal conduit was revised at [facility] (formerly [facility]3] and he now has an enterocutaneous fistula.



PLAN:  Will be management as per General Surgery.  Patient has been placed

n.p.o. and will be managed on hyperal and medications to see if the fistula

will close spontaneously.  If the patient does not improve with

conservative management, my recommendation would be to discuss transfer to

[facility] ([facility]3] or a tertiary center which can adequately deal with patient's

complex problems.  I would also recommend a Nephrology consultation given

patient's low chloride and history of an ileal conduit.  He likely has a

hypochloremic metabolic acidosis.  As he is going to be managed with

hyperalimentation, we may need Nephrology input to ensure no major

electrolyte imbalances arise.



Thank you for allowing me to participate the care of this patient.  I will

follow him with you.







__________________________________________

Anthony Ashley MD



DD:  06/26/2018 18:18:12

DT:  06/26/2018 18:23:51

Job # [id number]

## 2018-06-27 NOTE — CON
DATE:  06/27/2018



LOCATION:  Patient is in room 275, bed 2.



REASON FOR CONSULTATION:  Bradycardia.



HISTORY OF PRESENT ILLNESS:  Patient is a 62-year-old male, admitted to the

hospital with abdominal pain, found to have multiple fistulas in the

abdomen including an enterocutaneous fistula.  Patient also found to have

bradycardia.  He has longstanding history of bradycardia, hypothyroidism,

asthma.  Patient in the past had interstitial cystitis and had cystectomy

because of very severe pain due to cystitis, and along with it, urinary

diversion was done at [facility], recently found to have herniation of abdominal

wall mesh in urinary conduit.  Recently reconstructive surgery was done at

[facility] for reconstruction of the conduit and now patient

developed multiple fistulas including enterocutaneous fistula.  Patient

states that he has longstanding history of bradycardia.  Denies any

dizziness, syncope, chest pain, shortness of breath.  On 02/23/2018,

patient had stress test, which was negative, with ejection fraction of 59%.

Patient had echo on 02/19/2018, which showed normal LV size, normal LV

ejection fraction, with normal systolic function, ejection fraction 65%,

mild to moderate aortic regurgitation was seen.  RVSP was only 16 mmHg.



PAST MEDICAL HISTORY:  As mentioned before, patient has history of asthma,

hypothyroidism.  Patient had cystectomy with urinary diversion, and then

had abdominal hernia, and found to have herniation of abdominal wall mesh

into urinary conduit, recently had reconstructive surgery at [facility] for reconstruction of conduit, and now has multiple abdominal

fistulas.



PERSONAL HISTORY:  Denies smoking.  Denies drinking.



ALLERGIES:  PATIENT DENIES ANY ALLERGIES.



MEDICATIONS AT HOME:  Patient is on levothyroxine 150 mcg p.o. daily,

omeprazole 20 mg daily, aspirin 81 mg daily, Xanax 0.25 daily, albuterol

Ventolin 2 puffs every 6 hourly.



REVIEW OF SYSTEMS:  All the systems reviewed, positive mentioned in the

history, others are negative.



PHYSICAL EXAMINATION:

VITAL SIGNS:  Blood pressure is 106/67, respiration 19, pulse 55,

temperature 98.4.

HEENT:  Head is normocephalic.  Eyes, pupils are normal.  Conjunctivae

slightly pale.

NECK:  JVP low.  Carotids equal.

THORAX:  AP diameter normal.

LUNGS:  Clear.

CARDIOVASCULAR:  S1 and S2.

ABDOMEN:  Patient has multiple fistulas.

EXTREMITIES:  No clubbing, no cyanosis, no edema.



LABORATORY DATA:  WBC 6.1, hemoglobin 9, hematocrit 29.7, platelets 239. 

Sodium 140, potassium 3.9, BUN 19, creatinine 1.5, glucose 108.  AST and

ALT are normal.  Total protein 8.4, albumin is 4.3, lipase 118.  EKG showed

sinus bradycardia, 44 per minute.  Chest x-ray showed no active pulmonary

disease.  Small bowel x-ray showed enterocutaneous fistula likely at the

level of mid jejunum.



DIAGNOSES:  Multiple abdominal fistula including enterocutaneous fistula,

hypothyroidism, asthma, history of bradycardia, anemia.



PLAN:  We will check for T3, T4, TSH.  Patient already had stress test and

echo as mentioned in above history.  Patient will continue to have IV

fluid.  Patient is also on piperacillin, tazobactam 100 mL IV every 6

hourly, linezolid 600 mg IV every 12 hours.  We will check labs in the

morning including T3, free T4, and TSH.  We will follow with you.





__________________________________________

Dante Mays MD



DD:  06/27/2018 13:51:52

DT:  06/27/2018 21:20:54

Job # [id number]

## 2018-06-28 LAB
ALBUMIN SERPL-MCNC: 3.5 G/DL (ref 3–4.8)
ALBUMIN/GLOB SERPL: 0.9 {RATIO} (ref 1.1–1.8)
ALT SERPL-CCNC: 17 U/L (ref 7–56)
AST SERPL-CCNC: 25 U/L (ref 17–59)
BASOPHILS # BLD AUTO: 0.03 K/MM3 (ref 0–2)
BASOPHILS NFR BLD: 0.5 % (ref 0–3)
BUN SERPL-MCNC: 28 MG/DL (ref 7–21)
CALCIUM SERPL-MCNC: 9 MG/DL (ref 8.4–10.5)
EOSINOPHIL # BLD: 0.4 10*3/UL (ref 0–0.7)
EOSINOPHIL NFR BLD: 6.6 % (ref 1.5–5)
ERYTHROCYTE [DISTWIDTH] IN BLOOD BY AUTOMATED COUNT: 14.2 % (ref 11.5–14.5)
GFR NON-AFRICAN AMERICAN: > 60
GRANULOCYTES # BLD: 3.47 10*3/UL (ref 1.4–6.5)
GRANULOCYTES NFR BLD: 62.2 % (ref 50–68)
HGB BLD-MCNC: 10.2 G/DL (ref 14–18)
LYMPHOCYTES # BLD: 1.5 10*3/UL (ref 1.2–3.4)
LYMPHOCYTES NFR BLD AUTO: 25.9 % (ref 22–35)
MCH RBC QN AUTO: 28.3 PG (ref 25–35)
MCHC RBC AUTO-ENTMCNC: 34.3 G/DL (ref 31–37)
MCV RBC AUTO: 82.5 FL (ref 80–105)
MONOCYTES # BLD AUTO: 0.3 10*3/UL (ref 0.1–0.6)
MONOCYTES NFR BLD: 4.8 % (ref 1–6)
PLATELET # BLD: 210 10^3/UL (ref 120–450)
PMV BLD AUTO: 8.1 FL (ref 7–11)
RBC # BLD AUTO: 3.6 10^6/UL (ref 3.5–6.1)
T3 SERPL-MCNC: 0.79 NG/ML (ref 0.97–1.69)
T4 FREE SERPL-MCNC: 0.88 NG/DL (ref 0.78–2.19)
WBC # BLD AUTO: 5.6 10^3/UL (ref 4.5–11)

## 2018-06-28 RX ADMIN — PANTOPRAZOLE SODIUM SCH: 20 TABLET, DELAYED RELEASE ORAL at 05:19

## 2018-06-28 RX ADMIN — MAGNESIUM HYDROXIDE SCH: 400 SUSPENSION ORAL at 09:38

## 2018-06-28 RX ADMIN — PIPERACILLIN AND TAZOBACTAM SCH MLS/HR: 3; .375 INJECTION, POWDER, LYOPHILIZED, FOR SOLUTION INTRAVENOUS; PARENTERAL at 23:51

## 2018-06-28 RX ADMIN — LINEZOLID SCH MLS/HR: 600 INJECTION, SOLUTION INTRAVENOUS at 10:00

## 2018-06-28 RX ADMIN — NYSTATIN SCH DOSE: 100000 POWDER TOPICAL at 17:56

## 2018-06-28 RX ADMIN — NYSTATIN SCH DOSE: 100000 POWDER TOPICAL at 10:00

## 2018-06-28 RX ADMIN — PIPERACILLIN AND TAZOBACTAM SCH MLS/HR: 3; .375 INJECTION, POWDER, LYOPHILIZED, FOR SOLUTION INTRAVENOUS; PARENTERAL at 12:10

## 2018-06-28 RX ADMIN — PIPERACILLIN AND TAZOBACTAM SCH MLS/HR: 3; .375 INJECTION, POWDER, LYOPHILIZED, FOR SOLUTION INTRAVENOUS; PARENTERAL at 00:34

## 2018-06-28 RX ADMIN — LINEZOLID SCH MLS/HR: 600 INJECTION, SOLUTION INTRAVENOUS at 22:20

## 2018-06-28 RX ADMIN — PIPERACILLIN AND TAZOBACTAM SCH MLS/HR: 3; .375 INJECTION, POWDER, LYOPHILIZED, FOR SOLUTION INTRAVENOUS; PARENTERAL at 05:19

## 2018-06-28 RX ADMIN — NYSTATIN SCH DOSE: 100000 POWDER TOPICAL at 13:18

## 2018-06-28 RX ADMIN — PIPERACILLIN AND TAZOBACTAM SCH MLS/HR: 3; .375 INJECTION, POWDER, LYOPHILIZED, FOR SOLUTION INTRAVENOUS; PARENTERAL at 17:57

## 2018-06-28 NOTE — PN
DATE:  06/28/2018



PULMONARY PROGRESS NOTE



REFERRING PHYSICIAN:  Sandra Perez MD



SUBJECTIVE:  He is lying in the bed, sleepy, arousable.  No headache.  No

rhinitis.  No cough.  Short of breath with exertion.  Mild abdominal pain. 

No leg pain.  No leg swelling.



OBJECTIVE:

GENERAL:  In no acute distress.

VITAL SIGNS:  Temperature is 98, heart rate 69, respiratory rate is 18,

blood pressure 126/72, pulse ox 97% on room air.

HEENT:  Moist mucous membrane.  No ulcer or thrush.

NECK:  Supple.  No JVD.

LUNGS:  Have a fair airflow with rhonchi.

HEART:  S1 and S2.

ABDOMEN:  Has a open wound, multiple fistulas, diffuse erythema of the

abdominal wall.

EXTREMITIES:  There is no edema.

NEUROLOGIC:  Awake and alert.  Follows simple command.



MEDICATIONS:  He is on Ativan 0.5 mg every 6 hours p.r.n., Cepacol lozenges

every 2 hours p.r.n., he is on Clinimix IV 83 mL per hour,

albuterol-Atrovent nebulizer every 6 hours p.r.n., Intralipid 20%,

clonazepam 0.25 mg twice a day, morphine 2 mg every 4 hours p.r.n.,

Protonix 20 mg daily, Sandostatin 50 _____ every 8 hours, Sonata 5 mg at

bedtime p.r.n., Synthroid 150 mcg daily, Zosyn 3.375 g every 6 hours, Zyvox

600 mg every 12 hours.



LABORATORY DATA:  Shows hemoglobin 10.2, hematocrit 29.7, WBC 5.6, platelet

is 210.  Sodium 142, potassium 4.1, chloride 108, bicarbonate 22, BUN 28,

creatinine 1, glucose is 116.  Calcium is 9, phosphorous 3.7, magnesium 2. 

Total bili less than 0.1, AST 25, ALT 17, alk phos is 56, albumin is 3.5. 

Abdominal wall has a Klebsiella pneumonia and vancomycin-resistance

Enterococcus faecium.  Abdominal x-ray done today, which is unremarkable.



IMPRESSION AND PLAN:  Multiple abdominal surgeries with the multiple

enterocutaneous fistulas present.  He has an open wound, has ileostomy,

cellulitis of the abdominal wall, chronic obstructive lung disease,

hypothyroid, hyperlipidemia, renal insufficiency, malnutrition.  Pulmonary

point of view, doing okay.  Keep head at 45 degrees.  Bronchodilator,

p.r.n. supplemental oxygen.  Gastric prophylaxis.  Sequential compression

device to lower extremity.  Surgical followup.  Infectious Diseases

followup.



Thank you and we will follow with you.







__________________________________________

Dante Haque MD



DD:  06/28/2018 17:32:14

DT:  06/28/2018 19:04:20

Job # [id number]

## 2018-06-28 NOTE — PN
DATE:  06/28/2018



SUBJECTIVE:  The patient was followed up today.  The patient was admitted

on the medical site for evaluation of leaking ostomy site, multiple

fistulas in his abdomen including enterocutaneous fistula.  The patient was

seen by this writer for depression as well as uncontrolled anxiety, which

related to the medical issues.  Please see initial note for more detailed

information.  The patient was started on 0.5 mg of Ativan IV push every 6

hours as needed for anxiety as well as for insomnia.  Altogether, the

patient took only two doses of 0.5 mg of Ativan.  The patient is not

misusing or abusing that medication.  The patient was followed up today. 

The patient presented to be alert, pleasant, cooperative.  The patient

reported that Ativan is helping him.  The patient reported that medical

issues make him feel very depressed and hopeless.  Emotional support and

empathic listening as well as supportive therapy was provided to the

patient.  The patient was appreciative.



Vital signs seem to be stable.  Temperature 98.6, pulse is 70, blood

pressure 123/62, respirations 18, oxygen saturation is 96.



MEDICATIONS:  Reviewed.  The patient is on antibiotics, Cepacol.  Klonopin

0.5 mg twice a day scheduled, but the patient is on n.p.o.  Synthroid,

Zyvox, morphine, pantoprazole, _____ piperacillin, also Sonata as needed.



LABORATORY DATA:  Reviewed.  Hemoglobin and hematocrit are 10.2 and 29.7. 

Coagulation reviewed.  Chemistry reviewed.



MENTAL STATUS EXAMINATION:  The patient appears to be alert and oriented,

pleasant, cooperative, at times tearful, intermittent eye contact.  Speech

was overproductive but not pressured.  Mood described, at times "I feel

hopeless."  Thought process seems to be circumstantial, but not tangential.

Thought content, the patient denied visual, auditory, or tactile

hallucinations.  Denied paranoid ideation.  The patient adamantly denied

thoughts of harming himself or others.  Denied intent or plan.  The patient

said "I don't want to die, I want to leave, I've girlfriend, I've four

cats."  Insight and judgment seem to be fair.  Impulses are well

controlled.



IMPRESSION:  Most likely, the patient has anxiety and depression due to

general medical condition, rule out major depressive disorder.



PLAN:  Ativan as needed for anxiety IV push because the patient is on

n.p.o.  Continue supportive therapy and current management.  We will follow

up and advise accordingly.



Thank you very much for letting me to participate in the care of your

patient.







__________________________________________

Dee Gallego MD



DD:  06/28/2018 13:54:39

DT:  06/28/2018 13:56:55

Job # [id number]

## 2018-06-28 NOTE — PN
DATE:  06/27/2018



SUBJECTIVE:  Patient is a 62-year-old male.  Patient was seen and examined

at the bedside on 06/27/2018, looks like depressed.  Feel the pain over the

abdomen and scrotum, improved more due to the news that fistula is probably

fixable.  Slept poor overnight.  He was transferred to telemetry from

surgical floor due to bradycardia as per house physician.



PHYSICAL EXAMINATION:

VITAL SIGNS:  Temperature 98.4, pulse 55, respiratory rate 19, blood

pressure 106/67, pulse oximetry 99.

HEENT:  Head:  Normocephalic, atraumatic.  Eyes:  PERRLA.  Extraocular

muscles intact.  Conjunctivae clear.  Nose:  Patent.  Mucosus membrane

moist.

NECK:  Supple, no carotid bruit.  No JVD or thyromegaly.

CHEST:  Bilaterally symmetrical.

HEART:  S1, S2 positive.

LUNGS:  Clear to auscultation.

ABDOMEN:  Soft, bowel sounds present.  No organomegaly.

EXTREMITIES:  No edema, no cyanosis.

NEUROLOGICAL:  Patient is awake, alert, moving all 4 extremities, no focal

deficits.



MEDICATIONS:  DuoNeb, Cepacol lozenges, Zosyn, Zyvox, Clinimix, 

levothyroxine,  morphine, nystatin powder, octreotide, Protonix, saliva 
substitute.



LABORATORY DATA:  White blood cells 6.1, hemoglobin 9.9, hematocrit 29.7,

platelet 239.  Sodium 140, potassium 3.9, BUN 31, creatinine 1.2, glucose

97.



ASSESSMENT AND PLAN:  Mr. Dmitri Hickey is a 62-year-old male with

anemia, renal insufficiency, hyperchloremia, has multiple medical problems,

has enterocutaneous fistula and urostomy with skin irritation.  Patient is

n.p.o. getting total parenteral nutrition, octreotide, IV fluid, wound

care, application for fistula and urostomy, In's and O's are strict

control. Diabetes mellitus.  Dietitian referral, placed one to one, saliva

substitutes, Cepacol lozenges.  Discussion done with the nursing staff. 

Psychiatry consult called.  Reviewed Dr. Haque's notes.  Getting

antibiotics.  Surgical team is on the case.  Abdominal wall infection

associated with fistula, growing vancomycin-resistant Enterococcus, on

antibiotics.  From the urine culture gram-negative rods.  Currently on 

Zyvox or Zosyn and the patient is looking chronically ill and debilitated. 

We will repeat labs.  We will follow up.





__________________________________________

Sandra Perez MD



DD:  06/27/2018 23:53:36

DT:  06/28/2018 1:38:41

Job # [id number]

[email or web address]

## 2018-06-28 NOTE — CP.PCM.PN
Subjective





- Date & Time of Evaluation


Date of Evaluation: 06/28/18


Time of Evaluation: 06:35





- Subjective


Subjective: 





Awake, no distress, denies chest pain





Reason for consultation and follow up: Cardiac evaluation for bradycardia (

heart rate 40's/min) asymptomatic, history of asthma, interstitial hemorrhagic, 

cystitis s/p ileal conduit, and cystectomy, hypothyroidism, GI fistulas, 

anxiety issues, and nephritis.





Seen and examined by me and Dr. Albarado














Objective





- Vital Signs/Intake and Output


Vital Signs (last 24 hours): 


 











Temp Pulse Resp BP Pulse Ox


 


 97.8 F   57 L  20   101/60   96 


 


 06/28/18 06:00  06/28/18 06:00  06/28/18 06:00  06/28/18 06:00  06/28/18 06:00








Intake and Output: 


 











 06/28/18 06/28/18





 06:59 18:59


 


Intake Total 1824 


 


Output Total 1060 


 


Balance 764 














- Medications


Medications: 


 Current Medications





Albuterol/Ipratropium (Duoneb 3 Mg/0.5 Mg (3 Ml) Ud)  3 ml IH [id number] PRN


   PRN Reason: Sinus symptoms


Benzocaine/Menthol (Cepacol Sore Throat)  1 fernandez MT Q2H PRN


   PRN Reason: Sore Throat


   Last Admin: 06/27/18 21:43 Dose:  1 fernandez


Clonazepam (Klonopin)  0.25 mg PO BID MARIA D


   PRN Reason: Protocol


   Last Admin: 06/28/18 09:38 Dose:  Not Given


Piperacillin Sod/Tazobactam Sod (Zosyn 3.375 In Ns 100ml)  100 mls @ 200 mls/hr 

IVPB Q6 MARIA D


   PRN Reason: Protocol


   Stop: 07/02/18 18:01


   Last Admin: 06/28/18 05:19 Dose:  200 mls/hr


Linezolid (Zyvox 600mg/300ml D5w)  600 mg in 300 mls @ 200 mls/hr IVPB Q12 MARIA D


   PRN Reason: Protocol


   Stop: 07/02/18 22:01


   Last Admin: 06/27/18 21:43 Dose:  200 mls/hr


Amino Acids/Electrolytes/Dextrose (Clinimix 5/20 % "E" (2000 Ml))  2,000 mls @ 

83.333 mls/hr IV .Q24H [facility]


   Stop: 06/29/18 17:59


   Last Admin: 06/27/18 18:02 Dose:  83.333 mls/hr


Fat Emulsion Intravenous (Intralipid 20%)  250 mls @ 20.833 mls/hr IV MWF@1800 

[facility]


   Stop: 06/29/18 18:01


   Last Admin: 06/27/18 18:02 Dose:  20.833 mls/hr


Levothyroxine Sodium (Synthroid)  150 mcg PO 0600 [facility]


   Last Admin: 06/28/18 05:19 Dose:  Not Given


Lorazepam (Ativan)  0.5 mg IV Q6 PRN; Protocol


   PRN Reason: anxiety/insomnia


   Last Admin: 06/28/18 00:37 Dose:  0.5 mg


Magnesium Hydroxide (Milk Of Magnesia)  30 ml PO DAILY [facility]


   Last Admin: 06/28/18 09:38 Dose:  Not Given


Morphine Sulfate (Morphine)  2 mg IVP Q4H PRN


   PRN Reason: Pain, severe (8-10)


   Last Admin: 06/27/18 12:06 Dose:  2 mg


Nystatin (Nystop Topical Powder)  0 gm TOP TID [facility]


   Last Admin: 06/27/18 18:03 Dose:  1 dose


Octreotide Acetate (Sandostatin)  50 mcg SC Q8H [facility]


   Last Admin: 06/28/18 00:45 Dose:  50 mcg


Pantoprazole Sodium (Protonix Ec Tab)  20 mg PO 0600 [facility]


   Last Admin: 06/28/18 05:19 Dose:  Not Given


Petrolatum (Desitin Maximum Strength Topical 40% Oint)  0 gm TOP Q4H PRN


   PRN Reason: Rash


   Last Admin: 06/25/18 02:59 Dose:  1 applic


Saliva Substitute (Saliva Substitute)  2 ml PO Q6H PRN


   PRN Reason: Dry mouth


   Last Admin: 06/27/18 21:48 Dose:  2 ml


Zaleplon (Sonata)  5 mg PO HS PRN


   PRN Reason: Insomnia











- Labs


Labs: 


 





 06/27/18 06:30 





 06/27/18 06:30 





 











PT  12.2 SECONDS (9.4-12.5)   06/24/18  18:18    


 


INR  1.07  (0.93-1.08)   06/24/18  18:18    














- Constitutional


Appears: No Acute Distress





- Head Exam


Head Exam: NORMOCEPHALIC





- Eye Exam


Eye Exam: Normal appearance





- ENT Exam


ENT Exam: Mucous Membranes Moist





- Respiratory Exam


Respiratory Exam: Clear to Ausculation Bilateral, NORMAL BREATHING PATTERN





- Cardiovascular Exam


Cardiovascular Exam: Bradycardia, +S1, +S2


Additional comments: 





Telemetry 50's/min





- GI/Abdominal Exam


GI & Abdominal Exam: Soft, Normal Bowel Sounds


Additional comments: 





ostomy tube to drainage bag





-  Exam


Additional comments: 





urostomy tube to drainage bag





- Extremities Exam


Extremities Exam: Normal Capillary Refill


Additional comments: 





left arm PICC





- Neurological Exam


Neurological Exam: Alert, Awake, Oriented x3





- Psychiatric Exam


Psychiatric exam: Anxious





- Skin


Skin Exam: Normal Color, Warm





Assessment and Plan





- Assessment and Plan (Free Text)


Assessment: 





A 62 year old male who came in to the ER due to abdominal pain and leaking 

ostomy site. 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 

enterocutanoeus fistula. Cardiac consult was called for due to bradycardia. He 

has long history of bradycardia, hypothyroidism, asthma,interstitial hemorrhagic

, cystitis s/p ileal conduit, and cystectomy, anxiety, nephritis.Recently had 

reconstructive surgery of the urinary conduit at [facility].














Plan: 





Stable cardiac status


Chronic bradycardia,asymptomatic, 


Blood pressure stable


Denies dizziness or chest pain


Avoid betablocker


Urine positive for VRE


On antibiotics


On peripheral TPN


Continue current treatment


Continue current medications





Will follow up





Plan and treatment discussed with Dr. Albarado

## 2018-06-28 NOTE — PN
DATE:  06/28/2018



SUBJECTIVE:  The patient was seen and examined on the bedside, looking

comfortable.  A little bit better as compared to yesterday, more cool and

calm.  Still had leaking urostomy that had been evaluated by the surgery

and wound care nurse.  Leaking resolved.  No fever.  No chills.  No nausea

or vomiting.  The patient is supposed to go for fistulogram today. 

Abdominal ultrasound report reviewed.  History of liver lesion.  Showed

multiple echogenic septic foci suspicious of metastatic disease.



PHYSICAL EXAMINATION:

VITAL SIGNS:  Temperature 97.8, pulse 57, respiratory rate 20, blood

pressure 101/60, pulse oximetry 96.

HEENT:  Head normocephalic, atraumatic.  Eyes PERRLA.  Extraocular muscles

intact.  Conjunctivae clear.  Nose patent.  Mucous membrane moist.

NECK:  Supple.  No carotid bruit.  No JVD or thyromegaly.

CHEST:  Bilaterally symmetrical.

HEART:  S1 and S2 positive.

LUNGS:  Clear to auscultation.

ABDOMEN:  Tender.  Bowel sounds positive.

EXTREMITIES:  No edema.  No cyanosis.

NEUROLOGICAL:  The patient is awake and alert.  Moving all 4 extremities. 

No focal deficits.



LABORATORY DATA:  White blood cells 6.1, hemoglobin 9.9, hematocrit 29.7,

platelets 239.  Sodium 143, potassium 3.9, BUN 31, creatinine 1.2, glucose

97.



MEDICATIONS:  DuoNeb, Cepacol, Klonopin, Zosyn, Zyvox, Clinimix,

levothyroxine, Ativan, morphine, nystatin powder topically, octreotide,

saliva substitute.



ASSESSMENT AND PLAN:  Mr. Dmitri Hickey, 62-year-old male with anemia,

renal insufficiency, hyperchloremia, has abdominal wall fistula.  GI series

showed possibly fistula from the jejunum.  Hemorrhagic cystitis, status

post cystectomy and ileal conduit with revision; urinary tract infection;

hepatic lesion, previous biopsies were nondiagnostic; anemia;

hypothyroidism.  The patient is supposed to go for fistulogram.  On total

parenteral nutrition, octreotide, IV antibiotics.  Monitoring ins and

outputs.  Appreciated GI, Surgery and Cardiology input.  We will continue

present treatment.  Repeat labs.  We will follow up.





__________________________________________

Sandra Perez MD





DD:  06/28/2018 21:54:44

DT:  06/28/2018 22:01:08

Job # [id number]

## 2018-06-28 NOTE — CP.PCM.PN
Subjective





- Date & Time of Evaluation


Date of Evaluation: 06/28/18


Time of Evaluation: 07:40





- Subjective


Subjective: 





General Surgery Note for Yvette





Patient seen and examined at bedside. Overnight, urostomy was leaking and had 

to be changed. Patient states excoriations and pain has been improving with 

topical Milk of magnesia. Patient has no new complaints today. He is continues 

to be NPO, recevining TPN, and denies BM from rectum. Enterocutaneous fistulas 

with 300cc of bilious output over last 12hrs. Denies fever/chills. 





Objective





- Vital Signs/Intake and Output


Vital Signs (last 24 hours): 


 











Temp Pulse Resp BP Pulse Ox


 


 97.8 F   57 L  20   101/60   96 


 


 06/28/18 06:00  06/28/18 06:00  06/28/18 06:00  06/28/18 06:00  06/28/18 06:00








Intake and Output: 


 











 06/28/18 06/28/18





 06:59 18:59


 


Intake Total 1824 


 


Output Total 1060 


 


Balance 764 














- Medications


Medications: 


 Current Medications





Albuterol/Ipratropium (Duoneb 3 Mg/0.5 Mg (3 Ml) Ud)  3 ml IH [id number] PRN


   PRN Reason: Sinus symptoms


Benzocaine/Menthol (Cepacol Sore Throat)  1 fernandez MT Q2H PRN


   PRN Reason: Sore Throat


   Last Admin: 06/27/18 21:43 Dose:  1 fernandez


Clonazepam (Klonopin)  0.25 mg PO BID MARIA D


   PRN Reason: Protocol


   Last Admin: 06/27/18 18:01 Dose:  0.25 mg


Piperacillin Sod/Tazobactam Sod (Zosyn 3.375 In Ns 100ml)  100 mls @ 200 mls/hr 

IVPB Q6 MARIA D


   PRN Reason: Protocol


   Stop: 07/02/18 18:01


   Last Admin: 06/28/18 05:19 Dose:  200 mls/hr


Linezolid (Zyvox 600mg/300ml D5w)  600 mg in 300 mls @ 200 mls/hr IVPB Q12 MARIA D


   PRN Reason: Protocol


   Stop: 07/02/18 22:01


   Last Admin: 06/27/18 21:43 Dose:  200 mls/hr


Amino Acids/Electrolytes/Dextrose (Clinimix 5/20 % "E" (2000 Ml))  2,000 mls @ 

83.333 mls/hr IV .Q24H [facility]


   Stop: 06/29/18 17:59


   Last Admin: 06/27/18 18:02 Dose:  83.333 mls/hr


Fat Emulsion Intravenous (Intralipid 20%)  250 mls @ 20.833 mls/hr IV MWF@1800 

[facility]


   Stop: 06/29/18 18:01


   Last Admin: 06/27/18 18:02 Dose:  20.833 mls/hr


Levothyroxine Sodium (Synthroid)  150 mcg PO 0600 [facility]


   Last Admin: 06/28/18 05:19 Dose:  Not Given


Lorazepam (Ativan)  0.5 mg IV Q6 PRN; Protocol


   PRN Reason: anxiety/insomnia


   Last Admin: 06/28/18 00:37 Dose:  0.5 mg


Magnesium Hydroxide (Milk Of Magnesia)  30 ml PO DAILY [facility]


   Last Admin: 06/27/18 10:11 Dose:  Not Given


Morphine Sulfate (Morphine)  2 mg IVP Q4H PRN


   PRN Reason: Pain, severe (8-10)


   Last Admin: 06/27/18 12:06 Dose:  2 mg


Nystatin (Nystop Topical Powder)  0 gm TOP TID [facility]


   Last Admin: 06/27/18 18:03 Dose:  1 dose


Octreotide Acetate (Sandostatin)  50 mcg SC Q8H [facility]


   Last Admin: 06/28/18 00:45 Dose:  50 mcg


Pantoprazole Sodium (Protonix Ec Tab)  20 mg PO 0600 [facility]


   Last Admin: 06/28/18 05:19 Dose:  Not Given


Petrolatum (Desitin Maximum Strength Topical 40% Oint)  0 gm TOP Q4H PRN


   PRN Reason: Rash


   Last Admin: 06/25/18 02:59 Dose:  1 applic


Saliva Substitute (Saliva Substitute)  2 ml PO Q6H PRN


   PRN Reason: Dry mouth


   Last Admin: 06/27/18 21:48 Dose:  2 ml


Zaleplon (Sonata)  5 mg PO HS PRN


   PRN Reason: Insomnia











- Labs


Labs: 


 





 06/27/18 06:30 





 06/27/18 06:30 





 











PT  12.2 SECONDS (9.4-12.5)   06/24/18  18:18    


 


INR  1.07  (0.93-1.08)   06/24/18  18:18    














- Constitutional


Appears: No Acute Distress





- Head Exam


Head Exam: ATRAUMATIC, NORMOCEPHALIC





- Eye Exam


Eye Exam: Normal appearance





- ENT Exam


ENT Exam: Mucous Membranes Moist





- Respiratory Exam


Respiratory Exam: NORMAL BREATHING PATTERN





- Cardiovascular Exam


Cardiovascular Exam: REGULAR RHYTHM





- GI/Abdominal Exam


GI & Abdominal Exam: Soft, Normal Bowel Sounds.  absent: Distended, Firm, 

Guarding, Rigid, Rebound


Additional comments: 





Colostomy appliance over eterocutaneous fistulas with draining NGT on suction- 

clean dry and intact 


Urostomy with dressing reinforced - no leak currently





- Extremities Exam


Extremities Exam: Normal Capillary Refill





- Neurological Exam


Neurological Exam: Alert, Awake, Oriented x3





- Psychiatric Exam


Psychiatric exam: Normal Affect, Normal Mood





- Skin


Skin Exam: Dry, Warm





Assessment and Plan





- Assessment and Plan (Free Text)


Assessment: 


62M with enterocutaneous fistulas & urostomy with skin irritation; Urine cx and 

abdominal cx grew VRE and Klebsiella


Plan: 


NPO


TPN


Octreotide 50 mg SC Q8H


IV fluids


Wound Care


IV antibiotics


Appliance to fistulas & urostomy- do not remove


Strict I's & O's


Dietician referral


Replace GI losses 1:1 with Lactated Ringers if fistula output is more than 500cc

/day


Saliva substitute


Cepacol lozanges


Sips & chips


UGIS with Presumable enterocutaneous fistula likely at the level of the mid 

jejunum


f/u ABXR


f/u fistulogram


Further recommendations as per Dr. Yvette Morris PGY1

## 2018-06-28 NOTE — RAD
HISTORY:

evaluate for clearance of contrast  



COMPARISON:

Small bowel series 0 6/26/2018



FINDINGS:



BOWEL:

Normal bowel gas pattern. No residual oral contrast within the small 

bowel or the colon.  No hepatic or splenic enlargement.  No masses or 

abnormal calcifications.  Nasogastric tube seen extending to the 

upper pelvis. 



BONES:

Normal.



OTHER FINDINGS:

None.



IMPRESSION:

No residual contrast material within the bowel.

## 2018-06-28 NOTE — CP.PCM.PN
<Claritza Claros - Last Filed: 06/28/18 12:02>





Subjective





- Date & Time of Evaluation


Date of Evaluation: 06/28/18


Time of Evaluation: 10:00





- Subjective


Subjective: 





S&E at bedside, chart reviewed. No N/V, abdominal discomfort better, Had 

leaking urostomy that was evaluated by surgery and wound care nurse. Leaking 

resolved. No c/o fever chills, awaiting to go for fistulogram today. In good 

spirits this morning. Abdominal US reprot reviewed to FU hx of liver lesion, 

showed multiple echogenic hepatic foci suspicious for metastatic disease, no GB 

stone CBD 4mm, fatty liver.





Objective





- Vital Signs/Intake and Output


Vital Signs (last 24 hours): 


 











Temp Pulse Resp BP Pulse Ox


 


 97.8 F   57 L  20   101/60   96 


 


 06/28/18 06:00  06/28/18 06:00  06/28/18 06:00  06/28/18 06:00  06/28/18 06:00








Intake and Output: 


 











 06/28/18 06/28/18





 06:59 18:59


 


Intake Total 1824 


 


Output Total 1060 


 


Balance 764 














- Medications


Medications: 


 Current Medications





Albuterol/Ipratropium (Duoneb 3 Mg/0.5 Mg (3 Ml) Ud)  3 ml IH [id number] PRN


   PRN Reason: Sinus symptoms


Benzocaine/Menthol (Cepacol Sore Throat)  1 fernandez MT Q2H PRN


   PRN Reason: Sore Throat


   Last Admin: 06/27/18 21:43 Dose:  1 fernandez


Clonazepam (Klonopin)  0.25 mg PO BID MARIA D


   PRN Reason: Protocol


   Last Admin: 06/28/18 09:38 Dose:  Not Given


Piperacillin Sod/Tazobactam Sod (Zosyn 3.375 In Ns 100ml)  100 mls @ 200 mls/hr 

IVPB Q6 MARIA D


   PRN Reason: Protocol


   Stop: 07/02/18 18:01


   Last Admin: 06/28/18 05:19 Dose:  200 mls/hr


Linezolid (Zyvox 600mg/300ml D5w)  600 mg in 300 mls @ 200 mls/hr IVPB Q12 MARIA D


   PRN Reason: Protocol


   Stop: 07/02/18 22:01


   Last Admin: 06/27/18 21:43 Dose:  200 mls/hr


Amino Acids/Electrolytes/Dextrose (Clinimix 5/20 % "E" (2000 Ml))  2,000 mls @ 

83.333 mls/hr IV .Q24H [facility]


   Stop: 06/29/18 17:59


   Last Admin: 06/27/18 18:02 Dose:  83.333 mls/hr


Fat Emulsion Intravenous (Intralipid 20%)  250 mls @ 20.833 mls/hr IV MWF@1800 

[facility]


   Stop: 06/29/18 18:01


   Last Admin: 06/27/18 18:02 Dose:  20.833 mls/hr


Levothyroxine Sodium (Synthroid)  150 mcg PO 0600 [facility]


   Last Admin: 06/28/18 05:19 Dose:  Not Given


Lorazepam (Ativan)  0.5 mg IV Q6 PRN; Protocol


   PRN Reason: anxiety/insomnia


   Last Admin: 06/28/18 00:37 Dose:  0.5 mg


Magnesium Hydroxide (Milk Of Magnesia)  30 ml PO DAILY [facility]


   Last Admin: 06/28/18 09:38 Dose:  Not Given


Morphine Sulfate (Morphine)  2 mg IVP Q4H PRN


   PRN Reason: Pain, severe (8-10)


   Last Admin: 06/27/18 12:06 Dose:  2 mg


Nystatin (Nystop Topical Powder)  0 gm TOP TID [facility]


   Last Admin: 06/27/18 18:03 Dose:  1 dose


Octreotide Acetate (Sandostatin)  50 mcg SC Q8H [facility]


   Last Admin: 06/28/18 00:45 Dose:  50 mcg


Pantoprazole Sodium (Protonix Ec Tab)  20 mg PO 0600 [facility]


   Last Admin: 06/28/18 05:19 Dose:  Not Given


Petrolatum (Desitin Maximum Strength Topical 40% Oint)  0 gm TOP Q4H PRN


   PRN Reason: Rash


   Last Admin: 06/25/18 02:59 Dose:  1 applic


Saliva Substitute (Saliva Substitute)  2 ml PO Q6H PRN


   PRN Reason: Dry mouth


   Last Admin: 06/27/18 21:48 Dose:  2 ml


Zaleplon (Sonata)  5 mg PO HS PRN


   PRN Reason: Insomnia











- Labs


Labs: 


 





 06/27/18 06:30 





 06/27/18 06:30 





 











PT  12.2 SECONDS (9.4-12.5)   06/24/18  18:18    


 


INR  1.07  (0.93-1.08)   06/24/18  18:18    














- Constitutional


Appears: No Acute Distress





- Eye Exam


Eye Exam: Normal appearance.  absent: Scleral icterus





- ENT Exam


ENT Exam: Mucous Membranes Moist





- Neck Exam


Neck Exam: Normal Inspection





- Respiratory Exam


Respiratory Exam: NORMAL BREATHING PATTERN





- Cardiovascular Exam


Cardiovascular Exam: +S1, +S2





- GI/Abdominal Exam


GI & Abdominal Exam: Soft, Tenderness


Additional comments: 





 L ileal condiut site clean and dry, draining yellow urine. Midline wound vac 

in place- draining green stool. Excoriations around lower abdomen. No blood 

noted.





- Extremities Exam


Extremities Exam: absent: Calf Tenderness, Pedal Edema





- Neurological Exam


Neurological Exam: Alert, Awake, Oriented x3





- Skin


Skin Exam: Dry, Warm





Assessment and Plan





- Assessment and Plan (Free Text)


Assessment: 





Assessment: 





Abdominal wall fistula - GI series showed possible fistula from jejunum 


Hemorrhagic cystitis s/p cystectomty and ileal condiut w/ revision 


UTI 


Hepatic lesion - Previous biopsy was non-diagnostic 


Anemia


Hypothyroid





Plan: 





for fistulogram today


on TPN. 


on Octreotide 50q8


on IV antibiotics 


Monitor I&O as well as drain output


as per surgery





Seen and discussed with Dr. Pham.





<Racheal Pham - Last Filed: 07/11/18 17:52>





Objective





- Vital Signs/Intake and Output


Vital Signs (last 24 hours): 


 











Temp Pulse Resp BP Pulse Ox


 


 98.6 F   63   18   126/72   97 


 


 06/28/18 16:50  06/28/18 22:00  06/28/18 16:50  06/28/18 16:50  06/28/18 16:50








Intake and Output: 


 











 06/28/18 06/29/18





 18:59 06:59


 


Intake Total 0 


 


Output Total 600 150


 


Balance -600 -150














- Medications


Medications: 


 Current Medications





Albuterol/Ipratropium (Duoneb 3 Mg/0.5 Mg (3 Ml) Ud)  3 ml IH [id number] PRN


   PRN Reason: Sinus symptoms


Benzocaine/Menthol (Cepacol Sore Throat)  1 fernandez MT Q2H PRN


   PRN Reason: Sore Throat


   Last Admin: 06/27/18 21:43 Dose:  1 fernandez


Clonazepam (Klonopin)  0.25 mg PO BID MARIA D


   PRN Reason: Protocol


   Last Admin: 06/28/18 18:16 Dose:  Not Given


Piperacillin Sod/Tazobactam Sod (Zosyn 3.375 In Ns 100ml)  100 mls @ 200 mls/hr 

IVPB Q6 MARIA D


   PRN Reason: Protocol


   Stop: 07/02/18 18:01


   Last Admin: 06/28/18 17:57 Dose:  200 mls/hr


Linezolid (Zyvox 600mg/300ml D5w)  600 mg in 300 mls @ 200 mls/hr IVPB Q12 MARIA D


   PRN Reason: Protocol


   Stop: 07/02/18 22:01


   Last Admin: 06/28/18 22:20 Dose:  200 mls/hr


Amino Acids/Electrolytes/Dextrose (Clinimix 5/20 % "E" (2000 Ml))  2,000 mls @ 

83.333 mls/hr IV .Q24H [facility]


   Stop: 06/29/18 17:59


   Last Admin: 06/28/18 17:57 Dose:  83.333 mls/hr


Fat Emulsion Intravenous (Intralipid 20%)  250 mls @ 20.833 mls/hr IV MWF@1800 

[facility]


   Stop: 06/29/18 18:01


   Last Admin: 06/27/18 18:02 Dose:  20.833 mls/hr


Levothyroxine Sodium (Synthroid)  150 mcg PO 0600 [facility]


   Last Admin: 06/28/18 05:19 Dose:  Not Given


Lorazepam (Ativan)  0.5 mg IV Q6 PRN; Protocol


   PRN Reason: anxiety/insomnia


   Last Admin: 06/28/18 13:33 Dose:  0.5 mg


Magnesium Hydroxide (Milk Of Magnesia)  30 ml PO DAILY [facility]


   Last Admin: 06/28/18 09:38 Dose:  Not Given


Morphine Sulfate (Morphine)  2 mg IVP Q4H PRN


   PRN Reason: Pain, severe (8-10)


   Last Admin: 06/27/18 12:06 Dose:  2 mg


Nystatin (Nystop Topical Powder)  0 gm TOP TID [facility]


   Last Admin: 06/28/18 17:56 Dose:  1 dose


Octreotide Acetate (Sandostatin)  50 mcg SC Q8H [facility]


   Last Admin: 06/28/18 17:56 Dose:  50 mcg


Pantoprazole Sodium (Protonix Ec Tab)  20 mg PO 0600 [facility]


   Last Admin: 06/28/18 05:19 Dose:  Not Given


Petrolatum (Desitin Maximum Strength Topical 40% Oint)  0 gm TOP Q4H PRN


   PRN Reason: Rash


   Last Admin: 06/25/18 02:59 Dose:  1 applic


Saliva Substitute (Saliva Substitute)  2 ml PO Q6H PRN


   PRN Reason: Dry mouth


   Last Admin: 06/27/18 21:48 Dose:  2 ml


Zaleplon (Sonata)  5 mg PO HS PRN


   PRN Reason: Insomnia











- Labs


Labs: 


 





 06/28/18 10:30 





 06/28/18 10:30 





 











PT  12.2 SECONDS (9.4-12.5)   06/24/18  18:18    


 


INR  1.07  (0.93-1.08)   06/24/18  18:18    














Attending/Attestation





- Attestation


I have personally seen and examined this patient.: Yes


I have fully participated in the care of the patient.: Yes


I have reviewed all pertinent clinical information, including history, physical 

exam and plan: Yes


Notes (Text): 





This is an addendum to GI progress report dictated by MARTELL Villegas.The 

patient was seen and examined earlier.  Medical records, lab studies, imagings 

were reviewed.  Last 24 hours events reviewed.  Agreed with the above treatment 

plan as outlined in  MARTELL Villegas's notes  with the addition of the following

:


Abdominal US report reviewed to FU hx of liver lesion, showed multiple 

echogenic hepatic foci suspicious for metastatic disease, no GB stone CBD 4mm, 

fatty liver.


Abdominal wall fistula - GI series showed possible fistula from jejunum 


Hemorrhagic cystitis s/p cystectomty and ileal condiut w/ revision 


for fistulogram today


on TPN. 


on Octreotide 50q8


on IV antibiotics 


Monitor I&O as well as drain output


as per surgery

## 2018-06-28 NOTE — US
HISTORY:

hx of liver lesions



COMPARISON:

None.



TECHNIQUE:

Sonographic evaluation of the abdomen.



FINDINGS:



LIVER:

Measures 14.5 cm.  Diffusely increased echogenicity of the liver 

parenchyma. Consistent with fatty infiltration.  Several ill-defined 

small echogenic foci are identified. This is concerning for 

metastatic disease.  Recommend evaluation with contrast-enhanced CT, 

multiphasic.  No biliary dilatation.  Smooth contour.



GALLBLADDER:

Unremarkable. No gallstones.



COMMON BILE DUCT:

Measures 4 mm. No stones. No dilatation.



PANCREAS:

Unremarkable as visualized. No mass. No ductal dilatation.



RIGHT KIDNEY:

Measures 9.3cm. Normal echogenicity. No calculus, mass, or 

hydronephrosis.



LEFT KIDNEY:

Measures 10.0cm. Normal echogenicity. No calculus, mass, or 

hydronephrosis.



SPLEEN:

Normal in size and contour. No mass.



AORTA:

No aneurysmal dilatation. 



IVC:

Unremarkable. 



OTHER FINDINGS:

None. 



IMPRESSION:

Multiple echogenic hepatic foci suspicious for metastatic disease.  

Recommend further evaluation with multiphasic contrast enhanced CT 

examination of the abdomen. Fatty infiltration of the liver.  No 

evidence of cholelithiasis or cholecystitis.

## 2018-06-28 NOTE — PN
DATE:  06/28/2018



SUBJECTIVE:  The patient is in bed and seen earlier this morning, is

comfortable, uneventful night.



PHYSICAL EXAMINATION:

VITAL SIGNS:  Temperature is 97, blood pressure is 101/60, respiratory rate

20, heart rate of 58.

HEENT:  Examination of HEENT is unremarkable.

NECK:  Supple.

LUNGS:  Have decreased breath sounds.

HEART:  Normal S1, S2.

ABDOMEN:  Soft, nontender.



LABORATORY DATA:  Laboratory examination reveals a white count of 6.1,

hemoglobin of 9, platelets of 239.  Chemistries reveals a BUN of 31,

creatinine of 1.1.  Urinalysis is noted, 15-20 wbc's.  Microbiology is

reviewed.  Klebsiella pneumoniae, Enterococcus in the urine culture.  There

is gram-negative jama and a gram-positive cocci in the abdominal wound

culture.  Further identification and sensitivity is pending.  Review of

orders reveals the patient to be on Zosyn and Zyvox.  Dr. Nair's note is

reviewed.  She was at the bedside caring for the patient this morning. 

Case was discussed with Dr. Nair.



ASSESSMENT AND PLAN:  A 62-year-old male seen earlier in [facility], bed 2. 

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

vancomycin-resistant Enterococcus and Klebsiella in the urine and

gram-negative jama and A gram-positive cocci in the abdominal wall.  The

patient is chronically ill, debilitated with a low body mass index of 19. 

Currently on Zyvox and Zosyn.  We will follow with you.





__________________________________________

Margarito Khan MD





DD:  06/28/2018 6:22:47

DT:  06/28/2018 6:24:14

Job # [id number]

## 2018-06-29 LAB
ALBUMIN SERPL-MCNC: 3.4 G/DL (ref 3–4.8)
ALBUMIN/GLOB SERPL: 0.9 {RATIO} (ref 1.1–1.8)
ALT SERPL-CCNC: 14 U/L (ref 7–56)
AST SERPL-CCNC: 35 U/L (ref 17–59)
BASOPHILS # BLD AUTO: 0.04 K/MM3 (ref 0–2)
BASOPHILS NFR BLD: 0.8 % (ref 0–3)
BUN SERPL-MCNC: 28 MG/DL (ref 7–21)
CALCIUM SERPL-MCNC: 9 MG/DL (ref 8.4–10.5)
EOSINOPHIL # BLD: 0.3 10*3/UL (ref 0–0.7)
EOSINOPHIL NFR BLD: 6.1 % (ref 1.5–5)
ERYTHROCYTE [DISTWIDTH] IN BLOOD BY AUTOMATED COUNT: 14.2 % (ref 11.5–14.5)
GFR NON-AFRICAN AMERICAN: > 60
GRANULOCYTES # BLD: 2.86 10*3/UL (ref 1.4–6.5)
GRANULOCYTES NFR BLD: 56.4 % (ref 50–68)
HGB BLD-MCNC: 9.8 G/DL (ref 14–18)
LYMPHOCYTES # BLD: 1.5 10*3/UL (ref 1.2–3.4)
LYMPHOCYTES NFR BLD AUTO: 29.6 % (ref 22–35)
MCH RBC QN AUTO: 27.2 PG (ref 25–35)
MCHC RBC AUTO-ENTMCNC: 32.9 G/DL (ref 31–37)
MCV RBC AUTO: 82.8 FL (ref 80–105)
MONOCYTES # BLD AUTO: 0.4 10*3/UL (ref 0.1–0.6)
MONOCYTES NFR BLD: 7.1 % (ref 1–6)
PLATELET # BLD: 197 10^3/UL (ref 120–450)
PMV BLD AUTO: 8.1 FL (ref 7–11)
RBC # BLD AUTO: 3.6 10^6/UL (ref 3.5–6.1)
WBC # BLD AUTO: 5.1 10^3/UL (ref 4.5–11)

## 2018-06-29 RX ADMIN — PIPERACILLIN AND TAZOBACTAM SCH MLS/HR: 3; .375 INJECTION, POWDER, LYOPHILIZED, FOR SOLUTION INTRAVENOUS; PARENTERAL at 05:02

## 2018-06-29 RX ADMIN — NYSTATIN SCH: 100000 POWDER TOPICAL at 10:15

## 2018-06-29 RX ADMIN — NYSTATIN SCH DOSE: 100000 POWDER TOPICAL at 13:00

## 2018-06-29 RX ADMIN — MAGNESIUM HYDROXIDE SCH ML: 400 SUSPENSION ORAL at 12:47

## 2018-06-29 RX ADMIN — PURIFIED WATER PRN LOZ: 99.05 LIQUID OPHTHALMIC at 22:36

## 2018-06-29 RX ADMIN — Medication PRN ML: at 12:41

## 2018-06-29 RX ADMIN — PIPERACILLIN AND TAZOBACTAM SCH MLS/HR: 3; .375 INJECTION, POWDER, LYOPHILIZED, FOR SOLUTION INTRAVENOUS; PARENTERAL at 17:40

## 2018-06-29 RX ADMIN — NYSTATIN SCH DOSE: 100000 POWDER TOPICAL at 17:43

## 2018-06-29 RX ADMIN — PANTOPRAZOLE SODIUM SCH: 20 TABLET, DELAYED RELEASE ORAL at 05:03

## 2018-06-29 RX ADMIN — PURIFIED WATER PRN LOZ: 99.05 LIQUID OPHTHALMIC at 00:07

## 2018-06-29 RX ADMIN — LINEZOLID SCH MLS/HR: 600 INJECTION, SOLUTION INTRAVENOUS at 10:10

## 2018-06-29 RX ADMIN — LINEZOLID SCH MLS/HR: 600 INJECTION, SOLUTION INTRAVENOUS at 22:36

## 2018-06-29 RX ADMIN — PIPERACILLIN AND TAZOBACTAM SCH MLS/HR: 3; .375 INJECTION, POWDER, LYOPHILIZED, FOR SOLUTION INTRAVENOUS; PARENTERAL at 12:46

## 2018-06-29 RX ADMIN — PURIFIED WATER PRN LOZ: 99.05 LIQUID OPHTHALMIC at 07:57

## 2018-06-29 RX ADMIN — PURIFIED WATER PRN LOZ: 99.05 LIQUID OPHTHALMIC at 02:25

## 2018-06-29 NOTE — PN
DATE:  06/29/2018



SUBJECTIVE:  The patient is in bed, in no acute distress, nontoxic.



PHYSICAL EXAMINATION:

VITAL SIGNS:  Temperature is 98, blood pressure is 106/60, respiratory rate

of 16.

HEENT:  Unremarkable.

NECK:  Supple.

LUNGS:  Have decreased breath sounds.

HEART:  Normal S1 and S2.

ABDOMEN:  Soft, nontender.  No organomegaly.  No rebound.  No guarding.  No

masses.



LABORATORY EXAMINATION:  Reveals the patient's white count is 5.1 and the

chemistries are noted.  Microbiology is reviewed.  Klebsiella and VRE are

noted to be from abdominal wound cultures with Klebsiella is relatively

sensitive Klebsiella to Bactrim, ertapenem, cefazolin, Cipro and another

Klebsiella pneumoniae subspecies are slightly more resistant and VRE is

also noted.  Review of orders reveals the patient to be on Zyvox and Zosyn.

Claritza Claros' note is reviewed.   _____ note is reviewed with an

enterocutaneous fistula and urostomy with skin irritation.  Dr. Perez's

note is reviewed.



ASSESSMENT AND PLAN:  A 62-year-old male who was seen earlier today with

abdominal wall infection and abdominal wall fistula growing

vancomycin-resistant Enterococcus and Enterococcus Klebsiella in the urine.

Also, now with Klebsiella and vancomycin-resistant Enterococcus in

abdominal wall.  Currently on Zyvox and Zosyn.  We will complete a short

course of antibiotics.  Patient for a possible fistulogram today.  We will

follow with you.





__________________________________________

Margarito Khan MD



DD:  06/29/2018 14:52:05

DT:  06/29/2018 16:29:09

Job # [id number]

## 2018-06-29 NOTE — PN
DATE:  06/29/2018



SUBJECTIVE:  The patient is lying on the bed, just come back for

fistulogram.  Surgical resident is at the bedside doing the wound care. 

Fistulogram shows some improvement with the leakage.  No chest pain.  No

shortness of breath.  Has some wound area discomfort.  No dysuria.  No leg

pain.  No leg swelling.



PHYSICAL EXAMINATION:

GENERAL:  In no acute distress.

VITAL SIGNS:  Temperature is 98, heart rate is 68, respiratory rate is 18,

blood pressure 106/56, pulse ox 99% on room air.

HEENT:  Moist mucous membrane.  No ulcer or thrush noted.

NECK:  Supple.  No JVD.

LUNGS:  Have a fair airflow with rhonchi.

HEART:  S1 and S2.

ABDOMEN:  Has a open wound in the mid abdomen.  Ileostomy looks okay.

EXTREMITIES:  There is no edema.

NEUROLOGICAL:  Awake and alert.  Follows simple command.



MEDICATIONS:  He is on Ativan 0.5 mg every 6 hours p.r.n., Cepacol lozenges

every 12 hour p.r.n., Clinimix IV, DuoNeb every 6 hour p.r.n., Intralipid

20 mL/hour, Klonopin 0.25 mg twice a day, round the clock morphine 2 mg

every 4 hour p.r.n., Protonix 20 mg daily, Saliva Substitute every 6 hour

p.r.n., octreotide 50 mcg subcu every 8 hour, Sonata 5 mg at bedtime

p.r.n., Synthroid 150 mcg daily, Zosyn 3.375 g IV every 6 hour, Zyvox 600

mg twice a day.



LABORATORY DATA:  Shows hemoglobin 9.8, hematocrit 29.8, WBC 5.1, platelet

count is 197.  Sodium 142, potassium 3.7, chloride 106, bicarbonate 25, BUN

28, creatinine 1, glucose 120, calcium is 9, phosphorus 3.3, magnesium 2.1,

AST 35, ALT 14, alkaline phosphatase is 55, albumin is 3.4.  Microbiology,

abdominal wound has a vancomycin-resistant Enterococcus faecium.  Also, has

Klebsiella pneumoniae.  Has a fistulogram done today, which shows there is

2 fistula communicating cutaneously to small bowel around mid jejunum area.



IMPRESSION AND PLAN:  Enterocutaneous fistula with system drainage, chronic

obstructive lung disease, malnutrition, hypothyroid, hyperlipidemia,

history of renal insufficiency, malnourished.  Spoke to nursing staff. 

Also, spoke to surgical resident.  Pulmonary point of view, doing well. 

P.r.n. bronchodilator.  Keep head at 45 degrees.  On pain management.  IV

nutrition.  Wound care.  Gastric and deep vein thrombosis prophylaxis.



Thank you and we will follow with you.



__________________________________________

Dante Haque MD



DD:  06/29/2018 15:15:18

DT:  06/29/2018 17:27:38

Job # [id number]

## 2018-06-29 NOTE — CP.PCM.PN
<Claritza Claros - Last Filed: 06/29/18 08:48>





Subjective





- Date & Time of Evaluation


Date of Evaluation: 06/29/18


Time of Evaluation: 08:30





- Subjective


Subjective: 





S&E at bedside, chart reviewed, plan for fistulogram today, c/o of empty 

feeling in stomach, "gurgling", no N/V, no acute overnight events. No fever or 

chills. 





Objective





- Vital Signs/Intake and Output


Vital Signs (last 24 hours): 


 











Temp Pulse Resp BP Pulse Ox


 


 97.9 F   52 L  18   101/69   99 


 


 06/29/18 06:00  06/29/18 06:00  06/29/18 06:00  06/29/18 07:54  06/29/18 06:00








Intake and Output: 


 











 06/29/18 06/29/18





 06:59 18:59


 


Intake Total 1496 


 


Output Total 3850 


 


Balance -2354 














- Medications


Medications: 


 Current Medications





Albuterol/Ipratropium (Duoneb 3 Mg/0.5 Mg (3 Ml) Ud)  3 ml IH [id number] PRN


   PRN Reason: Sinus symptoms


Benzocaine/Menthol (Cepacol Sore Throat)  1 fernandez MT Q2H PRN


   PRN Reason: Sore Throat


   Last Admin: 06/29/18 07:57 Dose:  1 fernandez


Clonazepam (Klonopin)  0.25 mg PO BID MARIA D


   PRN Reason: Protocol


   Last Admin: 06/28/18 18:16 Dose:  Not Given


Piperacillin Sod/Tazobactam Sod (Zosyn 3.375 In Ns 100ml)  100 mls @ 200 mls/hr 

IVPB Q6 MARIA D


   PRN Reason: Protocol


   Stop: 07/02/18 18:01


   Last Admin: 06/29/18 05:02 Dose:  200 mls/hr


Linezolid (Zyvox 600mg/300ml D5w)  600 mg in 300 mls @ 200 mls/hr IVPB Q12 MARIA D


   PRN Reason: Protocol


   Stop: 07/02/18 22:01


   Last Admin: 06/28/18 22:20 Dose:  200 mls/hr


Amino Acids/Electrolytes/Dextrose (Clinimix 5/20 % "E" (2000 Ml))  2,000 mls @ 

83.333 mls/hr IV .Q24H MARIA D


   Stop: 07/02/18 17:59


Fat Emulsion Intravenous (Intralipid 20%)  250 mls @ 20.833 mls/hr IV MWF@1800 

[facility]


Levothyroxine Sodium (Synthroid)  150 mcg PO 0600 [facility]


   Last Admin: 06/29/18 05:03 Dose:  Not Given


Lorazepam (Ativan)  0.5 mg IV Q6 PRN; Protocol


   PRN Reason: anxiety/insomnia


   Last Admin: 06/29/18 02:25 Dose:  0.5 mg


Magnesium Hydroxide (Milk Of Magnesia)  30 ml PO DAILY [facility]


   Last Admin: 06/28/18 09:38 Dose:  Not Given


Morphine Sulfate (Morphine)  2 mg IVP Q4H PRN


   PRN Reason: Pain, severe (8-10)


   Last Admin: 06/27/18 12:06 Dose:  2 mg


Nystatin (Nystop Topical Powder)  0 gm TOP TID [facility]


   Last Admin: 06/28/18 17:56 Dose:  1 dose


Octreotide Acetate (Sandostatin)  50 mcg SC Q8H [facility]


   Last Admin: 06/29/18 00:48 Dose:  Not Given


Pantoprazole Sodium (Protonix Ec Tab)  20 mg PO 0600 [facility]


   Last Admin: 06/29/18 05:03 Dose:  Not Given


Petrolatum (Desitin Maximum Strength Topical 40% Oint)  0 gm TOP Q4H PRN


   PRN Reason: Rash


   Last Admin: 06/25/18 02:59 Dose:  1 applic


Saliva Substitute (Saliva Substitute)  2 ml PO Q6H PRN


   PRN Reason: Dry mouth


   Last Admin: 06/27/18 21:48 Dose:  2 ml


Zaleplon (Sonata)  5 mg PO HS PRN


   PRN Reason: Insomnia











- Labs


Labs: 


 





 06/29/18 06:00 





 06/29/18 06:00 





 











PT  12.2 SECONDS (9.4-12.5)   06/24/18  18:18    


 


INR  1.07  (0.93-1.08)   06/24/18  18:18    














- Constitutional


Appears: No Acute Distress





- Head Exam


Head Exam: NORMOCEPHALIC





- Eye Exam


Eye Exam: Normal appearance.  absent: Scleral icterus





- ENT Exam


ENT Exam: Mucous Membranes Moist





- Neck Exam


Neck Exam: Normal Inspection





- Respiratory Exam


Respiratory Exam: NORMAL BREATHING PATTERN.  absent: Respiratory Distress





- Cardiovascular Exam


Cardiovascular Exam: +S1, +S2





- GI/Abdominal Exam


GI & Abdominal Exam: Soft, Tenderness, Normal Bowel Sounds.  absent: Guarding, 

Rebound


Additional comments: 





L ileal condiut site clean and dry, draining yellow urine. Midline wound vac in 

place- draining green stool. Excoriations around lower abdomen. No blood noted.





- Extremities Exam


Extremities Exam: absent: Calf Tenderness, Pedal Edema





- Neurological Exam


Neurological Exam: Alert, Awake, Oriented x3





- Skin


Skin Exam: Dry, Warm





Assessment and Plan





- Assessment and Plan (Free Text)


Assessment: 





Assessment: 





Abdominal wall fistula - GI series showed possible fistula from jejunum 


Hemorrhagic cystitis s/p cystectomty and ileal condiut w/ revision 


UTI 


Hepatic lesion - Previous biopsy was non-diagnostic 


Anemia


Hypothyroid





Plan: 





on TPN. 


on Octreotide 50q8


on IV antibiotics 


on PPI


Monitor I&O as well as drain output


for fistulogram today


as per surgery





Seen and discussed with Dr. Pham.





<Racheal Pham V - Last Filed: 07/11/18 17:54>





Objective





- Vital Signs/Intake and Output


Vital Signs (last 24 hours): 


 











Temp Pulse Resp BP Pulse Ox


 


 98.0 F   51 L  20   101/58 L  98 


 


 06/29/18 23:29  06/29/18 23:29  06/29/18 23:29  06/29/18 23:29  06/29/18 23:29








Intake and Output: 


 











 06/29/18 06/30/18





 18:59 06:59


 


Intake Total 0 1350


 


Output Total 400 800


 


Balance -400 550














- Medications


Medications: 


 Current Medications





Albuterol/Ipratropium (Duoneb 3 Mg/0.5 Mg (3 Ml) Ud)  3 ml IH [id number] PRN


   PRN Reason: Sinus symptoms


Benzocaine/Menthol (Cepacol Sore Throat)  1 fernandez MT Q2H PRN


   PRN Reason: Sore Throat


   Last Admin: 06/29/18 22:36 Dose:  1 fernandez


Clonazepam (Klonopin)  0.25 mg PO BID MARIA D


   PRN Reason: Protocol


   Last Admin: 06/29/18 17:37 Dose:  0.25 mg


Piperacillin Sod/Tazobactam Sod (Zosyn 3.375 In Ns 100ml)  100 mls @ 200 mls/hr 

IVPB Q6 MARIA D


   PRN Reason: Protocol


   Stop: 07/02/18 18:01


   Last Admin: 06/29/18 17:40 Dose:  200 mls/hr


Linezolid (Zyvox 600mg/300ml D5w)  600 mg in 300 mls @ 200 mls/hr IVPB Q12 MARIA D


   PRN Reason: Protocol


   Stop: 07/02/18 22:01


   Last Admin: 06/29/18 22:36 Dose:  200 mls/hr


Amino Acids/Electrolytes/Dextrose (Clinimix 5/20 % "E" (2000 Ml))  2,000 mls @ 

83.333 mls/hr IV .Q24H MARIA D


   Stop: 07/02/18 17:59


   Last Admin: 06/29/18 19:55 Dose:  83.333 mls/hr


Fat Emulsion Intravenous (Intralipid 20%)  250 mls @ 20.833 mls/hr IV MWF@1800 

MARIA D


   Last Admin: 06/29/18 17:38 Dose:  20.833 mls/hr


Levothyroxine Sodium (Synthroid)  150 mcg PO 0600 [facility]


   Last Admin: 06/29/18 05:03 Dose:  Not Given


Lorazepam (Ativan)  0.5 mg IV Q6 PRN; Protocol


   PRN Reason: anxiety/insomnia


   Last Admin: 06/29/18 23:22 Dose:  0.5 mg


Magnesium Hydroxide (Milk Of Magnesia)  30 ml PO DAILY [facility]


   Last Admin: 06/29/18 12:47 Dose:  30 ml


Nystatin (Nystop Topical Powder)  0 gm TOP TID [facility]


   Last Admin: 06/29/18 17:43 Dose:  1 dose


Octreotide Acetate (Sandostatin)  50 mcg SC Q8H [facility]


   Last Admin: 06/29/18 17:41 Dose:  50 mcg


Pantoprazole Sodium (Protonix Ec Tab)  20 mg PO 0600 [facility]


   Last Admin: 06/29/18 05:03 Dose:  Not Given


Petrolatum (Desitin Maximum Strength Topical 40% Oint)  0 gm TOP Q4H PRN


   PRN Reason: Rash


   Last Admin: 06/25/18 02:59 Dose:  1 applic


Saliva Substitute (Saliva Substitute)  2 ml PO Q6H PRN


   PRN Reason: Dry mouth


   Last Admin: 06/29/18 12:41 Dose:  2 ml


Zaleplon (Sonata)  5 mg PO HS PRN


   PRN Reason: Insomnia











- Labs


Labs: 


 





 06/29/18 06:00 





 06/29/18 06:00 





 











PT  12.2 SECONDS (9.4-12.5)   06/24/18  18:18    


 


INR  1.07  (0.93-1.08)   06/24/18  18:18    














Attending/Attestation





- Attestation


I have personally seen and examined this patient.: Yes


I have fully participated in the care of the patient.: Yes


I have reviewed all pertinent clinical information, including history, physical 

exam and plan: Yes


Notes (Text): 


This is an addendum to GI progress report dictated by MARTELL Villegas.The 

patient was seen and examined earlier.  Medical records, lab studies, imagings 

were reviewed.  Last 24 hours events reviewed.  Agreed with the above treatment 

plan as outlined in  MARTELL Villegas's notes  with the addition of the following


 


Abdomen is Soft, w/ Tenderness, Normal Bowel Sounds.  absent: Guarding, Rebound


 L ileal condiut site clean and dry, draining yellow urine. Midline wound vac 

in place- draining green stool. Excoriations around lower abdomen. No blood 

noted.


continue current treatment


fistulogram not done yesterday, planned for today NPO


as per surgery

## 2018-06-29 NOTE — RAD
PROCEDURE:  Intraoperative Fluoroscopy. 



HISTORY:

Evaluation of abdominal enterocutaneous fistula



FINDINGS:

Fluoroscopic assistance was provided under fluoroscopic guidance, a 

12 [place] non inflated [place] catheter tip was inserted into 2 

cutaneous fistulas along the mid anterior abdominal wall followed by 

injection of several cc of Omnipaque 350 contrast material. Study 

demonstrates 2 separate fistulous communications between the skin 

surface anterior abdominal wall and small-bowel (likely mid jejunum). 

. The 1st and more inferiorly located fistula overlies the level of 

the sacral rim just to the right of midline and the 2nd is slightly 

more superiorly located.

## 2018-06-29 NOTE — CP.PCM.PN
Subjective





- Date & Time of Evaluation


Date of Evaluation: 06/29/18


Time of Evaluation: 07:15





- Subjective


Subjective: 





General Surgery Note for Yvette





Patient seen and examined at bedside. No acute event overnight. He has no leak 

from either appliance overnight. Patient states excoriations continue to 

improve with topical Milk of magnesia. Patient also complains of dry mouth. He 

is NPO with TPN. No BM from rectum. Enterocutaneous fistulas with 850cc of 

bilious output over 24 hrs. Denies fever/chills or nausea/vomiting.





Objective





- Vital Signs/Intake and Output


Vital Signs (last 24 hours): 


 











Temp Pulse Resp BP Pulse Ox


 


 97.9 F   52 L  18   86/56 L  99 


 


 06/29/18 06:00  06/29/18 06:00  06/29/18 06:00  06/29/18 06:00  06/29/18 06:00








Intake and Output: 


 











 06/29/18 06/29/18





 06:59 18:59


 


Intake Total 1496 


 


Output Total 3850 


 


Balance -2354 














- Medications


Medications: 


 Current Medications





Albuterol/Ipratropium (Duoneb 3 Mg/0.5 Mg (3 Ml) Ud)  3 ml IH [id number] PRN


   PRN Reason: Sinus symptoms


Benzocaine/Menthol (Cepacol Sore Throat)  1 fernandez MT Q2H PRN


   PRN Reason: Sore Throat


   Last Admin: 06/29/18 02:25 Dose:  1 fernandez


Clonazepam (Klonopin)  0.25 mg PO BID MARIA D


   PRN Reason: Protocol


   Last Admin: 06/28/18 18:16 Dose:  Not Given


Piperacillin Sod/Tazobactam Sod (Zosyn 3.375 In Ns 100ml)  100 mls @ 200 mls/hr 

IVPB Q6 MARIA D


   PRN Reason: Protocol


   Stop: 07/02/18 18:01


   Last Admin: 06/29/18 05:02 Dose:  200 mls/hr


Linezolid (Zyvox 600mg/300ml D5w)  600 mg in 300 mls @ 200 mls/hr IVPB Q12 MARIA D


   PRN Reason: Protocol


   Stop: 07/02/18 22:01


   Last Admin: 06/28/18 22:20 Dose:  200 mls/hr


Amino Acids/Electrolytes/Dextrose (Clinimix 5/20 % "E" (2000 Ml))  2,000 mls @ 

83.333 mls/hr IV .Q24H [facility]


   Stop: 06/29/18 17:59


   Last Admin: 06/28/18 17:57 Dose:  83.333 mls/hr


Fat Emulsion Intravenous (Intralipid 20%)  250 mls @ 20.833 mls/hr IV MWF@1800 

[facility]


   Stop: 06/29/18 18:01


   Last Admin: 06/27/18 18:02 Dose:  20.833 mls/hr


Levothyroxine Sodium (Synthroid)  150 mcg PO 0600 [facility]


   Last Admin: 06/29/18 05:03 Dose:  Not Given


Lorazepam (Ativan)  0.5 mg IV Q6 PRN; Protocol


   PRN Reason: anxiety/insomnia


   Last Admin: 06/29/18 02:25 Dose:  0.5 mg


Magnesium Hydroxide (Milk Of Magnesia)  30 ml PO DAILY [facility]


   Last Admin: 06/28/18 09:38 Dose:  Not Given


Morphine Sulfate (Morphine)  2 mg IVP Q4H PRN


   PRN Reason: Pain, severe (8-10)


   Last Admin: 06/27/18 12:06 Dose:  2 mg


Nystatin (Nystop Topical Powder)  0 gm TOP TID [facility]


   Last Admin: 06/28/18 17:56 Dose:  1 dose


Octreotide Acetate (Sandostatin)  50 mcg SC Q8H [facility]


   Last Admin: 06/29/18 00:48 Dose:  Not Given


Pantoprazole Sodium (Protonix Ec Tab)  20 mg PO 0600 [facility]


   Last Admin: 06/29/18 05:03 Dose:  Not Given


Petrolatum (Desitin Maximum Strength Topical 40% Oint)  0 gm TOP Q4H PRN


   PRN Reason: Rash


   Last Admin: 06/25/18 02:59 Dose:  1 applic


Saliva Substitute (Saliva Substitute)  2 ml PO Q6H PRN


   PRN Reason: Dry mouth


   Last Admin: 06/27/18 21:48 Dose:  2 ml


Zaleplon (Sonata)  5 mg PO HS PRN


   PRN Reason: Insomnia











- Labs


Labs: 


 





 06/29/18 06:00 





 06/29/18 06:00 





 











PT  12.2 SECONDS (9.4-12.5)   06/24/18  18:18    


 


INR  1.07  (0.93-1.08)   06/24/18  18:18    














- Constitutional


Appears: No Acute Distress





- Head Exam


Head Exam: ATRAUMATIC, NORMOCEPHALIC





- Eye Exam


Eye Exam: Normal appearance





- ENT Exam


ENT Exam: Mucous Membranes Moist





- Respiratory Exam


Respiratory Exam: NORMAL BREATHING PATTERN





- Cardiovascular Exam


Cardiovascular Exam: REGULAR RHYTHM





- GI/Abdominal Exam


GI & Abdominal Exam: Soft, Normal Bowel Sounds.  absent: Tenderness


Additional comments: 





Colostomy appliance over eterocutaneous fistulas with draining NGT on suction- 

clean dry and intact 


Urostomy with dressing reinforced - no leak


excoriations are improving





-  Exam


Additional comments: 





nath catheter





- Back Exam


Back Exam: absent: CVA tenderness (L), CVA tenderness (R)





- Neurological Exam


Neurological Exam: Alert, Awake, Oriented x3





- Psychiatric Exam


Psychiatric exam: Normal Affect, Normal Mood





- Skin


Skin Exam: Dry, Warm


Additional comments: 





excoriations improving, skin is dry, one area lateral to enterocutaneous 

fistula appliance has minimal bleeding from skin breakdown





Assessment and Plan





- Assessment and Plan (Free Text)


Assessment: 


62M with enterocutaneous fistulas & urostomy with skin irritation; Urine cx and 

abdominal cx grew VRE and Klebsiella


Plan: 





NPO


TPN


Octreotide 50 mg SC Q8H


IV fluids


Wound Care


IV antibiotics


Appliance to fistulas & urostomy- do not remove, will replace as needed


Strict I's & O's


Dietician referral


Replace GI losses 1:1 with Lactated Ringers if fistula output is more than 500cc

/day


Saliva substitute


Cepacol lozanges


Sips & chips


UGIS with Presumable enterocutaneous fistula likely at the level of the mid 

jejunum


f/u fistulogram today at 10 am


Further recommendations as per Dr. Yvette Morris PGY1

## 2018-06-29 NOTE — PN
DATE:  06/29/2018



FOLLOWUP NOTE



SUBJECTIVE:  The patient was seen today.  The patient presented to be in

good spirits.  The patient reports that he feels much better, he feels

optimistic.  Anxiety is under control.  The patient is willing to take all

of the medications.  Discussed with the surgical team.  The patient had

dressing during this writer visit.



OBJECTIVE:

VITAL SIGNS:  Stable.  Temperature 98, pulse is 49, blood pressure 106/66,

respirations 18.



MEDICATIONS:  Reviewed.  DuoNeb, Clinimix, Cepacol, Klonopin 0.5 mg p.o.,

but the patient is not taking because he is on n.p.o. and will be resumed

after the patient will be able to eat.  Ativan 0.5 mg four times a day as

needed.  Altogether, the patient got only three doses for the past two

days.  The patient does not seem to be very anxious.  The patient is on

morphine, nystatin, Protonix, cefazolin, and Sonata as needed for anxiety,

but when the patient will be able to tolerate food.



LABORATORY DATA:  Hemoglobin and hematocrit reviewed.  Coagulation

reviewed.  Chemistry reviewed.  Urinalysis reviewed, which was done in

06/24.



MENTAL STATUS EXAMINATION:  As this writer described above, the patient was

alert and oriented, pleasant, cooperative, in good spirits, good eye

contact.   Mood described "I feel more optimistic."  Thought process was

coherent and goal directed.  Thought content:  The patient denied visual,

auditory, or tactile hallucinations.  Denied paranoid ideation.  The

patient is more hopeful for the future.  Insight and judgment seem to be

improving.  Impulses are well controlled.



IMPRESSION:  Mood disorder and anxiety disorder due to change in medical

condition.



PLAN:  Continue current management.  Continue current medication.  We will

follow up and advise accordingly.  The patient seems to be improving from

the mental standpoint.  No suicidality, no aggression, no agitation.



Thank you very much for letting me participate in care of your patient.







__________________________________________

Dee Gallego MD



DD:  06/29/2018 14:53:35

DT:  06/29/2018 16:36:29

Job # [id number]

## 2018-06-29 NOTE — PN
DATE:  06/29/2018



This is a 62-year-old male.



SUBJECTIVE:  Patient is seen and examined at the bedside.  Looking

comfortable.  No fever.  No chills.  No nausea or vomiting.  Still having

abdominal pain.  Night was unremarkable.  Do not look like in acute

distress .  Nontoxic.



PHYSICAL EXAMINATION:

VITAL SIGNS:  Temperature 98, blood pressure 106/60, respiratory rate 16,

pulse 80.

HEENT:  Head:  Normocephalic, atraumatic.  Eyes:  PERRLA.  Extraocular

muscles intact.  Conjunctivae clear.  Nose:  Patent.  Mucosus membrane

moist.

NECK:  Supple.  No carotid bruit.  No JVD or thyromegaly.

CHEST:  Bilaterally symmetrical.

HEART:  S1, S2 positive.

LUNGS:  Clear to auscultation.

ABDOMEN:  Soft, bowel sounds present.  No organomegaly.

EXTREMITIES:  No edema, no cyanosis.

NEUROLOGICAL:  Patient is awake, alert, moving all 4 extremities, no focal

deficits.



MEDICATIONS:  Ativan, Cepacol lozenges,  DuoNeb, fat emulsion,

clonazepam, milk of magnesia, morphine, nystatin powder.



LABORATORY DATA:  White blood cell 5.1, hemoglobin 9.8, hematocrit 29.8,

platelets 197.  Sodium 142, potassium 3.7, BUN 50, creatinine 1.  Glucose

102.



ASSESSMENT AND PLAN:  Mr. Dmitri Hickey is a 62-year-old male with

anemia, hyperglycemia, proteinuria, hematuria, urinary tract infection. 

Seen by Dr. Khan, Infectious Disease.  Charts reviewed.  Labs

reviewed.  Reviewed the medications.  Has abdominal fistulas with growing

vancomycin-resistant Enterococcus and Enterococcus Klebsiella in the urine,

also now with Klebsiella and vancomycin-resistant Enterococcus in the

abdominal wall.  Continue on Zyvox and Zosyn, will complete the short

course of the antibiotics.  The patient is a possible hypotensive.  Patient

need a fistulogram very soon, so we can decide about patient's condition.  and 
Infectious Disease is on the case .  Urologist is also on the

case.  We will continue present treatment.  Repeat labs.  We will follow

up.





__________________________________________

Sandra Perez MD





DD:  06/29/2018 18:09:38

DT:  06/29/2018 20:28:56

Job # [id number]

[email or web address]

## 2018-06-29 NOTE — CP.PCM.PN
Subjective





- Date & Time of Evaluation


Date of Evaluation: 06/29/18


Time of Evaluation: 06:25





- Subjective


Subjective: 





Awake, comfortable, no distress, denies chest pain





Reason for consultation and follow up: Cardiac evaluation for bradycardia (

heart rate 40's/min) asymptomatic, history of asthma, interstitial hemorrhagic, 

cystitis s/p ileal conduit, and cystectomy, hypothyroidism, GI fistulas, 

anxiety issues, and nephritis.





Seen and examined by me and Dr. Mays





Objective





- Vital Signs/Intake and Output


Vital Signs (last 24 hours): 


 











Temp Pulse Resp BP Pulse Ox


 


 97.9 F   52 L  18   86/56 L  99 


 


 06/29/18 06:00  06/29/18 06:00  06/29/18 06:00  06/29/18 06:00  06/29/18 06:00








Intake and Output: 


 











 06/29/18 06/29/18





 06:59 18:59


 


Intake Total 1496 


 


Output Total 3850 


 


Balance -2354 














- Medications


Medications: 


 Current Medications





Albuterol/Ipratropium (Duoneb 3 Mg/0.5 Mg (3 Ml) Ud)  3 ml IH [id number] PRN


   PRN Reason: Sinus symptoms


Benzocaine/Menthol (Cepacol Sore Throat)  1 fernandez MT Q2H PRN


   PRN Reason: Sore Throat


   Last Admin: 06/29/18 02:25 Dose:  1 fernandez


Clonazepam (Klonopin)  0.25 mg PO BID [facility]


   PRN Reason: Protocol


   Last Admin: 06/28/18 18:16 Dose:  Not Given


Piperacillin Sod/Tazobactam Sod (Zosyn 3.375 In Ns 100ml)  100 mls @ 200 mls/hr 

IVPB Q6 [facility]


   PRN Reason: Protocol


   Stop: 07/02/18 18:01


   Last Admin: 06/29/18 05:02 Dose:  200 mls/hr


Linezolid (Zyvox 600mg/300ml D5w)  600 mg in 300 mls @ 200 mls/hr IVPB Q12 [facility]


   PRN Reason: Protocol


   Stop: 07/02/18 22:01


   Last Admin: 06/28/18 22:20 Dose:  200 mls/hr


Amino Acids/Electrolytes/Dextrose (Clinimix 5/20 % "E" (2000 Ml))  2,000 mls @ 

83.333 mls/hr IV .Q24H [facility]


   Stop: 06/29/18 17:59


   Last Admin: 06/28/18 17:57 Dose:  83.333 mls/hr


Fat Emulsion Intravenous (Intralipid 20%)  250 mls @ 20.833 mls/hr IV MWF@1800 

[facility]


   Stop: 06/29/18 18:01


   Last Admin: 06/27/18 18:02 Dose:  20.833 mls/hr


Levothyroxine Sodium (Synthroid)  150 mcg PO 0600 [facility]


   Last Admin: 06/29/18 05:03 Dose:  Not Given


Lorazepam (Ativan)  0.5 mg IV Q6 PRN; Protocol


   PRN Reason: anxiety/insomnia


   Last Admin: 06/29/18 02:25 Dose:  0.5 mg


Magnesium Hydroxide (Milk Of Magnesia)  30 ml PO DAILY [facility]


   Last Admin: 06/28/18 09:38 Dose:  Not Given


Morphine Sulfate (Morphine)  2 mg IVP Q4H PRN


   PRN Reason: Pain, severe (8-10)


   Last Admin: 06/27/18 12:06 Dose:  2 mg


Nystatin (Nystop Topical Powder)  0 gm TOP TID [facility]


   Last Admin: 06/28/18 17:56 Dose:  1 dose


Octreotide Acetate (Sandostatin)  50 mcg SC Q8H [facility]


   Last Admin: 06/29/18 00:48 Dose:  Not Given


Pantoprazole Sodium (Protonix Ec Tab)  20 mg PO 0600 [facility]


   Last Admin: 06/29/18 05:03 Dose:  Not Given


Petrolatum (Desitin Maximum Strength Topical 40% Oint)  0 gm TOP Q4H PRN


   PRN Reason: Rash


   Last Admin: 06/25/18 02:59 Dose:  1 applic


Saliva Substitute (Saliva Substitute)  2 ml PO Q6H PRN


   PRN Reason: Dry mouth


   Last Admin: 06/27/18 21:48 Dose:  2 ml


Zaleplon (Sonata)  5 mg PO HS PRN


   PRN Reason: Insomnia











- Labs


Labs: 


 





 06/28/18 10:30 





 06/28/18 10:30 





 











PT  12.2 SECONDS (9.4-12.5)   06/24/18  18:18    


 


INR  1.07  (0.93-1.08)   06/24/18  18:18    














- Constitutional


Appears: No Acute Distress





- Eye Exam


Eye Exam: Normal appearance





- ENT Exam


ENT Exam: Mucous Membranes Moist





- Respiratory Exam


Respiratory Exam: Clear to Ausculation Bilateral, NORMAL BREATHING PATTERN





- Cardiovascular Exam


Cardiovascular Exam: Bradycardia, +S1, +S2





- GI/Abdominal Exam


GI & Abdominal Exam: Soft, Normal Bowel Sounds


Additional comments: 





ostomy mid abdomen





-  Exam


Additional comments: 





urostomy tube to drainage bag





- Extremities Exam


Extremities Exam: Normal Capillary Refill


Additional comments: 





left arm PICC





- Neurological Exam


Neurological Exam: Alert, Awake, Oriented x3





- Psychiatric Exam


Psychiatric exam: Normal Affect, Normal Mood





- Skin


Skin Exam: Normal Color, Warm





Assessment and Plan





- Assessment and Plan (Free Text)


Assessment: 





A 62 year old male who came in to the ER due to abdominal pain and leaking 

ostomy site. 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 

enterocutanoeus fistula. Cardiac consult was called for due to bradycardia. He 

has long history of bradycardia, hypothyroidism, asthma,interstitial hemorrhagic

, cystitis s/p ileal conduit, and cystectomy, anxiety, nephritis.Recently had 

reconstructive surgery of the urinary conduit at [facility].




















Plan: 





For flouroscopy today to evaluate fistula/s


Stable cardiac status


Chronic bradycardia,asymptomatic, 


Blood pressure stable


Urine positive for VRE


On antibiotics


ID on consult


On peripheral TPN


Continue current treatment


Continue current medications





Will follow up





Plan and treatment discussed with Dr. Albarado

## 2018-06-29 NOTE — RAD
HISTORY:

Assess for distal intestine obstruction  



COMPARISON:

No prior.



FINDINGS:



BOWEL:

No evidence of acute mechanical bowel obstruction. No gross free 

intraperitoneal air identified. 



There is a drainage catheter is seen along the surface of the mid 

anterior abdominal wall (overlying skin surface) 



IMPRESSION:

No obstruction. There is a drainage catheter overlying the mid 

anterior abdominal wall (overlying the skin surface)

## 2018-06-30 LAB
ALBUMIN SERPL-MCNC: 3.6 G/DL (ref 3–4.8)
ALBUMIN/GLOB SERPL: 0.9 {RATIO} (ref 1.1–1.8)
ALT SERPL-CCNC: 20 U/L (ref 7–56)
AST SERPL-CCNC: 28 U/L (ref 17–59)
BASOPHILS # BLD AUTO: 0.03 K/MM3 (ref 0–2)
BASOPHILS NFR BLD: 0.5 % (ref 0–3)
BUN SERPL-MCNC: 28 MG/DL (ref 7–21)
CALCIUM SERPL-MCNC: 9 MG/DL (ref 8.4–10.5)
EOSINOPHIL # BLD: 0.3 10*3/UL (ref 0–0.7)
EOSINOPHIL NFR BLD: 5.9 % (ref 1.5–5)
ERYTHROCYTE [DISTWIDTH] IN BLOOD BY AUTOMATED COUNT: 14 % (ref 11.5–14.5)
GFR NON-AFRICAN AMERICAN: > 60
GRANULOCYTES # BLD: 3.62 10*3/UL (ref 1.4–6.5)
GRANULOCYTES NFR BLD: 63 % (ref 50–68)
HGB BLD-MCNC: 10.8 G/DL (ref 14–18)
LYMPHOCYTES # BLD: 1.5 10*3/UL (ref 1.2–3.4)
LYMPHOCYTES NFR BLD AUTO: 25.2 % (ref 22–35)
MCH RBC QN AUTO: 27.4 PG (ref 25–35)
MCHC RBC AUTO-ENTMCNC: 32.9 G/DL (ref 31–37)
MCV RBC AUTO: 83.2 FL (ref 80–105)
MONOCYTES # BLD AUTO: 0.3 10*3/UL (ref 0.1–0.6)
MONOCYTES NFR BLD: 5.4 % (ref 1–6)
PLATELET # BLD: 204 10^3/UL (ref 120–450)
PMV BLD AUTO: 8.5 FL (ref 7–11)
RBC # BLD AUTO: 3.94 10^6/UL (ref 3.5–6.1)
WBC # BLD AUTO: 5.8 10^3/UL (ref 4.5–11)

## 2018-06-30 RX ADMIN — PURIFIED WATER PRN LOZ: 99.05 LIQUID OPHTHALMIC at 09:22

## 2018-06-30 RX ADMIN — LINEZOLID SCH MLS/HR: 600 INJECTION, SOLUTION INTRAVENOUS at 21:26

## 2018-06-30 RX ADMIN — NYSTATIN SCH DOSE: 100000 POWDER TOPICAL at 14:16

## 2018-06-30 RX ADMIN — NYSTATIN SCH DOSE: 100000 POWDER TOPICAL at 09:26

## 2018-06-30 RX ADMIN — PURIFIED WATER PRN LOZ: 99.05 LIQUID OPHTHALMIC at 21:30

## 2018-06-30 RX ADMIN — PURIFIED WATER PRN LOZ: 99.05 LIQUID OPHTHALMIC at 05:02

## 2018-06-30 RX ADMIN — LINEZOLID SCH MLS/HR: 600 INJECTION, SOLUTION INTRAVENOUS at 09:24

## 2018-06-30 RX ADMIN — PIPERACILLIN AND TAZOBACTAM SCH MLS/HR: 3; .375 INJECTION, POWDER, LYOPHILIZED, FOR SOLUTION INTRAVENOUS; PARENTERAL at 00:29

## 2018-06-30 RX ADMIN — PURIFIED WATER PRN LOZ: 99.05 LIQUID OPHTHALMIC at 00:29

## 2018-06-30 RX ADMIN — PIPERACILLIN AND TAZOBACTAM SCH MLS/HR: 3; .375 INJECTION, POWDER, LYOPHILIZED, FOR SOLUTION INTRAVENOUS; PARENTERAL at 11:53

## 2018-06-30 RX ADMIN — PIPERACILLIN AND TAZOBACTAM SCH MLS/HR: 3; .375 INJECTION, POWDER, LYOPHILIZED, FOR SOLUTION INTRAVENOUS; PARENTERAL at 05:03

## 2018-06-30 RX ADMIN — PURIFIED WATER PRN LOZ: 99.05 LIQUID OPHTHALMIC at 03:11

## 2018-06-30 RX ADMIN — MAGNESIUM HYDROXIDE SCH ML: 400 SUSPENSION ORAL at 09:23

## 2018-06-30 RX ADMIN — PURIFIED WATER PRN LOZ: 99.05 LIQUID OPHTHALMIC at 16:15

## 2018-06-30 RX ADMIN — NYSTATIN SCH DOSE: 100000 POWDER TOPICAL at 17:45

## 2018-06-30 RX ADMIN — PIPERACILLIN AND TAZOBACTAM SCH MLS/HR: 3; .375 INJECTION, POWDER, LYOPHILIZED, FOR SOLUTION INTRAVENOUS; PARENTERAL at 17:55

## 2018-06-30 RX ADMIN — PANTOPRAZOLE SODIUM SCH: 20 TABLET, DELAYED RELEASE ORAL at 05:02

## 2018-06-30 NOTE — PN
DATE:  06/30/2018



PULMONARY PROGRESS NOTE



REFERRING PHYSICIAN:  Sandra Perez MD



SUBJECTIVE:  He is lying in the bed, sleepy, arousable.  Night was

unremarkable.  Feels okay.  No cough.  No sputum production.  No chest

pain.  Mild abdominal discomfort.  No leg pain or leg swelling.



PHYSICAL EXAMINATION:

GENERAL:  In no acute distress.

VITAL SIGNS:  Temperature is 98, heart rate is 74, respiratory rate is 20,

blood pressure 91/61, pulse ox 97% on room air.

HEENT:  Moist mucous membrane.  No ulcer or thrush noted.

NECK:  Supple.  No JVD.

LUNGS:  Have a fair airflow with rhonchi.

HEART:  S1 and S2.

ABDOMEN:  Open wound, has a dressing.  Diffuse erythema.

EXTREMITIES:  There is no edema.

NEUROLOGICAL:  Awake and alert.  Follows simple command.



MEDICATIONS:  Ativan 0.5 mg every 6 hour p.r.n., Cepacol lozenges every 12

hour p.r.n., IV Clinimix 83 mL/hour, petroleum jelly to affected area,

albuterol/Atrovent nebulizer every 6 hours p.r.n., Intralipid 20 mL/hour,

clonazepam 0.25 mg twice a day, milk of magnesia p.r.n. basis, morphine 2

mg every 4 hour p.r.n., Protonix 20 mg daily, Sandostatin _____ subcu every

8 hour, Sonata 5 mg at bedtime p.r.n., Synthroid 150 mcg daily, Zosyn 3.375

g every 6 hours, Zyvox 600 mg every 12 hours.



LABORATORY DATA:  Shows hemoglobin 10.8, hematocrit 32.8, WBC 5.8, platelet

count is 204.  Sodium 141, potassium 3.8, chloride 105, bicarbonate is 23,

BUN 28, creatinine 1.1, glucose is 132, calcium 9, phosphorus 3.1,

magnesium 2.1, AST 28, ALT 20, alkaline phosphatase is 56, albumin 3.6.



IMPRESSION AND PLAN:  Enterocutaneous fistula with persistent drainage,

chronic obstructive lung disease, malnutrition, hypothyroid, malnourished. 

Continue bronchodilator.  Keep head at 45 degrees.  Antibiotics, TPN,

gastric prophylaxis, deep vein thrombosis prophylaxis, pain management,

surgical followup.



Thank you and we will follow with you.





__________________________________________

Dante Haque MD







DD:  06/30/2018 17:55:25

DT:  06/30/2018 18:34:40

[facility] # [id number]

## 2018-06-30 NOTE — CP.PCM.PN
Subjective





- Date & Time of Evaluation


Date of Evaluation: 06/30/18


Time of Evaluation: 07:20





- Subjective


Subjective: 





Patient seen and examined at bedside this AM. No adverse events overnight. 

Patient complains of cramping abdominal pain but no nausea, vomiting, fevers, 

or chills. Patient states that he has had the same sensation last time he 

passed stool per rectum





Objective





- Vital Signs/Intake and Output


Vital Signs (last 24 hours): 


 











Temp Pulse Resp BP Pulse Ox


 


 98.2 F   54 L  20   105/56 L  97 


 


 06/30/18 06:00  06/30/18 06:00  06/30/18 06:00  06/30/18 06:00  06/30/18 06:00








Intake and Output: 


 











 06/30/18 06/30/18





 06:59 18:59


 


Intake Total 3098 


 


Output Total 2650 


 


Balance 448 














- Medications


Medications: 


 Current Medications





Albuterol/Ipratropium (Duoneb 3 Mg/0.5 Mg (3 Ml) Ud)  3 ml IH [id number] PRN


   PRN Reason: Sinus symptoms


Benzocaine/Menthol (Cepacol Sore Throat)  1 fernandez MT Q2H PRN


   PRN Reason: Sore Throat


   Last Admin: 06/30/18 05:02 Dose:  1 fernandez


Clonazepam (Klonopin)  0.25 mg PO BID MARIA D


   PRN Reason: Protocol


   Last Admin: 06/29/18 17:37 Dose:  0.25 mg


Piperacillin Sod/Tazobactam Sod (Zosyn 3.375 In Ns 100ml)  100 mls @ 200 mls/hr 

IVPB Q6 MARIA D


   PRN Reason: Protocol


   Stop: 07/02/18 18:01


   Last Admin: 06/30/18 05:03 Dose:  200 mls/hr


Linezolid (Zyvox 600mg/300ml D5w)  600 mg in 300 mls @ 200 mls/hr IVPB Q12 MARIA D


   PRN Reason: Protocol


   Stop: 07/02/18 22:01


   Last Admin: 06/29/18 22:36 Dose:  200 mls/hr


Amino Acids/Electrolytes/Dextrose (Clinimix 5/20 % "E" (2000 Ml))  2,000 mls @ 

83.333 mls/hr IV .Q24H MARIA D


   Stop: 07/02/18 17:59


   Last Admin: 06/29/18 19:55 Dose:  83.333 mls/hr


Fat Emulsion Intravenous (Intralipid 20%)  250 mls @ 20.833 mls/hr IV MWF@1800 

[facility]


   Last Admin: 06/29/18 17:38 Dose:  20.833 mls/hr


Levothyroxine Sodium (Synthroid)  150 mcg PO 0600 [facility]


   Last Admin: 06/30/18 05:02 Dose:  Not Given


Lorazepam (Ativan)  0.5 mg IV Q6 PRN; Protocol


   PRN Reason: anxiety/insomnia


   Last Admin: 06/29/18 23:22 Dose:  0.5 mg


Magnesium Hydroxide (Milk Of Magnesia)  30 ml PO DAILY [facility]


   Last Admin: 06/29/18 12:47 Dose:  30 ml


Nystatin (Nystop Topical Powder)  0 gm TOP TID [facility]


   Last Admin: 06/29/18 17:43 Dose:  1 dose


Octreotide Acetate (Sandostatin)  50 mcg SC Q8H [facility]


   Last Admin: 06/30/18 02:00 Dose:  Not Given


Pantoprazole Sodium (Protonix Ec Tab)  20 mg PO 0600 [facility]


   Last Admin: 06/30/18 05:02 Dose:  Not Given


Petrolatum (Desitin Maximum Strength Topical 40% Oint)  0 gm TOP Q4H PRN


   PRN Reason: Rash


   Last Admin: 06/25/18 02:59 Dose:  1 applic


Saliva Substitute (Saliva Substitute)  2 ml PO Q6H PRN


   PRN Reason: Dry mouth


   Last Admin: 06/29/18 12:41 Dose:  2 ml


Zaleplon (Sonata)  5 mg PO HS PRN


   PRN Reason: Insomnia











- Labs


Labs: 


 





 06/30/18 06:00 





 06/30/18 06:00 





 











PT  12.2 SECONDS (9.4-12.5)   06/24/18  18:18    


 


INR  1.07  (0.93-1.08)   06/24/18  18:18    














- Constitutional


Appears: Well, Non-toxic, No Acute Distress





- Head Exam


Head Exam: ATRAUMATIC, NORMOCEPHALIC





- Eye Exam


Eye Exam: Normal appearance.  absent: Conjunctival injection, Scleral icterus





- ENT Exam


ENT Exam: Mucous Membranes Moist, Normal Oropharynx





- Respiratory Exam


Respiratory Exam: NORMAL BREATHING PATTERN.  absent: Accessory Muscle Use, 

Respiratory Distress





- Cardiovascular Exam


Cardiovascular Exam: RRR





- GI/Abdominal Exam


GI & Abdominal Exam: Soft.  absent: Distended, Tenderness


Additional comments: 





midline abdominal wound with superior and inferior fistulas productive of dark 

green clear succus, no leak from the ostomy bag


left urostomy pink, productive of clear yellow urine





- Extremities Exam


Extremities Exam: absent: Calf Tenderness, Pedal Edema, Tenderness





- Neurological Exam


Neurological Exam: Alert, Awake, Oriented x3





- Psychiatric Exam


Psychiatric exam: Normal Affect, Normal Mood





- Skin


Skin Exam: Dry, Normal Color, Warm


Additional comments: 





areas of skin excoriation on abdomen and testicles improving





Assessment and Plan





- Assessment and Plan (Free Text)


Assessment: 





62M with chronic enterocutaneous fistulas


Fluoroscopy studies reveal patent fistulas into small bowel


Plan: 





Continue TPN


Continue monitoring strict intake and output


Continue NPO except sips of water, hard candy


Monitor for bowel movement--if patient passes the contrast injected yesterday 

will indicate no distal obstruction


Will review images of the fluoroscopy. Further surgical planning pending 

clinical evaluation and review of imaging


PRN pain and nausea medication





Discussed with Dr. Crawford, further recs per him


Che Ayala, PGY2

## 2018-06-30 NOTE — PN
DATE:  06/30/2018



LOCATION:  The patient is in room 275, bed 2.



REASON FOR CONSULTATION:  Follow up sinus bradycardia, abdominal fistulas,

history of asthma, interstitial hemorrhagic cystitis, status post surgery

from the bladder and status post ileal conduit, hypothyroidism, anxiety.



SUBJECTIVE:  The patient is lying flat in bed without chest pain, shortness

of breath, palpitation.



PHYSICAL EXAMINATION:

VITAL SIGNS:  Blood pressure 105/56, respirations 20, pulse 54, temperature

98.2.

HEENT:  Head is normocephalic.  Eyes:  Pupils normal.  Conjunctivae

slightly pale.

NECK:  JVP low.  Carotids equal.

THORAX:  AP diameter normal.

LUNGS:  Clear.

CARDIOVASCULAR:  S1 and S2.

ABDOMEN:  The patient had dressing on the abdomen.  Had ileal conduit and

multiple fistulas.

EXTREMITIES:  No clubbing.  No cyanosis.



LABORATORY DATA:  WBC 5.8, hemoglobin 10.8, hematocrit 32.8, platelets 204.

Sodium 141, potassium 3.8, BUN 28, creatinine 1.1, random glucose 122. 

AST, ALT normal.  Total protein, albumin normal.



DIAGNOSES:  Bradycardia.  The patient is asymptomatic.  The patient has a

history of bradycardia for many years and he has no symptoms it, history of

asthma, history of cystectomy for interstitial hemorrhagic cystitis, ileal

conduit, multiple abdominal fistulas, history of asthma.



PLAN:  The patient is getting IV therapy.  The patient is getting

hyperalimentation, Protonix 20 daily, Synthroid 150 mcg p.o. daily. 

Clinically, asymptomatic from bradycardia.  The patient from cardiac point

of view can go for any procedure or surgery if needed.  We will continue to

monitor the patient.  We will follow up.





__________________________________________

Dante Mays MD





DD:  06/30/2018 8:16:49

DT:  06/30/2018 9:39:15

Job # [id number]

## 2018-06-30 NOTE — PN
DATE:  06/30/2018



FOLLOWUP NOTE



SUBJECTIVE:  The patient was followed up today.  Notes reviewed.  Discussed

with the nursing staff.  The patient presented in good spirit.  The patient

reported that he feels optimistic for his prognosis.  This writer reviewed

surgical nodes.  It seems like the patient has fistulas into small bowel

and surgical intervention is pending upon imaging.  The patient reported

that he feels better after anxiolytics were started.  The patient reported

that he has future oriented plans to get better.  The patient reported that

he is in relationship with the girlfriend and he is looking forward to get

better, be discharged and put his life back together.  The patient denied

hearing voices, denied seeing things today.  The patient was allowed to

have sip of water and took Klonopin p.o.  The patient reported huge relief

from the anxiety.  The patient reported that he hears no voices, seeing no

things and the patient is not psychotic.  Labs reviewed.  Vital signs

reviewed.  Medications reviewed.  No changes.



MENTAL STATUS EXAMINATION:  The patient presented to be alert and oriented.

Affect was bright today.  Mood described, I feel optimistic.  The patient's

thought process seems to be coherent and goal directed.  Thought content,

the patient denied visual, auditory or tactile hallucinations.  Denied

paranoid ideation.  The patient wants to get better.  The patient has

future-oriented plans.  Denied thoughts of harming himself or others. 

Insight and judgment seems to be improving.  Impulses are well controlled.



IMPRESSION:  Anxiety disorder due to general medical condition.  Mood

disorder due to general medical condition.



PLAN:  Continue current management.  Continue current medications. 

Empathic listening and supportive therapy were provided.  The patient

should be continued on current medications.  We will follow up every other

day.  We will see the patient on Monday, 07/02/2018.





__________________________________________

Dee Gallego MD





DD:  06/30/2018 13:04:34

DT:  06/30/2018 13:06:20

Job # [id number]

## 2018-07-01 LAB
ALBUMIN SERPL-MCNC: 3.9 G/DL (ref 3–4.8)
ALBUMIN/GLOB SERPL: 1 {RATIO} (ref 1.1–1.8)
ALT SERPL-CCNC: 21 U/L (ref 7–56)
AST SERPL-CCNC: 24 U/L (ref 17–59)
BASOPHILS # BLD AUTO: 0.04 K/MM3 (ref 0–2)
BASOPHILS NFR BLD: 0.7 % (ref 0–3)
BUN SERPL-MCNC: 28 MG/DL (ref 7–21)
CALCIUM SERPL-MCNC: 9.2 MG/DL (ref 8.4–10.5)
EOSINOPHIL # BLD: 0.3 10*3/UL (ref 0–0.7)
EOSINOPHIL NFR BLD: 5.5 % (ref 1.5–5)
ERYTHROCYTE [DISTWIDTH] IN BLOOD BY AUTOMATED COUNT: 13.9 % (ref 11.5–14.5)
GFR NON-AFRICAN AMERICAN: > 60
GRANULOCYTES # BLD: 3.77 10*3/UL (ref 1.4–6.5)
GRANULOCYTES NFR BLD: 62.8 % (ref 50–68)
HGB BLD-MCNC: 10.9 G/DL (ref 14–18)
LYMPHOCYTES # BLD: 1.6 10*3/UL (ref 1.2–3.4)
LYMPHOCYTES NFR BLD AUTO: 26.3 % (ref 22–35)
MCH RBC QN AUTO: 27.1 PG (ref 25–35)
MCHC RBC AUTO-ENTMCNC: 32.5 G/DL (ref 31–37)
MCV RBC AUTO: 83.3 FL (ref 80–105)
MONOCYTES # BLD AUTO: 0.3 10*3/UL (ref 0.1–0.6)
MONOCYTES NFR BLD: 4.7 % (ref 1–6)
PLATELET # BLD: 202 10^3/UL (ref 120–450)
PMV BLD AUTO: 8.2 FL (ref 7–11)
RBC # BLD AUTO: 4.02 10^6/UL (ref 3.5–6.1)
WBC # BLD AUTO: 6 10^3/UL (ref 4.5–11)

## 2018-07-01 RX ADMIN — NYSTATIN SCH: 100000 POWDER TOPICAL at 14:09

## 2018-07-01 RX ADMIN — NYSTATIN SCH DOSE: 100000 POWDER TOPICAL at 18:41

## 2018-07-01 RX ADMIN — PANTOPRAZOLE SODIUM SCH MG: 20 TABLET, DELAYED RELEASE ORAL at 06:55

## 2018-07-01 RX ADMIN — PURIFIED WATER PRN LOZ: 99.05 LIQUID OPHTHALMIC at 22:08

## 2018-07-01 RX ADMIN — MAGNESIUM HYDROXIDE SCH ML: 400 SUSPENSION ORAL at 07:40

## 2018-07-01 RX ADMIN — NYSTATIN SCH DOSE: 100000 POWDER TOPICAL at 10:15

## 2018-07-01 RX ADMIN — PURIFIED WATER PRN LOZ: 99.05 LIQUID OPHTHALMIC at 04:34

## 2018-07-01 NOTE — PN
DATE:  07/01/2018



SUBJECTIVE:  The patient was seen and examined at the bedside on

07/01/2018.  Denies shortness of breath.  No nausea, vomiting or diarrhea. 

No hematuria or hematochezia.  No swelling of the leg.  No chest pain.  No

palpitations, but still having abdominal pain.



PHYSICAL EXAMINATION:

VITAL SIGNS:  Temperature 98, heart rate 73, respiratory rate 20, blood

pressure 108/62, pulse 80, 

HEENT:  Head normocephalic, atraumatic.  Eyes, PERRLA.  Extraocular muscles

intact.  Conjunctivae clear.  Nose patent.  Mucosus membrane moist.

NECK:  Supple.  No carotid bruit.  No JVD or thyromegaly.

CHEST:  Bilaterally symmetrical.

HEART:  S1 and S2 positive.

LUNGS:  Clear to auscultation.

ABDOMEN:  Soft, bowel sounds present.  No organomegaly.  Open wound has

dressing, colostomy working very well.

EXTREMITIES:  No edema, no cyanosis.

NEUROLOGICAL:  Patient is awake, alert, moving all 4 extremities, no focal

deficits.



MEDICATIONS:  Lorazepam, Cepacol lozenges, Clinimix, petroleum, DuoNeb,

Flagyl, Klonopin, milk of magnesia, morphine, nystatin, Protonix, Sonata,

Synthroid.



LABORATORY DATA:  Hemoglobin 10.9, hematocrit 33.5, white blood cells 6,

platelets 202.  Sodium 140, potassium 3.8, BUN 28, creatinine 0.9, AST 24,

ALT 21.



ASSESSMENT AND PLAN:  Mr. Ruperto Rizvi is a 62-year-old male with

enterocutaneous fistula with persistent drainage, chronic obstructive lung

disease, malnutrition, hypothyroidism, asthma.  Continue bronchodilator. 

Continue total parenteral nutrition.  Gastric prophylaxis, deep vein

thrombosis prophylaxis.  Waiting for surgical input.  Physical therapy, out

of bed.  Appreciate Dr. Haque's input.





__________________________________________

Sandra Perez MD



DD:  07/01/2018 18:43:03

DT:  07/01/2018 19:42:51

Job # [id number]

MTDD

## 2018-07-01 NOTE — PN
DATE:  06/30/2018



SUBJECTIVE:  The patient was seen and examined at the bedside on

06/30/2018.  Looking comfortable.  No nausea, vomiting or diarrhea.  No

hematuria or hematochezia.  No swelling of the leg.  No chest pain.  No

palpitations.  Night was unremarkable.  No cough, no sputum production. 

Has abdominal discomfort, but mood looks flatter.



PHYSICAL EXAMINATION:

VITAL SIGNS:  Temperature 98, heart rate 74, respiratory rate 20, blood

pressure 91/61, pulse oximetry 97.

HEENT:  Head normocephalic, atraumatic.  Eyes, PERRLA.  Extraocular muscles

intact.  Conjunctivae clear.  Nose:  Patent.  Mucosus membrane moist.

NECK:  Supple.  No carotid bruit.  No JVD or thyromegaly.

CHEST:  Bilaterally symmetrical.

HEART:  S1 and S2 positive.

LUNGS:  Clear to auscultation.

ABDOMEN:  Soft, but having open wound, has dressing, diffuse erythema.

NEUROLOGICAL:  The patient is awake and alert, follows simple commands.



MEDICATIONS:  Ativan, Cepacol lozenges, Clinimix, Protonix, Sonata,

Synthroid, Zosyn and Zyvox.



LABORATORY DATA:  Hemoglobin 10.8, hematocrit 32.8, white blood cell 5.8,

platelets 204.  Sodium 141, potassium 3.8, BUN 28, creatinine 1.1, glucose

132.  AST 28, ALT 20.



ASSESSMENT AND PLAN:  Mr. Dmitri Hickey is a 62-year-old male with

enterocutaneous fistula with persistent drainage, chronic obstructive lung

disease, malnutrition, hypothyroidism.  Getting antibiotics as per

Infectious Disease, total parenteral nutrition, gastric prophylaxis, deep

venous thrombosis prophylaxis, pain management.  Surgeon is on the case. 

Psychiatry is on the case.  Appreciate Dr. Haque's input and Dr. Gallego's input.  Cardiologist is on the case also.  We will continue

present treatment.  Repeat labs.  We will follow up.





__________________________________________

Sandra Perez MD



DD:  06/30/2018 20:40:50

DT:  06/30/2018 22:01:20

Job # [id number]

## 2018-07-01 NOTE — PN
DATE:  07/01/2018



LOCATION:  The patient is in room 275, bed 2.



REASON FOR CONSULTATION AND FOLLOWUP:  Sinus bradycardia, abdominal

fistulas, history of COPD, interstitial hemorrhagic cystitis, status post

surgery of bladder and status post ileal conduit, hypothyroidism, anxiety.



SUBJECTIVE:  Patient denies any cardiac symptoms like chest pain, shortness

of breath, or palpitation.  He still feels abdominal discomfort from those

fistulas.



PHYSICAL EXAMINATION:

VITAL SIGNS:  Blood pressure is 108/62, respirations 18, pulse 73,

temperature 97.6.

HEENT:  Head is normocephalic.  Eyes:  Pupils normal.  Conjunctivae

slightly pale.

NECK:  JVP low.  Carotids equal.

THORAX:  AP diameter normal.

LUNGS:  Clear.

CARDIOVASCULAR:  S1 and S2.

ABDOMEN:  Multiple fistulas in the abdomen.

EXTREMITIES:  No clubbing.  No cyanosis.



LABORATORY DATA:  WBC 6, hemoglobin 10.9, hematocrit 33.5, platelet 202. 

Sodium 140, potassium 3.8, BUN 28, creatinine 0.9, random sugar 128.  AST

24, ALT 21, total protein 7.8, albumin 3.9.



DIAGNOSES:  Bradycardia, asymptomatic.  Patient has history of bradycardia

for many years, history of chronic obstructive pulmonary disease, history

of cystectomy for interstitial hemorrhagic cystitis, ileal conduit,

multiple abdominal fistulas.



PLAN:  Patient is asymptomatic from bradycardia, which he has history of

for many years, so he does not need any treatment from that point of view

and patient is getting hyperalimentation therapy, also getting

levothyroxine 150 mcg daily, Protonix 40 daily.  Clinically, cardiac status

is stable.  If patient need any procedure or surgery, he can go for it as

moderate risk.  We will continue to follow closely.







__________________________________________

Dante Mays MD



DD:  07/01/2018 13:21:36

DT:  07/01/2018 14:13:15

Job # [id number]

## 2018-07-01 NOTE — PN
DATE:  07/01/2018



SUBJECTIVE:  The patient is in bed, in no acute distress.  Nontoxic.



PHYSICAL EXAMINATION:

VITAL SIGNS:  Temperature is 97, blood pressure is 112/60 and respiratory

rate of 18.

HEENT:  Examination is unremarkable.

NECK:  Supple.

LUNGS:  Have decreased breath sounds.

HEART:  Normal S1 and S2.

ABDOMEN:  Soft and nontender.  No rebound or guarding.



DATA:  Laboratory examination reveals a white count of 6 and hemoglobin of

10.  BUN of 28 and creatinine of 0.9.  Urinalysis is noted.  Microbiology

is reviewed.



ASSESSMENT AND PLAN:  This is a 62-year-old male with abdominal wall

infection, abdominal wall fistula growing vancomycin-resistant Enterococcus

Klebsiella and fistulogram showing two different fistulas, on Zyvox and

Zosyn and Dr. Che Ayala' note is reviewed.  Dr. Perez's note is

reviewed.  We will discontinue the antibiotics, Zyvox and Zosyn; actually

it has been discontinued by pharmacy already.  The patient is on total

parenteral nutrition.  If able to, _____ ideally like to have Flagyl for

the fistula, p.o. Flagyl, will use lower dose 250 mg every 8 hours for the

fistula.





__________________________________________

Margarito Khan MD



DD:  07/01/2018 10:18:56

DT:  07/01/2018 10:45:54

Job # [id number]

## 2018-07-01 NOTE — PN
DATE:  07/01/2018



PULMONARY PROGRESS NOTE



REFERRING PHYSICIAN:  Sandra Perez MD.



SUBJECTIVE:  He is lying in bed, head at 45 degrees.  Feels okay.  Getting

hungry.  No chest pain.  Denies any shortness of breath.  Wound pain is

improving.  No leg pain.  No leg swelling.



OBJECTIVE:

GENERAL:  In no acute distress.

VITAL SIGNS:  Temperature is 98, heart rate is 73, respiratory rate is 20,

blood pressure 108/62, pulse ox 97% on room air.

HEENT:  Moist mucous membrane.  Crowded airway.

NECK:  Supple.  No JVD.

LUNGS:  Have a fair airflow with few rhonchi.

HEART:  S1 and S2.

ABDOMEN:  Open wound, has a dressing.  Colostomy working well.

EXTREMITIES:  There is no edema.

NEUROLOGICAL:  Awake and alert.  Follows simple command.



MEDICATIONS:  He is on lorazepam 0.5 mg every 6 hours p.r.n., Cepacol

lozenges every 2 hours p.r.n., also on Clinimix IV 3 mL/hour, getting

petroleum jelly to affected area every 4 hours, DuoNeb every 6 hours

p.r.n., Flagyl 250 mg every 8 hours, Intralipid IV, Klonopin 0.25 mg twice

a day, milk of magnesia 30 mL daily, morphine 2 mg every 4 hours p.r.n.,

nystatin to affected area three times a day, Protonix 40 mg daily, Saliva

Substitute every 6 hours p.r.n., Sandostatin 50 mcg subcu every 8 hours,

Sonata 5 mg at bedtime p.r.n., Synthroid 150 mcg daily.



LABORATORY DATA:  Shows hemoglobin 10.9, hematocrit 33.5, WBC 6, platelet

count is 202.  Sodium 140, potassium 3.8, chloride 103, bicarbonate 23, BUN

28, creatinine 0.9, glucose 112, calcium 9.2, phosphorus 3.2, magnesium

2.1, AST 24, ALT 21, alk phos is 58, albumin is 3.9.



IMPRESSION AND PLAN:  Enterocutaneous fistula with persistent drainage,

chronic obstructive lung disease, malnutrition, hypothyroid, chronic

obstructive lung disease.  Continue bronchodilator.  Keep head at 45

degrees.  Continue total parenteral nutrition.  Gastric prophylaxis.  Deep

vein thrombosis prophylaxis.  Surgical followup.  Follow up labs.  Physical

therapy.  Out of bed to chair if possible.



Thank you and we will follow with you.





__________________________________________

Dante Haque MD





DD:  07/01/2018 15:36:35

DT:  07/01/2018 18:05:45

Job # [id number]

## 2018-07-02 LAB
ALBUMIN SERPL-MCNC: 4 G/DL (ref 3–4.8)
ALBUMIN/GLOB SERPL: 0.9 {RATIO} (ref 1.1–1.8)
ALT SERPL-CCNC: 28 U/L (ref 7–56)
AST SERPL-CCNC: 30 U/L (ref 17–59)
BASOPHILS # BLD AUTO: 0.05 K/MM3 (ref 0–2)
BASOPHILS NFR BLD: 0.9 % (ref 0–3)
BUN SERPL-MCNC: 31 MG/DL (ref 7–21)
CALCIUM SERPL-MCNC: 9.3 MG/DL (ref 8.4–10.5)
EOSINOPHIL # BLD: 0.3 10*3/UL (ref 0–0.7)
EOSINOPHIL NFR BLD: 6.4 % (ref 1.5–5)
ERYTHROCYTE [DISTWIDTH] IN BLOOD BY AUTOMATED COUNT: 13.9 % (ref 11.5–14.5)
GFR NON-AFRICAN AMERICAN: > 60
GRANULOCYTES # BLD: 3.08 10*3/UL (ref 1.4–6.5)
GRANULOCYTES NFR BLD: 57.6 % (ref 50–68)
HDLC SERPL-MCNC: 13 MG/DL (ref 29–60)
HGB BLD-MCNC: 11.1 G/DL (ref 14–18)
LDLC SERPL-MCNC: 73 MG/DL (ref 0–129)
LYMPHOCYTES # BLD: 1.6 10*3/UL (ref 1.2–3.4)
LYMPHOCYTES NFR BLD AUTO: 29.9 % (ref 22–35)
MCH RBC QN AUTO: 28 PG (ref 25–35)
MCHC RBC AUTO-ENTMCNC: 33.4 G/DL (ref 31–37)
MCV RBC AUTO: 83.8 FL (ref 80–105)
MONOCYTES # BLD AUTO: 0.3 10*3/UL (ref 0.1–0.6)
MONOCYTES NFR BLD: 5.2 % (ref 1–6)
PLATELET # BLD: 207 10^3/UL (ref 120–450)
PMV BLD AUTO: 8.6 FL (ref 7–11)
RBC # BLD AUTO: 3.96 10^6/UL (ref 3.5–6.1)
WBC # BLD AUTO: 5.4 10^3/UL (ref 4.5–11)

## 2018-07-02 RX ADMIN — Medication PRN APPLIC: at 17:52

## 2018-07-02 RX ADMIN — PANTOPRAZOLE SODIUM SCH MG: 40 TABLET, DELAYED RELEASE ORAL at 05:48

## 2018-07-02 RX ADMIN — NYSTATIN SCH DOSE: 100000 POWDER TOPICAL at 09:56

## 2018-07-02 RX ADMIN — NYSTATIN SCH DOSE: 100000 POWDER TOPICAL at 13:20

## 2018-07-02 RX ADMIN — MAGNESIUM HYDROXIDE SCH ML: 400 SUSPENSION ORAL at 09:51

## 2018-07-02 RX ADMIN — NYSTATIN SCH DOSE: 100000 POWDER TOPICAL at 17:47

## 2018-07-02 NOTE — CP.PCM.PN
<Tigre Bass - Last Filed: 07/02/18 14:50>





Subjective





- Date & Time of Evaluation


Date of Evaluation: 07/02/18


Time of Evaluation: 08:40





- Subjective


Subjective: 





GI Progress Note for Dr. Ankit Bass,  PGY-3





Patient seen and examined at bedside.  No acute events reported overnight.  No 

acute complaints on exam.  Denies nausea, emesis, fevers, chills, blood or 

cloudy urine in his colleciton bag.  No further blood oozing from his abdominal 

fistulas.  Denies chest pain, shortness of breath, focal weakness.





Objective





- Vital Signs/Intake and Output


Vital Signs (last 24 hours): 


 











Temp Pulse Resp BP Pulse Ox


 


 98.1 F   84   21   112/70   96 


 


 07/02/18 12:00  07/02/18 12:00  07/02/18 12:00  07/02/18 12:00  07/02/18 06:00








Intake and Output: 


 











 07/02/18 07/02/18





 06:59 18:59


 


Intake Total 1236 


 


Output Total 675 


 


Balance 561 














- Medications


Medications: 


 Current Medications





Albuterol/Ipratropium (Duoneb 3 Mg/0.5 Mg (3 Ml) Ud)  3 ml IH [id number] PRN


   PRN Reason: Sinus symptoms


Benzocaine/Menthol (Cepacol Sore Throat)  1 fernandez MT Q2H PRN


   PRN Reason: Sore Throat


   Last Admin: 07/01/18 22:08 Dose:  1 fernandez


Clonazepam (Klonopin)  0.25 mg PO BID MARIA D


   PRN Reason: Protocol


   Last Admin: 07/02/18 09:51 Dose:  0.25 mg


Amino Acids/Electrolytes/Dextrose (Clinimix 5/20 % "E" (2000 Ml))  2,000 mls @ 

83.333 mls/hr IV .Q24H MARIA D


   Stop: 07/05/18 17:59


Fat Emulsion Intravenous (Intralipid 20%)  250 mls @ 20.833 mls/hr IV MWF@1800 

MARIA D


Levothyroxine Sodium (Synthroid)  150 mcg PO 0600 MARIA D


   Last Admin: 07/02/18 05:48 Dose:  150 mcg


Lorazepam (Ativan)  0.5 mg IV Q6 PRN; Protocol


   PRN Reason: anxiety/insomnia


   Last Admin: 07/01/18 14:05 Dose:  0.5 mg


Magnesium Hydroxide (Milk Of Magnesia)  30 ml PO DAILY [facility]


   Last Admin: 07/02/18 09:51 Dose:  30 ml


Metronidazole (Flagyl)  250 mg PO Q8H [facility]


   PRN Reason: Protocol


   Stop: 07/10/18 10:31


   Last Admin: 07/02/18 09:52 Dose:  250 mg


Morphine Sulfate (Morphine)  2 mg IVP Q4H PRN


   PRN Reason: Pain, severe (8-10)


   Last Admin: 07/01/18 22:08 Dose:  2 mg


Nystatin (Nystop Topical Powder)  0 gm TOP TID [facility]


   Last Admin: 07/02/18 13:20 Dose:  1 dose


Octreotide Acetate (Sandostatin)  50 mcg SC Q8H [facility]


   Last Admin: 07/02/18 09:55 Dose:  Not Given


Pantoprazole Sodium (Protonix Ec Tab)  40 mg PO 0600 [facility]


   Last Admin: 07/02/18 05:48 Dose:  40 mg


Petrolatum (Desitin Maximum Strength Topical 40% Oint)  0 gm TOP Q4H PRN


   PRN Reason: Rash


   Last Admin: 06/25/18 02:59 Dose:  1 applic


Saliva Substitute (Saliva Substitute)  2 ml PO Q6H PRN


   PRN Reason: Dry mouth


   Last Admin: 06/29/18 12:41 Dose:  2 ml


Zaleplon (Sonata)  5 mg PO HS PRN


   PRN Reason: Insomnia











- Labs


Labs: 


 





 07/02/18 06:30 





 07/02/18 06:30 





 











PT  12.2 SECONDS (9.4-12.5)   06/24/18  18:18    


 


INR  1.07  (0.93-1.08)   06/24/18  18:18    














- Constitutional


Appears: Non-toxic, No Acute Distress, Chronically Ill





- Head Exam


Head Exam: ATRAUMATIC, NORMAL INSPECTION, NORMOCEPHALIC





- Eye Exam


Eye Exam: EOMI, Normal appearance.  absent: Conjunctival injection, Scleral 

icterus


Pupil Exam: absent: Fixed, Irregular





- ENT Exam


ENT Exam: Mucous Membranes Moist





- Neck Exam


Neck Exam: Full ROM, Normal Inspection





- Respiratory Exam


Respiratory Exam: Clear to Ausculation Bilateral, NORMAL BREATHING PATTERN.  

absent: Accessory Muscle Use, Chest Wall Tenderness, Decreased Breath Sounds, 

Rales, Rhonchi, Wheezes, Respiratory Distress





- Cardiovascular Exam


Cardiovascular Exam: REGULAR RHYTHM, RRR, +S1, +S2.  absent: Bradycardia, 

Tachycardia, Irregular Rhythm, JVD, +S4





- GI/Abdominal Exam


GI & Abdominal Exam: Soft, Normal Bowel Sounds (but only auscultated at RUQ).  

absent: Distended, Firm, Guarding, Rigid, Tenderness


Additional comments: 





two open fistulas covered with badaging and suction device draining feculent 

material from site


Left lateral lower abdominal urostomy bag with clear yellow urine





- Extremities Exam


Extremities Exam: Full ROM, Normal Inspection.  absent: Calf Tenderness, Pedal 

Edema, Tenderness





- Neurological Exam


Neurological Exam: Alert, Awake, Oriented x3


Additional comments: 





following all commands appropriately





- Psychiatric Exam


Psychiatric exam: Normal Affect, Normal Mood





- Skin


Skin Exam: Dry, Intact, Normal Color, Warm





Assessment and Plan





- Assessment and Plan (Free Text)


Assessment: 





This is 61yo male with past medical history of hemorrhagic cystitis s/p 

cystectomy and ileal conduit, multiple abdominal surgeries including hernia 

repairs who came to ED for abdominal pain and decreased urine output.  Pending 

MRI abdomen, pending decision by surgical team for possible procedure.


Plan: 





Abdominal wall fistulas - GI series showed possible fistula from jejunum 


Hemorrhagic cystitis s/p cystectomty and ileal condiut w/ revision 


UTI 


Hepatic lesion - benign on bx


Anemia


Hypothyroid








on TPN. 


on Octreotide 50q8


on PO Flagyl 250mg q8


on PPI


Monitor I&O as well as drain output: ~300cc drainage in last 24 hrs


Fistulogram shows 2 separate fistulas communicating with between small bowel 

and anterior abdominal wall skin


Pending decision by Surgical team regarding possibility of surgical repair; 

going to OR Thursday





Seen and discussed with attending, Dr. Pham.





<Racheal Pham V - Last Filed: 07/03/18 00:29>





Objective





- Vital Signs/Intake and Output


Vital Signs (last 24 hours): 


 











Temp Pulse Resp BP Pulse Ox


 


 98.1 F   91 H  21   105/69   96 


 


 07/02/18 17:54  07/02/18 17:54  07/02/18 17:54  07/02/18 17:54  07/02/18 06:00








Intake and Output: 


 











 07/02/18 07/03/18





 18:59 06:59


 


Intake Total 934 


 


Output Total 900 


 


Balance 34 














- Medications


Medications: 


 Current Medications





Albuterol/Ipratropium (Duoneb 3 Mg/0.5 Mg (3 Ml) Ud)  3 ml IH [id number] PRN


   PRN Reason: Sinus symptoms


Benzocaine/Menthol (Cepacol Sore Throat)  1 fernandez MT Q2H PRN


   PRN Reason: Sore Throat


   Last Admin: 07/01/18 22:08 Dose:  1 fernandez


Clonazepam (Klonopin)  0.25 mg PO BID MARIAD 


   PRN Reason: Protocol


   Last Admin: 07/02/18 17:46 Dose:  0.25 mg


Diphenhydramine HCl (Benadryl)  25 mg PO Q6 PRN


   PRN Reason: Itching / Pruritus


   Last Admin: 07/02/18 21:54 Dose:  25 mg


Amino Acids/Electrolytes/Dextrose (Clinimix 5/20 % "E" (2000 Ml))  2,000 mls @ 

83.333 mls/hr IV .Q24H MARIA D


   Stop: 07/05/18 17:59


   Last Admin: 07/02/18 17:45 Dose:  83.333 mls/hr


Fat Emulsion Intravenous (Intralipid 20%)  250 mls @ 20.833 mls/hr IV MWF@1800 

MARIA D


   Last Admin: 07/02/18 17:45 Dose:  20.833 mls/hr


Levothyroxine Sodium (Synthroid)  150 mcg PO 0600 [facility]


   Last Admin: 07/02/18 05:48 Dose:  150 mcg


Lorazepam (Ativan)  0.5 mg IV Q6 PRN; Protocol


   PRN Reason: anxiety/insomnia


   Last Admin: 07/01/18 14:05 Dose:  0.5 mg


Magnesium Hydroxide (Milk Of Magnesia)  30 ml PO DAILY [facility]


   Last Admin: 07/02/18 09:51 Dose:  30 ml


Metronidazole (Flagyl)  250 mg PO Q8H MARIA D


   PRN Reason: Protocol


   Stop: 07/10/18 10:31


   Last Admin: 07/02/18 17:46 Dose:  250 mg


Morphine Sulfate (Morphine)  2 mg IVP Q4H PRN


   PRN Reason: Pain, severe (8-10)


   Last Admin: 07/01/18 22:08 Dose:  2 mg


Nystatin (Nystop Topical Powder)  0 gm TOP TID MARIA D


   Last Admin: 07/02/18 17:47 Dose:  1 dose


Octreotide Acetate (Sandostatin)  50 mcg SC Q8H [facility]


   Last Admin: 07/02/18 17:50 Dose:  50 mcg


Pantoprazole Sodium (Protonix Ec Tab)  40 mg PO 0600 MARIA D


   Last Admin: 07/02/18 05:48 Dose:  40 mg


Petrolatum (Desitin Maximum Strength Topical 40% Oint)  0 gm TOP Q4H PRN


   PRN Reason: Rash


   Last Admin: 07/02/18 17:52 Dose:  1 applic


Saliva Substitute (Saliva Substitute)  2 ml PO Q6H PRN


   PRN Reason: Dry mouth


   Last Admin: 06/29/18 12:41 Dose:  2 ml


Zaleplon (Sonata)  5 mg PO HS PRN


   PRN Reason: Insomnia











- Labs


Labs: 


 





 07/02/18 06:30 





 07/02/18 06:30 





 











PT  12.2 SECONDS (9.4-12.5)   06/24/18  18:18    


 


INR  1.07  (0.93-1.08)   06/24/18  18:18    














Attending/Attestation





- Attestation


I have personally seen and examined this patient.: Yes


I have fully participated in the care of the patient.: Yes


I have reviewed all pertinent clinical information, including history, physical 

exam and plan: Yes


Notes (Text): 





This is an addendum to GI progress  report dictated by the Medical Resident.The 

patient was seen and examined earlier.  Medical records, lab studies, imagings 

were reviewed.  Last 24 hours events reviewed.  Agreed with the above treatment 

plan as outlined in Medical Resident 's notes the with the addition of the 

following


plan for OR


Follow-up electrolytes





07/03/18 00:27

## 2018-07-02 NOTE — CON
DATE:  07/02/2018



HISTORY OF PRESENT ILLNESS:  The patient is a 62-year-old  male

who is being treated on the medical floor followed by Psychiatry due to

anxiety and depressive symptoms.  Dr. Gallego has been following this

patient since 06/27 and her last follow up was on 06/30/2018.  I have

reviewed her notes which indicate that the patient has been improving, due

to her interventions indicating that he has been more optimistic and his

anxiety was better controlled though he still has some sleep restlessness. 

I have reviewed recent notes and met with the patient at bedside to ensure

sustained improvement and the patient is well oriented and cooperative with

my visit.  The patient indicates that he continues to do better and that he

is tolerating his current psychiatric medications well and feels better. 

The Klonopin has been beneficial to take the edge of recovering his

anxiety.  Still his mood is up and down; however, he generally hopeful,

optimistic and indicate that he has a lot to live for, he relies on the a

[employer] that he "prays a lot."  He has a lot of confident in his medical

team.  He does not want to fail and indicates that "I have a life, I want

to live, I have a girlfriend and I have plans with her."  The patient is

future oriented, indicates that he wants to go outside, feel the refreshed

air and go fishing eventually again.  Generally, his affective is reactive

and he is communicate though at times over inclusive during _____ by

conversation.  Can expresses his needs well.  Denies hallucinations. 

Denies any major side effects from his medications and daily does

demonstrate sustain improvement in consideration of initial psychiatric

notes that were reviewed by this provider during the time that Psychiatry

first came on board.  There have been no major behavioral issues either.



Vital signs and labs were reviewed by this provider.



RELEVANT PSYCHIATRIC MEDICATIONS:  Include Klonopin 0.25 mg p.o. b.i.d. and

Sonata 5 mg at bedtime p.r.n. of which the patient did not have any doses

yesterday.



IMPRESSION:  Anxiety disorder due to general medical condition, mood

disorder due to general medical condition, both improving.



RECOMMENDATIONS:  We will continue with current management including

Klonopin 0.25 mg b.i.d. and Sonata 5 mg at bedtime p.r.n.  _____ patient to

request sleep agent if he has  restless sleep as he complained about last

night.  There are no acute issues at this time.  He seems to be

demonstrating sustained improvement.  Psychiatry will signed off.  Please

may consult p.r.n. if there are any acute changes in his presentation or

functioning.





__________________________________________

Rakesh Abrams MD





DD:  07/02/2018 9:46:28

DT:  07/02/2018 19:56:31

Job # [id number]

## 2018-07-02 NOTE — PN
DATE:  07/02/2018



SUBJECTIVE:  The patient was seen and examined on bedside on 07/02/2018 and

looking comfortable.  No acute event happened overnight.  Morally, he looks

better.  Denies nausea, vomiting, fever, chills.  No blood in the urine in

the urine collecting bag.  No further blood oozing from the abdominal

fistula.  No fever.  No chills.



PHYSICAL EXAMINATION:

VITAL SIGNS:  Temperature 98.1, pulse 84, respiratory rate 21, blood

pressure 112/70, pulse oximetry 96.

HEENT:  Head normocephalic, atraumatic.  Eyes PERRLA.  Extraocular muscles

intact.  Conjunctivae clear.  Nose patent.  Mucous membrane moist.

NECK:  Supple.  No carotid bruit.  No JVD or thyromegaly.

CHEST:  Bilaterally symmetrical.

HEART:  S1 and S2 positive.

LUNGS:  Clear to auscultation.

ABDOMEN:  Soft, tender, has multiple bags.

EXTREMITIES:  No edema.  No cyanosis.

NEUROLOGICAL:  The patient is awake and alert.  Moving all 4 extremities. 

No focal deficits.



MEDICATIONS:  DuoNeb, Cepacol lozenges, Klonopin, Clinimix, Synthroid,

Ativan, milk of magnesia, Pletal, morphine, Protonix, 



LABORATORY DATA:  White blood cells 5.4, hemoglobin 11.1, hematocrit 33.2,

platelets 207.  Sodium 143, potassium 4, BUN 31, creatinine 0.9, glucose

140.



ASSESSMENT AND PLAN:  Mr. Dmitri Hickey, 62-year-old male with anemia,

hyperglycemia with history of hemorrhagic cystitis, status post cystectomy

and ileal conduit, multiple abdominal surgeries including hernia repair,

came to the [facility] emergency room with pain and decreased urine

output.  According to Surgical team, the patient needs procedure, but still

pending.  Has abdominal fistula.  Gastrointestinal series shows possibly

fistula from the jejunum, hemorrhagic cystitis, status post cystectomy and

ileal conduit with revision, urinary tract infection, hepatic lesion,

benign on biopsy, anemia, hypothyroidism.  Getting total parenteral

nutrition, octreotide, Flagyl, proton pump inhibitor.  Fistulogram shows

two separate fistulas communicating between small bowel and anterior

abdominal wall skin.  Surgical team is thinking about surgery regarding

possibility of surgical repair.  May be going to operating room Thursday. 

GI, Surgery, Cardiology and Pulmonary is on the case.  Repeat labs.  We

will follow up.





__________________________________________

Sandra Perez MD







DD:  07/02/2018 22:24:54

DT:  07/02/2018 22:55:17

[facility] # [id number]

JOCELINE

## 2018-07-02 NOTE — PN
DATE:  07/02/2018



LOCATION:  Patient is in room 275, bed 2.



REASON FOR CONSULTATION AND FOLLOWUP:  Sinus bradycardia, abdominal

fistulas, history of COPD, status post urinary bladder removal, status post

ileal conduit, hypothyroidism, anxiety.



SUBJECTIVE:  Patient denies any chest pain, shortness of breath, or

palpitation.  He complains of abdominal discomfort.  Patient is lying flat

without any cardiac symptoms.



PHYSICAL EXAMINATION:

VITAL SIGNS:  Blood pressure is 112/70, respirations 21, pulse 84,

temperature 98.1.

HEENT:  Head:  Normocephalic.  Eyes:  Pupils normal.  Conjunctivae slightly

pale.

NECK:  JVP low.  Carotids equal.

THORAX:  AP diameter normal.

LUNGS:  Clear.

CARDIOVASCULAR:  S1 and S2.

ABDOMEN:  Patient has multiple fistulas.

EXTREMITIES:  No clubbing.  No cyanosis.



LABORATORY DATA:  WBC 5.4, hemoglobin 11.1, hematocrit 33.2, platelet 207. 

Sodium 143, potassium 4, BUN 31, creatinine 0.9, random sugar 84.  Calcium,

phosphorus, magnesium are normal.  AST, ALT normal.  Total protein 8.2,

albumin 4.  Triglyceride 161, cholesterol 126, LDL 73, HDL 13.



DIAGNOSES:  Bradycardia, asymptomatic, history of bradycardia for many

years; chronic obstructive pulmonary disease; history of cystectomy for

interstitial hemorrhagic cystitis; ileal conduit; multiple abdominal

fistulas.



PLAN:  Clinically, patient's cardiac status is stable.  Patient is

asymptomatic from bradycardia.  We will discontinue telemetry.  We will

continue hyperalimentation, metronidazole 250 p.o. every 8 hours, Protonix

40 daily, levothyroxine 150 mcg p.o. daily, Klonopin 0.25 b.i.d., DuoNeb

and nebulizer therapy.  We will follow.







__________________________________________

Dante Mays MD



DD:  07/02/2018 14:41:02

DT:  07/02/2018 15:32:20

Job # [id number]

## 2018-07-02 NOTE — PN
DATE:  07/01/2018



SUBJECTIVE:  This patient was seen and evaluated earlier today.  This

patient still has significant drainage from the enterocutaneous fistula.



PHYSICAL EXAMINATION:

VITAL SIGNS:  Temperature is 98.3, pulse _____, blood pressure 115/76.

HEENT:  Atraumatic and anicteric.

NECK:  Supple.

HEART:  S1 and S2 heard.

LUNGS:  Bilateral air entry present.

ABDOMEN:  Soft.  There is a large abdominal wound, which _____

enterocutaneous fistula with significant drainage was noticed.



LABORATORY DATA:  Hemoglobin 10.9, hematocrit 33.5, WBC 6, platelets 202. 

BUN 28 and creatinine 0.5.



IMPRESSION:  This 62-year-old patient admitted with enterocutaneous

fistula.  Previous postop course is complicated with a large surgery to

remove the ileal conduit from the right side to left side.  The patient has

now prior history of ventral hernia repair done in the past.  Now, had

enterocutaneous fistula.  We will discuss with the surgical team about the

plan.  The patient does have some small hepatic lesions.  The biopsies done

in the past was nondiagnostic.



Thank you very much for allowing me to participate in the care of the

patient.





__________________________________________

Racheal Pham MD



DD:  07/02/2018 0:07:54

DT:  07/02/2018 1:38:22

Job # [id number]

## 2018-07-02 NOTE — PN
DATE:  07/02/2018



SUBJECTIVE:  The patient is in bed, in no acute distress, nontoxic, was

seen early this morning in room 275 bed 2.



PHYSICAL EXAMINATION:

VITAL SIGNS:  Temperature is 97, blood pressure is 109/60, respiratory rate

of 18.

HEENT:  Examination of HEENT is unremarkable.

NECK:  Supple.

LUNGS:  Have decreased breath sounds.

HEART:  Normal S1 and S2.

ABDOMEN:  Soft.



LABORATORY DATA:  Laboratory examination reveals a white count of 5.4,

hemoglobin of 11, platelets of 207.  Coagulation is noted.  Chemistries

reveals a BUN of 31, creatinine of 0.9.  Urinalysis is noted.  Microbiology

reveals Klebsiella is noted.  Review of orders reveals the patient to be on

p.o. Flagyl.



ASSESSMENT AND PLAN:  A 62-year-old male with abdominal wall infection,

abdominal wall fistula, _____ vancomycin-resistant Enterococcus,

Klebsiella.  Fistulogram showing 2 different fistulas.  He has completed

the antibiotic therapy.  Currently, only on p.o. Flagyl for the fistula. 

We will continue to follow with you.  The patient is at risk for developing

nosocomial infections.  Dr. Dipesh Morris's progress note is reviewed. 

Currently, only on p.o. antibiotic, p.o. Flagyl.  Has completed the

intravenous antibiotic therapy.





__________________________________________

Margarito Khan MD





DD:  07/02/2018 12:26:48

DT:  07/02/2018 12:44:05

Job # [id number]

## 2018-07-02 NOTE — CP.PCM.PN
Subjective





- Date & Time of Evaluation


Date of Evaluation: 07/02/18


Time of Evaluation: 07:05





- Subjective


Subjective: 





General Surgery Note for Yvette





Patient seen and examined at bedside. No acute event overnight. Small leak from 

urostomy this morning. Patient states excoriations almost resolved. Patient 

complains of being hungry and wants to eat. He is still NPO with TPN. No BM 

from rectum. 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 

hrs. Denies fever/chills or nausea/vomiting.








Objective





- Vital Signs/Intake and Output


Vital Signs (last 24 hours): 


 











Temp Pulse Resp BP Pulse Ox


 


 97.7 F   74   18   109/68   96 


 


 07/02/18 06:00  07/02/18 06:00  07/02/18 06:00  07/02/18 06:00  07/02/18 06:00








Intake and Output: 


 











 07/02/18 07/02/18





 06:59 18:59


 


Intake Total 1236 


 


Output Total 675 


 


Balance 561 














- Medications


Medications: 


 Current Medications





Albuterol/Ipratropium (Duoneb 3 Mg/0.5 Mg (3 Ml) Ud)  3 ml IH [id number] PRN


   PRN Reason: Sinus symptoms


Benzocaine/Menthol (Cepacol Sore Throat)  1 fernandez MT Q2H PRN


   PRN Reason: Sore Throat


   Last Admin: 07/01/18 22:08 Dose:  1 fernandez


Clonazepam (Klonopin)  0.25 mg PO BID MARIA D


   PRN Reason: Protocol


   Last Admin: 07/01/18 18:40 Dose:  0.25 mg


Levothyroxine Sodium (Synthroid)  150 mcg PO 0600 [facility]


   Last Admin: 07/02/18 05:48 Dose:  150 mcg


Lorazepam (Ativan)  0.5 mg IV Q6 PRN; Protocol


   PRN Reason: anxiety/insomnia


   Last Admin: 07/01/18 14:05 Dose:  0.5 mg


Magnesium Hydroxide (Milk Of Magnesia)  30 ml PO DAILY MARIA D


   Last Admin: 07/01/18 07:40 Dose:  30 ml


Metronidazole (Flagyl)  250 mg PO Q8H MARIA D


   PRN Reason: Protocol


   Stop: 07/10/18 10:31


   Last Admin: 07/02/18 03:30 Dose:  250 mg


Morphine Sulfate (Morphine)  2 mg IVP Q4H PRN


   PRN Reason: Pain, severe (8-10)


   Last Admin: 07/01/18 22:08 Dose:  2 mg


Nystatin (Nystop Topical Powder)  0 gm TOP TID [facility]


   Last Admin: 07/01/18 18:41 Dose:  1 dose


Octreotide Acetate (Sandostatin)  50 mcg SC Q8H [facility]


   Last Admin: 07/02/18 02:00 Dose:  Not Given


Pantoprazole Sodium (Protonix Ec Tab)  40 mg PO 0600 [facility]


   Last Admin: 07/02/18 05:48 Dose:  40 mg


Petrolatum (Desitin Maximum Strength Topical 40% Oint)  0 gm TOP Q4H PRN


   PRN Reason: Rash


   Last Admin: 06/25/18 02:59 Dose:  1 applic


Saliva Substitute (Saliva Substitute)  2 ml PO Q6H PRN


   PRN Reason: Dry mouth


   Last Admin: 06/29/18 12:41 Dose:  2 ml


Zaleplon (Sonata)  5 mg PO HS PRN


   PRN Reason: Insomnia











- Labs


Labs: 


 





 07/02/18 06:30 





 07/02/18 06:30 





 











PT  12.2 SECONDS (9.4-12.5)   06/24/18  18:18    


 


INR  1.07  (0.93-1.08)   06/24/18  18:18    














- Constitutional


Appears: No Acute Distress





- Head Exam


Head Exam: ATRAUMATIC, NORMOCEPHALIC





- Eye Exam


Eye Exam: Normal appearance





- ENT Exam


ENT Exam: Mucous Membranes Moist





- Respiratory Exam


Respiratory Exam: NORMAL BREATHING PATTERN





- Cardiovascular Exam


Cardiovascular Exam: REGULAR RHYTHM





- GI/Abdominal Exam


GI & Abdominal Exam: Soft, Normal Bowel Sounds.  absent: Distended, Guarding, 

Rigid, Tenderness, Rebound


Additional comments: 





Colostomy appliance over enterocutaneous fistulas with draining NGT on suction- 

clean dry and intact 


Urostomy with dressing reinforced, no leak currently


excoriations are markedly improved








- Extremities Exam


Extremities Exam: Normal Capillary Refill





- Neurological Exam


Neurological Exam: Alert, Awake, Oriented x3





- Psychiatric Exam


Psychiatric exam: Normal Affect, Normal Mood





- Skin


Skin Exam: Dry, Warm


Additional comments: 





excoriations on abdomen and genital area are markedly improved





Assessment and Plan





- Assessment and Plan (Free Text)


Assessment: 


62M with enterocutaneous fistulas & urostomy; Urine cx and abdominal cx grew 

VRE and Klebsiella


Plan: 





NPO - may have hard candy


TPN


Octreotide 50 mg SC Q8H


IV fluids


Wound Care


IV antibiotics


Appliance to fistulas & urostomy- do not remove, will replace as needed


Strict I's & O's


Dietician referral


Replace GI losses 1:1 with Lactated Ringers if fistula output is more than 500cc

/day


Saliva substitute


Cepacol lozanges


Sips & chips


Further recommendations as per Dr. Yvette Morris PGY1

## 2018-07-03 LAB
ALBUMIN SERPL-MCNC: 3.8 G/DL (ref 3–4.8)
ALBUMIN/GLOB SERPL: 0.9 {RATIO} (ref 1.1–1.8)
ALT SERPL-CCNC: 28 U/L (ref 7–56)
AST SERPL-CCNC: 47 U/L (ref 17–59)
BASOPHILS # BLD AUTO: 0.03 K/MM3 (ref 0–2)
BASOPHILS NFR BLD: 0.5 % (ref 0–3)
BUN SERPL-MCNC: 32 MG/DL (ref 7–21)
CALCIUM SERPL-MCNC: 9.3 MG/DL (ref 8.4–10.5)
EOSINOPHIL # BLD: 0.4 10*3/UL (ref 0–0.7)
EOSINOPHIL NFR BLD: 6.5 % (ref 1.5–5)
ERYTHROCYTE [DISTWIDTH] IN BLOOD BY AUTOMATED COUNT: 13.7 % (ref 11.5–14.5)
GFR NON-AFRICAN AMERICAN: > 60
GRANULOCYTES # BLD: 3.81 10*3/UL (ref 1.4–6.5)
GRANULOCYTES NFR BLD: 61.9 % (ref 50–68)
HGB BLD-MCNC: 11 G/DL (ref 14–18)
LYMPHOCYTES # BLD: 1.6 10*3/UL (ref 1.2–3.4)
LYMPHOCYTES NFR BLD AUTO: 25.6 % (ref 22–35)
MCH RBC QN AUTO: 27.7 PG (ref 25–35)
MCHC RBC AUTO-ENTMCNC: 33.1 G/DL (ref 31–37)
MCV RBC AUTO: 83.6 FL (ref 80–105)
MONOCYTES # BLD AUTO: 0.3 10*3/UL (ref 0.1–0.6)
MONOCYTES NFR BLD: 5.5 % (ref 1–6)
PLATELET # BLD: 183 10^3/UL (ref 120–450)
PMV BLD AUTO: 8.3 FL (ref 7–11)
RBC # BLD AUTO: 3.97 10^6/UL (ref 3.5–6.1)
WBC # BLD AUTO: 6.2 10^3/UL (ref 4.5–11)

## 2018-07-03 RX ADMIN — PANTOPRAZOLE SODIUM SCH MG: 40 TABLET, DELAYED RELEASE ORAL at 05:52

## 2018-07-03 RX ADMIN — NYSTATIN SCH: 100000 POWDER TOPICAL at 11:57

## 2018-07-03 RX ADMIN — NYSTATIN SCH DOSE: 100000 POWDER TOPICAL at 14:54

## 2018-07-03 NOTE — CP.PCM.PN
Subjective





- Date & Time of Evaluation


Date of Evaluation: 07/03/18


Time of Evaluation: 09:00





- Subjective


Subjective: 





GI Progress Note for Dr. Ankit Bass, IM PGY-3





Patient seen and examined at bedside.  No acute events reported overnight.  No 

acute complaints on exam.  Denies nausea, emesis, fevers, chills, blood or 

cloudy urine in his colleciton bag.  No blood oozing from his abdominal fistulas

, but continues to have drainage.  Denies chest pain, shortness of breath, 

focal weakness.  Overall, reports feeling better.  As per Surgery, pending OR 

on Thursday (7/5).





Objective





- Vital Signs/Intake and Output


Vital Signs (last 24 hours): 


 











Temp Pulse Resp BP Pulse Ox


 


 98.2 F   55 L  18   98/55 L  98 


 


 07/03/18 00:01  07/03/18 02:00  07/03/18 00:01  07/03/18 00:01  07/03/18 00:01








Intake and Output: 


 











 07/02/18 07/03/18





 18:59 06:59


 


Intake Total 934 0


 


Output Total 900 600


 


Balance 34 -600














- Medications


Medications: 


 Current Medications





Albuterol/Ipratropium (Duoneb 3 Mg/0.5 Mg (3 Ml) Ud)  3 ml IH [id number] PRN


   PRN Reason: Sinus symptoms


Benzocaine/Menthol (Cepacol Sore Throat)  1 fernandez MT Q2H PRN


   PRN Reason: Sore Throat


   Last Admin: 07/01/18 22:08 Dose:  1 fernandez


Clonazepam (Klonopin)  0.25 mg PO BID [facility] D


   PRN Reason: Protocol


   Last Admin: 07/02/18 17:46 Dose:  0.25 mg


Diphenhydramine HCl (Benadryl)  25 mg PO Q6 PRN


   PRN Reason: Itching / Pruritus


   Last Admin: 07/03/18 03:45 Dose:  25 mg


Amino Acids/Electrolytes/Dextrose (Clinimix 5/20 % "E" (2000 Ml))  2,000 mls @ 

83.333 mls/hr IV .Q24H [facility]


   Stop: 07/05/18 17:59


   Last Admin: 07/02/18 17:45 Dose:  83.333 mls/hr


Fat Emulsion Intravenous (Intralipid 20%)  250 mls @ 20.833 mls/hr IV MWF@1800 

[facility]


   Last Admin: 07/02/18 17:45 Dose:  20.833 mls/hr


Levothyroxine Sodium (Synthroid)  150 mcg PO 0600 [facility]


   Last Admin: 07/03/18 05:17 Dose:  150 mcg


Lorazepam (Ativan)  0.5 mg IV Q6 PRN; Protocol


   PRN Reason: anxiety/insomnia


   Last Admin: 07/01/18 14:05 Dose:  0.5 mg


Magnesium Hydroxide (Milk Of Magnesia)  30 ml PO DAILY [facility]


   Last Admin: 07/02/18 09:51 Dose:  30 ml


Metronidazole (Flagyl)  250 mg PO Q8H [facility]


   PRN Reason: Protocol


   Stop: 07/10/18 10:31


   Last Admin: 07/03/18 03:43 Dose:  250 mg


Morphine Sulfate (Morphine)  2 mg IVP Q4H PRN


   PRN Reason: Pain, severe (8-10)


   Last Admin: 07/01/18 22:08 Dose:  2 mg


Nystatin (Nystop Topical Powder)  0 gm TOP TID [facility]


   Last Admin: 07/02/18 17:47 Dose:  1 dose


Octreotide Acetate (Sandostatin)  50 mcg SC Q8H [facility]


   Last Admin: 07/02/18 17:50 Dose:  50 mcg


Pantoprazole Sodium (Protonix Ec Tab)  40 mg PO 0600 [facility]


   Last Admin: 07/03/18 05:52 Dose:  40 mg


Petrolatum (Desitin Maximum Strength Topical 40% Oint)  0 gm TOP Q4H PRN


   PRN Reason: Rash


   Last Admin: 07/02/18 17:52 Dose:  1 applic


Saliva Substitute (Saliva Substitute)  2 ml PO Q6H PRN


   PRN Reason: Dry mouth


   Last Admin: 06/29/18 12:41 Dose:  2 ml


Zaleplon (Sonata)  5 mg PO HS PRN


   PRN Reason: Insomnia











- Labs


Labs: 


 





 07/03/18 06:00 





 07/02/18 06:30 





 











PT  12.2 SECONDS (9.4-12.5)   06/24/18  18:18    


 


INR  1.07  (0.93-1.08)   06/24/18  18:18    














- Additional Findings


Additional findings: 





- Constitutional


Appears: Non-toxic, No Acute Distress, Chronically Ill





- Head Exam


Head Exam: ATRAUMATIC, NORMAL INSPECTION, NORMOCEPHALIC





- Eye Exam


Eye Exam: EOMI, Normal appearance.  absent: Conjunctival injection, Scleral 

icterus


Pupil Exam: absent: Fixed, Irregular





- ENT Exam


ENT Exam: Mucous Membranes Moist





- Neck Exam


Neck Exam: Full ROM, Normal Inspection





- Respiratory Exam


Respiratory Exam: Clear to Ausculation Bilateral, NORMAL BREATHING PATTERN.  

absent: Accessory Muscle Use, Chest Wall Tenderness, Decreased Breath Sounds, 

Rales, Rhonchi, Wheezes, Respiratory Distress





- Cardiovascular Exam


Cardiovascular Exam: REGULAR RHYTHM, RRR, +S1, +S2.  absent: Bradycardia, 

Tachycardia, Irregular Rhythm, JVD, +S4





- GI/Abdominal Exam


GI & Abdominal Exam: Soft, Normal Bowel Sounds (but only auscultated at RUQ).  

absent: Distended, Firm, Guarding, Rigid, Tenderness


Additional comments: 


two open fistulas covered with badaging and suction device draining feculent 

material from site


Left lateral lower abdominal urostomy bag with clear yellow urine





- Extremities Exam


Extremities Exam: Full ROM, Normal Inspection.  absent: Calf Tenderness, Pedal 

Edema, Tenderness





- Neurological Exam


Neurological Exam: Alert, Awake, Oriented x3, Following all commands 

appropriately, moving all extremities spontaneously





- Psychiatric Exam


Psychiatric exam: Normal Affect, Normal Mood





- Skin


Skin Exam: Dry, Intact, Normal Color, Warm





Assessment and Plan





- Assessment and Plan (Free Text)


Assessment: 





This is 63yo male with past medical history of hemorrhagic cystitis s/p 

cystectomy and ileal conduit, multiple abdominal surgeries including hernia 

repairs who came to ED for abdominal pain and decreased urine output.  Pending 

MRI abdomen, pending OR Thursday (7/5) as per Surgery.


Plan: 





Abdominal wall fistulas - GI series showed possible fistula from jejunum 


Hemorrhagic cystitis s/p cystectomty and ileal condiut w/ revision 


UTI 


Hepatic lesion - benign on bx


Anemia


Hypothyroid








on TPN. 


on Octreotide 50q8, PO Flagyl 250mg q8, PO Protonix 40mg daily


Monitor I&O as well as drain output: ~250cc drainage in last 24 hrs


Fistulogram shows 2 separate fistulas communicating with between small bowel 

and anterior abdominal wall skin


Going to OR Thursday as per Surgery





Seen and discussed with attending, Dr. Pham.

## 2018-07-03 NOTE — CP.PCM.PN
Subjective





- Date & Time of Evaluation


Date of Evaluation: 07/03/18


Time of Evaluation: 08:00





- Subjective


Subjective: 





General Surgery Note for Yvette





Patient seen and examined at bedside. No acute event overnight. Patient is NPO 

and still receiving TPN. Enterocutaneous fistulas with 250cc of bilious output 

over 24 hrs. Denies fever/chills or nausea/vomiting. Patient complaining of 

itching that does not resolve with benadryl.








Objective





- Vital Signs/Intake and Output


Vital Signs (last 24 hours): 


 











Temp Pulse Resp BP Pulse Ox


 


 98.2 F   55 L  18   98/55 L  98 


 


 07/03/18 00:01  07/03/18 02:00  07/03/18 00:01  07/03/18 00:01  07/03/18 00:01








Intake and Output: 


 











 07/03/18 07/03/18





 06:59 18:59


 


Intake Total 0 


 


Output Total 600 


 


Balance -600 














- Medications


Medications: 


 Current Medications





Albuterol/Ipratropium (Duoneb 3 Mg/0.5 Mg (3 Ml) Ud)  3 ml IH [id number] PRN


   PRN Reason: Sinus symptoms


Benzocaine/Menthol (Cepacol Sore Throat)  1 fernandez MT Q2H PRN


   PRN Reason: Sore Throat


   Last Admin: 07/01/18 22:08 Dose:  1 fernandez


Clonazepam (Klonopin)  0.25 mg PO BID [facility] D


   PRN Reason: Protocol


   Last Admin: 07/02/18 17:46 Dose:  0.25 mg


Diphenhydramine HCl (Benadryl)  25 mg PO Q6 PRN


   PRN Reason: Itching / Pruritus


   Last Admin: 07/03/18 03:45 Dose:  25 mg


Amino Acids/Electrolytes/Dextrose (Clinimix 5/20 % "E" (2000 Ml))  2,000 mls @ 

83.333 mls/hr IV .Q24H [facility]


   Stop: 07/05/18 17:59


   Last Admin: 07/02/18 17:45 Dose:  83.333 mls/hr


Fat Emulsion Intravenous (Intralipid 20%)  250 mls @ 20.833 mls/hr IV MWF@1800 

[facility]


   Last Admin: 07/02/18 17:45 Dose:  20.833 mls/hr


Levothyroxine Sodium (Synthroid)  150 mcg PO 0600 [facility]


   Last Admin: 07/03/18 05:17 Dose:  150 mcg


Lorazepam (Ativan)  0.5 mg IV Q6 PRN; Protocol


   PRN Reason: anxiety/insomnia


   Last Admin: 07/01/18 14:05 Dose:  0.5 mg


Magnesium Hydroxide (Milk Of Magnesia)  30 ml PO DAILY [facility]


   Last Admin: 07/02/18 09:51 Dose:  30 ml


Metronidazole (Flagyl)  250 mg PO Q8H [facility]


   PRN Reason: Protocol


   Stop: 07/10/18 10:31


   Last Admin: 07/03/18 03:43 Dose:  250 mg


Morphine Sulfate (Morphine)  2 mg IVP Q4H PRN


   PRN Reason: Pain, severe (8-10)


   Last Admin: 07/01/18 22:08 Dose:  2 mg


Nystatin (Nystop Topical Powder)  0 gm TOP TID [facility]


   Last Admin: 07/02/18 17:47 Dose:  1 dose


Octreotide Acetate (Sandostatin)  50 mcg SC Q8H [facility]


   Last Admin: 07/02/18 17:50 Dose:  50 mcg


Pantoprazole Sodium (Protonix Ec Tab)  40 mg PO 0600 [facility]


   Last Admin: 07/03/18 05:52 Dose:  40 mg


Petrolatum (Desitin Maximum Strength Topical 40% Oint)  0 gm TOP Q4H PRN


   PRN Reason: Rash


   Last Admin: 07/02/18 17:52 Dose:  1 applic


Saliva Substitute (Saliva Substitute)  2 ml PO Q6H PRN


   PRN Reason: Dry mouth


   Last Admin: 06/29/18 12:41 Dose:  2 ml


Zaleplon (Sonata)  5 mg PO  PRN


   PRN Reason: Insomnia











- Labs


Labs: 


 





 07/03/18 06:00 





 07/03/18 06:00 





 











PT  12.2 SECONDS (9.4-12.5)   06/24/18  18:18    


 


INR  1.07  (0.93-1.08)   06/24/18  18:18    














- Constitutional


Appears: No Acute Distress





- Head Exam


Head Exam: ATRAUMATIC, NORMOCEPHALIC





- Eye Exam


Eye Exam: Normal appearance





- ENT Exam


ENT Exam: Mucous Membranes Moist





- Respiratory Exam


Respiratory Exam: NORMAL BREATHING PATTERN





- Cardiovascular Exam


Cardiovascular Exam: REGULAR RHYTHM





- GI/Abdominal Exam


GI & Abdominal Exam: Soft, Normal Bowel Sounds.  absent: Distended, Firm, 

Guarding, Rigid, Tenderness, Rebound


Additional comments: 





Colostomy appliance over enterocutaneous fistulas with draining NGT on suction


Urostomy with dressing reinforced, no leak currently


excoriations are markedly improved











- Extremities Exam


Extremities Exam: Normal Capillary Refill





- Neurological Exam


Neurological Exam: Alert, Awake, Oriented x3





- Psychiatric Exam


Psychiatric exam: Normal Affect, Normal Mood





- Skin


Skin Exam: Dry, Warm





Assessment and Plan





- Assessment and Plan (Free Text)


Assessment: 


62M with enterocutaneous fistulas & urostomy; Urine cx and abdominal cx grew 

VRE and Klebsiella


Plan: 





NPO - may have hard candy


TPN


Octreotide 50 mg SC Q8H


IV fluids


Wound Care


IV antibiotics


Appliance to fistulas & urostomy- do not remove, will replace as needed


Strict I's & O's


Dietician referral


Replace GI losses 1:1 with Lactated Ringers if fistula output is more than 500cc

/day


Saliva substitute


Cepacol lozanges


Sips & chipS


OR tentatively planned for Thursday afternoon


Further recommendations as per Dr. Yvette Morris PGY2

## 2018-07-03 NOTE — PN
DATE:  07/03/2018



PULMONARY PROGRESS NOTE



REFERRING PHYSICIAN:  Sandra Perez MD.



SUBJECTIVE:  He is lying in the bed, head at 45 degrees.  Night was

unremarkable.  No headache, no rhinitis, no nausea.  Mild abdominal

discomfort.  No dysuria.  No leg pain or leg swelling.



OBJECTIVE:

GENERAL:  In no acute distress.

VITAL SIGNS:  Temperature is 98, heart rate 60, respiratory rate is 20,

blood pressure 99/60, pulse ox 96% on room air.

HEENT:  Moist mucous membrane.  No ulcer or thrush noted.

NECK:  Supple.  No JVD.

LUNGS:  Fair airflow with rhonchi.

HEART:  S1 and S2.

ABDOMEN:  Has open wound in the mid abdomen, has a dressing.

EXTREMITIES:  There is no edema.

NEUROLOGICAL:  Awake, alert, follow simple commands.



MEDICATIONS:  He is on Ativan 0.5 mg every 6 hours p.r.n., Benadryl 25 mg

every 6 hours p.r.n., Cepacol lozenges every 2 hours p.r.n., Clinimix 83 mL

per hour, petroleum jelly affected area, DuoNeb every 6 hours p.r.n.,

Flagyl 250 mg every 8 hours, clonazepam is 0.25 mg twice a day, morphine 2

mg IV every 4 hours p.r.n., Protonix 40 mg daily, Sandostatin 50 mcg subcu

every 8 hours, sonata 5 mg at bedtime p.r.n., Synthroid 150 mcg daily.



LABORATORY DATA:  Shows hemoglobin 11, hematocrit 33.2, WBC 6.2, platelet

count is 183.  Sodium 143, potassium 4.1, chloride 107, bicarbonate 22, BUN

32, creatinine 1, glucose 148, calcium 9.3, phosphorus 3.2, magnesium 2. 

AST 47, ALT 28, alkaline phosphatase is 63, albumin is 3.8.



IMPRESSION AND PLAN:  Enterocutaneous fistula with persistent drainage,

nonhealing open wound, chronic obstructive lung disease, malnutrition,

hypothyroid, history of bladder resection, activity of daily living

dysfunction.  Continue bronchodilator.  Keep head at 45 degrees,

antibiotics, gastric prophylaxis, deep vein thrombosis prophylaxis,

physical therapy, IV Clinimix, surgical followup.



Thank you and we will follow with you.







__________________________________________

Dante Haque MD



DD:  07/03/2018 18:25:43

DT:  07/03/2018 21:03:47

[facility] # [id number]

## 2018-07-03 NOTE — PN
DATE:  07/03/2018



REASON FOR CONSULTATION AND FOLLOWUP:  Sinus bradycardia, abdominal

fistula, history of COPD, status post urinary bladder removal, status post

ileal conduit, hypothyroidism, anxiety disorder, preop evaluation for

reconstruction surgery.



SUBJECTIVE:  The patient denies any chest pain, shortness of breath, or any

palpitations.



OBJECTIVE:

GENERAL:  Not in apparent distress.

VITAL SIGNS:  Temperature afebrile, heart rate 58, blood pressure 105/69.

HEENT:  PERRLA.  Extraocular muscles intact.

NECK:  Supple.  No carotid bruit or thyromegaly.

CHEST:  Clear to auscultation.

HEART:  S1 and S2, regular.

ABDOMEN:  Soft.

EXTREMITIES:  Clubbing and cyanosis negative.



LABORATORY DATA:  Blood workup as follows:  WBC 6.2, hemoglobin 11,

hematocrit 33.2, and platelet count 183.  Chemistry shows sodium 143,

potassium 4.2, chloride 107, carbon dioxide 20, anion gap of 18, BUN 32,

creatinine 1.



IMPRESSION:  A 62-year-old male with past medical history significant for

bradycardia for many years, history of COPD, history of cystectomy for

interstitial hemorrhagic cystitis, ileal conduit, multiple abdominal

surgery with fistula formation.  The patient is at preop for closure of the

fistula.  Baseline, the patient has bradycardia, asymptomatic.  No absolute

contraindication to surgery, no evidence of arrhythmia, no evidence of

congestive heart failure, no evidence for angina except baseline

bradycardia.



RECOMMENDATIONS:  Continue broad-spectrum antibiotics as per ID.  Continue

adequate analgesia, follow up electrolytes, monitor electrolytes.  The

patient is cleared from cardiology point of view to go for surgery.  We

will discontinue telemetry.  The patient recently had a stress test and an

echo in the office, there was essentially no significant ischemia noted, on

previous cardiac workup.



Thank you, Dr. Perez, for providing us the opportunity in take care of the

patient, Dmitri Hickey.  We will follow with you.







__________________________________________

Dante Albarado MD



DD:  07/03/2018 10:01:02

DT:  07/03/2018 10:39:01

[facility] # [id number]

## 2018-07-03 NOTE — CP.PCM.PCO
Physician Communication Note





- Physician Communication Note


Physician Communication Note: psychiatric team signed off

## 2018-07-03 NOTE — PN
DATE:  07/03/2018



SUBJECTIVE:  Patient is in room _____ before, bed 2 at this time.  Patient

was seen earlier this morning in 275, bed 2.  Patient is doing well.  No

nausea, no vomiting.  No fevers, no chills.



PHYSICAL EXAMINATION:

VITAL SIGNS:  Temperature is 98, blood pressure is 99/60, respiratory rate

of 18, heart rate of 58.

HEENT:  Unremarkable.

NECK:  Supple.

LUNGS:  Have decreased breath sounds.

HEART:  Normal S1 and S2.

ABDOMEN:  Soft and nontender.



LABORATORY DATA:  Reveals white count of 6.2, hemoglobin of 11, platelets

of 183.  BUN of 32, creatinine of 1.  Urinalysis is noted.



ASSESSMENT AND PLAN:  This is a 62-year-old male, who was seen early this

morning with vancomycin-resistant Enterococcus and Klebsiella.  Patient

with fistulogram showing two different fistulas.  Continue the antibiotic

therapy.  Currently on p.o. Flagyl.  Surgery is following the patient

closely.  Patient has BMI of only 19, chronically ill, debilitated,

cachectic appearing.





__________________________________________

Margarito Khan MD





DD:  07/03/2018 19:08:46

DT:  07/03/2018 21:52:51

[facility] # [id number]

## 2018-07-03 NOTE — PN
DATE:  07/02/2018



PULMONARY PROGRESS NOTE



REFERRING PHYSICIAN:  Dr. Perez.



SUBJECTIVE:  He is lying in the bed, sleepy, arousable.  Night was

unremarkable.  No headache, no rhinitis.  No cough, no nausea.  Still has

some abdominal discomfort and fistula discharge.  No leg pain, no leg

swelling.



PHYSICAL EXAMINATION:

GENERAL:  In no acute distress.

VITAL SIGNS:  Temp is 98, heart rate is 91, respiratory rate is 20, blood

pressure 105/69, pulse ox 96% on 2 liters nasal cannula.

HEENT:  Moist mucous membrane.  No ulcer or thrush.

NECK:  Supple.  No JVD.

LUNGS:  Have a fair airflow with rhonchi.

HEART:  S1, and S2.

ABDOMEN:  Positive bowel sounds, has midabdominal nonhealing scar, also

have fistulas, ileostomy draining well.

EXTREMITIES:  There is no edema.

NEUROLOGICAL:  Awake, alert, follow simple commands.



MEDICATIONS:  He is on Ativan 0.5 mg every 6 hours p.r.n., Benadryl 25 mg

every 6 hours p.r.n., Cepacol lozenges every 2 hours p.r.n.  Also getting

IV Clinimix _____ mL per hour, petroleum jelly to affected areas, DuoNeb

_____ every 6 hours p.r.n., Flagyl 250 mg every 8 hours, Intralipid 20% is

80 mL per hour, clonazepam 0.25 mg twice a day, milk of magnesia 30 mL

daily, morphine 2 mg every 4 hours p.r.n., Protonix 40 mg daily,

Sandostatin 50 mcg subcu every 8 hours, sonata 5 mg at bedtime p.r.n.,

Synthroid 150 mg daily.



LABORATORY DATA:  Shows hemoglobin 11.1, hematocrit 33.2, WBC 5.4, platelet

count is 207.  Sodium 143, potassium 4, chloride 105, bicarbonate 24, BUN

31, creatinine 0.9, glucose 114, calcium 9.3, phosphorus 3.6, magnesium

2.2, total bili is 0.4, AST 30, ALT 28, alk phos is 64, albumin 4,

prealbumin 37.5, cholesterol is 126.  Abdominal wound has Klebsiella

pneumonia and vanco-resistant Enterococcus faecium.



IMPRESSION AND PLAN:  Enterocutaneous fistula with persistent drainage,

chronic obstructive lung disease, malnutrition, hypothyroid, history of

bladder resection, activity of daily living dysfunction.  Pulmonary point

of view, doing okay.  Bronchodilator.  Keep head of bed at 45 degrees,

antibiotics, total parenteral nutrition, pressure ulcer precaution.















Thank you and we will follow with you.





__________________________________________

Dante Haque MD



DD:  07/02/2018 22:07:41

DT:  07/02/2018 23:33:26

[facility] # [id number]

## 2018-07-04 LAB
ALBUMIN SERPL-MCNC: 3.9 G/DL (ref 3–4.8)
ALBUMIN/GLOB SERPL: 0.9 {RATIO} (ref 1.1–1.8)
ALT SERPL-CCNC: 34 U/L (ref 7–56)
AST SERPL-CCNC: 49 U/L (ref 17–59)
BASOPHILS # BLD AUTO: 0.05 K/MM3 (ref 0–2)
BASOPHILS NFR BLD: 0.8 % (ref 0–3)
BUN SERPL-MCNC: 33 MG/DL (ref 7–21)
CALCIUM SERPL-MCNC: 9.7 MG/DL (ref 8.4–10.5)
EOSINOPHIL # BLD: 0.3 10*3/UL (ref 0–0.7)
EOSINOPHIL NFR BLD: 5.6 % (ref 1.5–5)
ERYTHROCYTE [DISTWIDTH] IN BLOOD BY AUTOMATED COUNT: 13.8 % (ref 11.5–14.5)
GFR NON-AFRICAN AMERICAN: > 60
GRANULOCYTES # BLD: 3.49 10*3/UL (ref 1.4–6.5)
GRANULOCYTES NFR BLD: 59.1 % (ref 50–68)
HGB BLD-MCNC: 10.7 G/DL (ref 14–18)
LYMPHOCYTES # BLD: 1.7 10*3/UL (ref 1.2–3.4)
LYMPHOCYTES NFR BLD AUTO: 28.1 % (ref 22–35)
MCH RBC QN AUTO: 28.1 PG (ref 25–35)
MCHC RBC AUTO-ENTMCNC: 33.9 G/DL (ref 31–37)
MCV RBC AUTO: 82.9 FL (ref 80–105)
MONOCYTES # BLD AUTO: 0.4 10*3/UL (ref 0.1–0.6)
MONOCYTES NFR BLD: 6.4 % (ref 1–6)
PLATELET # BLD: 189 10^3/UL (ref 120–450)
PMV BLD AUTO: 8.7 FL (ref 7–11)
RBC # BLD AUTO: 3.81 10^6/UL (ref 3.5–6.1)
WBC # BLD AUTO: 5.9 10^3/UL (ref 4.5–11)

## 2018-07-04 RX ADMIN — Medication PRN ML: at 20:11

## 2018-07-04 RX ADMIN — PANTOPRAZOLE SODIUM SCH MG: 40 TABLET, DELAYED RELEASE ORAL at 05:42

## 2018-07-04 RX ADMIN — PURIFIED WATER PRN LOZ: 99.05 LIQUID OPHTHALMIC at 20:10

## 2018-07-04 RX ADMIN — NYSTATIN SCH: 100000 POWDER TOPICAL at 19:15

## 2018-07-04 RX ADMIN — NYSTATIN SCH DOSE: 100000 POWDER TOPICAL at 19:15

## 2018-07-04 RX ADMIN — NYSTATIN SCH DOSE: 100000 POWDER TOPICAL at 11:08

## 2018-07-04 NOTE — CP.PCM.PN
Subjective





- Date & Time of Evaluation


Date of Evaluation: 07/04/18


Time of Evaluation: 11:13





- Subjective


Subjective: 





General Surgery Note for Yvette





Patient seen and examined at bedside. No acute event overnight. Patient resting 

comfortably in bed. No complaints at this time.





Objective





- Vital Signs/Intake and Output


Vital Signs (last 24 hours): 


 











Temp Pulse Resp BP Pulse Ox


 


 97.7 F   63   18   110/64   95 


 


 07/04/18 06:00  07/04/18 06:00  07/04/18 06:00  07/04/18 06:00  07/04/18 06:00








Intake and Output: 


 











 07/04/18 07/04/18





 06:59 18:59


 


Intake Total 996 


 


Output Total 1200 


 


Balance -204 














- Medications


Medications: 


 Current Medications





Albuterol/Ipratropium (Duoneb 3 Mg/0.5 Mg (3 Ml) Ud)  3 ml IH [id number] PRN


   PRN Reason: Sinus symptoms


Benzocaine/Menthol (Cepacol Sore Throat)  1 fernandez MT Q2H PRN


   PRN Reason: Sore Throat


   Last Admin: 07/01/18 22:08 Dose:  1 fernandez


Clonazepam (Klonopin)  0.25 mg PO BID [facility]


   PRN Reason: Protocol


   Last Admin: 07/03/18 17:03 Dose:  0.25 mg


Diphenhydramine HCl (Benadryl)  25 mg PO Q6 PRN


   PRN Reason: Itching / Pruritus


   Last Admin: 07/04/18 01:39 Dose:  25 mg


Amino Acids/Electrolytes/Dextrose (Clinimix 5/20 % "E" (2000 Ml))  2,000 mls @ 

83.333 mls/hr IV .Q24H [facility]


   Stop: 07/05/18 17:59


   Last Admin: 07/03/18 17:03 Dose:  83.333 mls/hr


Levothyroxine Sodium (Synthroid)  150 mcg PO 0600 [facility]


   Last Admin: 07/04/18 05:42 Dose:  150 mcg


Lorazepam (Ativan)  0.5 mg IV Q6 PRN; Protocol


   PRN Reason: anxiety/insomnia


   Last Admin: 07/04/18 05:41 Dose:  0.5 mg


Magnesium Hydroxide (Milk Of Magnesia)  30 ml PO DAILY [facility]


   Last Admin: 07/02/18 09:51 Dose:  30 ml


Metronidazole (Flagyl)  250 mg PO Q8H [facility]


   PRN Reason: Protocol


   Stop: 07/10/18 10:31


   Last Admin: 07/04/18 01:40 Dose:  250 mg


Morphine Sulfate (Morphine)  2 mg IVP Q4H PRN


   PRN Reason: Pain, severe (8-10)


Nystatin (Nystop Topical Powder)  0 gm TOP TID [facility]


   Last Admin: 07/03/18 14:54 Dose:  1 dose


Octreotide Acetate (Sandostatin)  50 mcg SC Q8H [facility]


   Last Admin: 07/04/18 01:40 Dose:  Not Given


Pantoprazole Sodium (Protonix Ec Tab)  40 mg PO 0600 [facility]


   Last Admin: 07/04/18 05:42 Dose:  40 mg


Petrolatum (Desitin Maximum Strength Topical 40% Oint)  0 gm TOP Q4H PRN


   PRN Reason: Rash


   Last Admin: 07/02/18 17:52 Dose:  1 applic


Saliva Substitute (Saliva Substitute)  2 ml PO Q6H PRN


   PRN Reason: Dry mouth


   Last Admin: 06/29/18 12:41 Dose:  2 ml


Zaleplon (Sonata)  5 mg PO HS PRN


   PRN Reason: Insomnia


   Last Admin: 07/03/18 21:54 Dose:  5 mg











- Labs


Labs: 


 





 07/04/18 05:55 





 07/04/18 05:55 





 











PT  12.2 SECONDS (9.4-12.5)   06/24/18  18:18    


 


INR  1.07  (0.93-1.08)   06/24/18  18:18    














- Constitutional


Appears: No Acute Distress





- Head Exam


Head Exam: ATRAUMATIC, NORMOCEPHALIC





- Eye Exam


Eye Exam: Normal appearance





- ENT Exam


ENT Exam: Mucous Membranes Moist





- Respiratory Exam


Respiratory Exam: NORMAL BREATHING PATTERN





- Cardiovascular Exam


Cardiovascular Exam: REGULAR RHYTHM





- GI/Abdominal Exam


GI & Abdominal Exam: Soft, Normal Bowel Sounds.  absent: Tenderness


Additional comments: 





Colostomy appliance over enterocutaneous fistulas with draining NGT on suction


Urostomy with no leak





- Extremities Exam


Extremities Exam: Normal Capillary Refill





- Neurological Exam


Neurological Exam: Alert, Awake, Oriented x3





- Psychiatric Exam


Psychiatric exam: Normal Affect, Normal Mood





- Skin


Skin Exam: Dry, Warm





Assessment and Plan





- Assessment and Plan (Free Text)


Assessment: 


62M with enterocutaneous fistulas & urostomy; Urine cx and abdominal cx grew 

VRE and Klebsiella


Plan: 


NPO - may have hard candy


TPN


Octreotide 50 mg SC Q8H


IV fluids


Wound Care


IV antibiotics


Appliance to fistulas & urostomy- do not remove, will replace as needed


Strict I's & O's


Dietician referral


Replace GI losses 1:1 with Lactated Ringers if fistula output is more than 500cc

/day


Saliva substitute


Cepacol lozanges


Sips & chipS


OR tentatively planned for Thursday afternoon


Further recommendations as per Dr. Yvette Morris PGY2

## 2018-07-04 NOTE — PN
DATE:  07/04/2018



SUBJECTIVE:  The patient is seen earlier today in [facility], bed 2.  No fevers

and no chills.  No nausea.  No vomiting.



PHYSICAL EXAMINATION:

VITAL SIGNS:  On exam, temperature is 98, blood pressure is 110/60,

respiratory rate of 18.

HEENT:  Examination of HEENT is unremarkable.

NECK:  Supple.

LUNGS:  Have decreased breath sounds.

HEART:  Normal S1, S2.

ABDOMEN:  Soft, nontender.



LABORATORY DATA:  Laboratory examination reveals a white count of 5.4,

hemoglobin of 10, platelets of 189.  BUN of 33, creatinine of 1. 

Urinalysis is noted with 15-20 wbc's.



ASSESSMENT AND PLAN:  A 62-year-old, vancomycin-resistant Enterococcus and

Klebsiella.  The patient had a fistulogram, two different fistulas. 

Currently on p.o. Flagyl for the fistula.  Case discussed with Surgical

team.  We will follow with you this patient who is chronically ill with a

low body mass index and cachectic.





__________________________________________

Margarito Khan MD





DD:  07/04/2018 11:06:03

DT:  07/04/2018 11:08:17

Job # [id number]

## 2018-07-04 NOTE — PN
DATE:  07/04/2018



TYPE OF DICTATION:  Progress note.



REASON FOR CONSULTATION AND FOLLOWUP:  Sinus tachycardia, abdominal

fistula, history of COPD, status post urinary bladder removal, status post

ileal conduit, hypothyroidism, anxiety disorder, preop evaluation for

reconstruction surgery.



SUBJECTIVE:  The patient denies any chest pain, shortness of breath, or any

palpitations.



OBJECTIVE:

GENERAL:  Not in apparent distress.

VITAL SIGNS:  Temperature afebrile, heart rate _____, blood pressure

110/64.

HEENT:  PERRLA.  Extraocular muscles intact.

NECK:  Supple.  No carotid bruit or thyromegaly.

CHEST:  Clear to auscultation.

HEART:  S1 and S2, regular.

ABDOMEN:  Soft.

EXTREMITIES:  Clubbing and cyanosis negative.



LABORATORY DATA:  WBC 5.9, hemoglobin 10.3, hematocrit 31.6, platelet count

189.  Chemistry shows sodium 142, potassium 4, chloride 107, carbon dioxide

23, anion gap of 17, BUN 33, creatinine 1.



IMPRESSION:  A 62-year-old male with long-standing history for bradycardia,

admitted with abdominal wound, history of chronic obstructive pulmonary

disease, history of cystectomy for interstitial hemorrhagic cystitis, ileal

conduit, history of multiple abdominal surgery and multiple abdominal

fistula.  Preop evaluation for closure of the fistula.  The patient has

baseline bradycardia, asymptomatic; history of recent noninvasive cardiac

workup essentially negative for ischemia, stress test and echo.  No

complaint of angina.  No evidence of arrhythmia except baseline

bradycardia, no history of congestive heart failure and no history of

angina symptoms.



RECOMMENDATIONS:  Given these findings, the patient is cleared from

Cardiology point of view to go for abdominal surgery.  No absolute

contraindication for abdominal surgery.  In the interim, continue

broad-spectrum antibiotics.  We will follow with you.  We will repeat the

blood workup in the morning.





















Thank you Dr. Perez for providing us the opportunity in taking care of the

patient, Ruperto Rizvi.







__________________________________________

Dante Albarado MD





DD:  07/04/2018 10:14:21

DT:  07/04/2018 11:10:33

Job # [id number]

## 2018-07-04 NOTE — PN
DATE:  07/04/2018



PULMONARY PROGRESS NOTE



REFERRING PHYSICIAN:  Sandra Perez MD



SUBJECTIVE:  Patient is lying in the bed, head at 45 degrees.  Night was

unremarkable.  No cough.  No sputum production.  No chest pain.  Has a mild

itching on the wound area.  No leg pain or leg swelling.



OBJECTIVE:

GENERAL:  In no acute distress.

VITAL SIGNS:  Temperature is 98, heart rate 63, respiratory rate is 20,

blood pressure 110/64, pulse ox 95% on room air.

HEENT:  Moist mucous membrane.  No ulcer or thrush noted.

NECK:  Supple.  No JVD.

LUNGS:  Have a fair airflow with rhonchi.

HEART:  S1 and S2.

ABDOMEN:  Has an open wound with dressing still drenching GI secretion.

EXTREMITIES:  There is no edema.

NEUROLOGICAL:  Awake, alert, follow simple commands.



MEDICATIONS:  He is on Ativan 0.5 mg every 6 hours p.r.n., Benadryl 25 mg

every 6 hours p.r.n., Cepacol lozenges every 2 hours p.r.n.  He is on

Clinimix 83 mL per hour.  Also receiving DuoNeb every 6 hours p.r.n.,

Flagyl 250 mg every 8 hours, heparin 5000 units subcu every 8 hours,

Klonopin 0.25 mg twice a day, morphine 2 mg every 4 hours p.r.n., Protonix

40 mg daily, Sandostatin 50 mcg every 8 hours, Sonata p.r.n. basis,

Synthroid 150 mcg daily.



LABORATORY DATA:  Shows hemoglobin 10.7, hematocrit 31.6, WBC 5.9, platelet

is 189.  Sodium 142, potassium 4, chloride 107, bicarbonate 23, BUN 33,

creatinine 1, glucose 131, calcium 9.7, phosphorus 3.2, magnesium 2.1.  AST

49, ALT 34, alkaline phosphatase is 61, albumin is 3.9.



IMPRESSION AND PLAN:  Enterocutaneous fistula with persistent drainage,

nonhealing midabdominal wound, chronic obstructive lung disease,

malnutrition, hypothyroid, history of bladder resection, activity of daily

living dysfunction, chronic lung disease, continue bronchodilator.  Keep

head at 45 degrees, antibiotics as per Infectious Disease, gastric and deep

vein thrombosis prophylaxis, surgical followup.  Scheduled to go to OR

tomorrow.



Thank you and we will follow with you.







__________________________________________

Dante Haque MD



DD:  07/04/2018 16:36:22

DT:  07/04/2018 18:23:06

[facility] # [id number]

## 2018-07-04 NOTE — CP.PCM.PN
Subjective





- Date & Time of Evaluation


Date of Evaluation: 07/04/18


Time of Evaluation: 09:00





- Subjective


Subjective: 





GI Progress Note for Dr. Ankit Bass, IM PGY-3





Patient seen and examined at bedside.  No acute events reported overnight.  No 

acute complaints on exam.  Denies nausea, emesis, fevers, chills, blood or 

cloudy urine in his collection bag.  No blood oozing from his abdominal fistulas

, but continues to have drainage.  Denies chest pain, shortness of breath, 

focal weakness.  Only complaint is pruritus at site of adhesive bandage used to 

secure fistula-drainage device.  As per Surgery, pending OR on Thursday (7/5).








Objective





- Vital Signs/Intake and Output


Vital Signs (last 24 hours): 


 











Temp Pulse Resp BP Pulse Ox


 


 98.9 F   77   16   96/57 L  99 


 


 07/03/18 22:35  07/03/18 22:35  07/03/18 22:35  07/03/18 22:35  07/03/18 22:35








Intake and Output: 


 











 07/04/18 07/04/18





 06:59 18:59


 


Intake Total 996 


 


Output Total 1200 


 


Balance -204 














- Medications


Medications: 


 Current Medications





Albuterol/Ipratropium (Duoneb 3 Mg/0.5 Mg (3 Ml) Ud)  3 ml IH [id number] PRN


   PRN Reason: Sinus symptoms


Benzocaine/Menthol (Cepacol Sore Throat)  1 fernandez MT Q2H PRN


   PRN Reason: Sore Throat


   Last Admin: 07/01/18 22:08 Dose:  1 fernandez


Clonazepam (Klonopin)  0.25 mg PO BID MARIA D


   PRN Reason: Protocol


   Last Admin: 07/03/18 17:03 Dose:  0.25 mg


Diphenhydramine HCl (Benadryl)  25 mg PO Q6 PRN


   PRN Reason: Itching / Pruritus


   Last Admin: 07/04/18 01:39 Dose:  25 mg


Amino Acids/Electrolytes/Dextrose (Clinimix 5/20 % "E" (2000 Ml))  2,000 mls @ 

83.333 mls/hr IV .Q24H MARIA D


   Stop: 07/05/18 17:59


   Last Admin: 07/03/18 17:03 Dose:  83.333 mls/hr


Levothyroxine Sodium (Synthroid)  150 mcg PO 0600 [facility]


   Last Admin: 07/04/18 05:42 Dose:  150 mcg


Lorazepam (Ativan)  0.5 mg IV Q6 PRN; Protocol


   PRN Reason: anxiety/insomnia


   Last Admin: 07/04/18 05:41 Dose:  0.5 mg


Magnesium Hydroxide (Milk Of Magnesia)  30 ml PO DAILY [facility]


   Last Admin: 07/02/18 09:51 Dose:  30 ml


Metronidazole (Flagyl)  250 mg PO Q8H MARIA D


   PRN Reason: Protocol


   Stop: 07/10/18 10:31


   Last Admin: 07/04/18 01:40 Dose:  250 mg


Morphine Sulfate (Morphine)  2 mg IVP Q4H PRN


   PRN Reason: Pain, severe (8-10)


Nystatin (Nystop Topical Powder)  0 gm TOP TID [facility]


   Last Admin: 07/03/18 14:54 Dose:  1 dose


Octreotide Acetate (Sandostatin)  50 mcg SC Q8H [facility]


   Last Admin: 07/04/18 01:40 Dose:  Not Given


Pantoprazole Sodium (Protonix Ec Tab)  40 mg PO 0600 [facility]


   Last Admin: 07/04/18 05:42 Dose:  40 mg


Petrolatum (Desitin Maximum Strength Topical 40% Oint)  0 gm TOP Q4H PRN


   PRN Reason: Rash


   Last Admin: 07/02/18 17:52 Dose:  1 applic


Saliva Substitute (Saliva Substitute)  2 ml PO Q6H PRN


   PRN Reason: Dry mouth


   Last Admin: 06/29/18 12:41 Dose:  2 ml


Zaleplon (Sonata)  5 mg PO HS PRN


   PRN Reason: Insomnia


   Last Admin: 07/03/18 21:54 Dose:  5 mg











- Labs


Labs: 


 





 07/04/18 05:55 





 07/04/18 05:55 





 











PT  12.2 SECONDS (9.4-12.5)   06/24/18  18:18    


 


INR  1.07  (0.93-1.08)   06/24/18  18:18    














- Additional Findings


Additional findings: 





- Constitutional


Appears: Non-toxic, No Acute Distress, Chronically Ill





- Head Exam


Head Exam: ATRAUMATIC, NORMAL INSPECTION, NORMOCEPHALIC





- Eye Exam


Eye Exam: EOMI, Normal appearance.  absent: Conjunctival injection, Scleral 

icterus


Pupil Exam: absent: Fixed, Irregular





- ENT Exam


ENT Exam: Mucous Membranes Moist





- Neck Exam


Neck Exam: Full ROM, Normal Inspection





- Respiratory Exam


Respiratory Exam: Clear to Ausculation Bilateral, NORMAL BREATHING PATTERN.  

absent: Accessory Muscle Use, Chest Wall Tenderness, Decreased Breath Sounds, 

Rales, Rhonchi, Wheezes, Respiratory Distress





- Cardiovascular Exam


Cardiovascular Exam: REGULAR RHYTHM, RRR, +S1, +S2.  absent: Bradycardia, 

Tachycardia, Irregular Rhythm, JVD, +S4





- GI/Abdominal Exam


GI & Abdominal Exam: Soft, Normal Bowel Sounds (but only auscultated at RUQ).  

absent: Distended, Firm, Guarding, Rigid, Tenderness


Additional comments: 


two open fistulas covered with badaging and suction device draining feculent 

material from site


Left lateral lower abdominal urostomy bag with clear yellow urine





- Extremities Exam


Extremities Exam: Full ROM, Normal Inspection.  absent: Calf Tenderness, Pedal 

Edema, Tenderness





- Neurological Exam


Neurological Exam: Alert, Awake, Oriented x3, Following all commands 

appropriately, moving all extremities spontaneously





- Psychiatric Exam


Psychiatric exam: Normal Affect, Normal Mood





- Skin


Skin Exam: Dry, Intact, Normal Color, Warm





Assessment and Plan





- Assessment and Plan (Free Text)


Assessment: 





This is 61yo male with past medical history of hemorrhagic cystitis s/p 

cystectomy and ileal conduit, multiple abdominal surgeries including hernia 

repairs who came to ED for abdominal pain and decreased urine output.  Pending 

OR Thursday (7/5) as per Surgery.


Plan: 





Abdominal wall fistulas - GI series showed possible fistula from jejunum 


Hemorrhagic cystitis s/p cystectomty and ileal condiut w/ revision 


UTI 


Hepatic lesion - benign on bx


Anemia


Hypothyroid








on TPN. 


on Octreotide 50q8, PO Flagyl 250mg q8, PO Protonix 40mg daily


Monitor I&O as well as drain output


Fistulogram shows 2 separate fistulas communicating with between small bowel 

and anterior abdominal wall skin


Going to OR Thursday as per Surgery





Seen and discussed with attending, Dr. Pham.

## 2018-07-04 NOTE — CP.PCM.PN
Objective





- Vital Signs/Intake and Output


Vital Signs (last 24 hours): 


 











Temp Pulse Resp BP Pulse Ox


 


 98.9 F   77   16   96/57 L  99 


 


 07/03/18 22:35  07/03/18 22:35  07/03/18 22:35  07/03/18 22:35  07/03/18 22:35








Intake and Output: 


 











 07/03/18 07/04/18





 18:59 06:59


 


Intake Total  996


 


Output Total  1200


 


Balance  -204














- Medications


Medications: 


 Current Medications





Albuterol/Ipratropium (Duoneb 3 Mg/0.5 Mg (3 Ml) Ud)  3 ml IH [id number] PRN


   PRN Reason: Sinus symptoms


Benzocaine/Menthol (Cepacol Sore Throat)  1 fernandez MT Q2H PRN


   PRN Reason: Sore Throat


   Last Admin: 07/01/18 22:08 Dose:  1 fernandez


Clonazepam (Klonopin)  0.25 mg PO BID MARIA D


   PRN Reason: Protocol


   Last Admin: 07/03/18 17:03 Dose:  0.25 mg


Diphenhydramine HCl (Benadryl)  25 mg PO Q6 PRN


   PRN Reason: Itching / Pruritus


   Last Admin: 07/04/18 01:39 Dose:  25 mg


Amino Acids/Electrolytes/Dextrose (Clinimix 5/20 % "E" (2000 Ml))  2,000 mls @ 

83.333 mls/hr IV .Q24H [facility]


   Stop: 07/05/18 17:59


   Last Admin: 07/03/18 17:03 Dose:  83.333 mls/hr


Levothyroxine Sodium (Synthroid)  150 mcg PO 0600 [facility]


   Last Admin: 07/04/18 05:42 Dose:  150 mcg


Lorazepam (Ativan)  0.5 mg IV Q6 PRN; Protocol


   PRN Reason: anxiety/insomnia


   Last Admin: 07/04/18 05:41 Dose:  0.5 mg


Magnesium Hydroxide (Milk Of Magnesia)  30 ml PO DAILY [facility]


   Last Admin: 07/02/18 09:51 Dose:  30 ml


Metronidazole (Flagyl)  250 mg PO Q8H [facility] D


   PRN Reason: Protocol


   Stop: 07/10/18 10:31


   Last Admin: 07/04/18 01:40 Dose:  250 mg


Morphine Sulfate (Morphine)  2 mg IVP Q4H PRN


   PRN Reason: Pain, severe (8-10)


Nystatin (Nystop Topical Powder)  0 gm TOP TID [facility]


   Last Admin: 07/03/18 14:54 Dose:  1 dose


Octreotide Acetate (Sandostatin)  50 mcg SC Q8H [facility]


   Last Admin: 07/04/18 01:40 Dose:  Not Given


Pantoprazole Sodium (Protonix Ec Tab)  40 mg PO 0600 [facility]


   Last Admin: 07/04/18 05:42 Dose:  40 mg


Petrolatum (Desitin Maximum Strength Topical 40% Oint)  0 gm TOP Q4H PRN


   PRN Reason: Rash


   Last Admin: 07/02/18 17:52 Dose:  1 applic


Saliva Substitute (Saliva Substitute)  2 ml PO Q6H PRN


   PRN Reason: Dry mouth


   Last Admin: 06/29/18 12:41 Dose:  2 ml


Zaleplon (Sonata)  5 mg PO HS PRN


   PRN Reason: Insomnia


   Last Admin: 07/03/18 21:54 Dose:  5 mg











- Labs


Labs: 


 





 07/04/18 05:55 





 07/03/18 06:00 





 











PT  12.2 SECONDS (9.4-12.5)   06/24/18  18:18    


 


INR  1.07  (0.93-1.08)   06/24/18  18:18

## 2018-07-04 NOTE — CP.PCM.PN
<Yudy Adams - Last Filed: 07/04/18 10:11>





Subjective





- Date & Time of Evaluation


Date of Evaluation: 07/04/18


Time of Evaluation: 07:00





- Subjective


Subjective: 





Medicine Progress Note for Dr. Hunter (covering for Dr. Perez), MELISA Adams 

PGY3





Patient seen and examined at bedside. There were no acute overnight events as 

per nursing staff. Patient is resting in bed. He is complaining about the 

abdominal dressing has been making his skin itchy and red. Otherwise, he feels 

OK. He denies chest pain, shortness of breath, nausea/vomiting, numbness/

tingling, fever/chills. 





Objective





- Vital Signs/Intake and Output


Vital Signs (last 24 hours): 


 











Temp Pulse Resp BP Pulse Ox


 


 97.7 F   63   18   110/64   95 


 


 07/04/18 06:00  07/04/18 06:00  07/04/18 06:00  07/04/18 06:00  07/04/18 06:00








Intake and Output: 


 











 07/04/18 07/04/18





 06:59 18:59


 


Intake Total 996 


 


Output Total 1200 


 


Balance -204 














- Medications


Medications: 


 Current Medications





Albuterol/Ipratropium (Duoneb 3 Mg/0.5 Mg (3 Ml) Ud)  3 ml IH [id number] PRN


   PRN Reason: Sinus symptoms


Benzocaine/Menthol (Cepacol Sore Throat)  1 fernandez MT Q2H PRN


   PRN Reason: Sore Throat


   Last Admin: 07/01/18 22:08 Dose:  1 fernandez


Clonazepam (Klonopin)  0.25 mg PO BID [facility] D


   PRN Reason: Protocol


   Last Admin: 07/03/18 17:03 Dose:  0.25 mg


Diphenhydramine HCl (Benadryl)  25 mg PO Q6 PRN


   PRN Reason: Itching / Pruritus


   Last Admin: 07/04/18 01:39 Dose:  25 mg


Amino Acids/Electrolytes/Dextrose (Clinimix 5/20 % "E" (2000 Ml))  2,000 mls @ 

83.333 mls/hr IV .Q24H [facility]


   Stop: 07/05/18 17:59


   Last Admin: 07/03/18 17:03 Dose:  83.333 mls/hr


Levothyroxine Sodium (Synthroid)  150 mcg PO [facility]


   Last Admin: 07/04/18 05:42 Dose:  150 mcg


Lorazepam (Ativan)  0.5 mg IV Q6 PRN; Protocol


   PRN Reason: anxiety/insomnia


   Last Admin: 07/04/18 05:41 Dose:  0.5 mg


Magnesium Hydroxide (Milk Of Magnesia)  30 ml PO DAILY [facility]


   Last Admin: 07/02/18 09:51 Dose:  30 ml


Metronidazole (Flagyl)  250 mg PO Q8H [facility] D


   PRN Reason: Protocol


   Stop: 07/10/18 10:31


   Last Admin: 07/04/18 01:40 Dose:  250 mg


Morphine Sulfate (Morphine)  2 mg IVP Q4H PRN


   PRN Reason: Pain, severe (8-10)


Nystatin (Nystop Topical Powder)  0 gm TOP TID [facility]


   Last Admin: 07/03/18 14:54 Dose:  1 dose


Octreotide Acetate (Sandostatin)  50 mcg SC Q8H [facility]


   Last Admin: 07/04/18 01:40 Dose:  Not Given


Pantoprazole Sodium (Protonix Ec Tab)  40 mg PO 0600 [facility]


   Last Admin: 07/04/18 05:42 Dose:  40 mg


Petrolatum (Desitin Maximum Strength Topical 40% Oint)  0 gm TOP Q4H PRN


   PRN Reason: Rash


   Last Admin: 07/02/18 17:52 Dose:  1 applic


Saliva Substitute (Saliva Substitute)  2 ml PO Q6H PRN


   PRN Reason: Dry mouth


   Last Admin: 06/29/18 12:41 Dose:  2 ml


Zaleplon (Sonata)  5 mg PO HS PRN


   PRN Reason: Insomnia


   Last Admin: 07/03/18 21:54 Dose:  5 mg











- Labs


Labs: 


 





 07/04/18 05:55 





 07/04/18 05:55 





 











PT  12.2 SECONDS (9.4-12.5)   06/24/18  18:18    


 


INR  1.07  (0.93-1.08)   06/24/18  18:18    














- Constitutional


Appears: No Acute Distress, Chronically Ill





- Head Exam


Head Exam: ATRAUMATIC, NORMAL INSPECTION, NORMOCEPHALIC





- Eye Exam


Eye Exam: Normal appearance, PERRL


Pupil Exam: NORMAL ACCOMODATION, PERRL





- ENT Exam


ENT Exam: Mucous Membranes Moist


Additional comments: 





poor dentition 





- Respiratory Exam


Respiratory Exam: Clear to Ausculation Bilateral, NORMAL BREATHING PATTERN.  

absent: Rales, Rhonchi, Wheezes





- Cardiovascular Exam


Cardiovascular Exam: REGULAR RHYTHM, +S1, +S2.  absent: Gallop, Rubs, Murmur





- GI/Abdominal Exam


GI & Abdominal Exam: Soft.  absent: Guarding, Tenderness, Rebound


Additional comments: 





L ileal condiut site clean and dry, draining yellow urine. Midline wound vac in 

place- draining green stool. Excoriations around lower abdomen. 








- Extremities Exam


Extremities Exam: Normal Inspection.  absent: Pedal Edema





- Neurological Exam


Neurological Exam: Alert, Awake, CN II-XII Intact, Oriented x3





- Skin


Skin Exam: Dry, Warm





Assessment and Plan





- Assessment and Plan (Free Text)


Assessment: 


This is 61yo male with past medical history of anemia, hypothyroidism 

hemorrhagic cystitis s/p cystectomy and ileal conduit, multiple abdominal 

surgeries who is admitted for 





1. Abdominal wall fistula 


   - GI series showed possible fistula from jejunum 


   - Fistulagram showed 2 different fistulas within the small bowel 


   - abdominal drain cultures + for VRE and Klebisella 


2. Hemorrhagic cystitis s/p cystectomty and ileal condiut w/ revision 


3. UTI 


   - + for VRE and Klebsiella 


4. Hepatic lesion 


   - Previous biopsy was non-diagnostic 


5. Anemia (normocytic) 


   - Hgb stable 


6. Hypothyroid


   - TSH 0.04 


Plan: 


Patient is on TPN. Spoke with surgery with possible plan for OR tomorrow. 

Cardiology evaluated patient and reports patient can proceed with surgert. Pulm 

is on consult. Continue synthroid for hypothyroidism. Patient is on PO Flagyl 

as per ID. Pain is controlled. Continue Nystatin powder and benadryl prn for 

itching. Continue wound care. Continue to monitor ileal conduit and abdominal 

wound drain output. Continue wound care. Patient was seen by psychiatry for 

depression and anxiety. Medications were adjusted and psych signed off. Patient'

s mood has improved. He is on GI prophylaxis and will place patient on Heparin 

SC. PT recommended home with services upon discharge. 





Case seen, discussed and reviewed with Dr. Hunter. 


MELISA Adams PGY3





<Julian Hunter - Last Filed: 07/04/18 11:08>





Objective





- Vital Signs/Intake and Output


Vital Signs (last 24 hours): 


 











Temp Pulse Resp BP Pulse Ox


 


 97.7 F   63   18   110/64   95 


 


 07/04/18 06:00  07/04/18 06:00  07/04/18 06:00  07/04/18 06:00  07/04/18 06:00








Intake and Output: 


 











 07/04/18 07/04/18





 06:59 18:59


 


Intake Total 996 


 


Output Total 1200 


 


Balance -204 














- Medications


Medications: 


 Current Medications





Albuterol/Ipratropium (Duoneb 3 Mg/0.5 Mg (3 Ml) Ud)  3 ml IH [id number] PRN


   PRN Reason: Sinus symptoms


Benzocaine/Menthol (Cepacol Sore Throat)  1 fernandez MT Q2H PRN


   PRN Reason: Sore Throat


   Last Admin: 07/01/18 22:08 Dose:  1 fernandez


Clonazepam (Klonopin)  0.25 mg PO BID MARIA D


   PRN Reason: Protocol


   Last Admin: 07/03/18 17:03 Dose:  0.25 mg


Diphenhydramine HCl (Benadryl)  25 mg PO Q6 PRN


   PRN Reason: Itching / Pruritus


   Last Admin: 07/04/18 01:39 Dose:  25 mg


Heparin Sodium (Porcine) (Heparin)  5,000 units SC Q12 MARIA D


   PRN Reason: Protocol


Amino Acids/Electrolytes/Dextrose (Clinimix 5/20 % "E" (2000 Ml))  2,000 mls @ 

83.333 mls/hr IV .Q24H [facility]


   Stop: 07/05/18 17:59


   Last Admin: 07/03/18 17:03 Dose:  83.333 mls/hr


Levothyroxine Sodium (Synthroid)  150 mcg PO 0600 [facility]


   Last Admin: 07/04/18 05:42 Dose:  150 mcg


Lorazepam (Ativan)  0.5 mg IV Q6 PRN; Protocol


   PRN Reason: anxiety/insomnia


   Last Admin: 07/04/18 05:41 Dose:  0.5 mg


Metronidazole (Flagyl)  250 mg PO Q8H MARIA D


   PRN Reason: Protocol


   Stop: 07/10/18 10:31


   Last Admin: 07/04/18 01:40 Dose:  250 mg


Morphine Sulfate (Morphine)  2 mg IVP Q4H PRN


   PRN Reason: Pain, severe (8-10)


Nystatin (Nystop Topical Powder)  0 gm TOP TID [facility]


   Last Admin: 07/03/18 14:54 Dose:  1 dose


Octreotide Acetate (Sandostatin)  50 mcg SC Q8H [facility]


   Last Admin: 07/04/18 01:40 Dose:  Not Given


Pantoprazole Sodium (Protonix Ec Tab)  40 mg PO 0600 [facility]


   Last Admin: 07/04/18 05:42 Dose:  40 mg


Petrolatum (Desitin Maximum Strength Topical 40% Oint)  0 gm TOP Q4H PRN


   PRN Reason: Rash


   Last Admin: 07/02/18 17:52 Dose:  1 applic


Saliva Substitute (Saliva Substitute)  2 ml PO Q6H PRN


   PRN Reason: Dry mouth


   Last Admin: 06/29/18 12:41 Dose:  2 ml


Zaleplon (Sonata)  5 mg PO HS PRN


   PRN Reason: Insomnia


   Last Admin: 07/03/18 21:54 Dose:  5 mg











- Labs


Labs: 


 





 07/04/18 05:55 





 07/04/18 05:55 





 











PT  12.2 SECONDS (9.4-12.5)   06/24/18  18:18    


 


INR  1.07  (0.93-1.08)   06/24/18  18:18    














Assessment and Plan





- Assessment and Plan (Free Text)


Plan: 





Pt seen and examined by me. The labs and medications have been reviewed.I agree 

with the note of the medical resident and it was reviewed by me.Pt is going to 

go to the OR in the am. Will need DVT prophylaxis. Local wound care.

## 2018-07-05 LAB
ALBUMIN SERPL-MCNC: 4.1 G/DL (ref 3–4.8)
ALBUMIN/GLOB SERPL: 0.9 {RATIO} (ref 1.1–1.8)
ALT SERPL-CCNC: 50 U/L (ref 7–56)
APTT BLD: 26.9 SECONDS (ref 25.1–36.5)
AST SERPL-CCNC: 52 U/L (ref 17–59)
BASOPHILS # BLD AUTO: 0.07 K/MM3 (ref 0–2)
BASOPHILS NFR BLD: 1.1 % (ref 0–3)
BUN SERPL-MCNC: 34 MG/DL (ref 7–21)
CALCIUM SERPL-MCNC: 10.1 MG/DL (ref 8.4–10.5)
EOSINOPHIL # BLD: 0.4 10*3/UL (ref 0–0.7)
EOSINOPHIL NFR BLD: 5.9 % (ref 1.5–5)
ERYTHROCYTE [DISTWIDTH] IN BLOOD BY AUTOMATED COUNT: 13.8 % (ref 11.5–14.5)
GFR NON-AFRICAN AMERICAN: > 60
GRANULOCYTES # BLD: 3.83 10*3/UL (ref 1.4–6.5)
GRANULOCYTES NFR BLD: 58.2 % (ref 50–68)
HGB BLD-MCNC: 11.6 G/DL (ref 14–18)
INR PPP: 1.1 (ref 0.93–1.08)
LYMPHOCYTES # BLD: 1.7 10*3/UL (ref 1.2–3.4)
LYMPHOCYTES NFR BLD AUTO: 25.8 % (ref 22–35)
MCH RBC QN AUTO: 27.7 PG (ref 25–35)
MCHC RBC AUTO-ENTMCNC: 33.3 G/DL (ref 31–37)
MCV RBC AUTO: 83.1 FL (ref 80–105)
MONOCYTES # BLD AUTO: 0.6 10*3/UL (ref 0.1–0.6)
MONOCYTES NFR BLD: 9 % (ref 1–6)
PLATELET # BLD: 222 10^3/UL (ref 120–450)
PMV BLD AUTO: 8.7 FL (ref 7–11)
PROTHROMBIN TIME: 12.7 SECONDS (ref 9.4–12.5)
RBC # BLD AUTO: 4.19 10^6/UL (ref 3.5–6.1)
WBC # BLD AUTO: 6.6 10^3/UL (ref 4.5–11)

## 2018-07-05 PROCEDURE — 0DNW0ZZ RELEASE PERITONEUM, OPEN APPROACH: ICD-10-PCS | Performed by: GENERAL PRACTICE

## 2018-07-05 PROCEDURE — 0DB80ZZ EXCISION OF SMALL INTESTINE, OPEN APPROACH: ICD-10-PCS | Performed by: GENERAL PRACTICE

## 2018-07-05 RX ADMIN — PANTOPRAZOLE SODIUM SCH MG: 40 TABLET, DELAYED RELEASE ORAL at 05:27

## 2018-07-05 RX ADMIN — NYSTATIN SCH DOSE: 100000 POWDER TOPICAL at 11:05

## 2018-07-05 RX ADMIN — MAGNESIUM HYDROXIDE SCH ML: 400 SUSPENSION ORAL at 10:53

## 2018-07-05 RX ADMIN — Medication PRN MG: at 23:42

## 2018-07-05 NOTE — CP.PCM.PN
<Tigre Bass - Last Filed: 07/05/18 14:04>





Subjective





- Date & Time of Evaluation


Date of Evaluation: 07/05/18


Time of Evaluation: 11:00





- Subjective


Subjective: 





GI Progress Note for Dr. Ankit Bass,  PGY-3





Patient seen and examined at bedside.  No acute events reported overnight.  No 

acute complaints on exam.  Denies nausea, emesis, fevers, chills, blood or 

cloudy urine in his collection bag.  Excited for his possible surgery today, 

later this afternoon as per Surgery.








Objective





- Vital Signs/Intake and Output


Vital Signs (last 24 hours): 


 











Temp Pulse Resp BP Pulse Ox


 


 98.0 F   68   18   114/63   95 


 


 07/04/18 22:00  07/04/18 22:00  07/04/18 22:00  07/04/18 22:00  07/04/18 22:00








Intake and Output: 


 











 07/05/18 07/05/18





 06:59 18:59


 


Intake Total 0 


 


Output Total 700 


 


Balance -700 














- Medications


Medications: 


 Current Medications





Albuterol/Ipratropium (Duoneb 3 Mg/0.5 Mg (3 Ml) Ud)  3 ml IH [id number] PRN


   PRN Reason: Sinus symptoms


Benzocaine/Menthol (Cepacol Sore Throat)  1 fernandez MT Q2H PRN


   PRN Reason: Sore Throat


   Last Admin: 07/04/18 20:10 Dose:  1 fernandez


Clonazepam (Klonopin)  0.25 mg PO BID MARIA D


   PRN Reason: Protocol


   Last Admin: 07/04/18 19:03 Dose:  0.25 mg


Diphenhydramine HCl (Benadryl)  25 mg PO Q6 PRN


   PRN Reason: Itching / Pruritus


   Last Admin: 07/04/18 01:39 Dose:  25 mg


Heparin Sodium (Porcine) (Heparin)  5,000 units SC Q12 MARIA D


   PRN Reason: Protocol


   Last Admin: 07/04/18 21:35 Dose:  Not Given


Amino Acids/Electrolytes/Dextrose (Clinimix 5/20 % "E" (2000 Ml))  2,000 mls @ 

83.333 mls/hr IV .Q24H MARIA D


   Stop: 07/05/18 17:59


   Last Admin: 07/04/18 19:04 Dose:  83.333 mls/hr


Levothyroxine Sodium (Synthroid)  150 mcg PO 0600 [facility]


   Last Admin: 07/05/18 05:27 Dose:  150 mcg


Lorazepam (Ativan)  0.5 mg IV Q6 PRN; Protocol


   PRN Reason: anxiety/insomnia


   Last Admin: 07/04/18 05:41 Dose:  0.5 mg


Magnesium Hydroxide (Milk Of Magnesia)  30 ml PO DAILY [facility]


Metronidazole (Flagyl)  250 mg PO Q8H MARIA D


   PRN Reason: Protocol


   Stop: 07/10/18 10:31


   Last Admin: 07/05/18 05:27 Dose:  250 mg


Morphine Sulfate (Morphine)  2 mg IVP Q4H PRN


   PRN Reason: Pain, severe (8-10)


Nystatin (Nystop Topical Powder)  0 gm TOP TID [facility]


   Last Admin: 07/04/18 19:15 Dose:  1 dose


Octreotide Acetate (Sandostatin)  50 mcg SC Q8H [facility]


   Last Admin: 07/05/18 02:21 Dose:  Not Given


Pantoprazole Sodium (Protonix Ec Tab)  40 mg PO 0600 [facility]


   Last Admin: 07/05/18 05:27 Dose:  40 mg


Petrolatum (Desitin Maximum Strength Topical 40% Oint)  0 gm TOP Q4H PRN


   PRN Reason: Rash


   Last Admin: 07/02/18 17:52 Dose:  1 applic


Saliva Substitute (Saliva Substitute)  2 ml PO Q6H PRN


   PRN Reason: Dry mouth


   Last Admin: 07/04/18 20:11 Dose:  2 ml


Zaleplon (Sonata)  5 mg PO HS PRN


   PRN Reason: Insomnia


   Last Admin: 07/04/18 21:32 Dose:  5 mg











- Labs


Labs: 


 





 07/05/18 06:10 





 07/05/18 06:10 





 











PT  12.7 SECONDS (9.4-12.5)  H  07/05/18  06:10    


 


INR  1.10  (0.93-1.08)  H  07/05/18  06:10    


 


APTT  26.9 Seconds (25.1-36.5)   07/05/18  06:10    














- Additional Findings


Additional findings: 





- Constitutional


Appears: Non-toxic, No Acute Distress, Chronically Ill





- Head Exam


Head Exam: ATRAUMATIC, NORMAL INSPECTION, NORMOCEPHALIC





- Eye Exam


Eye Exam: EOMI, Normal appearance.  absent: Conjunctival injection, Scleral 

icterus


Pupil Exam: absent: Fixed, Irregular





- ENT Exam


ENT Exam: Mucous Membranes Moist





- Neck Exam


Neck Exam: Full ROM, Normal Inspection





- Respiratory Exam


Respiratory Exam: Clear to Ausculation Bilateral, NORMAL BREATHING PATTERN.  

absent: Accessory Muscle Use, Chest Wall Tenderness, Decreased Breath Sounds, 

Rales, Rhonchi, Wheezes, Respiratory Distress





- Cardiovascular Exam


Cardiovascular Exam: REGULAR RHYTHM, RRR, +S1, +S2.  absent: Bradycardia, 

Tachycardia, Irregular Rhythm, JVD, +S4





- GI/Abdominal Exam


GI & Abdominal Exam: Soft, Normal Bowel Sounds (but only auscultated at RUQ).  

absent: Distended, Firm, Guarding, Rigid, Tenderness


Additional comments: 


two open fistulas covered with badaging and suction device draining feculent 

material from site


Left lateral lower abdominal urostomy bag with clear yellow urine





- Extremities Exam


Extremities Exam: Full ROM, Normal Inspection.  absent: Calf Tenderness, Pedal 

Edema, Tenderness





- Neurological Exam


Neurological Exam: Alert, Awake, Following all commands appropriately, moving 

all extremities spontaneously





- Psychiatric Exam


Psychiatric exam: Normal Affect, Normal Mood





- Skin


Skin Exam: Dry, Intact, Normal Color, Warm








Assessment and Plan





- Assessment and Plan (Free Text)


Assessment: 





This is 63yo male with past medical history of hemorrhagic cystitis s/p 

cystectomy and ileal conduit, multiple abdominal surgeries including hernia 

repairs who came to ED for abdominal pain and decreased urine output.  Pending 

OR today as per Surgery.


Plan: 





Abdominal wall fistulas - GI series showed possible fistula from jejunum 


Hemorrhagic cystitis s/p cystectomty and ileal condiut w/ revision 


UTI 


Hepatic lesion - benign on bx


Anemia


Hypothyroid








on TPN. 


on Octreotide 50q8, PO Flagyl 250mg q8, PO Protonix 40mg daily


Monitor I&O as well as drain output


Fistulogram shows 2 separate fistulas communicating with between small bowel 

and anterior abdominal wall skin


OR today as per Surgery





Seen and discussed with attending, Dr. Pham.








<Racheal Pham - Last Filed: 07/05/18 23:14>





Objective





- Vital Signs/Intake and Output


Vital Signs (last 24 hours): 


 











Temp Pulse Resp BP Pulse Ox


 


 98.3 F   76   19   131/71   100 


 


 07/05/18 21:50  07/05/18 21:50  07/05/18 21:50  07/05/18 21:50  07/05/18 21:50








Intake and Output: 


 











 07/05/18 07/06/18





 18:59 06:59


 


Intake Total 0 


 


Output Total 500 80


 


Balance -500 -80














- Medications


Medications: 


 Current Medications





Albuterol/Ipratropium (Duoneb 3 Mg/0.5 Mg (3 Ml) Ud)  3 ml IH [id number] PRN


   PRN Reason: Sinus symptoms


Benzocaine/Menthol (Cepacol Sore Throat)  1 fernandez MT Q2H PRN


   PRN Reason: Sore Throat


   Last Admin: 07/04/18 20:10 Dose:  1 fernandez


Clonazepam (Klonopin)  0.25 mg PO BID MARIA D


   PRN Reason: Protocol


   Last Admin: 07/05/18 10:53 Dose:  0.25 mg


Diphenhydramine HCl (Benadryl)  25 mg PO Q6 PRN


   PRN Reason: Itching / Pruritus


   Last Admin: 07/04/18 01:39 Dose:  25 mg


Heparin Sodium (Porcine) (Heparin)  5,000 units SC Q12 [facility]IA D


   PRN Reason: Protocol


   Last Admin: 07/04/18 21:35 Dose:  Not Given


Hydromorphone HCl (Dilaudid)  0.5 mg IVP Q15M PRN


   PRN Reason: Pain, moderate (4-7)


   Stop: 07/05/18 23:14


   Last Admin: 07/05/18 21:10 Dose:  0.5 mg


Lactated Ringer's (Lactated Ringer's)  1,000 mls @ 75 mls/hr IV .[id number] [facility]


   Stop: 07/05/18 23:16


Cefazolin Sodium (Ancef 1gm In Ns)  1 gm in 100 mls @ 100 mls/hr IVPB Q12H [facility]


   PRN Reason: Protocol


Levothyroxine Sodium (Synthroid)  150 mcg PO 0600 [facility]


   Last Admin: 07/05/18 05:27 Dose:  150 mcg


Lorazepam (Ativan)  0.5 mg IV Q6 PRN; Protocol


   PRN Reason: anxiety/insomnia


   Last Admin: 07/04/18 05:41 Dose:  0.5 mg


Magnesium Hydroxide (Milk Of Magnesia)  30 ml PO DAILY [facility]


   Last Admin: 07/05/18 10:53 Dose:  30 ml


Metronidazole (Flagyl)  250 mg PO Q8H [facility] D


   PRN Reason: Protocol


   Stop: 07/10/18 10:31


   Last Admin: 07/05/18 10:53 Dose:  250 mg


Morphine Sulfate (Morphine)  4 mg IVP Q4H PRN


   PRN Reason: Pain, severe (8-10)


Morphine Sulfate (Morphine)  2 mg IVP Q4H PRN


   PRN Reason: Pain, moderate (4-7)


Nystatin (Nystop Topical Powder)  0 gm TOP TID [facility]


   Last Admin: 07/05/18 11:05 Dose:  1 dose


Octreotide Acetate (Sandostatin)  50 mcg SC Q8H [facility]


   Last Admin: 07/05/18 10:58 Dose:  Not Given


Ondansetron HCl (Zofran Inj)  4 mg IVP ONCE PRN


   PRN Reason: Nausea/Vomiting


Pantoprazole Sodium (Protonix Ec Tab)  40 mg PO 0600 [facility]


   Last Admin: 07/05/18 05:27 Dose:  40 mg


Petrolatum (Desitin Maximum Strength Topical 40% Oint)  0 gm TOP Q4H PRN


   PRN Reason: Rash


   Last Admin: 07/02/18 17:52 Dose:  1 applic


Saliva Substitute (Saliva Substitute)  2 ml PO Q6H PRN


   PRN Reason: Dry mouth


   Last Admin: 07/04/18 20:11 Dose:  2 ml


Zaleplon (Sonata)  5 mg PO HS PRN


   PRN Reason: Insomnia


   Last Admin: 07/04/18 21:32 Dose:  5 mg











- Labs


Labs: 


 





 07/05/18 06:10 





 07/05/18 06:10 





 











PT  12.7 SECONDS (9.4-12.5)  H  07/05/18  06:10    


 


INR  1.10  (0.93-1.08)  H  07/05/18  06:10    


 


APTT  26.9 Seconds (25.1-36.5)   07/05/18  06:10    














Attending/Attestation





- Attestation


I have personally seen and examined this patient.: Yes


I have fully participated in the care of the patient.: Yes


I have reviewed all pertinent clinical information, including history, physical 

exam and plan: Yes


Notes (Text): 


p


07/05/18 23:14

## 2018-07-05 NOTE — PN
DATE:  07/05/2018



SUBJECTIVE:  Patient is in bed, in no acute distress.  He was seen early

this morning in 564.



PHYSICAL EXAMINATION:

VITAL SIGNS:  Temperature 98, blood pressure 116/70, respiratory rate 16.

HEENT:  Unremarkable.

NECK:  Supple.

LUNGS:  Have decreased breath sounds.

HEART:  Normal S1 and S2.

ABDOMEN:  Soft, nontender.



LABORATORY DATA:  Is noted.



ASSESSMENT AND PLAN:  This is a 62-year-old male, who was seen early this

morning in 564, bed 2, with vancomycin-resistant Enterococcus and

Klebsiella, and multiple fistulas.  Patient is scheduled for surgical team

intervention today for his fistula repair.  Currently patient is on p.o.

Flagyl for the fistula, and we will follow closely with you.





__________________________________________

Margarito Khan MD



DD:  07/05/2018 19:20:57

DT:  07/05/2018 20:44:15

Job # [id number]

## 2018-07-05 NOTE — PCM.SURG1
Surgeon's Initial Post Op Note





- Surgeon's Notes


Surgeon: Dr. Crawford


Assistant: Che Ayala, PGY2; Anisha Linda, PGY1; Fahad Olmedo, OMS3


Type of Anesthesia: General Endo


Anesthesia Administered By: Dr. Martínez and Dr. Muse


Pre-Operative Diagnosis: Enterocutaneous fistula x 2


Operative Findings: two entero-cutaneous fistulas of small bowel, numerous 

diffuse dense adhesions, see full operative report


Post-Operative Diagnosis: Enterocutaneous fistulas x2, dense intra-abdominal 

adhesions


Operation Performed: exploratory laparotomy, primary anastamosis, lysis of 

dense adhesions


Specimen/Specimens Removed: 5 cm x 2 cm skin excess


Estimated Blood Loss: EBL {In ML}: 50


Blood Products Given: N/A


Drains Used: Juan


Post-Op Condition: Good


Date of Surgery/Procedure: 07/05/18


Time of Surgery/Procedure: 18:00

## 2018-07-05 NOTE — PN
DATE:  07/05/2018



PULMONARY PROGRESS NOTE



REFERRING PHYSICIAN:  Sandra Perez MD.



SUBJECTIVE:  He is lying in the bed, sleepy, arousable, scheduled for

abdominal surgery today.  No cough.  No sputum production.  Mild abdominal

pain.  No leg pain or leg swelling.



PHYSICAL EXAMINATION:

GENERAL:  In no acute distress.

VITAL SIGNS:  Temperature is 98, heart rate is 73, respiratory rate is 18,

blood pressure 100/73, pulse oximetry 95% on room air.

HEENT:  Moist mucous membrane.

NECK:  Supple.  No JVD.

LUNGS:  Have a fair airflow with rhonchi.

HEART:  S1 and S2.

ABDOMEN:  Has a wound covered with dressing, ileostomy draining well.

EXTREMITIES:  There is no edema.

NEUROLOGIC:  Sleepy, arousable, follows simple commands.



MEDICATIONS:  He is on Ativan 0.5 mg every 6 hours p.r.n. for anxiety,

Benadryl 25 mg every 6 hours for itching, Cepacol lozenges every 2 hours

p.r.n., getting Clinimix 83 mL per hour, petroleum jelly to affected area

every 4 hours, DuoNeb every 6 hours p.r.n., metronidazole 250 mg every 8

hours, heparin 5000 units subcu every 12 hours, clonazepam 0.25 mg twice a

day, morphine 2 mg every 4 hours p.r.n., Protonix 40 mg daily, Sandostatin

50 mcg subcutaneous every 8 hours, Sonata 5 mg at bedtime p.r.n., and

Synthroid 150 mcg daily.



LABORATORY DATA:  Shows hemoglobin 11.6, hematocrit 34.8, WBC 6.6, platelet

is 222.  INR 1.1, PTT 27.  Sodium 145, potassium 4.4, chloride 104,

bicarbonate 22, BUN 34, creatinine 1, glucose 101, calcium 10.1, magnesium

2.1.  AST 52, ALT 50, alkaline phosphatase is 66, albumin is 4.1.



IMPRESSION AND PLAN:  Enterocutaneous fistula with persistent drainage,

nonhealing mid abdominal wound, chronic obstructive lung disease,

malnutrition, hypothyroid, bladder resection in the past, activity of daily

living dysfunction, chronic lung disease.  Pulmonary point of view, doing

well.  Continue bronchodilator.  Keep head of bed at 45 degrees, pain

management, on total parenteral nutrition, gastric and deep vein thrombosis

prophylaxis.  Surgical followup.  Continue therapy, out of bed to chair as

tolerated.



Thank you and we will follow with you.







__________________________________________

Dante Haque MD



DD:  07/05/2018 17:46:30

DT:  07/05/2018 20:19:16

Job # [id number]

## 2018-07-05 NOTE — CP.PCM.PN
<Yudy Adams - Last Filed: 07/05/18 12:14>





Subjective





- Date & Time of Evaluation


Date of Evaluation: 07/05/18


Time of Evaluation: 07:00





- Subjective


Subjective: 





Medicine Progress Note for Dr. Hunter (covering for Dr. Perez), MELISA Adams 

PGY3





Patient seen and examined at bedside. There were no acute overnight events as 

per nursing staff. Patient is in good spirits today. He denies chest pain, 

shortness of breath, abdominal pain, nausea/vomiting, fever/chills, numbness or 

tingling. 











Objective





- Vital Signs/Intake and Output


Vital Signs (last 24 hours): 


 











Temp Pulse Resp BP Pulse Ox


 


 98.0 F   68   18   114/63   95 


 


 07/04/18 22:00  07/04/18 22:00  07/04/18 22:00  07/04/18 22:00  07/04/18 22:00








Intake and Output: 


 











 07/04/18 07/05/18





 18:59 06:59


 


Intake Total  0


 


Output Total 1000 700


 


Balance -1000 -700














- Medications


Medications: 


 Current Medications





Albuterol/Ipratropium (Duoneb 3 Mg/0.5 Mg (3 Ml) Ud)  3 ml IH [id number] PRN


   PRN Reason: Sinus symptoms


Benzocaine/Menthol (Cepacol Sore Throat)  1 fernandez MT Q2H PRN


   PRN Reason: Sore Throat


   Last Admin: 07/04/18 20:10 Dose:  1 fernandez


Clonazepam (Klonopin)  0.25 mg PO BID MARIA D


   PRN Reason: Protocol


   Last Admin: 07/04/18 19:03 Dose:  0.25 mg


Diphenhydramine HCl (Benadryl)  25 mg PO Q6 PRN


   PRN Reason: Itching / Pruritus


   Last Admin: 07/04/18 01:39 Dose:  25 mg


Heparin Sodium (Porcine) (Heparin)  5,000 units SC Q12 [facility]IA D


   PRN Reason: Protocol


   Last Admin: 07/04/18 21:35 Dose:  Not Given


Amino Acids/Electrolytes/Dextrose (Clinimix 5/20 % "E" (2000 Ml))  2,000 mls @ 

83.333 mls/hr IV .Q24H [facility]


   Stop: 07/05/18 17:59


   Last Admin: 07/04/18 19:04 Dose:  83.333 mls/hr


Levothyroxine Sodium (Synthroid)  150 mcg PO 0600 [facility]


   Last Admin: 07/05/18 05:27 Dose:  150 mcg


Lorazepam (Ativan)  0.5 mg IV Q6 PRN; Protocol


   PRN Reason: anxiety/insomnia


   Last Admin: 07/04/18 05:41 Dose:  0.5 mg


Magnesium Hydroxide (Milk Of Magnesia)  30 ml PO DAILY [facility]


Metronidazole (Flagyl)  250 mg PO Q8H MARIA D


   PRN Reason: Protocol


   Stop: 07/10/18 10:31


   Last Admin: 07/05/18 05:27 Dose:  250 mg


Morphine Sulfate (Morphine)  2 mg IVP Q4H PRN


   PRN Reason: Pain, severe (8-10)


Nystatin (Nystop Topical Powder)  0 gm TOP TID [facility]


   Last Admin: 07/04/18 19:15 Dose:  1 dose


Octreotide Acetate (Sandostatin)  50 mcg SC Q8H [facility]


   Last Admin: 07/05/18 02:21 Dose:  Not Given


Pantoprazole Sodium (Protonix Ec Tab)  40 mg PO 0600 [facility]


   Last Admin: 07/05/18 05:27 Dose:  40 mg


Petrolatum (Desitin Maximum Strength Topical 40% Oint)  0 gm TOP Q4H PRN


   PRN Reason: Rash


   Last Admin: 07/02/18 17:52 Dose:  1 applic


Saliva Substitute (Saliva Substitute)  2 ml PO Q6H PRN


   PRN Reason: Dry mouth


   Last Admin: 07/04/18 20:11 Dose:  2 ml


Zaleplon (Sonata)  5 mg PO HS PRN


   PRN Reason: Insomnia


   Last Admin: 07/04/18 21:32 Dose:  5 mg











- Labs


Labs: 


 





 07/05/18 06:10 





 07/04/18 05:55 





 











PT  12.7 SECONDS (9.4-12.5)  H  07/05/18  06:10    


 


INR  1.10  (0.93-1.08)  H  07/05/18  06:10    


 


APTT  26.9 Seconds (25.1-36.5)   07/05/18  06:10    














- Constitutional


Appears: No Acute Distress





- Head Exam


Head Exam: ATRAUMATIC, NORMAL INSPECTION, NORMOCEPHALIC





- Eye Exam


Eye Exam: Normal appearance, PERRL


Pupil Exam: NORMAL ACCOMODATION, PERRL





- ENT Exam


ENT Exam: Mucous Membranes Dry





- Respiratory Exam


Respiratory Exam: Clear to Ausculation Bilateral, NORMAL BREATHING PATTERN.  

absent: Rales, Rhonchi, Wheezes





- Cardiovascular Exam


Cardiovascular Exam: REGULAR RHYTHM, +S1, +S2.  absent: Gallop, Rubs, Murmur





- GI/Abdominal Exam


GI & Abdominal Exam: Soft.  absent: Tenderness


Additional comments: 





L ileal condiut site clean and dry, draining yellow urine. Midline wound vac in 

place- draining green stool. Excoriations around lower abdomen. 





- Extremities Exam


Extremities Exam: Normal Inspection.  absent: Calf Tenderness, Pedal Edema





- Neurological Exam


Neurological Exam: Alert, Awake, CN II-XII Intact, Oriented x3





- Psychiatric Exam


Psychiatric exam: Normal Affect, Normal Mood





- Skin


Skin Exam: Dry, Warm





Assessment and Plan





- Assessment and Plan (Free Text)


Assessment: 


This is 61yo male with past medical history of anemia, hypothyroidism 

hemorrhagic cystitis s/p cystectomy and ileal conduit, multiple abdominal 

surgeries who is admitted for 





1. Abdominal wall fistula 


   - GI series showed possible fistula from jejunum 


   - Fistulagram showed 2 different fistulas within the small bowel 


   - abdominal drain cultures + for VRE and Klebisella 


2. Hemorrhagic cystitis s/p cystectomty and ileal condiut w/ revision 


3. UTI 


   - + for VRE and Klebsiella 


4. Hepatic lesion 


   - Previous biopsy was non-diagnostic 


5. Anemia (normocytic) 


   - Hgb stable 


6. Hypothyroid


   - TSH 0.04 


Plan: 


He is on TPN. Patient is planned to go to OR today. Cardiology reports patient 

is able to proceed with surgery. His pain is on control. Continue Synthroid, 

and Flagyl. ID is on consult. Continue to monitor output from drains. Continue 

wound care. Patient was evaluated by psychiatry and mood has improved with 

Klonopin and prn Ativan. Pulm is on consult, recs appreciated. Continue GI and 

DVT prophylaxis. GI recommendations appreciated. PT recommended home with 

services upon discharge. 





Case seen, discussed and reviewed with Dr. Hunter. 


MELISA Adams PGY3





<Julian Hunter - Last Filed: 07/05/18 22:09>





Objective





- Vital Signs/Intake and Output


Vital Signs (last 24 hours): 


 











Temp Pulse Resp BP Pulse Ox


 


 98.3 F   76   19   131/71   100 


 


 07/05/18 21:50  07/05/18 21:50  07/05/18 21:50  07/05/18 21:50  07/05/18 21:50








Intake and Output: 


 











 07/05/18 07/06/18





 18:59 06:59


 


Intake Total 0 


 


Output Total 500 


 


Balance -500 














- Medications


Medications: 


 Current Medications





Albuterol/Ipratropium (Duoneb 3 Mg/0.5 Mg (3 Ml) Ud)  3 ml IH [id number] PRN


   PRN Reason: Sinus symptoms


Benzocaine/Menthol (Cepacol Sore Throat)  1 fernandez MT Q2H PRN


   PRN Reason: Sore Throat


   Last Admin: 07/04/18 20:10 Dose:  1 fernandez


Clonazepam (Klonopin)  0.25 mg PO BID [facility]


   PRN Reason: Protocol


   Last Admin: 07/05/18 10:53 Dose:  0.25 mg


Diphenhydramine HCl (Benadryl)  25 mg PO Q6 PRN


   PRN Reason: Itching / Pruritus


   Last Admin: 07/04/18 01:39 Dose:  25 mg


Heparin Sodium (Porcine) (Heparin)  5,000 units SC Q12 [facility]


   PRN Reason: Protocol


   Last Admin: 07/04/18 21:35 Dose:  Not Given


Hydromorphone HCl (Dilaudid)  0.5 mg IVP Q15M PRN


   PRN Reason: Pain, moderate (4-7)


   Stop: 07/05/18 23:14


   Last Admin: 07/05/18 21:10 Dose:  0.5 mg


Lactated Ringer's (Lactated Ringer's)  1,000 mls @ 75 mls/hr IV .[id number] [facility]


   Stop: 07/05/18 23:16


Cefazolin Sodium (Ancef 1gm In Ns)  1 gm in 100 mls @ 100 mls/hr IVPB BID [facility]


   PRN Reason: Protocol


Levothyroxine Sodium (Synthroid)  150 mcg PO 0600 [facility]


   Last Admin: 07/05/18 05:27 Dose:  150 mcg


Lorazepam (Ativan)  0.5 mg IV Q6 PRN; Protocol


   PRN Reason: anxiety/insomnia


   Last Admin: 07/04/18 05:41 Dose:  0.5 mg


Magnesium Hydroxide (Milk Of Magnesia)  30 ml PO DAILY [facility]


   Last Admin: 07/05/18 10:53 Dose:  30 ml


Metronidazole (Flagyl)  250 mg PO Q8H [facility]


   PRN Reason: Protocol


   Stop: 07/10/18 10:31


   Last Admin: 07/05/18 10:53 Dose:  250 mg


Morphine Sulfate (Morphine)  4 mg IVP Q4H PRN


   PRN Reason: Pain, severe (8-10)


Morphine Sulfate (Morphine)  2 mg IVP Q4H PRN


   PRN Reason: Pain, moderate (4-7)


Nystatin (Nystop Topical Powder)  0 gm TOP TID [facility]


   Last Admin: 07/05/18 11:05 Dose:  1 dose


Octreotide Acetate (Sandostatin)  50 mcg SC Q8H [facility]


   Last Admin: 07/05/18 10:58 Dose:  Not Given


Ondansetron HCl (Zofran Inj)  4 mg IVP ONCE PRN


   PRN Reason: Nausea/Vomiting


Pantoprazole Sodium (Protonix Ec Tab)  40 mg PO 0600 [facility]


   Last Admin: 07/05/18 05:27 Dose:  40 mg


Petrolatum (Desitin Maximum Strength Topical 40% Oint)  0 gm TOP Q4H PRN


   PRN Reason: Rash


   Last Admin: 07/02/18 17:52 Dose:  1 applic


Saliva Substitute (Saliva Substitute)  2 ml PO Q6H PRN


   PRN Reason: Dry mouth


   Last Admin: 07/04/18 20:11 Dose:  2 ml


Zaleplon (Sonata)  5 mg PO HS PRN


   PRN Reason: Insomnia


   Last Admin: 07/04/18 21:32 Dose:  5 mg











- Labs


Labs: 


 





 07/05/18 06:10 





 07/05/18 06:10 





 











PT  12.7 SECONDS (9.4-12.5)  H  07/05/18  06:10    


 


INR  1.10  (0.93-1.08)  H  07/05/18  06:10    


 


APTT  26.9 Seconds (25.1-36.5)   07/05/18  06:10    














Assessment and Plan





- Assessment and Plan (Free Text)


Plan: 





Pt seen and examined. I have reviewed the note of the medical resident and 

agree with it. I have discussed the assessment and plan with the resident.  I 

have reviewed the patient's labs and medications. Pt went to OR today. No pain. 

On Klonopin and Ativan.

## 2018-07-05 NOTE — PN
DATE:  07/05/2018



REASON FOR THE CONSULTATION:  Sinus bradycardia; abdominal fistula,

multiple; history of COPD; history of urinary bladder removal, status post

ileal conduit, hypothyroidism, anxiety disorder, preop evaluation for

reconstruction surgery.



SUBJECTIVE:  The patient denies any chest pain, shortness of breath or any

palpitation.



OBJECTIVE:

GENERAL:  Not in any apparent distress, lying flat in the bed, awaiting to

go for OR.  The patient has baseline bradycardia for many years.

VITAL SIGNS:  Temperature afebrile, heart rate 66, blood pressure 104/64.

HEENT:  PERRLA.  Extraocular muscles intact.

NECK:  Supple.  No carotid bruit.  No thyromegaly.

CHEST:  Clear to auscultation.

HEART:  S1 and S2 regular.

ABDOMEN:  Soft.  Multiple abdominal fistulas noted.  Ileostomy noted.

EXTREMITIES:  Clubbing and cyanosis negative.



LABORATORY DATA:  Blood workup:  WBC 6.6, hemoglobin 11.6, hematocrit 34.6,

platelet count 222.  Chemistry shows sodium 145, potassium 4.4, chloride

108, carbon dioxide 22, anion gap of 19, BUN 34, creatinine 1.  Total _____

albumin and globulin ratio 0.9.



IMPRESSION:  A 62-year-old male with past medical history significant for

bradycardia for many years, being admitted for possible closure of the

fistula, multiple abdominal fistula, history of resection of the bladder

from hemorrhagic cystitis, cystectomy, ileal conduit, multiple abdominal

surgeries, multiple abdominal fistulas.  Going today for abdominal closure.



RECOMMENDATIONS:  Given these findings, the patient is cleared from

Cardiology point of view to go for abdominal surgery.  No evidence of acute

ischemia, arrhythmia, or congestive heart failure.  We will clear the

patient with moderate risk because of underlying comorbidity.  The patient

is not on beta blocker because the patient has a baseline bradycardia.  The

patient had noninvasive workup a couple of months ago at Dr. Mays's

office, essentially the patient was told negative.  We will follow with

you.  We will repeat the lab in the morning.



Thank you, Dr. Perez, for providing us the opportunity in taking care of

the patient, Dmitri Hickey.







__________________________________________

Dante Albarado MD



DD:  07/05/2018 13:29:00

DT:  07/05/2018 15:11:15

Job # [id number]

## 2018-07-05 NOTE — PN
DATE:  07/03/2018



SUBJECTIVE:  Patient is a 62-year-old male.  Patient was seen and examined

at the bedside on 07/03/2018, looking comfortable.  No change in the

status.  No nausea, vomiting, or diarrhea.  No chest pain, no palpitation. 

No headache, no dizziness.



PHYSICAL EXAMINATION:

VITAL SIGNS:  Temperature 98, heart rate 60, respiratory rate 20, blood

pressure 99/60, pulse oximetry 96% on room air.

HEENT:  Head:  Normocephalic, atraumatic.  Eyes:  PERRLA.  Extraocular

muscles intact.  Conjunctivae clear.  Nose:  Patent.  Mucous membrane

moist.

NECK:  Supple.  No carotid bruit, JVD, or thyromegaly.

CHEST:  Bilaterally symmetrical.

HEART:  S1 and S2 positive.

LUNGS:  Clear to auscultation.

ABDOMEN:  Soft.  Having 2 to 3 bags and tender.

EXTREMITIES:  No edema.  No cyanosis.

NEUROLOGIC:  Patient is awake and alert.  Moving all 4 extremities.  No

focal deficit.



MEDICATIONS:  Ativan, Benadryl, Cepacol lozenges, Clinimix, DuoNeb, Flagyl,

clonazepam, morphine, Protonix, Sonata, and Synthroid.



LABORATORY DATA:  Hemoglobin 11, hematocrit 33.2, white blood cells 6.2,

platelets 183.  Sodium 143, potassium 4.1, BUN 32, creatinine 1, glucose

148.  AST 47, ALT 28.



ASSESSMENT AND PLAN:  Mr. Dmitri Hickey is a 62-year-old male with

enterocutaneous fistula with persistent drainage, nonhealing open wound of

abdominal wall, chronic obstructive lung disease, malnutrition, depressed. 

Psychiatrist is on the case.  Hypothyroidism, history of bladder resection

due to hemorrhagic cystitis.  He is not able to do his activity of daily

living.  Surgery is on the case.  GI is on the case.  Waiting for surgical

followup.  Repeat labs.  We will follow up.







__________________________________________

Sandra Perez MD



DD:  07/05/2018 1:12:38

DT:  07/05/2018 1:38:07

Job # [id number]

## 2018-07-06 LAB
ALBUMIN SERPL-MCNC: 3.2 G/DL (ref 3–4.8)
ALBUMIN/GLOB SERPL: 0.9 {RATIO} (ref 1.1–1.8)
ALT SERPL-CCNC: 26 U/L (ref 7–56)
AST SERPL-CCNC: 39 U/L (ref 17–59)
BASOPHILS # BLD AUTO: 0.01 K/MM3 (ref 0–2)
BASOPHILS NFR BLD: 0.1 % (ref 0–3)
BUN SERPL-MCNC: 32 MG/DL (ref 7–21)
CALCIUM SERPL-MCNC: 8.9 MG/DL (ref 8.4–10.5)
EOSINOPHIL # BLD: 0.1 10*3/UL (ref 0–0.7)
EOSINOPHIL NFR BLD: 1.8 % (ref 1.5–5)
ERYTHROCYTE [DISTWIDTH] IN BLOOD BY AUTOMATED COUNT: 14 % (ref 11.5–14.5)
GFR NON-AFRICAN AMERICAN: > 60
GRANULOCYTES # BLD: 6.01 10*3/UL (ref 1.4–6.5)
GRANULOCYTES NFR BLD: 78.8 % (ref 50–68)
HGB BLD-MCNC: 9.3 G/DL (ref 14–18)
LYMPHOCYTES # BLD: 1 10*3/UL (ref 1.2–3.4)
LYMPHOCYTES NFR BLD AUTO: 13.5 % (ref 22–35)
MCH RBC QN AUTO: 27.3 PG (ref 25–35)
MCHC RBC AUTO-ENTMCNC: 32.6 G/DL (ref 31–37)
MCV RBC AUTO: 83.6 FL (ref 80–105)
MONOCYTES # BLD AUTO: 0.4 10*3/UL (ref 0.1–0.6)
MONOCYTES NFR BLD: 5.8 % (ref 1–6)
PLATELET # BLD: 175 10^3/UL (ref 120–450)
PMV BLD AUTO: 8.9 FL (ref 7–11)
RBC # BLD AUTO: 3.41 10^6/UL (ref 3.5–6.1)
WBC # BLD AUTO: 7.6 10^3/UL (ref 4.5–11)

## 2018-07-06 RX ADMIN — MORPHINE SULFATE PRN MG: 4 INJECTION, SOLUTION INTRAMUSCULAR; INTRAVENOUS at 15:28

## 2018-07-06 RX ADMIN — CEFAZOLIN SCH MLS/HR: 330 INJECTION, POWDER, FOR SOLUTION INTRAMUSCULAR; INTRAVENOUS at 17:02

## 2018-07-06 RX ADMIN — PANTOPRAZOLE SODIUM SCH: 40 TABLET, DELAYED RELEASE ORAL at 06:26

## 2018-07-06 RX ADMIN — MORPHINE SULFATE PRN MG: 4 INJECTION, SOLUTION INTRAMUSCULAR; INTRAVENOUS at 03:48

## 2018-07-06 RX ADMIN — PANTOPRAZOLE SODIUM SCH: 40 TABLET, DELAYED RELEASE ORAL at 05:15

## 2018-07-06 RX ADMIN — PURIFIED WATER PRN LOZ: 99.05 LIQUID OPHTHALMIC at 23:34

## 2018-07-06 RX ADMIN — MORPHINE SULFATE PRN MG: 4 INJECTION, SOLUTION INTRAMUSCULAR; INTRAVENOUS at 20:55

## 2018-07-06 RX ADMIN — MORPHINE SULFATE PRN MG: 4 INJECTION, SOLUTION INTRAMUSCULAR; INTRAVENOUS at 08:25

## 2018-07-06 RX ADMIN — NYSTATIN SCH APPLIC: 100000 POWDER TOPICAL at 10:09

## 2018-07-06 RX ADMIN — NYSTATIN SCH: 100000 POWDER TOPICAL at 17:28

## 2018-07-06 RX ADMIN — PURIFIED WATER PRN LOZ: 99.05 LIQUID OPHTHALMIC at 06:00

## 2018-07-06 RX ADMIN — PANTOPRAZOLE SODIUM SCH MG: 40 TABLET, DELAYED RELEASE ORAL at 05:37

## 2018-07-06 RX ADMIN — CEFAZOLIN SCH MLS/HR: 330 INJECTION, POWDER, FOR SOLUTION INTRAMUSCULAR; INTRAVENOUS at 05:38

## 2018-07-06 RX ADMIN — METRONIDAZOLE SCH MLS/HR: 500 INJECTION, SOLUTION INTRAVENOUS at 10:05

## 2018-07-06 RX ADMIN — LEVOTHYROXINE SODIUM ANHYDROUS SCH MCG: 100 INJECTION, POWDER, LYOPHILIZED, FOR SOLUTION INTRAVENOUS at 10:06

## 2018-07-06 RX ADMIN — NYSTATIN SCH: 100000 POWDER TOPICAL at 15:10

## 2018-07-06 RX ADMIN — METRONIDAZOLE SCH MLS/HR: 500 INJECTION, SOLUTION INTRAVENOUS at 02:49

## 2018-07-06 RX ADMIN — METRONIDAZOLE SCH MLS/HR: 500 INJECTION, SOLUTION INTRAVENOUS at 17:59

## 2018-07-06 RX ADMIN — MAGNESIUM HYDROXIDE SCH: 400 SUSPENSION ORAL at 10:09

## 2018-07-06 NOTE — PN
DATE:  07/06/2018



REASON FOR CONSULTATION AND FOLLOWUP:  Sinus bradycardia, abdominal

fistula, status post exploratory laparotomy, preop evaluation, and postop

followup.



SUBJECTIVE:  The patient denies any chest pain, complaining of throat pain

probably secondary to NG tube, status post exploratory laparotomy, and NG

tube in place.



OBJECTIVE:

GENERAL:  Not in apparent distress, lying flat in the bed, _____ getting

PPN.

VITAL SIGNS:  Temperature afebrile, heart rate 90, blood pressure 112/67.

HEENT:  PERRLA, intact.

NECK:  Supple.  No carotid bruit or thyromegaly.

CHEST:  Clear to auscultation.

HEART:  S1 and S2, regular.

ABDOMEN:  Soft.

EXTREMITIES:  Clubbing and cyanosis negative.



LABORATORY DATA:  Blood workup as follows:  WBC ____ , hemoglobin _____ ,

hematocrit 28.5, and platelet count 175.  Chemistry shows sodium 144,

potassium 4.6, chloride 108, carbon dioxide 28, anion gap of 18.  BUN 32,

creatinine 1.1.  Magnesium 1.6.



IMPRESSION:  Asymptomatic bradycardia for long time, history of hemorrhagic

cystitis, status post resection, status post ileal conduit, multiple _____,

multiple abdominal fistula, status post exploratory laparotomy, lysis of

adhesions, end-to-end anastomosis, resection of the bowel.



RECOMMENDATIONS:  Continue PPN.  The patient has a baseline bradycardia,

asymptomatic, preop.  The patient has workup that was essentially negative.

Supplement electrolytes as needed.  We will follow with you.  Continue DVT

prophylaxis.



Thank you, Dr. Perez, for providing us the opportunity in taking care of

the patient, Ruperto Rizvi.







__________________________________________

Dante Albarado MD



DD:  07/06/2018 11:32:48

DT:  07/06/2018 14:00:45

Job # [id number]

## 2018-07-06 NOTE — CP.PCM.PN
Subjective





- Date & Time of Evaluation


Date of Evaluation: 07/06/18


Time of Evaluation: 08:20





- Subjective


Subjective: 





Seen and examined at the  Bedside this morning, chart reviewed.  Postop day #1 

status post exploratory laparoscopy with primary anastomosis and lysis of 

adhesions.  Patient with NG tube to LIS , very minimal drainage noted.  Does c/

o sore throat, on Cepacol lozenges prn. Patient denied nausea, vomiting, fever 

or chills patient is belching.  Intermittent sleep, no distress.





Objective





- Vital Signs/Intake and Output


Vital Signs (last 24 hours): 


 











Temp Pulse Resp BP Pulse Ox


 


 98.3 F   90   18   112/67   98 


 


 07/06/18 06:00  07/06/18 06:00  07/06/18 06:00  07/06/18 06:00  07/06/18 06:00








Intake and Output: 


 











 07/06/18 07/06/18





 06:59 18:59


 


Intake Total  1100


 


Output Total 480 50


 


Balance -480 1050














- Medications


Medications: 


 Current Medications





Albuterol/Ipratropium (Duoneb 3 Mg/0.5 Mg (3 Ml) Ud)  3 ml IH [id number] PRN


   PRN Reason: Sinus symptoms


Benzocaine/Menthol (Cepacol Sore Throat)  1 fernandez MT Q2H PRN


   PRN Reason: Sore Throat


   Last Admin: 07/06/18 06:00 Dose:  1 fernandez


Clonazepam (Klonopin)  0.25 mg PO BID MARIA D


   PRN Reason: Protocol


   Last Admin: 07/05/18 10:53 Dose:  0.25 mg


Diphenhydramine HCl (Benadryl)  25 mg PO Q6 PRN


   PRN Reason: Itching / Pruritus


   Last Admin: 07/04/18 01:39 Dose:  25 mg


Heparin Sodium (Porcine) (Heparin)  5,000 units SC Q12 MARIA D


   PRN Reason: Protocol


Cefazolin Sodium (Ancef 1gm In Ns)  1 gm in 100 mls @ 100 mls/hr IVPB Q12H MARIA D


   PRN Reason: Protocol


   Last Admin: 07/06/18 05:38 Dose:  100 mls/hr


Metronidazole (Flagyl)  500 mg in 100 mls @ 100 mls/hr IVPB Q8H MARIA D


   PRN Reason: Protocol


   Last Admin: 07/06/18 02:49 Dose:  100 mls/hr


Lactated Ringer's (Lactated Ringer's)  1,000 mls @ 125 mls/hr IV .Q8H MARIA D


   Last Admin: 07/06/18 08:18 Dose:  125 mls/hr


Levothyroxine Sodium (Synthroid)  75 mcg IVP DAILY [facility]


Lorazepam (Ativan)  0.5 mg IV Q6 PRN; Protocol


   PRN Reason: anxiety/insomnia


   Last Admin: 07/06/18 02:49 Dose:  0.5 mg


Magnesium Hydroxide (Milk Of Magnesia)  30 ml PO DAILY MARIA D


   Last Admin: 07/05/18 10:53 Dose:  30 ml


Morphine Sulfate (Morphine)  4 mg IVP Q4H PRN


   PRN Reason: Pain, severe (8-10)


   Last Admin: 07/06/18 08:25 Dose:  4 mg


Morphine Sulfate (Morphine)  2 mg IVP Q4H PRN


   PRN Reason: Pain, moderate (4-7)


   Last Admin: 07/05/18 23:42 Dose:  2 mg


Nystatin (Nystop Topical Powder)  0 gm TOP TID [facility]


   Last Admin: 07/05/18 11:05 Dose:  1 dose


Ondansetron HCl (Zofran Inj)  4 mg IVP Q6H PRN


   PRN Reason: Nausea/Vomiting


Pantoprazole Sodium (Protonix Inj)  40 mg IVP Q12 [facility]


Petrolatum (Desitin Maximum Strength Topical 40% Oint)  0 gm TOP Q4H PRN


   PRN Reason: Rash


   Last Admin: 07/02/18 17:52 Dose:  1 applic


Saliva Substitute (Saliva Substitute)  2 ml PO Q6H PRN


   PRN Reason: Dry mouth


   Last Admin: 07/04/18 20:11 Dose:  2 ml


Zaleplon (Sonata)  5 mg PO HS PRN


   PRN Reason: Insomnia


   Last Admin: 07/04/18 21:32 Dose:  5 mg











- Labs


Labs: 


 





 07/06/18 07:00 





 07/06/18 07:00 





 











PT  12.7 SECONDS (9.4-12.5)  H  07/05/18  06:10    


 


INR  1.10  (0.93-1.08)  H  07/05/18  06:10    


 


APTT  26.9 Seconds (25.1-36.5)   07/05/18  06:10    














- Constitutional


Appears: No Acute Distress





- Head Exam


Head Exam: NORMOCEPHALIC





- Eye Exam


Eye Exam: Normal appearance.  absent: Scleral icterus





- ENT Exam


ENT Exam: Mucous Membranes Moist





- Neck Exam


Neck Exam: Normal Inspection





- Respiratory Exam


Respiratory Exam: Clear to Ausculation Bilateral, NORMAL BREATHING PATTERN.  

absent: Respiratory Distress





- Cardiovascular Exam


Cardiovascular Exam: +S1, +S2





- GI/Abdominal Exam


GI & Abdominal Exam: Normal Bowel Sounds


Additional comments: 





surgical dressing in place dry and intact, CYNTHIA drain with serosanguenous. left 

lateral urostomy bag with clear yellow urine.





- Extremities Exam


Extremities Exam: absent: Calf Tenderness, Pedal Edema


Additional comments: 





scd in place





- Neurological Exam


Neurological Exam: Alert, Awake, Oriented x3





- Skin


Skin Exam: Dry, Warm





Assessment and Plan





- Assessment and Plan (Free Text)


Assessment: 





Assessment: 





S/P Exploratory Lap with primary anatomosis with lysis of adhesion


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 

fistulogram show abdominal fistulax2


Hemorrhagic cystitis s/p cystectomty and ileal condiut w/ revision 


UTI 


Hepatic lesion - Previous biopsy was non-diagnostic 


Anemia


Hypothyroid





Plan: 





diet as per surgery 


to restart TPN today, currently on IVF


on IV antibiotics 


on PPI


Monitor I&O as well as drain output


on DVT prophylaxsis


monitor H/H and for overt GI bleeding


post op mgt as per surgery





Seen and discussed with Dr. Pham.

## 2018-07-06 NOTE — PN
DATE:  07/06/2016



PULMONARY PROGRESS NOTE



REFERRING PHYSICIAN:  Sandra Perez MD.



SUBJECTIVE:  He is lying in the bed, head at 45 degrees.  Feels much

better, status post laparotomy, lysis of adhesions.  No headaches.  No

rhinitis.  No cough.  Still n.p.o.  No leg pain or leg swelling.



PHYSICAL EXAMINATION:

GENERAL:  In no acute distress.

VITAL SIGNS:  Temperature is 98, heart rate is 90, respiratory rate 18,

blood pressure 112/67, pulse ox is 98% on 3 L nasal cannula.

HEENT:  Moist mucous membrane.  No ulcer or thrush noted.

NECK:  Supple.  No JVD.

LUNGS:  Have a fair airflow with rhonchi.

HEART:  S1 and S2.

ABDOMEN:  Had midline surgical incision site covered with dressing. 

Colostomy working well.

EXTREMITIES:  There is no edema.

NEUROLOGICAL:  Awake and alert.  Follows simple command.



MEDICATIONS:  He is on Ancef 1 g IV every 12 hours, Ativan 0.5 mg every 6

hours p.r.n., Benadryl 25 mg every 6 hours p.r.n., Cepacol lozenges every 2

hours p.r.n., Clinimix 83 mL/hour, petroleum at affected area, DuoNeb every

6 hours p.r.n., Flagyl 500 mg every 8 hours, heparin 5000 units subcu every

12 hours, Intralipid 20 mL/hour, clonazepam 0.25 mg twice a day, lactated

Ringer was given 125 mL per hour, morphine 4 mg every 4 hours p.r.n.,

morphine sulfate 2 mg for mild pain, nystatin 3 times a day, Protonix 40 mg

every 12 hours, Saliva Substitute every 6 hours p.r.n., Sonata 5 mg at

bedtime p.r.n., Synthroid 75 mcg daily, Zofran p.r.n. basis.



LABORATORY DATA:  Shows hemoglobin 9.3, hematocrit 28.5, WBC 7.6, platelet

count is 175.  Sodium 144, potassium 4.6, chloride 108, bicarbonate 22, BUN

32, creatinine 1.1, glucose 117, calcium is 8.9, phosphorus 4.6, magnesium

1.6, AST 39, ALT 26, alkaline phosphatase is 62, albumin is 3.2.



IMPRESSION AND PLAN:  Status post laparotomy, has a colostomy, history of

enterocutaneous fistula, chronic obstructive lung disease, malnutrition,

hypothyroid.  Pulmonary point of view, doing okay.  Continue

bronchodilator.  Keep head at 45 degrees.  Continue TPN, pain management. 

GI and Surgical followup.  Gastric and deep venous thrombosis prophylaxis.



Thank you and we will follow with you.









__________________________________________

Dante Haque MD



DD:  07/06/2018 15:16:57

DT:  07/06/2018 17:32:04

Job # [id number]

## 2018-07-06 NOTE — CP.PCM.PN
Subjective





- Date & Time of Evaluation


Date of Evaluation: 07/06/18


Time of Evaluation: 06:55





- Subjective


Subjective: 





General Surgery Note for Dr. Crawford





Patient seen and examined at bedside. No acute event overnight. Patient is s/p 

ex-lap, enterotomy repair x2, lysis of dense adhesions. Patient states pain is 

controlled. He is complaining of dry mouth and sore throat. Urine 1380 cc over 

last 24 hours. Juan drain with 50 cc of serosanguinous output since OR. NGT 

with minimal output overnight.





Objective





- Vital Signs/Intake and Output


Vital Signs (last 24 hours): 


 











Temp Pulse Resp BP Pulse Ox


 


 98.3 F   76   19   131/71   100 


 


 07/05/18 21:50  07/05/18 21:50  07/05/18 21:50  07/05/18 21:50  07/05/18 21:50








Intake and Output: 


 











 07/06/18 07/06/18





 06:59 18:59


 


Output Total 480 


 


Balance -480 














- Medications


Medications: 


 Current Medications





Albuterol/Ipratropium (Duoneb 3 Mg/0.5 Mg (3 Ml) Ud)  3 ml IH [id number] PRN


   PRN Reason: Sinus symptoms


Benzocaine/Menthol (Cepacol Sore Throat)  1 fernandez MT Q2H PRN


   PRN Reason: Sore Throat


   Last Admin: 07/06/18 06:00 Dose:  1 fernandez


Clonazepam (Klonopin)  0.25 mg PO BID MARIA D


   PRN Reason: Protocol


   Last Admin: 07/05/18 10:53 Dose:  0.25 mg


Diphenhydramine HCl (Benadryl)  25 mg PO Q6 PRN


   PRN Reason: Itching / Pruritus


   Last Admin: 07/04/18 01:39 Dose:  25 mg


Heparin Sodium (Porcine) (Heparin)  5,000 units SC Q12 MARIA D


   PRN Reason: Protocol


Cefazolin Sodium (Ancef 1gm In Ns)  1 gm in 100 mls @ 100 mls/hr IVPB Q12H [facility] D


   PRN Reason: Protocol


   Last Admin: 07/06/18 05:38 Dose:  100 mls/hr


Lactated Ringer's (Lactated Ringer's)  1,000 mls @ 100 mls/hr IV .Q10H [facility]IA D


   Last Admin: 07/05/18 23:35 Dose:  100 mls/hr


Metronidazole (Flagyl)  500 mg in 100 mls @ 100 mls/hr IVPB Q8H [facility]IA D


   PRN Reason: Protocol


   Last Admin: 07/06/18 02:49 Dose:  100 mls/hr


Levothyroxine Sodium (Synthroid)  75 mcg IVP DAILY [facility]


Lorazepam (Ativan)  0.5 mg IV Q6 PRN; Protocol


   PRN Reason: anxiety/insomnia


   Last Admin: 07/06/18 02:49 Dose:  0.5 mg


Magnesium Hydroxide (Milk Of Magnesia)  30 ml PO DAILY MARIA D


   Last Admin: 07/05/18 10:53 Dose:  30 ml


Morphine Sulfate (Morphine)  4 mg IVP Q4H PRN


   PRN Reason: Pain, severe (8-10)


   Last Admin: 07/06/18 03:48 Dose:  4 mg


Morphine Sulfate (Morphine)  2 mg IVP Q4H PRN


   PRN Reason: Pain, moderate (4-7)


   Last Admin: 07/05/18 23:42 Dose:  2 mg


Nystatin (Nystop Topical Powder)  0 gm TOP TID [facility]


   Last Admin: 07/05/18 11:05 Dose:  1 dose


Octreotide Acetate (Sandostatin)  50 mcg SC Q8H MARIA D


   Last Admin: 07/05/18 10:58 Dose:  Not Given


Ondansetron HCl (Zofran Inj)  4 mg IVP Q6H PRN


   PRN Reason: Nausea/Vomiting


Pantoprazole Sodium (Protonix Inj)  40 mg IVP Q12 [facility]


Petrolatum (Desitin Maximum Strength Topical 40% Oint)  0 gm TOP Q4H PRN


   PRN Reason: Rash


   Last Admin: 07/02/18 17:52 Dose:  1 applic


Saliva Substitute (Saliva Substitute)  2 ml PO Q6H PRN


   PRN Reason: Dry mouth


   Last Admin: 07/04/18 20:11 Dose:  2 ml


Zaleplon (Sonata)  5 mg PO HS PRN


   PRN Reason: Insomnia


   Last Admin: 07/04/18 21:32 Dose:  5 mg











- Labs


Labs: 


 





 07/06/18 07:00 





 07/05/18 06:10 





 











PT  12.7 SECONDS (9.4-12.5)  H  07/05/18  06:10    


 


INR  1.10  (0.93-1.08)  H  07/05/18  06:10    


 


APTT  26.9 Seconds (25.1-36.5)   07/05/18  06:10    














- Constitutional


Appears: No Acute Distress





- Head Exam


Head Exam: ATRAUMATIC, NORMOCEPHALIC





- Eye Exam


Eye Exam: EOMI, Normal appearance


Pupil Exam: PERRL





- ENT Exam


ENT Exam: Mucous Membranes Moist


Additional comments: 





NGT in place





- Respiratory Exam


Respiratory Exam: NORMAL BREATHING PATTERN





- Cardiovascular Exam


Cardiovascular Exam: REGULAR RHYTHM





- GI/Abdominal Exam


GI & Abdominal Exam: Soft, Normal Bowel Sounds.  absent: Distended, Guarding, 

Rigid, Tenderness, Rebound


Additional comments: 





Urostomy appliance is in place without leak


Surgical dressing clean dry and intact


Juan drain with serosanguinous output





- Extremities Exam


Extremities Exam: Normal Capillary Refill





- Back Exam


Back Exam: absent: CVA tenderness (L), CVA tenderness (R)





- Neurological Exam


Neurological Exam: Alert, Awake, Oriented x3





- Psychiatric Exam


Psychiatric exam: Normal Affect, Normal Mood





- Skin


Skin Exam: Dry, Intact, Normal Color, Warm





Assessment and Plan





- Assessment and Plan (Free Text)


Assessment: 





62M s/p ex-lap, enterotomy repair x2, lysis of dense adhesions POD #1





Plan: 





NPO


TPN


NGT to intermittent suction


IV fluids


Wound Care


IV antibiotics


Appliance to  urostomy- do not remove, will replace as needed


Strict I's & O's


Saliva substitute


Cepacol lozanges


Synthroid


Further recommendations as per Dr. Y[facility]

## 2018-07-06 NOTE — CP.PCM.PN
<Yudy Adams - Last Filed: 07/06/18 11:04>





Subjective





- Date & Time of Evaluation


Date of Evaluation: 07/06/18


Time of Evaluation: 07:00





- Subjective


Subjective: 





Medicine Progress Note for Dr. Hunter (covering for Dr. Perez), MELISA Adams 

PGY3





Patient seen and examined at bedside. There were no acute overnight events as 

per nursing staff. Patient had fistula repair yesterday without complications. 

He has an NG tube in place. He is complaining about how the NG tube causes him 

"to gag". Otherwise he reports his abdominal pain is moderate and worse with 

movement and feels nauseous at times without vomiting. He denies chest pain, 

shortness of breath, numbness/tingling, fever/chills. 





Objective





- Vital Signs/Intake and Output


Vital Signs (last 24 hours): 


 











Temp Pulse Resp BP Pulse Ox


 


 98.3 F   90   18   112/67   98 


 


 07/06/18 06:00  07/06/18 06:00  07/06/18 06:00  07/06/18 06:00  07/06/18 06:00








Intake and Output: 


 











 07/06/18 07/06/18





 06:59 18:59


 


Intake Total  1100


 


Output Total 480 50


 


Balance -480 1050














- Medications


Medications: 


 Current Medications





Albuterol/Ipratropium (Duoneb 3 Mg/0.5 Mg (3 Ml) Ud)  3 ml IH [id number] PRN


   PRN Reason: Sinus symptoms


Benzocaine/Menthol (Cepacol Sore Throat)  1 fernandez MT Q2H PRN


   PRN Reason: Sore Throat


   Last Admin: 07/06/18 06:00 Dose:  1 fernandez


Clonazepam (Klonopin)  0.25 mg PO BID MARIA D


   PRN Reason: Protocol


   Last Admin: 07/05/18 10:53 Dose:  0.25 mg


Diphenhydramine HCl (Benadryl)  25 mg PO Q6 PRN


   PRN Reason: Itching / Pruritus


   Last Admin: 07/04/18 01:39 Dose:  25 mg


Heparin Sodium (Porcine) (Heparin)  5,000 units SC Q12 MARIA D


   PRN Reason: Protocol


Cefazolin Sodium (Ancef 1gm In Ns)  1 gm in 100 mls @ 100 mls/hr IVPB Q12H [facility]


   PRN Reason: Protocol


   Last Admin: 07/06/18 05:38 Dose:  100 mls/hr


Metronidazole (Flagyl)  500 mg in 100 mls @ 100 mls/hr IVPB Q8H [facility]IA D


   PRN Reason: Protocol


   Last Admin: 07/06/18 02:49 Dose:  100 mls/hr


Lactated Ringer's (Lactated Ringer's)  1,000 mls @ 125 mls/hr IV .Q8H [facility]IA D


   Last Admin: 07/06/18 08:18 Dose:  125 mls/hr


Magnesium 2 gm/50 ml NS (Magnesium Sulfate 2 Gm/50 Ml Ns)  2 gm in 50 mls @ 50 

mls/hr IVPB ONCE ONE


   Stop: 07/06/18 10:22


Levothyroxine Sodium (Synthroid)  75 mcg IVP DAILY [facility]


Lorazepam (Ativan)  0.5 mg IV Q6 PRN; Protocol


   PRN Reason: anxiety/insomnia


   Last Admin: 07/06/18 02:49 Dose:  0.5 mg


Magnesium Hydroxide (Milk Of Magnesia)  30 ml PO DAILY [facility]


   Last Admin: 07/05/18 10:53 Dose:  30 ml


Morphine Sulfate (Morphine)  4 mg IVP Q4H PRN


   PRN Reason: Pain, severe (8-10)


   Last Admin: 07/06/18 08:25 Dose:  4 mg


Morphine Sulfate (Morphine)  2 mg IVP Q4H PRN


   PRN Reason: Pain, moderate (4-7)


   Last Admin: 07/05/18 23:42 Dose:  2 mg


Nystatin (Nystop Topical Powder)  0 gm TOP TID [facility]


   Last Admin: 07/05/18 11:05 Dose:  1 dose


Ondansetron HCl (Zofran Inj)  4 mg IVP Q6H PRN


   PRN Reason: Nausea/Vomiting


Pantoprazole Sodium (Protonix Inj)  40 mg IVP Q12 [facility]


Petrolatum (Desitin Maximum Strength Topical 40% Oint)  0 gm TOP Q4H PRN


   PRN Reason: Rash


   Last Admin: 07/02/18 17:52 Dose:  1 applic


Saliva Substitute (Saliva Substitute)  2 ml PO Q6H PRN


   PRN Reason: Dry mouth


   Last Admin: 07/04/18 20:11 Dose:  2 ml


Zaleplon (Sonata)  5 mg PO HS PRN


   PRN Reason: Insomnia


   Last Admin: 07/04/18 21:32 Dose:  5 mg











- Labs


Labs: 


 





 07/06/18 07:00 





 07/06/18 07:00 





 











PT  12.7 SECONDS (9.4-12.5)  H  07/05/18  06:10    


 


INR  1.10  (0.93-1.08)  H  07/05/18  06:10    


 


APTT  26.9 Seconds (25.1-36.5)   07/05/18  06:10    














- Constitutional


Appears: No Acute Distress





- Head Exam


Head Exam: ATRAUMATIC, NORMAL INSPECTION, NORMOCEPHALIC





- Eye Exam


Eye Exam: Normal appearance


Pupil Exam: NORMAL ACCOMODATION





- ENT Exam


ENT Exam: Mucous Membranes Dry





- Respiratory Exam


Respiratory Exam: Clear to Ausculation Bilateral, NORMAL BREATHING PATTERN.  

absent: Rales, Rhonchi, Wheezes





- Cardiovascular Exam


Cardiovascular Exam: REGULAR RHYTHM, +S1, +S2.  absent: Gallop, Rubs, Murmur





- GI/Abdominal Exam


Additional comments: 


L ileal condiut site clean and dry, draining yellow urine. Midline surgical 

site clean and dry. Dressing in place without drainage. 











- Extremities Exam


Extremities Exam: absent: Calf Tenderness, Pedal Edema





- Neurological Exam


Neurological Exam: Alert, Awake, CN II-XII Intact, Oriented x3





- Skin


Skin Exam: Dry, Warm





Assessment and Plan





- Assessment and Plan (Free Text)


Assessment: 





This is 61yo male with past medical history of anemia, hypothyroidism 

hemorrhagic cystitis s/p cystectomy and ileal conduit, multiple abdominal 

surgeries who is admitted for 





1. Abdominal wall fistula s/p s/p ex-lap, enterotomy repair x2, lysis of dense 

adhesions POD #1


   - abdominal drain cultures + for VRE and Klebisella 


2. Hemorrhagic cystitis s/p cystectomty and ileal condiut w/ revision 


3. UTI 


   - + for VRE and Klebsiella 


4. Hepatic lesion 


   - Previous biopsy was non-diagnostic 


5. Anemia (normocytic) 


   - Hgb stable 


6. Hypothyroid


   - TSH 0.04 


7. Anxiety


Plan: 


Continue TPN. Patient is NPO with NGT. Continue to monitor I&O. Continue pain 

control and wound care. Patient is on IV Flagyl. ID is following. Continue 

synthroid, Klopinin, and prn Ativan. Patient is on GI and DVT prophylaxis. GI 

recommendations appreciated. Discussed recommendations with surgery team. PT 

recommended home with services once patient ready for d/c. 





Case seen, discussed and reviewed with Dr. Hunter. 


MELISA Adams PGY3





<Julian Hunter S - Last Filed: 07/08/18 09:40>





Objective





- Vital Signs/Intake and Output


Vital Signs (last 24 hours): 


 











Temp Pulse Resp BP Pulse Ox


 


 99.4 F   90   20   122/73   98 


 


 07/08/18 07:45  07/08/18 07:45  07/08/18 07:45  07/08/18 07:45  07/07/18 22:24








Intake and Output: 


 











 07/08/18 07/08/18





 06:59 18:59


 


Intake Total 1782 355


 


Output Total 1240 


 


Balance 542 355














- Medications


Medications: 


 Current Medications





Albuterol/Ipratropium (Duoneb 3 Mg/0.5 Mg (3 Ml) Ud)  3 ml IH [id number] PRN


   PRN Reason: Sinus symptoms


Benzocaine/Menthol (Cepacol Sore Throat)  1 fernandez MT Q2H PRN


   PRN Reason: Sore Throat


   Last Admin: 07/06/18 23:34 Dose:  1 fernandez


Clonazepam (Klonopin)  0.25 mg PO BID MARIA D


   PRN Reason: Protocol


   Last Admin: 07/07/18 18:27 Dose:  Not Given


Diphenhydramine HCl (Benadryl)  25 mg IVP Q4H PRN


   PRN Reason: Allergy symptoms


   Last Admin: 07/08/18 05:04 Dose:  25 mg


Lactated Ringer's (Lactated Ringer's)  1,000 mls @ 125 mls/hr IV .Q8H [facility]


   Last Admin: 07/07/18 23:25 Dose:  125 mls/hr


Amino Acids/Electrolytes/Dextrose (Clinimix 5/20 % "E" (2000 Ml))  2,000 mls @ 

83.333 mls/hr IV .Q24H [facility]


   Stop: 07/09/18 18:01


   Last Admin: 07/07/18 18:13 Dose:  83.333 mls/hr


Fat Emulsion Intravenous (Intralipid 20%)  250 mls @ 20.833 mls/hr IV MWF@1800 

[facility]


   Stop: 07/09/18 18:01


   Last Admin: 07/06/18 23:44 Dose:  20.833 mls/hr


Levothyroxine Sodium (Synthroid)  75 mcg IVP DAILY [facility]


   Last Admin: 07/07/18 10:27 Dose:  75 mcg


Lorazepam (Ativan)  0.5 mg IV Q6 PRN; Protocol


   PRN Reason: anxiety/insomnia


   Last Admin: 07/07/18 10:26 Dose:  0.5 mg


Magnesium Hydroxide (Milk Of Magnesia)  30 ml PO DAILY [facility]


   Last Admin: 07/07/18 10:27 Dose:  30 ml


Morphine Sulfate (Morphine)  2 mg IVP Q4H PRN


   PRN Reason: Pain, moderate (4-7)


Morphine Sulfate (Morphine)  4 mg IVP Q4H PRN


   PRN Reason: Pain, severe (8-10)


Nystatin (Nystop Topical Powder)  0 gm TOP TID [facility]


   Last Admin: 07/07/18 18:11 Dose:  1 applic


Ondansetron HCl (Zofran Inj)  4 mg IVP Q6H PRN


   PRN Reason: Nausea/Vomiting


   Last Admin: 07/08/18 02:32 Dose:  4 mg


Pantoprazole Sodium (Protonix Inj)  40 mg IVP Q12 [facility]


   Last Admin: 07/07/18 22:29 Dose:  40 mg


Petrolatum (Desitin Maximum Strength Topical 40% Oint)  0 gm TOP Q4H PRN


   PRN Reason: Rash


   Last Admin: 07/02/18 17:52 Dose:  1 applic


Saliva Substitute (Saliva Substitute)  2 ml PO Q6H PRN


   PRN Reason: Dry mouth


   Last Admin: 07/04/18 20:11 Dose:  2 ml


Zaleplon (Sonata)  5 mg PO HS PRN


   PRN Reason: Insomnia


   Last Admin: 07/04/18 21:32 Dose:  5 mg











- Labs


Labs: 


 





 07/07/18 22:30 





 07/07/18 06:30 





 











PT  12.7 SECONDS (9.4-12.5)  H  07/05/18  06:10    


 


INR  1.10  (0.93-1.08)  H  07/05/18  06:10    


 


APTT  26.9 Seconds (25.1-36.5)   07/05/18  06:10    














Assessment and Plan





- Assessment and Plan (Free Text)


Plan: 





Pt seen and examined on 7-6-18 and this is a late entry. I have reviewed the 

note of the medical resident and agree with it. I have discussed the assessment 

and plan with the resident.  I have reviewed the patient's labs and 

medications. On TPN. Continue with Klonopin. GI following. PT for ambulation.

## 2018-07-07 LAB
BASOPHILS # BLD AUTO: 0.02 K/MM3 (ref 0–2)
BASOPHILS NFR BLD: 0.3 % (ref 0–3)
BUN SERPL-MCNC: 27 MG/DL (ref 7–21)
CALCIUM SERPL-MCNC: 8.3 MG/DL (ref 8.4–10.5)
EOSINOPHIL # BLD: 0.1 10*3/UL (ref 0–0.7)
EOSINOPHIL NFR BLD: 1.8 % (ref 1.5–5)
ERYTHROCYTE [DISTWIDTH] IN BLOOD BY AUTOMATED COUNT: 14.1 % (ref 11.5–14.5)
ERYTHROCYTE [DISTWIDTH] IN BLOOD BY AUTOMATED COUNT: 14.2 % (ref 11.5–14.5)
ERYTHROCYTE [DISTWIDTH] IN BLOOD BY AUTOMATED COUNT: 14.2 % (ref 11.5–14.5)
GFR NON-AFRICAN AMERICAN: > 60
GRANULOCYTES # BLD: 5.49 10*3/UL (ref 1.4–6.5)
GRANULOCYTES NFR BLD: 81.1 % (ref 50–68)
HGB BLD-MCNC: 7.5 G/DL (ref 14–18)
HGB BLD-MCNC: 7.7 G/DL (ref 14–18)
HGB BLD-MCNC: 8 G/DL (ref 14–18)
LYMPHOCYTES # BLD: 0.7 10*3/UL (ref 1.2–3.4)
LYMPHOCYTES NFR BLD AUTO: 10 % (ref 22–35)
MCH RBC QN AUTO: 27.1 PG (ref 25–35)
MCH RBC QN AUTO: 27.3 PG (ref 25–35)
MCH RBC QN AUTO: 27.4 PG (ref 25–35)
MCHC RBC AUTO-ENTMCNC: 32.4 G/DL (ref 31–37)
MCHC RBC AUTO-ENTMCNC: 32.8 G/DL (ref 31–37)
MCHC RBC AUTO-ENTMCNC: 32.9 G/DL (ref 31–37)
MCV RBC AUTO: 83.2 FL (ref 80–105)
MCV RBC AUTO: 83.3 FL (ref 80–105)
MCV RBC AUTO: 83.7 FL (ref 80–105)
MONOCYTES # BLD AUTO: 0.5 10*3/UL (ref 0.1–0.6)
MONOCYTES NFR BLD: 6.8 % (ref 1–6)
PLATELET # BLD: 155 10^3/UL (ref 120–450)
PLATELET # BLD: 162 10^3/UL (ref 120–450)
PLATELET # BLD: 177 10^3/UL (ref 120–450)
PMV BLD AUTO: 8.1 FL (ref 7–11)
PMV BLD AUTO: 8.5 FL (ref 7–11)
PMV BLD AUTO: 8.6 FL (ref 7–11)
RBC # BLD AUTO: 2.74 10^6/UL (ref 3.5–6.1)
RBC # BLD AUTO: 2.82 10^6/UL (ref 3.5–6.1)
RBC # BLD AUTO: 2.95 10^6/UL (ref 3.5–6.1)
TROPONIN I SERPL-MCNC: < 0.01 NG/ML
WBC # BLD AUTO: 5.6 10^3/UL (ref 4.5–11)
WBC # BLD AUTO: 6.3 10^3/UL (ref 4.5–11)
WBC # BLD AUTO: 6.8 10^3/UL (ref 4.5–11)

## 2018-07-07 RX ADMIN — MORPHINE SULFATE PRN MG: 4 INJECTION, SOLUTION INTRAMUSCULAR; INTRAVENOUS at 21:18

## 2018-07-07 RX ADMIN — METRONIDAZOLE SCH MLS/HR: 500 INJECTION, SOLUTION INTRAVENOUS at 05:05

## 2018-07-07 RX ADMIN — NYSTATIN SCH APPLIC: 100000 POWDER TOPICAL at 14:00

## 2018-07-07 RX ADMIN — NYSTATIN SCH APPLIC: 100000 POWDER TOPICAL at 18:11

## 2018-07-07 RX ADMIN — LEVOTHYROXINE SODIUM ANHYDROUS SCH MCG: 100 INJECTION, POWDER, LYOPHILIZED, FOR SOLUTION INTRAVENOUS at 10:27

## 2018-07-07 RX ADMIN — NYSTATIN SCH APPLIC: 100000 POWDER TOPICAL at 10:00

## 2018-07-07 RX ADMIN — MAGNESIUM HYDROXIDE SCH ML: 400 SUSPENSION ORAL at 10:27

## 2018-07-07 RX ADMIN — DIPHENHYDRAMINE HYDROCHLORIDE PRN MG: 50 INJECTION INTRAMUSCULAR; INTRAVENOUS at 21:17

## 2018-07-07 RX ADMIN — MORPHINE SULFATE PRN MG: 4 INJECTION, SOLUTION INTRAMUSCULAR; INTRAVENOUS at 05:06

## 2018-07-07 RX ADMIN — Medication PRN MG: at 18:12

## 2018-07-07 RX ADMIN — CEFAZOLIN SCH MLS/HR: 330 INJECTION, POWDER, FOR SOLUTION INTRAMUSCULAR; INTRAVENOUS at 05:13

## 2018-07-07 NOTE — PN
DATE:  07/07/2018



PULMONARY PROGRESS NOTE



REFERRING PHYSICIAN:  Sandra Perez MD.



SUBJECTIVE:  The patient is lying in the bed, head at 45 degrees.  Sleepy,

arousable.  Night was unremarkable.  Hungry, wants to eat or at least wants

to drink water.  He is on TPN.  Status post laparotomy, reanastomosis and

adhesiolysis.  Mild abdominal pain.  No chest pain.  No leg pain.  No leg

swelling.



PHYSICAL EXAMINATION:

GENERAL:  In no acute distress.

VITAL SIGNS:  Temperature is 98, heart rate is 114, respiratory rate is 18,

blood pressure 116/69, pulse ox 99% on 2L nasal cannula.

HEENT:  Moist mucous membrane.  No ulcer or thrush noted.

NECK:  Supple.  No JVD.

LUNGS:  Have a fair airflow with rhonchi.

HEART:  S1 and S2.

ABDOMEN:  Incision site looks okay.  Colostomy working well.  CYNTHIA drain,

which is working okay, has small bloody secretion.

EXTREMITIES:  There is no edema.

NEUROLOGICAL:  Awake and alert.  Follows simple command.



MEDICATIONS:  He is on Ativan 0.5 mg every 6 hours p.r.n., Benadryl 25 mg

every 6 hours p.r.n., Cepacol lozenges every 2 hours p.r.n., getting

Clinimix 83 mL/hour, DuoNeb every 6 hours p.r.n., also receiving

Intralipid, clonazepam 0.25 mg twice a day, lactated Ringer 125 mL per

hour, milk of magnesia daily, morphine 4 mg every 4 hours p.r.n., Protonix

40 mg every 12 hours, Saliva Substitute every 6 hours p.r.n., Sonata 5 mg

at bedtime p.r.n. for insomnia, Synthroid 75 mcg daily, and Zofran p.r.n.

basis.



LABORATORY DATA:  Shows hemoglobin 7.7, hematocrit 23.5, WBC 6.3, platelet

count is 162.  Sodium 145, potassium 3.6, chloride 109, bicarbonate 24, BUN

27, creatinine 1, glucose 202, calcium 8.3.  Troponin less than 0.01. 

Chest x-ray done today shows no active evidence of disease.



IMPRESSION AND PLAN:  Status post laparotomy with reanastomosis and also

has a colostomy and lysis of adhesions, history of enterocutaneous fistula,

chronic obstructive lung disease, malnutrition, hypothyroid, on total

parenteral nutrition.  Pulmonary point of view, doing okay.  Continue

bronchodilator.  Keep head at 45 degrees.  Gastric and deep venous

thrombosis prophylaxis, antibiotics as per Infectious Diseases.  GI and

surgical followup.



Thank you and we will follow with you.







__________________________________________

Dante Haque MD



DD:  07/07/2018 18:48:35

DT:  07/07/2018 20:10:42

Job # [id number]

## 2018-07-07 NOTE — PN
DATE:  07/07/2018



SUBJECTIVE:  The patient is in bed, in no acute distress, nontoxic.



PHYSICAL EXAMINATION

VITAL SIGNS:   On exam, temperature is 98, blood pressure is 107/60,

respiratory rate of 20.

HEENT:  Examination of HEENT is unremarkable. .

NECK:  Supple

LUNGS:  Have decreased breath sounds.

HEART:  Normal S1, S2.

ABDOMEN:  Soft, nontender.



LABORATORY DATA:  Laboratory examination reveals the patient's white count

is 6.8, hemoglobin of 8.  Chemistries are noted.  Urinalysis is reviewed. 

Review of orders reveals the patient to be on IV Flagyl.  Dr. Dipehs Morris's progress note from this morning is reviewed, not been completed

so far.  Chest x-ray was done and results are pending.



ASSESSMENT AND PLAN:  This is a 62-year-old male who was seen early this

morning with history of hemorrhagic cystitis, had cystectomy and had an

ileal conduit. multiple abdominal surgeries including hernia repairs, who

was admitted initially with abdominal pain and found to have abdominal wall

cellulitis with a GI fistula from the jejunum and was taken to the OR on

07/05/2018 for repair and postop procedure day #2, the patient had

exploratory laparotomy and resection of the small bowel with an

enterocutaneous fistula repair, now on IV Flagyl.  Discontinue the IV

Flagyl.  Initially, I have given the patient p.o. Flagyl because of the

fistula.  Since it has been repaired, no antibiotics at this point

postoperatively necessary.  The patient is at risk for developing

nosocomial infections.  We will follow closely with you.





__________________________________________

Margarito Khan MD







DD:  07/07/2018 9:08:20

DT:  07/07/2018 9:10:31

Job # [id number]

## 2018-07-07 NOTE — PN
DATE:  07/06/2018



SUBJECTIVE:  The patient is in bed, in no acute distress, but seen early

this morning, in 564, bed 2.



PHYSICAL EXAMINATION:

VITAL SIGNS:   Temperature is 98, blood pressure is 108/40, respiratory

rate of 18.

HEENT:  Unremarkable.

NECK:  Supple.

LUNGS:  Decreased breath sounds.

HEART:  Normal S1 and S2.

ABDOMEN:  Soft, nontender.



LABORATORY DATA:  Reveals a white count of 7.6, hemoglobin of 9, platelets

of 175.  Chemistries reveals the BUN of 32, creatinine of 1.1.  Urinalysis

is noted, 15 to 20 wbc's.



Review of orders reveals the patient to be on cefazolin, and the patient

was taken to the OR yesterday for exploratory laparotomy, resection of

small bowel enterocutaneous fistula.



ASSESSMENT AND PLAN:  This is a 62-year-old male, was seen earlier this

morning, status post abdominal surgery for fistula, currently on cefazolin,

doing better.  We will follow closely with you.





__________________________________________

Margarito Khan MD



DD:  07/06/2018 21:02:38

DT:  07/06/2018 23:41:23

Job # [id number]

## 2018-07-07 NOTE — RAD
HISTORY:

Chest pain.



COMPARISON:

06/25/2018. 



FINDINGS:



LUNGS:

No active pulmonary disease.



PLEURA:

No significant pleural effusion identified, no pneumothorax apparent.



CARDIOVASCULAR:

 No radiographic findings to suggest acute or significant 

cardiovascular disease. PICC line in satisfactory position unchanged.



OSSEOUS STRUCTURES:

No significant abnormalities.



VISUALIZED UPPER ABDOMEN:

Normal.



OTHER FINDINGS:

None.



IMPRESSION:

No active disease. No significant interval change compared to the 

prior examination(s).

## 2018-07-07 NOTE — CP.PCM.PN
Subjective





- Date & Time of Evaluation


Date of Evaluation: 07/07/18


Time of Evaluation: 07:10





- Subjective


Subjective: 





General Surgery Note for Dr. Crawford





Patient seen and examined at bedside. Overnight, patient complained of chest 

pain. EKG and troponins were ordered. EKG showed NSR at 95 bpm. He received 

ativan and morphine. He also complained of nausea in which he received zofran. 

Patient is s/p ex-lap, enterotomy repair x2, lysis of dense adhesions POD#2. 

Patient states pain is controlled. He is complaining of dry mouth. Juan drain 

with 60 cc of serosanguinous output over 24 hrs.





Objective





- Vital Signs/Intake and Output


Vital Signs (last 24 hours): 


 











Temp Pulse Resp BP Pulse Ox


 


 98.7 F   114 H  18   116/69   99 


 


 07/06/18 22:32  07/06/18 22:32  07/06/18 22:32  07/06/18 22:32  07/06/18 22:32








Intake and Output: 


 











 07/07/18 07/07/18





 06:59 18:59


 


Intake Total 996 


 


Output Total 870 


 


Balance 126 














- Medications


Medications: 


 Current Medications





Albuterol/Ipratropium (Duoneb 3 Mg/0.5 Mg (3 Ml) Ud)  3 ml IH [id number] PRN


   PRN Reason: Sinus symptoms


Benzocaine/Menthol (Cepacol Sore Throat)  1 fernandez MT Q2H PRN


   PRN Reason: Sore Throat


   Last Admin: 07/06/18 23:34 Dose:  1 fernandez


Clonazepam (Klonopin)  0.25 mg PO BID MARIA D


   PRN Reason: Protocol


   Last Admin: 07/06/18 16:59 Dose:  Not Given


Diphenhydramine HCl (Benadryl)  25 mg PO Q6 PRN


   PRN Reason: Itching / Pruritus


   Last Admin: 07/04/18 01:39 Dose:  25 mg


Heparin Sodium (Porcine) (Heparin)  5,000 units SC Q12 [facility]


   PRN Reason: Protocol


Metronidazole (Flagyl)  500 mg in 100 mls @ 100 mls/hr IVPB Q8H [facility]


   PRN Reason: Protocol


   Last Admin: 07/07/18 05:05 Dose:  100 mls/hr


Lactated Ringer's (Lactated Ringer's)  1,000 mls @ 125 mls/hr IV .Q8H [facility]


   Last Admin: 07/06/18 08:18 Dose:  125 mls/hr


Amino Acids/Electrolytes/Dextrose (Clinimix 5/20 % "E" (2000 Ml))  2,000 mls @ 

83.333 mls/hr IV .Q24H [facility]


   Stop: 07/09/18 18:01


   Last Admin: 07/06/18 18:10 Dose:  83.333 mls/hr


Fat Emulsion Intravenous (Intralipid 20%)  250 mls @ 20.833 mls/hr IV MWF@1800 

[facility]


   Stop: 07/09/18 18:01


   Last Admin: 07/06/18 23:44 Dose:  20.833 mls/hr


Levothyroxine Sodium (Synthroid)  75 mcg IVP DAILY [facility]


   Last Admin: 07/06/18 10:06 Dose:  75 mcg


Lorazepam (Ativan)  0.5 mg IV Q6 PRN; Protocol


   PRN Reason: anxiety/insomnia


   Last Admin: 07/06/18 23:33 Dose:  0.5 mg


Magnesium Hydroxide (Milk Of Magnesia)  30 ml PO DAILY [facility]


   Last Admin: 07/06/18 10:09 Dose:  Not Given


Morphine Sulfate (Morphine)  4 mg IVP Q4H PRN


   PRN Reason: Pain, severe (8-10)


   Last Admin: 07/07/18 05:06 Dose:  4 mg


Morphine Sulfate (Morphine)  2 mg IVP Q4H PRN


   PRN Reason: Pain, moderate (4-7)


   Last Admin: 07/05/18 23:42 Dose:  2 mg


Nystatin (Nystop Topical Powder)  0 gm TOP TID [facility]


   Last Admin: 07/06/18 17:28 Dose:  Not Given


Ondansetron HCl (Zofran Inj)  4 mg IVP Q6H PRN


   PRN Reason: Nausea/Vomiting


   Last Admin: 07/07/18 05:08 Dose:  4 mg


Pantoprazole Sodium (Protonix Inj)  40 mg IVP Q12 [facility]


   Last Admin: 07/06/18 21:01 Dose:  40 mg


Petrolatum (Desitin Maximum Strength Topical 40% Oint)  0 gm TOP Q4H PRN


   PRN Reason: Rash


   Last Admin: 07/02/18 17:52 Dose:  1 applic


Saliva Substitute (Saliva Substitute)  2 ml PO Q6H PRN


   PRN Reason: Dry mouth


   Last Admin: 07/04/18 20:11 Dose:  2 ml


Zaleplon (Sonata)  5 mg PO HS PRN


   PRN Reason: Insomnia


   Last Admin: 07/04/18 21:32 Dose:  5 mg











- Labs


Labs: 


 





 07/07/18 06:30 





 07/07/18 06:30 





 











PT  12.7 SECONDS (9.4-12.5)  H  07/05/18  06:10    


 


INR  1.10  (0.93-1.08)  H  07/05/18  06:10    


 


APTT  26.9 Seconds (25.1-36.5)   07/05/18  06:10    














- GI/Abdominal Exam


Additional comments: 





Urostomy appliance is in place without leak


Surgical dressing clean dry and intact


Juan drain with serosanguinous output








Assessment and Plan





- Assessment and Plan (Free Text)


Assessment: 


62M s/p ex-lap, enterotomy repair x2, lysis of dense adhesions POD #2


Plan: 





NPO - ice chips, hard candy


Monitor Hgb, repeat CBC at 1200


TPN


IV fluids


Wound Care


IV antibiotics


Appliance to urostomy- do not remove, will replace today


Remove dressing today and apply betadine to incision


Strict I's & O's


Saliva substitute


Cepacol lozanges


Synthroid


Further recommendations as per Dr. Yvette Morris PGY2

## 2018-07-08 LAB
ALBUMIN SERPL-MCNC: 3 G/DL (ref 3–4.8)
ALBUMIN/GLOB SERPL: 0.9 {RATIO} (ref 1.1–1.8)
ALT SERPL-CCNC: 18 U/L (ref 7–56)
AST SERPL-CCNC: 18 U/L (ref 17–59)
BASOPHILS # BLD AUTO: 0.01 K/MM3 (ref 0–2)
BASOPHILS NFR BLD: 0.2 % (ref 0–3)
BUN SERPL-MCNC: 25 MG/DL (ref 7–21)
CALCIUM SERPL-MCNC: 8.6 MG/DL (ref 8.4–10.5)
EOSINOPHIL # BLD: 0.2 10*3/UL (ref 0–0.7)
EOSINOPHIL NFR BLD: 2.6 % (ref 1.5–5)
ERYTHROCYTE [DISTWIDTH] IN BLOOD BY AUTOMATED COUNT: 14 % (ref 11.5–14.5)
GFR NON-AFRICAN AMERICAN: > 60
GRANULOCYTES # BLD: 4.18 10*3/UL (ref 1.4–6.5)
GRANULOCYTES NFR BLD: 73.2 % (ref 50–68)
HGB BLD-MCNC: 9.1 G/DL (ref 14–18)
LYMPHOCYTES # BLD: 1 10*3/UL (ref 1.2–3.4)
LYMPHOCYTES NFR BLD AUTO: 17.2 % (ref 22–35)
MCH RBC QN AUTO: 28.1 PG (ref 25–35)
MCHC RBC AUTO-ENTMCNC: 33.6 G/DL (ref 31–37)
MCV RBC AUTO: 83.6 FL (ref 80–105)
MONOCYTES # BLD AUTO: 0.4 10*3/UL (ref 0.1–0.6)
MONOCYTES NFR BLD: 6.8 % (ref 1–6)
PLATELET # BLD: 180 10^3/UL (ref 120–450)
PMV BLD AUTO: 8.6 FL (ref 7–11)
RBC # BLD AUTO: 3.24 10^6/UL (ref 3.5–6.1)
WBC # BLD AUTO: 5.7 10^3/UL (ref 4.5–11)

## 2018-07-08 PROCEDURE — 30233N1 TRANSFUSION OF NONAUTOLOGOUS RED BLOOD CELLS INTO PERIPHERAL VEIN, PERCUTANEOUS APPROACH: ICD-10-PCS | Performed by: INTERNAL MEDICINE

## 2018-07-08 RX ADMIN — MAGNESIUM HYDROXIDE SCH ML: 400 SUSPENSION ORAL at 09:46

## 2018-07-08 RX ADMIN — DEXTROSE AND SODIUM CHLORIDE SCH MLS/HR: 5; 450 INJECTION, SOLUTION INTRAVENOUS at 10:47

## 2018-07-08 RX ADMIN — DIPHENHYDRAMINE HYDROCHLORIDE PRN MG: 50 INJECTION INTRAMUSCULAR; INTRAVENOUS at 05:04

## 2018-07-08 RX ADMIN — PURIFIED WATER PRN LOZ: 99.05 LIQUID OPHTHALMIC at 23:03

## 2018-07-08 RX ADMIN — LEVOTHYROXINE SODIUM ANHYDROUS SCH MCG: 100 INJECTION, POWDER, LYOPHILIZED, FOR SOLUTION INTRAVENOUS at 09:46

## 2018-07-08 NOTE — PN
DATE:  07/08/2018



SUBJECTIVE:  The patient is in bed, in no acute distress, nontoxic.



PHYSICAL EXAMINATION:

VITAL SIGNS:  Temperature is 99, blood pressure is 120/70, respiratory rate

of 20.

HEENT:  Unremarkable.

NECK:  Supple.

LUNGS:  Have decreased breath sounds.

HEART:  Normal S1, S2.

ABDOMEN:  Soft, nontender.



LABORATORY DATA:  Reveals a 5.6 white count, hemoglobin of 7, platelets of

155.  Chemistries reveal a BUN of 27, creatinine of 1.  Urinalysis is noted

and microbiology is noted and review of orders reveals the patient to be

off of antibiotics.  Dr. Jamison's progress note from this morning is

reviewed.  Dr. Hinojosa's note is reviewed.  Dr. Haque's progress note is

reviewed.



ASSESSMENT AND PLAN:  This is a 62-year-old male who was seen earlier today

in 564, bed 2 who has a history of hemorrhagic cystitis and cystectomy and

ileal conduit, multiple abdominal surgeries including hernia surgery, was

initially admitted with abdominal pain, found have abdominal wall

cellulitis, also with a gastric fistula and was taken to the OR on

07/05/2018 for a repair, now postop procedure day #3, had exploratory

laparotomy and resection of small bowel and enterocutaneous fistula repair,

currently off of antibiotics, afebrile, normal white count of 5.6, and no

evidence of infection; however, the patient is at risk for development of

nosocomial infections.  We will follow closely with you.







__________________________________________

Margarito Khan MD



DD:  07/08/2018 10:24:54

DT:  07/08/2018 10:26:02

Job # [id number]

## 2018-07-08 NOTE — RAD
HISTORY:

Abdominal distention and vomiting.



COMPARISON:

06/29/2018 abdominal x-ray. 



06/22/2018 CT abdomen and pelvis 



FINDINGS:



BOWEL:

Distention of small bowel likely postoperative ileus. 



BONES:

Normal.



OTHER FINDINGS:

Postoperative changes identified.  Surgical drain in the lower 

abdomen and pelvis.



IMPRESSION:

Presumed postoperative ileus. No radiographic findings of obstruction.

## 2018-07-08 NOTE — CP.PCM.PN
Subjective





- Date & Time of Evaluation


Date of Evaluation: 07/08/18


Time of Evaluation: 09:48





- Subjective


Subjective: 





Surgery: Dr. Crawford





Pt seen and examined. No acute overnight events. Pt states he is feeling 

nauseous this morning but denies vomiting, or abdominal pain. Denies flatus or 

BM. Denies F/C. 





Objective





- Vital Signs/Intake and Output


Vital Signs (last 24 hours): 


 











Temp Pulse Resp BP Pulse Ox


 


 99.4 F   90   20   122/73   97 


 


 07/08/18 07:45  07/08/18 07:45  07/08/18 07:45  07/08/18 07:45  07/08/18 06:00








Intake and Output: 


 











 07/08/18 07/08/18





 06:59 18:59


 


Intake Total 1782 355


 


Output Total 1240 


 


Balance 542 355














- Medications


Medications: 


 Current Medications





Albuterol/Ipratropium (Duoneb 3 Mg/0.5 Mg (3 Ml) Ud)  3 ml IH [id number] PRN


   PRN Reason: Sinus symptoms


Benzocaine/Menthol (Cepacol Sore Throat)  1 fernandez MT Q2H PRN


   PRN Reason: Sore Throat


   Last Admin: 07/06/18 23:34 Dose:  1 fernandez


Clonazepam (Klonopin)  0.25 mg PO BID [facility]


   PRN Reason: Protocol


   Last Admin: 07/07/18 18:27 Dose:  Not Given


Diphenhydramine HCl (Benadryl)  25 mg IVP Q4H PRN


   PRN Reason: Allergy symptoms


   Last Admin: 07/08/18 05:04 Dose:  25 mg


Lactated Ringer's (Lactated Ringer's)  1,000 mls @ 125 mls/hr IV .Q8H [facility]


   Last Admin: 07/07/18 23:25 Dose:  125 mls/hr


Amino Acids/Electrolytes/Dextrose (Clinimix 5/20 % "E" (2000 Ml))  2,000 mls @ 

83.333 mls/hr IV .Q24H [facility]


   Stop: 07/09/18 18:01


   Last Admin: 07/07/18 18:13 Dose:  83.333 mls/hr


Fat Emulsion Intravenous (Intralipid 20%)  250 mls @ 20.833 mls/hr IV MWF@1800 

[facility]


   Stop: 07/09/18 18:01


   Last Admin: 07/06/18 23:44 Dose:  20.833 mls/hr


Levothyroxine Sodium (Synthroid)  75 mcg IVP DAILY [facility]


   Last Admin: 07/07/18 10:27 Dose:  75 mcg


Lorazepam (Ativan)  0.5 mg IV Q6 PRN; Protocol


   PRN Reason: anxiety/insomnia


   Last Admin: 07/07/18 10:26 Dose:  0.5 mg


Magnesium Hydroxide (Milk Of Magnesia)  30 ml PO DAILY [facility]


   Last Admin: 07/07/18 10:27 Dose:  30 ml


Morphine Sulfate (Morphine)  2 mg IVP Q4H PRN


   PRN Reason: Pain, moderate (4-7)


Morphine Sulfate (Morphine)  4 mg IVP Q4H PRN


   PRN Reason: Pain, severe (8-10)


Nystatin (Nystop Topical Powder)  0 gm TOP TID [facility]


   Last Admin: 07/07/18 18:11 Dose:  1 applic


Ondansetron HCl (Zofran Inj)  4 mg IVP Q6H PRN


   PRN Reason: Nausea/Vomiting


   Last Admin: 07/08/18 02:32 Dose:  4 mg


Pantoprazole Sodium (Protonix Inj)  40 mg IVP Q12 [facility]


   Last Admin: 07/07/18 22:29 Dose:  40 mg


Petrolatum (Desitin Maximum Strength Topical 40% Oint)  0 gm TOP Q4H PRN


   PRN Reason: Rash


   Last Admin: 07/02/18 17:52 Dose:  1 applic


Saliva Substitute (Saliva Substitute)  2 ml PO Q6H PRN


   PRN Reason: Dry mouth


   Last Admin: 07/04/18 20:11 Dose:  2 ml


Zaleplon (Sonata)  5 mg PO HS PRN


   PRN Reason: Insomnia


   Last Admin: 07/04/18 21:32 Dose:  5 mg











- Labs


Labs: 


 





 07/07/18 22:30 





 07/07/18 06:30 





 











PT  12.7 SECONDS (9.4-12.5)  H  07/05/18  06:10    


 


INR  1.10  (0.93-1.08)  H  07/05/18  06:10    


 


APTT  26.9 Seconds (25.1-36.5)   07/05/18  06:10    














- Constitutional


Appears: Well, No Acute Distress





- Head Exam


Head Exam: ATRAUMATIC, NORMOCEPHALIC





- Eye Exam


Eye Exam: Normal appearance





- ENT Exam


ENT Exam: Mucous Membranes Moist





- Respiratory Exam


Respiratory Exam: NORMAL BREATHING PATTERN





- Cardiovascular Exam


Cardiovascular Exam: RRR





- GI/Abdominal Exam


GI & Abdominal Exam: Soft.  absent: Distended, Tenderness, Rebound


Additional comments: 





rosa drain in place with minimal serosang output, midline incision with 

staples in place, C/D/I





- Neurological Exam


Neurological Exam: Alert, Awake, Oriented x3





- Skin


Skin Exam: Dry, Warm





Assessment and Plan





- Assessment and Plan (Free Text)


Assessment: 





62M s/p Ex-lap for enterocutaneous fistulas x 2, with repair of enterotomies & 

lysis of dense adhesions; POD#3  


Plan: 





- H/H slowly trending down but no signs of active bleeding


- 2 units of PRBCs ordered; will continue to monitor with a repeat H/H this PM


- encourage out of bed & ambulation with PT 


- monitor bowel function


- encourage incentive spirometry 


- d/w Dr. Yvette Jamison, PGY-3

## 2018-07-08 NOTE — PN
DATE:  07/08/2018



FOLLOWUP NOTE



SUBJECTIVE:  He is comfortable in bed, in no acute distress.  No event

overnight.  Yesterday, hemoglobin and hematocrit declined to 7.1.  Two

units of PRBC ordered.  He received 1 unit of blood transfusion so far.  No

nausea.  No vomiting.  No abdominal pain.  Pain controlled with current

medications.



REVIEW OF SYSTEMS:  As per HPI.  Rest of 12-point review of systems

reviewed negative.



PHYSICAL EXAMINATION:

GENERAL:  Comfortable in bed, in no acute distress.

VITAL SIGNS:  Heart rate is 90 per minute, respiratory rate 21 per minute,

blood pressure 122/73, pulse ox 97% on room air.

HEENT:  Pallor positive.

NECK:  No lymphadenopathy.

CHEST:  Air entry present and equal bilateral.  No added sound.

CARDIOVASCULAR:  S1, S2 normal.  No murmur.  No gallop.

ABDOMEN:  Soft, nontender.  Surgical dressing present.

EXTREMITY:  No edema.

CNS:  Alert and oriented x3.  No focal sensorimotor deficit.



LABORATORY DATA:  White count 5.6, hemoglobin 7.5, hematocrit 22.8,

platelet 155.  Sodium 145, potassium 3.6, BUN 27, creatinine 1, glucose

202.



MEDICATIONS:  Bronchodilators p.r.n., IV fluids, Synthroid 75 mcg daily,

morphine 2 mg every 4 hours p.r.n., Zofran 4 mg IV every 4 hours p.r.n.,

Sonata 5 mg p.o. at bedtime p.r.n., Klonopin 0.25 mg b.i.d.



ASSESSMENT:

1.  Status post exploratory laparotomy for fistula repair.

2.  Severe anemia.

3.  Abdominal pain.

4.  Hypothyroidism.



PLAN:  We will continue IV fluids.  Pain control with current regimen with

morphine.  We will continue that.  Hemoglobin and hematocrit to be done

after completion of blood transfusion today.  Renal functions stable. 

Creatinine 1.  Electrolytes within normal limits.  We will continue

Synthroid for hypothyroidism.  Continue IV fluids.





__________________________________________

Gaviota Hinojosa MD





DD:  07/08/2018 10:02:27

DT:  07/08/2018 10:15:33

Job # [id number]

## 2018-07-08 NOTE — PN
DATE:  07/07/2018



HISTORY OF PRESENT ILLNESS:  Mr. Hickey is a 62-year-old male admitted

to the hospital with abdominal wall cellulitis and GI fistula.  He had

repair of the fistula done 2 days ago.  Today is day 2 postop, status post

exploratory laparotomy and small bowel resection with fistula repair. 

Complaining of itching with the morphine, pain is though well controlled

with morphine.  He is requesting to change morphine to something else.  No

nausea, no vomiting.  No fever.  No cough with expectoration.  Hemoglobin

declined today to 7.5 g/dL.  White count is still normal.



PAST MEDICAL HISTORY:  Asthma, interstitial cystitis, vitiligo,

hepatomegaly, colostomy, right abdominal urostomy, cystectomy.



FAMILY HISTORY:  Noncontributory, mother and father.



PERSONAL HISTORY:  Nonsmoker.  No history of alcohol abuse.



ALLERGIES:  NO KNOWN DRUG ALLERGIES.



MEDICATIONS:  DuoNeb every 6 hours p.r.n., Klonopin b.i.d., Benadryl every

4 hour p.r.n., morphine sulfate 4 mg every 3 hours, Synthroid 75 mcg daily,

Ativan 0.5 mg every 6 hour p.r.n., nystatin, Protonix, Sonata 5mg at

bedtime p.r.n.



LABORATORY DATA:  White count 5.6, hemoglobin 7.5, hematocrit 22.8,

platelet 155.  Sodium 145, potassium 3.6, creatinine 1, calcium 8.3. 

Troponin not elevated.



ASSESSMENT:

1.  Fistula repair status post exploratory laparotomy and small bowel

resection.

2.  Severe anemia.

3.  Abdominal pain.

4.  Itching with morphine.



PLAN:  We will continue IV fluids.  Discontinue morphine.  Change morphine

to Dilaudid 2 mg every 3 hours p.r.n. for pain.  Renal function is within

normal limits.  Electrolytes are normal.  Hemoglobin is declined to 7.5

g/dL.  Two units of blood transfusion to be given today.  We will continue

to monitor the blood count.  Continue Zofran p.r.n.  Ativan every 6 hours

p.r.n. for anxiety.  Continue Synthroid for hypothyroidism.  Continue

Klonopin 0.25 mg p.o. b.i.d.  Blood pressure control with current

medication.





__________________________________________

Gaviota Hinojosa MD





DD:  07/07/2018 23:32:31

DT:  07/08/2018 0:52:31

[facility] # [id number]

## 2018-07-08 NOTE — CP.PCM.PN
<Bobbi Olmedo - Last Filed: 07/08/18 16:28>





Subjective





- Date & Time of Evaluation


Date of Evaluation: 07/08/18


Time of Evaluation: 13:14





- Subjective


Subjective: 


PGY5 Gi Follow Up 





Pt seen and examined bedside 


Notes abd distention and tenderness


+Nauseous 


Denies any BM 





ROS: 12 point ROS conducted, neg other than above 








Objective





- Vital Signs/Intake and Output


Vital Signs (last 24 hours): 


 











Temp Pulse Resp BP Pulse Ox


 


 99.4 F   90   20   122/73   97 


 


 07/08/18 10:47  07/08/18 07:45  07/08/18 07:45  07/08/18 07:45  07/08/18 06:00








Intake and Output: 


 











 07/08/18 07/08/18





 06:59 18:59


 


Intake Total 1782 355


 


Output Total 1240 625


 


Balance 542 -270














- Medications


Medications: 


 Current Medications





Albuterol/Ipratropium (Duoneb 3 Mg/0.5 Mg (3 Ml) Ud)  3 ml IH [id number] PRN


   PRN Reason: Sinus symptoms


Benzocaine/Menthol (Cepacol Sore Throat)  1 fernandez MT Q2H PRN


   PRN Reason: Sore Throat


   Last Admin: 07/06/18 23:34 Dose:  1 fernandez


Clonazepam (Klonopin)  0.25 mg PO BID [facility]


   PRN Reason: Protocol


   Last Admin: 07/08/18 09:46 Dose:  0.25 mg


Diphenhydramine HCl (Benadryl)  25 mg IVP Q4H PRN


   PRN Reason: Allergy symptoms


   Last Admin: 07/08/18 05:04 Dose:  25 mg


Amino Acids/Electrolytes/Dextrose (Clinimix 5/20 % "E" (2000 Ml))  2,000 mls @ 

83.333 mls/hr IV .Q24H [facility]


   Stop: 07/09/18 18:01


   Last Admin: 07/07/18 18:13 Dose:  83.333 mls/hr


Fat Emulsion Intravenous (Intralipid 20%)  250 mls @ 20.833 mls/hr IV MWF@1800 

[facility]


   Stop: 07/09/18 18:01


   Last Admin: 07/06/18 23:44 Dose:  20.833 mls/hr


Dextrose/Sodium Chloride (Dextrose 5%/0.45% Ns 1000 Ml)  1,000 mls @ 75 mls/hr 

IV .[id number] [facility]


   Last Admin: 07/08/18 10:47 Dose:  75 mls/hr


Levothyroxine Sodium (Synthroid)  75 mcg IVP DAILY [facility]


   Last Admin: 07/08/18 09:46 Dose:  75 mcg


Lorazepam (Ativan)  0.5 mg IV Q6 PRN; Protocol


   PRN Reason: anxiety/insomnia


   Last Admin: 07/08/18 14:25 Dose:  0.5 mg


Magnesium Hydroxide (Milk Of Magnesia)  30 ml PO DAILY [facility]


   Last Admin: 07/08/18 09:46 Dose:  30 ml


Morphine Sulfate (Morphine)  2 mg IVP Q4H PRN


   PRN Reason: Pain, moderate (4-7)


Morphine Sulfate (Morphine)  4 mg IVP Q4H PRN


   PRN Reason: Pain, severe (8-10)


Nystatin (Nystop Topical Powder)  0 gm TOP TID [facility]


   Last Admin: 07/07/18 18:11 Dose:  1 applic


Ondansetron HCl (Zofran Inj)  4 mg IVP Q6H PRN


   PRN Reason: Nausea/Vomiting


   Last Admin: 07/08/18 09:51 Dose:  4 mg


Pantoprazole Sodium (Protonix Inj)  40 mg IVP Q12 [facility]


   Last Admin: 07/08/18 09:46 Dose:  40 mg


Petrolatum (Desitin Maximum Strength Topical 40% Oint)  0 gm TOP Q4H PRN


   PRN Reason: Rash


   Last Admin: 07/02/18 17:52 Dose:  1 applic


Saliva Substitute (Saliva Substitute)  2 ml PO Q6H PRN


   PRN Reason: Dry mouth


   Last Admin: 07/04/18 20:11 Dose:  2 ml


Zaleplon (Sonata)  5 mg PO HS PRN


   PRN Reason: Insomnia


   Last Admin: 07/04/18 21:32 Dose:  5 mg











- Labs


Labs: 


 





 07/08/18 10:00 





 07/08/18 10:00 





 











PT  12.7 SECONDS (9.4-12.5)  H  07/05/18  06:10    


 


INR  1.10  (0.93-1.08)  H  07/05/18  06:10    


 


APTT  26.9 Seconds (25.1-36.5)   07/05/18  06:10    














- Constitutional


Appears: Well, No Acute Distress





- Head Exam


Head Exam: ATRAUMATIC, NORMOCEPHALIC





- Eye Exam


Eye Exam: Normal appearance


Pupil Exam: NORMAL ACCOMODATION





- ENT Exam


ENT Exam: Mucous Membranes Moist, Normal Exam





- Neck Exam


Neck Exam: Normal Inspection





- Respiratory Exam


Respiratory Exam: Clear to Ausculation Bilateral, NORMAL BREATHING PATTERN.  

absent: Prolonged Expiratory Phase, Wheezes, Respiratory Distress





- Cardiovascular Exam


Cardiovascular Exam: REGULAR RHYTHM, +S1, +S2





- GI/Abdominal Exam


GI & Abdominal Exam: Distended, Hypoactive Bowel Sounds.  absent: Guarding, 

Rigid, Mass, Organomegaly





- Extremities Exam


Extremities Exam: absent: Joint Swelling, Pedal Edema





- Neurological Exam


Neurological Exam: Alert, Awake, Oriented x3





- Psychiatric Exam


Psychiatric exam: Normal Affect, Normal Mood





- Skin


Skin Exam: Dry, Intact, Normal Color, Warm





Assessment and Plan





- Assessment and Plan (Free Text)


Assessment: 


S/P Exploratory Lap with primary anatomosis with lysis of adhesion


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 

fistulogram show abdominal fistulax2


post-op ileus


Hemorrhagic cystitis s/p cystectomty and ileal condiut w/ revision 


UTI 


Hepatic lesion - Previous biopsy was non-diagnostic 


Anemia


Hypothyroid





Plan: 





diet as per surgery 


can resume clears


start miralax daily 


encourage out of bed


on IV antibiotics 


on PPI


Monitor I&O as well as drain output


on DVT prophylaxsis


monitor H/H and for overt GI bleeding


post op mgt as per surgery





Seen and discussed with Dr. Pham.











<Racheal Pham - Last Filed: 07/09/18 00:35>





Objective





- Vital Signs/Intake and Output


Vital Signs (last 24 hours): 


 











Temp Pulse Resp BP Pulse Ox


 


 98.6 F   82   20   121/88   97 


 


 07/08/18 22:03  07/08/18 22:03  07/08/18 22:03  07/08/18 22:03  07/08/18 22:03








Intake and Output: 


 











 07/08/18 07/09/18





 18:59 06:59


 


Intake Total 405 325


 


Output Total 625 300


 


Balance -220 25














- Medications


Medications: 


 Current Medications





Albuterol/Ipratropium (Duoneb 3 Mg/0.5 Mg (3 Ml) Ud)  3 ml IH [id number] PRN


   PRN Reason: Sinus symptoms


Benzocaine/Menthol (Cepacol Sore Throat)  1 fernandez MT Q2H PRN


   PRN Reason: Sore Throat


   Last Admin: 07/08/18 23:03 Dose:  1 fernandez


Clonazepam (Klonopin)  0.25 mg PO BID [facility]


   PRN Reason: Protocol


   Last Admin: 07/08/18 18:06 Dose:  0.25 mg


Diphenhydramine HCl (Benadryl)  25 mg IVP Q4H PRN


   PRN Reason: Allergy symptoms


   Last Admin: 07/08/18 05:04 Dose:  25 mg


Dextrose/Sodium Chloride (Dextrose 5%/0.45% Ns 1000 Ml)  1,000 mls @ 75 mls/hr 

IV .[id number] [facility]


   Last Admin: 07/08/18 10:47 Dose:  75 mls/hr


Levothyroxine Sodium (Synthroid)  75 mcg IVP DAILY [facility]


   Last Admin: 07/08/18 09:46 Dose:  75 mcg


Lorazepam (Ativan)  0.5 mg IV Q6 PRN; Protocol


   PRN Reason: anxiety/insomnia


   Last Admin: 07/08/18 14:25 Dose:  0.5 mg


Magnesium Hydroxide (Milk Of Magnesia)  30 ml PO DAILY [facility]


   Last Admin: 07/08/18 09:46 Dose:  30 ml


Morphine Sulfate (Morphine)  2 mg IVP Q4H PRN


   PRN Reason: Pain, moderate (4-7)


   Last Admin: 07/08/18 19:29 Dose:  2 mg


Morphine Sulfate (Morphine)  4 mg IVP Q4H PRN


   PRN Reason: Pain, severe (8-10)


Nystatin (Nystop Topical Powder)  0 gm TOP TID [facility]


   Last Admin: 07/07/18 18:11 Dose:  1 applic


Ondansetron HCl (Zofran Inj)  4 mg IVP Q6H PRN


   PRN Reason: Nausea/Vomiting


   Last Admin: 07/08/18 09:51 Dose:  4 mg


Pantoprazole Sodium (Protonix Inj)  40 mg IVP Q12 [facility]


   Last Admin: 07/08/18 21:28 Dose:  40 mg


Petrolatum (Desitin Maximum Strength Topical 40% Oint)  0 gm TOP Q4H PRN


   PRN Reason: Rash


   Last Admin: 07/02/18 17:52 Dose:  1 applic


Saliva Substitute (Saliva Substitute)  2 ml PO Q6H PRN


   PRN Reason: Dry mouth


   Last Admin: 07/04/18 20:11 Dose:  2 ml


Zaleplon (Sonata)  5 mg PO HS PRN


   PRN Reason: Insomnia


   Last Admin: 07/04/18 21:32 Dose:  5 mg











- Labs


Labs: 


 





 07/08/18 10:00 





 07/08/18 10:00 





 











PT  12.7 SECONDS (9.4-12.5)  H  07/05/18  06:10    


 


INR  1.10  (0.93-1.08)  H  07/05/18  06:10    


 


APTT  26.9 Seconds (25.1-36.5)   07/05/18  06:10    














Attending/Attestation





- Attestation


I have personally seen and examined this patient.: Yes


I have fully participated in the care of the patient.: Yes


I have reviewed all pertinent clinical information, including history, physical 

exam and plan: Yes


Notes (Text): 


This is an addendum to GI progress  report dictated by the GI Fellow.The 

patient was seen and examined earlier.  Medical records, lab studies, imagings 

were reviewed.  Last 24 hours events reviewed.  Agreed with the above treatment 

plan as outlined in GI Fellow 's notes the with the addition of the following


patient had episodes of vomitg bilious material


Unable to tolerate liquid diet


On examination abdomen is distended


Requested abdominal x-ray 2 views to evaluate for partial obstruction


Surgical follow-up





07/09/18 00:33

## 2018-07-08 NOTE — PN
DATE:  07/08/2018



PULMONARY PROGRESS NOTE



REFERRING PHYSICIAN:  Sandra Perez MD.



SUBJECTIVE:  He is lying in the bed, head at 45 degrees.  Has episode of

vomiting with coffee-ground material.  He has been n.p.o. though, presently

feels okay.  No cough, no sputum production.  Has some nausea feeling.  No

stool in the colostomy.  No leg swelling.



OBJECTIVE:

GENERAL:  In no acute distress.

VITAL SIGNS:  Temperature is 98, heart rate 73, respiratory rate is 18,

blood pressure 129/79, pulse ox 97% on room air.

HEENT:  Moist mucous membrane.  No ulcer or thrush noted.

NECK:  Supple.  No JVD.

LUNGS:  Have a fair airflow with rhonchi.

HEART:  S1 and S2.

ABDOMEN:  Decreased bowel sounds.  Midline surgical scar looks okay. 

Colostomy looks okay.

EXTREMITIES:  There is no edema.

NEUROLOGICAL:  Awake and alert.  Follows simple command.



MEDICATIONS:  He is on Ativan 0.5 mg every 6 hours p.r.n., Benadryl 25 mg

every 4 hours p.r.n., Cepacol lozenges every 2 hours p.r.n., Clinimix is 83

mL/hour, IV fluid D5 half normal saline 75 mL per hour, DuoNeb every 6

hours p.r.n., Intralipid 20% at 20 mL per hour, Klonopin 0.25 mg twice a

day, milk of magnesia p.r.n., morphine 2 mg every 4 hours p.r.n., also

morphine 4 mg every 4 hours p.r.n., Protonix 40 mg every 12 hours, Saliva

Substitute every 6 hours p.r.n., Sonata 5 mg at bedtime p.r.n., Synthroid

75 mcg daily, and Zofran p.r.n. basis.



LABORATORY DATA:  Shows hemoglobin 9.1, hematocrit 27.1, WBC 5.7, platelet

is 180.  Sodium 149, potassium 3.7, chloride 110, bicarbonate 28, BUN is

25, creatinine 0.9, glucose 113, calcium is 8.6, phosphorus is 2.9,

magnesium 2.2.  AST 18, ALT 18, alkaline phosphatase is 53, albumin is 3. 

Has abdominal x-ray done which shows presumed postoperative ileus.  No

radiographic finding of obstruction.



IMPRESSION AND PLAN:  Status post laparotomy with reanastomosis, has a

colostomy also, status post lysis of adhesion, history of enterocutaneous

fistula, chronic obstructive lung disease, malnutrition, hypothyroid. 

Patient is being followed by GI and Surgery.  Pulmonary point of view,

doing okay.  Keep head at 45 degrees.  Bronchodilator.  Aspiration

precaution.  Gastric prophylaxis, deep venous thrombosis prophylaxis. 

Followup labs in the morning.



Thank you and we will follow with you.





__________________________________________

Dante Haque MD



DD:  07/08/2018 17:45:12

DT:  07/08/2018 19:22:20

[facility] # [id number]

## 2018-07-09 LAB
ALBUMIN SERPL-MCNC: 2.7 G/DL (ref 3–4.8)
ALBUMIN/GLOB SERPL: 0.8 {RATIO} (ref 1.1–1.8)
ALT SERPL-CCNC: 14 U/L (ref 7–56)
AST SERPL-CCNC: 14 U/L (ref 17–59)
BASOPHILS # BLD AUTO: 0.01 K/MM3 (ref 0–2)
BASOPHILS NFR BLD: 0.2 % (ref 0–3)
BUN SERPL-MCNC: 23 MG/DL (ref 7–21)
CALCIUM SERPL-MCNC: 8.2 MG/DL (ref 8.4–10.5)
EOSINOPHIL # BLD: 0.2 10*3/UL (ref 0–0.7)
EOSINOPHIL NFR BLD: 3.6 % (ref 1.5–5)
ERYTHROCYTE [DISTWIDTH] IN BLOOD BY AUTOMATED COUNT: 14.1 % (ref 11.5–14.5)
GFR NON-AFRICAN AMERICAN: > 60
GRANULOCYTES # BLD: 2.67 10*3/UL (ref 1.4–6.5)
GRANULOCYTES NFR BLD: 64.2 % (ref 50–68)
HDLC SERPL-MCNC: 13 MG/DL (ref 29–60)
HGB BLD-MCNC: 9.8 G/DL (ref 14–18)
LDLC SERPL-MCNC: 46 MG/DL (ref 0–129)
LYMPHOCYTES # BLD: 1.1 10*3/UL (ref 1.2–3.4)
LYMPHOCYTES NFR BLD AUTO: 25.7 % (ref 22–35)
MCH RBC QN AUTO: 28.3 PG (ref 25–35)
MCHC RBC AUTO-ENTMCNC: 33.4 G/DL (ref 31–37)
MCV RBC AUTO: 84.7 FL (ref 80–105)
MONOCYTES # BLD AUTO: 0.3 10*3/UL (ref 0.1–0.6)
MONOCYTES NFR BLD: 6.3 % (ref 1–6)
PLATELET # BLD: 176 10^3/UL (ref 120–450)
PMV BLD AUTO: 8.7 FL (ref 7–11)
RBC # BLD AUTO: 3.46 10^6/UL (ref 3.5–6.1)
WBC # BLD AUTO: 4.2 10^3/UL (ref 4.5–11)

## 2018-07-09 RX ADMIN — PURIFIED WATER PRN LOZ: 99.05 LIQUID OPHTHALMIC at 04:44

## 2018-07-09 RX ADMIN — LEVOTHYROXINE SODIUM ANHYDROUS SCH MCG: 100 INJECTION, POWDER, LYOPHILIZED, FOR SOLUTION INTRAVENOUS at 09:44

## 2018-07-09 RX ADMIN — NYSTATIN SCH: 100000 POWDER TOPICAL at 14:01

## 2018-07-09 RX ADMIN — MAGNESIUM HYDROXIDE SCH ML: 400 SUSPENSION ORAL at 09:44

## 2018-07-09 RX ADMIN — DEXTROSE AND SODIUM CHLORIDE SCH MLS/HR: 5; 450 INJECTION, SOLUTION INTRAVENOUS at 15:35

## 2018-07-09 RX ADMIN — DEXTROSE AND SODIUM CHLORIDE SCH MLS/HR: 5; 450 INJECTION, SOLUTION INTRAVENOUS at 02:57

## 2018-07-09 RX ADMIN — NYSTATIN SCH APPLIC: 100000 POWDER TOPICAL at 09:50

## 2018-07-09 RX ADMIN — PURIFIED WATER PRN LOZ: 99.05 LIQUID OPHTHALMIC at 01:43

## 2018-07-09 RX ADMIN — NYSTATIN SCH: 100000 POWDER TOPICAL at 18:00

## 2018-07-09 NOTE — CP.PCM.PN
Subjective





- Date & Time of Evaluation


Date of Evaluation: 07/09/18


Time of Evaluation: 07:30





- Subjective


Subjective: 





Patient seen and examined this AM. No adverse events overnight. Patient had 

clinical symptoms of an ileus yesterday confirmed by abdominal XR and NGT was 

inserted with 800cc's dark brown/green clear fluid output over 24 hours. 

Patient reports resolution of nausea and abdominal pain and is passing gas.





Objective





- Vital Signs/Intake and Output


Vital Signs (last 24 hours): 


 











Temp Pulse Resp BP Pulse Ox


 


 98.7 F   74   20   112/72   99 


 


 07/09/18 06:00  07/09/18 06:00  07/09/18 06:00  07/09/18 06:00  07/09/18 06:00








Intake and Output: 


 











 07/09/18 07/09/18





 06:59 18:59


 


Intake Total 325 


 


Output Total 1600 


 


Balance -1275 














- Medications


Medications: 


 Current Medications





Albuterol/Ipratropium (Duoneb 3 Mg/0.5 Mg (3 Ml) Ud)  3 ml IH [id number] PRN


   PRN Reason: Sinus symptoms


Benzocaine/Menthol (Cepacol Sore Throat)  1 fernandez MT Q2H PRN


   PRN Reason: Sore Throat


   Last Admin: 07/09/18 04:44 Dose:  1 fernandez


Clonazepam (Klonopin)  0.25 mg PO BID [facility]


   PRN Reason: Protocol


   Last Admin: 07/08/18 18:06 Dose:  0.25 mg


Diphenhydramine HCl (Benadryl)  25 mg IVP Q4H PRN


   PRN Reason: Allergy symptoms


   Last Admin: 07/08/18 05:04 Dose:  25 mg


Dextrose/Sodium Chloride (Dextrose 5%/0.45% Ns 1000 Ml)  1,000 mls @ 75 mls/hr 

IV .[id number] [facility]


   Last Admin: 07/09/18 02:57 Dose:  75 mls/hr


Levothyroxine Sodium (Synthroid)  75 mcg IVP DAILY [facility]


   Last Admin: 07/08/18 09:46 Dose:  75 mcg


Lorazepam (Ativan)  0.5 mg IV Q6 PRN; Protocol


   PRN Reason: anxiety/insomnia


   Last Admin: 07/08/18 14:25 Dose:  0.5 mg


Magnesium Hydroxide (Milk Of Magnesia)  30 ml PO DAILY [facility]


   Last Admin: 07/08/18 09:46 Dose:  30 ml


Morphine Sulfate (Morphine)  2 mg IVP Q4H PRN


   PRN Reason: Pain, moderate (4-7)


   Last Admin: 07/08/18 19:29 Dose:  2 mg


Morphine Sulfate (Morphine)  4 mg IVP Q4H PRN


   PRN Reason: Pain, severe (8-10)


Nystatin (Nystop Topical Powder)  0 gm TOP TID [facility]


   Last Admin: 07/07/18 18:11 Dose:  1 applic


Ondansetron HCl (Zofran Inj)  4 mg IVP Q6H PRN


   PRN Reason: Nausea/Vomiting


   Last Admin: 07/08/18 09:51 Dose:  4 mg


Pantoprazole Sodium (Protonix Inj)  40 mg IVP Q12 [facility]


   Last Admin: 07/08/18 21:28 Dose:  40 mg


Petrolatum (Desitin Maximum Strength Topical 40% Oint)  0 gm TOP Q4H PRN


   PRN Reason: Rash


   Last Admin: 07/02/18 17:52 Dose:  1 applic


Saliva Substitute (Saliva Substitute)  2 ml PO Q6H PRN


   PRN Reason: Dry mouth


   Last Admin: 07/04/18 20:11 Dose:  2 ml


Zaleplon (Sonata)  5 mg PO HS PRN


   PRN Reason: Insomnia


   Last Admin: 07/04/18 21:32 Dose:  5 mg











- Labs


Labs: 


 





 07/09/18 06:30 





 07/09/18 06:30 





 











PT  12.7 SECONDS (9.4-12.5)  H  07/05/18  06:10    


 


INR  1.10  (0.93-1.08)  H  07/05/18  06:10    


 


APTT  26.9 Seconds (25.1-36.5)   07/05/18  06:10    














- Constitutional


Appears: Well, Non-toxic, No Acute Distress





- Head Exam


Head Exam: ATRAUMATIC, NORMOCEPHALIC





- Eye Exam


Eye Exam: Normal appearance.  absent: Conjunctival injection, Scleral icterus





- ENT Exam


ENT Exam: Mucous Membranes Moist, Normal Oropharynx





- Respiratory Exam


Respiratory Exam: NORMAL BREATHING PATTERN.  absent: Accessory Muscle Use, 

Respiratory Distress





- GI/Abdominal Exam


GI & Abdominal Exam: Soft.  absent: Distended, Tenderness


Additional comments: 





midline incision well approximated with staples, no surrounding erythema, 

urostomy pink and productive of clear yellow fluid





- Extremities Exam


Extremities Exam: absent: Calf Tenderness, Pedal Edema, Tenderness





- Neurological Exam


Neurological Exam: Alert, Awake, Oriented x3





- Psychiatric Exam


Psychiatric exam: Normal Affect, Normal Mood





- Skin


Skin Exam: Dry, Normal Color, Warm


Additional comments: 





mild excoriation of the skin surrounding the incision





Assessment and Plan





- Assessment and Plan (Free Text)


Assessment: 





62M POD#4 s/p ex lap with repair of enterocutaneous fistulas


Plan: 





Continue NGT at this time, may consider clamping NGT today


Encourage PT and ambulation


PRN pain and nausea medication


Continue monitoring strict intake and output


Continue NPO and TPN


Continue to monitor labs


May consider an enema or a suppository today





Discussed with Dr. Yvette Ayala, PGY2

## 2018-07-09 NOTE — CP.PCM.PN
Subjective





- Date & Time of Evaluation


Date of Evaluation: 07/09/18


Time of Evaluation: 08:30





- Subjective


Subjective: 





GI Progress Note for Dr. Ankit Bass, IM PGY-3





Patient seen and examined at bedside.  No further episodes of biliary emesis 

overnight.  Approximately 800cc bilious green fluid in collection from NGT on 

suction at time of exam.  Patient reports feeling better overall, passing 

flatus and feels like he has to make a BM but not yet able to.  Only complaint 

is sore/dry throat from the NGT, asking for something to drink.  Told cannot 

have any liquids yet, but can give oral swabs for dry mouth, pt agreeable to 

this.





Objective





- Vital Signs/Intake and Output


Vital Signs (last 24 hours): 


 











Temp Pulse Resp BP Pulse Ox


 


 98.7 F   74   20   112/72   99 


 


 07/09/18 06:00  07/09/18 06:00  07/09/18 06:00  07/09/18 06:00  07/09/18 06:00








Intake and Output: 


 











 07/09/18 07/09/18





 06:59 18:59


 


Intake Total 325 


 


Output Total 1600 


 


Balance -1275 














- Medications


Medications: 


 Current Medications





Albuterol/Ipratropium (Duoneb 3 Mg/0.5 Mg (3 Ml) Ud)  3 ml IH [id number] PRN


   PRN Reason: Sinus symptoms


Benzocaine/Menthol (Cepacol Sore Throat)  1 fernandez MT Q2H PRN


   PRN Reason: Sore Throat


   Last Admin: 07/09/18 04:44 Dose:  1 fernandez


Clonazepam (Klonopin)  0.25 mg PO BID [facility] D


   PRN Reason: Protocol


   Last Admin: 07/09/18 09:43 Dose:  0.25 mg


Diphenhydramine HCl (Benadryl)  25 mg IVP Q4H PRN


   PRN Reason: Allergy symptoms


   Last Admin: 07/08/18 05:04 Dose:  25 mg


Dextrose/Sodium Chloride (Dextrose 5%/0.45% Ns 1000 Ml)  1,000 mls @ 75 mls/hr 

IV .[id number] [facility]


   Last Admin: 07/09/18 02:57 Dose:  75 mls/hr


Levothyroxine Sodium (Synthroid)  75 mcg IVP DAILY [facility]


   Last Admin: 07/09/18 09:44 Dose:  75 mcg


Lorazepam (Ativan)  0.5 mg IV Q6 PRN; Protocol


   PRN Reason: anxiety/insomnia


   Last Admin: 07/08/18 14:25 Dose:  0.5 mg


Magnesium Hydroxide (Milk Of Magnesia)  30 ml PO DAILY [facility]


   Last Admin: 07/09/18 09:44 Dose:  30 ml


Morphine Sulfate (Morphine)  2 mg IVP Q4H PRN


   PRN Reason: Pain, moderate (4-7)


   Last Admin: 07/08/18 19:29 Dose:  2 mg


Morphine Sulfate (Morphine)  4 mg IVP Q4H PRN


   PRN Reason: Pain, severe (8-10)


Nystatin (Nystop Topical Powder)  0 gm TOP TID [facility]


   Last Admin: 07/09/18 09:50 Dose:  1 applic


Ondansetron HCl (Zofran Inj)  4 mg IVP Q6H PRN


   PRN Reason: Nausea/Vomiting


   Last Admin: 07/08/18 09:51 Dose:  4 mg


Pantoprazole Sodium (Protonix Inj)  40 mg IVP Q12 [facility]


   Last Admin: 07/09/18 09:44 Dose:  40 mg


Petrolatum (Desitin Maximum Strength Topical 40% Oint)  0 gm TOP Q4H PRN


   PRN Reason: Rash


   Last Admin: 07/02/18 17:52 Dose:  1 applic


Saliva Substitute (Saliva Substitute)  2 ml PO Q6H PRN


   PRN Reason: Dry mouth


   Last Admin: 07/04/18 20:11 Dose:  2 ml


Zaleplon (Sonata)  5 mg PO HS PRN


   PRN Reason: Insomnia


   Last Admin: 07/04/18 21:32 Dose:  5 mg











- Labs


Labs: 


 





 07/09/18 06:30 





 07/09/18 06:30 





 











PT  12.7 SECONDS (9.4-12.5)  H  07/05/18  06:10    


 


INR  1.10  (0.93-1.08)  H  07/05/18  06:10    


 


APTT  26.9 Seconds (25.1-36.5)   07/05/18  06:10    














- Additional Findings


Additional findings: 





- Constitutional


Appears: Non-toxic, No Acute Distress, Chronically Ill





- Head Exam


Head Exam: ATRAUMATIC, NORMAL INSPECTION, NORMOCEPHALIC





- Eye Exam


Eye Exam: EOMI, Normal appearance.  absent: Conjunctival injection, Scleral 

icterus


Pupil Exam: absent: Fixed, Irregular





- ENT Exam


ENT Exam: Mucous Membranes Moist, NGT in place on suction (minimal drainage in 

tube, approx 800cc bilious green drainage in container)





- Neck Exam


Neck Exam: Full ROM, Normal Inspection





- Respiratory Exam


Respiratory Exam: Clear to Ausculation Bilateral, NORMAL BREATHING PATTERN.  

absent: Accessory Muscle Use, Chest Wall Tenderness, Decreased Breath Sounds, 

Rales, Rhonchi, Wheezes, Respiratory Distress





- Cardiovascular Exam


Cardiovascular Exam: REGULAR RHYTHM, RRR, +S1, +S2.  absent: Bradycardia, 

Tachycardia, Irregular Rhythm, JVD, +S4





- GI/Abdominal Exam


GI & Abdominal Exam: Mildly distended, mildly firm to palpation but not rigid, 

moderately tender to palpation in all quadrants, Minimal bowel sounds 

appreciated (primarily at LUQ), Banding over surgical site without active 

bleeding or drainage through bandages, No surrounding cellulitis or erythema at 

surgical site, Left lateral lower abdominal urostomy bag with clear yellow urine





- Extremities Exam


Extremities Exam: Full ROM, Normal Inspection.  absent: Calf Tenderness, Pedal 

Edema, Tenderness





- Neurological Exam


Neurological Exam: Alert, Awake, Following all commands appropriately, moving 

all extremities spontaneously





- Psychiatric Exam


Psychiatric exam: Normal Affect, Normal Mood





- Skin


Skin Exam: Dry, Intact, Normal Color, Warm





Assessment and Plan





- Assessment and Plan (Free Text)


Assessment: 





This is 63yo male with past medical history of hemorrhagic cystitis s/p 

cystectomy and ileal conduit, multiple abdominal surgeries including hernia 

repairs who came to ED for abdominal pain and decreased urine output.  POD #4 

for surgical anastomosis, developed post-op illeus, now appears to be resolving.


Plan: 





S/P Exploratory Lap with primary anatomosis with lysis of adhesion


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 

fistulogram show abdominal fistulax2


post-op ileus


Hemorrhagic cystitis s/p cystectomty and ileal condiut w/ revision 


UTI 


Hepatic lesion - Previous biopsy was non-diagnostic 


Anemia


Hypothyroid








Diet advancement as per surgery, currently remains NPO 2/2 bilious emesis with 

PO intake yesterday


Abd X-ray shows dilated loops small bowel consistent with post-op illeus, 

passing flatus today and pending BM so unlikely SBO


NGT remains in place for gastric decompression, approx 800cc bilious drainage 

this AM, defer to surgery for clamping/dc'ing NGT


Encourage out of bed, incentive spirometer


On IV antibiotics, PPI, DVT ppx


Monitor I&O as well as drain output


monitor H/H and for overt GI bleeding


post op mgt as per surgery





Seen and discussed with Dr. Pham.

## 2018-07-09 NOTE — CP.PCM.PN
Subjective





- Date & Time of Evaluation


Date of Evaluation: 07/09/18


Time of Evaluation: 10:00





- Subjective


Subjective: 





No fevers, comfortable.





Objective





- Vital Signs/Intake and Output


Vital Signs (last 24 hours): 


 











Temp Pulse Resp BP Pulse Ox


 


 98.7 F   74   20   112/72   99 


 


 07/09/18 06:00  07/09/18 06:00  07/09/18 06:00  07/09/18 06:00  07/09/18 06:00








Intake and Output: 


 











 07/09/18 07/09/18





 06:59 18:59


 


Intake Total 325 


 


Output Total 1600 


 


Balance -1275 














- Medications


Medications: 


 Current Medications





Albuterol/Ipratropium (Duoneb 3 Mg/0.5 Mg (3 Ml) Ud)  3 ml IH [id number] PRN


   PRN Reason: Sinus symptoms


Benzocaine/Menthol (Cepacol Sore Throat)  1 fernandez MT Q2H PRN


   PRN Reason: Sore Throat


   Last Admin: 07/09/18 04:44 Dose:  1 fernandez


Clonazepam (Klonopin)  0.25 mg PO BID [facility] D


   PRN Reason: Protocol


   Last Admin: 07/08/18 18:06 Dose:  0.25 mg


Diphenhydramine HCl (Benadryl)  25 mg IVP Q4H PRN


   PRN Reason: Allergy symptoms


   Last Admin: 07/08/18 05:04 Dose:  25 mg


Dextrose/Sodium Chloride (Dextrose 5%/0.45% Ns 1000 Ml)  1,000 mls @ 75 mls/hr 

IV .[id number] [facility]


   Last Admin: 07/09/18 02:57 Dose:  75 mls/hr


Levothyroxine Sodium (Synthroid)  75 mcg IVP DAILY [facility]


   Last Admin: 07/08/18 09:46 Dose:  75 mcg


Lorazepam (Ativan)  0.5 mg IV Q6 PRN; Protocol


   PRN Reason: anxiety/insomnia


   Last Admin: 07/08/18 14:25 Dose:  0.5 mg


Magnesium Hydroxide (Milk Of Magnesia)  30 ml PO DAILY [facility]


   Last Admin: 07/08/18 09:46 Dose:  30 ml


Morphine Sulfate (Morphine)  2 mg IVP Q4H PRN


   PRN Reason: Pain, moderate (4-7)


   Last Admin: 07/08/18 19:29 Dose:  2 mg


Morphine Sulfate (Morphine)  4 mg IVP Q4H PRN


   PRN Reason: Pain, severe (8-10)


Nystatin (Nystop Topical Powder)  0 gm TOP TID [facility]


   Last Admin: 07/07/18 18:11 Dose:  1 applic


Ondansetron HCl (Zofran Inj)  4 mg IVP Q6H PRN


   PRN Reason: Nausea/Vomiting


   Last Admin: 07/08/18 09:51 Dose:  4 mg


Pantoprazole Sodium (Protonix Inj)  40 mg IVP Q12 [facility]


   Last Admin: 07/08/18 21:28 Dose:  40 mg


Petrolatum (Desitin Maximum Strength Topical 40% Oint)  0 gm TOP Q4H PRN


   PRN Reason: Rash


   Last Admin: 07/02/18 17:52 Dose:  1 applic


Saliva Substitute (Saliva Substitute)  2 ml PO Q6H PRN


   PRN Reason: Dry mouth


   Last Admin: 07/04/18 20:11 Dose:  2 ml


Zaleplon (Sonata)  5 mg PO HS PRN


   PRN Reason: Insomnia


   Last Admin: 07/04/18 21:32 Dose:  5 mg











- Labs


Labs: 


 





 07/09/18 06:30 





 07/09/18 06:30 





 











PT  12.7 SECONDS (9.4-12.5)  H  07/05/18  06:10    


 


INR  1.10  (0.93-1.08)  H  07/05/18  06:10    


 


APTT  26.9 Seconds (25.1-36.5)   07/05/18  06:10    














- Constitutional


Appears: Chronically Ill





- Head Exam


Head Exam: NORMAL INSPECTION





- Neck Exam


Neck Exam: absent: Meningismus





- Respiratory Exam


Respiratory Exam: Decreased Breath Sounds





- Cardiovascular Exam


Cardiovascular Exam: +S1, +S2





- GI/Abdominal Exam


GI & Abdominal Exam: Soft, Tenderness





Assessment and Plan





- Assessment and Plan (Free Text)


Plan: 





Assessment


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 

fistulectomy and debridement


history of Abdominal wall abscess S/P drainage S/P repeat debridement and wound 

vacuum placement, with Enterobacter


hypothyroidism


Prostate disease


anxiety disorder


esophageal strictures


acute renal failure





Plan


continue to monitor off antibiotics since he is at risk for nosocomial 

infections

## 2018-07-09 NOTE — PN
DATE:  07/09/2018



PULMONARY PROGRESS NOTE



REFERRING PHYSICIAN:  Sandra Perez MD



SUBJECTIVE:  The patient is sitting out of bed to reclining chair.  Night

was unremarkable.  No more nausea.  Feels like he may need to have a bowel

movement.  He is passing gas today.  No shortness of breath.  No chest

pain.  Mild abdominal discomfort.  No leg pain or swelling.



OBJECTIVE:

GENERAL:  In no distress.

VITAL SIGNS:  Temperature is 98, heart rate 76, respiratory rate is 20,

blood pressure 117/71, pulse ox 99% room air.

HEENT:  Moist mucous membranes.  No ulcer or thrush noted.

NECK:  Supple.  No JVD.

LUNGS:  Have fair airflow with rhonchi.

HEART:  S1 and S2.

ABDOMEN:  Midline surgical scar looks okay.

EXTREMITIES:  There is no edema.

NEUROLOGIC:  Awake and alert.  Follows simple command.



MEDICATIONS:  He is on Ativan 0.5 mg every 6 hours p.r.n., Benadryl 25 mg

every 4 hours p.r.n., CPAP lozenges every 2 hours p.r.n., IV fluid

half-normal saline 75 mL per hour, DuoNeb every 6 hours p.r.n., Klonopin

0.25 mg twice a day, milk of magnesia 30 mL daily, morphine 4 mg 4 hours

p.r.n., morphine 2 mg IV every 4 hours p.r.n., nystatin to affected area

three times a day, getting TPN 83 mL per hour, Protonix 40 mg twice a day,

saliva substitute every 6 hours p.r.n., Sonata 5 mg at bedtime p.r.n.,

Synthroid 75 mcg IV daily, Zofran p.r.n. basis.



LABORATORY DATA:  Shows hemoglobin 9.8, hematocrit 29.3, WBC 4.2, platelet

is 176.  Sodium 147, potassium 3.6, chloride 109, bicarbonate 29, BUN 23,

creatinine 0.8, glucose 128, calcium 8.2, phosphorus 3, magnesium 2.1, AST

14, ALT 14, alk phos is 45.  Albumin is 2.7.  Cholesterol is 82.



IMPRESSION AND PLAN:  Status post laparotomy with reanastomosis and lysis

of adhesion, history of enterocutaneous fistula, chronic obstructive lung

disease, malnutrition, hypothyroid.  Pulmonary point of view, doing okay. 

Continue bronchodilator.  Keep head at 45 degrees.  Aspiration precaution. 

Gastric prophylaxis, deep venous thrombosis prophylaxis.  Antibiotics as

per Infectious Diseases.  Physical therapy.



Thank you and we will follow with you.





__________________________________________

Dante Haque MD



DD:  07/09/2018 18:33:05

DT:  07/09/2018 18:35:23

Job # [id number]

## 2018-07-09 NOTE — PN
DATE:  07/08/2018



The patient seen on the floor, status post resection of fistula x2.  He is

doing better.  Has not passed gas or had a bowel movement but feels cramps.

A little bit of nausea noted.  The wound looks good.  The Mario-Gan

which is subcutaneous is unremarkable.  There is a little bit erythema

around the suture line which itself looks fine.  The ostomy is working

well.  We will follow until tomorrow until Dr. Crawford returns.  The

hemoglobin is down to 7.5 and being transfused.  We will repeat the

hemoglobin status post transfusion.





__________________________________________

Sunny Ag MD



DD:  07/08/2018 10:02:40

DT:  07/08/2018 12:21:02

Job # [id number]

## 2018-07-09 NOTE — CP.PCM.PN
Subjective





- Date & Time of Evaluation


Date of Evaluation: 07/09/18


Time of Evaluation: 06:20





- Subjective


Subjective: 





Awake, comfortable, no distress, denies chest pain,very thirsty, ice chips with 

NGT





Reason for consultation and follow up: Cardiac evaluation for bradycardia (

heart rate 40's/min) asymptomatic, history of asthma, interstitial hemorrhagic, 

cystitis s/p ileal conduit, and cystectomy, hypothyroidism, GI fistulas, 

anxiety issues, and nephritis.





Seen and examined by me and Dr. Albarado





Objective





- Vital Signs/Intake and Output


Vital Signs (last 24 hours): 


 











Temp Pulse Resp BP Pulse Ox


 


 98.6 F   82   20   121/88   97 


 


 07/08/18 22:03  07/08/18 22:03  07/08/18 22:03  07/08/18 22:03  07/08/18 22:03








Intake and Output: 


 











 07/08/18 07/09/18





 18:59 06:59


 


Intake Total 405 325


 


Output Total 625 1600


 


Balance -220 -1275














- Medications


Medications: 


 Current Medications





Albuterol/Ipratropium (Duoneb 3 Mg/0.5 Mg (3 Ml) Ud)  3 ml IH [id number] PRN


   PRN Reason: Sinus symptoms


Benzocaine/Menthol (Cepacol Sore Throat)  1 fernandez MT Q2H PRN


   PRN Reason: Sore Throat


   Last Admin: 07/09/18 04:44 Dose:  1 fernandez


Clonazepam (Klonopin)  0.25 mg PO BID [facility]


   PRN Reason: Protocol


   Last Admin: 07/08/18 18:06 Dose:  0.25 mg


Diphenhydramine HCl (Benadryl)  25 mg IVP Q4H PRN


   PRN Reason: Allergy symptoms


   Last Admin: 07/08/18 05:04 Dose:  25 mg


Dextrose/Sodium Chloride (Dextrose 5%/0.45% Ns 1000 Ml)  1,000 mls @ 75 mls/hr 

IV .[id number] [facility]


   Last Admin: 07/09/18 02:57 Dose:  75 mls/hr


Levothyroxine Sodium (Synthroid)  75 mcg IVP DAILY [facility]


   Last Admin: 07/08/18 09:46 Dose:  75 mcg


Lorazepam (Ativan)  0.5 mg IV Q6 PRN; Protocol


   PRN Reason: anxiety/insomnia


   Last Admin: 07/08/18 14:25 Dose:  0.5 mg


Magnesium Hydroxide (Milk Of Magnesia)  30 ml PO DAILY [facility]


   Last Admin: 07/08/18 09:46 Dose:  30 ml


Morphine Sulfate (Morphine)  2 mg IVP Q4H PRN


   PRN Reason: Pain, moderate (4-7)


   Last Admin: 07/08/18 19:29 Dose:  2 mg


Morphine Sulfate (Morphine)  4 mg IVP Q4H PRN


   PRN Reason: Pain, severe (8-10)


Nystatin (Nystop Topical Powder)  0 gm TOP TID [facility]


   Last Admin: 07/07/18 18:11 Dose:  1 applic


Ondansetron HCl (Zofran Inj)  4 mg IVP Q6H PRN


   PRN Reason: Nausea/Vomiting


   Last Admin: 07/08/18 09:51 Dose:  4 mg


Pantoprazole Sodium (Protonix Inj)  40 mg IVP Q12 [facility]


   Last Admin: 07/08/18 21:28 Dose:  40 mg


Petrolatum (Desitin Maximum Strength Topical 40% Oint)  0 gm TOP Q4H PRN


   PRN Reason: Rash


   Last Admin: 07/02/18 17:52 Dose:  1 applic


Saliva Substitute (Saliva Substitute)  2 ml PO Q6H PRN


   PRN Reason: Dry mouth


   Last Admin: 07/04/18 20:11 Dose:  2 ml


Zaleplon (Sonata)  5 mg PO HS PRN


   PRN Reason: Insomnia


   Last Admin: 07/04/18 21:32 Dose:  5 mg











- Labs


Labs: 


 





 07/08/18 10:00 





 07/08/18 10:00 





 











PT  12.7 SECONDS (9.4-12.5)  H  07/05/18  06:10    


 


INR  1.10  (0.93-1.08)  H  07/05/18  06:10    


 


APTT  26.9 Seconds (25.1-36.5)   07/05/18  06:10    














- Constitutional


Appears: No Acute Distress





- Head Exam


Head Exam: NORMOCEPHALIC





- Eye Exam


Eye Exam: Normal appearance





- ENT Exam


ENT Exam: Mucous Membranes Dry





- Respiratory Exam


Respiratory Exam: Clear to Ausculation Bilateral, NORMAL BREATHING PATTERN





- Cardiovascular Exam


Cardiovascular Exam: +S1, +S2





- GI/Abdominal Exam


GI & Abdominal Exam: Soft, Hypoactive Bowel Sounds


Additional comments: 





NGT 





-  Exam


Additional comments: 





left nephrostomy tube 





- Extremities Exam


Extremities Exam: Normal Capillary Refill





- Neurological Exam


Neurological Exam: Alert, Awake, Oriented x3





- Psychiatric Exam


Psychiatric exam: Normal Affect, Normal Mood





- Skin


Skin Exam: Intact, Normal Color, Warm





Assessment and Plan





- Assessment and Plan (Free Text)


Assessment: 





A 62 year old male who came in to the ER due to abdominal pain and leaking 

ostomy site. 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 

enterocutanoeus fistula. Cardiac consult was called for due to bradycardia. He 

has long history of bradycardia, hypothyroidism, asthma,interstitial hemorrhagic

, cystitis s/p ileal conduit, and cystectomy, anxiety, nephritis.Recently had 

reconstructive surgery of the urinary conduit at [facility].7 /6/18 status post explor lap.























Plan: 





Verbalized to be very thirsty, NGT intact


Post transfusion for low hemoglobin


Stable cardiac status


Chronic bradycardia,asymptomatic, 


Blood pressure stable, heart rate stable 70's


Urine positive for VRE, on contact isolation


ID on consult


Continue current treatment


Continue current medications





Will follow up





Plan and treatment discussed with Dr. Albarado

## 2018-07-10 LAB
ALBUMIN SERPL-MCNC: 2.7 G/DL (ref 3–4.8)
ALBUMIN/GLOB SERPL: 0.8 {RATIO} (ref 1.1–1.8)
ALT SERPL-CCNC: 12 U/L (ref 7–56)
AST SERPL-CCNC: 18 U/L (ref 17–59)
BASOPHILS # BLD AUTO: 0.02 K/MM3 (ref 0–2)
BASOPHILS NFR BLD: 0.5 % (ref 0–3)
BUN SERPL-MCNC: 20 MG/DL (ref 7–21)
CALCIUM SERPL-MCNC: 8.3 MG/DL (ref 8.4–10.5)
EOSINOPHIL # BLD: 0.2 10*3/UL (ref 0–0.7)
EOSINOPHIL NFR BLD: 5.2 % (ref 1.5–5)
ERYTHROCYTE [DISTWIDTH] IN BLOOD BY AUTOMATED COUNT: 13.7 % (ref 11.5–14.5)
GFR NON-AFRICAN AMERICAN: > 60
GRANULOCYTES # BLD: 2.04 10*3/UL (ref 1.4–6.5)
GRANULOCYTES NFR BLD: 56.1 % (ref 50–68)
HGB BLD-MCNC: 9.9 G/DL (ref 14–18)
LYMPHOCYTES # BLD: 1.2 10*3/UL (ref 1.2–3.4)
LYMPHOCYTES NFR BLD AUTO: 31.9 % (ref 22–35)
MCH RBC QN AUTO: 27.7 PG (ref 25–35)
MCHC RBC AUTO-ENTMCNC: 32.8 G/DL (ref 31–37)
MCV RBC AUTO: 84.6 FL (ref 80–105)
MONOCYTES # BLD AUTO: 0.2 10*3/UL (ref 0.1–0.6)
MONOCYTES NFR BLD: 6.3 % (ref 1–6)
PLATELET # BLD: 192 10^3/UL (ref 120–450)
PMV BLD AUTO: 8.5 FL (ref 7–11)
RBC # BLD AUTO: 3.57 10^6/UL (ref 3.5–6.1)
WBC # BLD AUTO: 3.6 10^3/UL (ref 4.5–11)

## 2018-07-10 RX ADMIN — LEVOTHYROXINE SODIUM ANHYDROUS SCH MCG: 100 INJECTION, POWDER, LYOPHILIZED, FOR SOLUTION INTRAVENOUS at 11:17

## 2018-07-10 RX ADMIN — PURIFIED WATER PRN LOZ: 99.05 LIQUID OPHTHALMIC at 11:18

## 2018-07-10 RX ADMIN — MAGNESIUM HYDROXIDE SCH: 400 SUSPENSION ORAL at 14:04

## 2018-07-10 RX ADMIN — NYSTATIN SCH: 100000 POWDER TOPICAL at 14:04

## 2018-07-10 RX ADMIN — NYSTATIN SCH: 100000 POWDER TOPICAL at 19:36

## 2018-07-10 RX ADMIN — DEXTROSE AND SODIUM CHLORIDE SCH MLS/HR: 5; 450 INJECTION, SOLUTION INTRAVENOUS at 04:47

## 2018-07-10 NOTE — CP.PCM.PN
Subjective





- Date & Time of Evaluation


Date of Evaluation: 07/10/18


Time of Evaluation: 12:29





- Subjective


Subjective: 





Progress not e for Dr. Crawford





Patient seen and examined this am at bedside.  Patient feels he is doing well, 

denies abdominal pain, had 2 BM last night following glycerin suppository.  

patient denies nausea and vomiting. Patient had BM today and is tolerating diet 

well.





Objective





- Vital Signs/Intake and Output


Vital Signs (last 24 hours): 


 











Temp Pulse Resp BP Pulse Ox


 


 98.3 F   54 L  20   110/68   99 


 


 07/10/18 06:00  07/10/18 06:00  07/10/18 06:00  07/10/18 06:00  07/10/18 06:00








Intake and Output: 


 











 07/10/18 07/10/18





 06:59 18:59


 


Output Total 1375 


 


Balance -1375 














- Medications


Medications: 


 Current Medications





Albuterol/Ipratropium (Duoneb 3 Mg/0.5 Mg (3 Ml) Ud)  3 ml IH [id number] PRN


   PRN Reason: Sinus symptoms


Benzocaine/Menthol (Cepacol Sore Throat)  1 fernandez MT Q2H PRN


   PRN Reason: Sore Throat


   Last Admin: 07/10/18 11:18 Dose:  1 fernandez


Clonazepam (Klonopin)  0.25 mg PO BID [facility]


   PRN Reason: Protocol


   Last Admin: 07/10/18 11:17 Dose:  0.25 mg


Diphenhydramine HCl (Benadryl)  25 mg IVP Q4H PRN


   PRN Reason: Allergy symptoms


   Last Admin: 07/08/18 05:04 Dose:  25 mg


Dextrose/Sodium Chloride (Dextrose 5%/0.45% Ns 1000 Ml)  1,000 mls @ 75 mls/hr 

IV .[id number] [facility]


   Last Admin: 07/10/18 04:47 Dose:  75 mls/hr


Potassium Phosphate 30 mmole/Amino Acids/Electrolytes/Dextrose  2,010 mls @ 

83.333 mls/hr IV .Q24H [facility]


   Stop: 07/12/18 17:59


   Last Admin: 07/09/18 17:41 Dose:  83.333 mls/hr


Fat Emulsion Intravenous (Intralipid 20%)  250 mls @ 21 mls/hr IV MWF@1800 [facility]


   Stop: 07/12/18 17:59


   Last Admin: 07/09/18 17:42 Dose:  21 mls/hr


Levothyroxine Sodium (Synthroid)  75 mcg IVP DAILY [facility]


   Last Admin: 07/10/18 11:17 Dose:  75 mcg


Lorazepam (Ativan)  0.5 mg IV Q6 PRN; Protocol


   PRN Reason: anxiety/insomnia


   Last Admin: 07/10/18 02:05 Dose:  0.5 mg


Magnesium Hydroxide (Milk Of Magnesia)  30 ml PO DAILY [facility]


   Last Admin: 07/09/18 09:44 Dose:  30 ml


Morphine Sulfate (Morphine)  4 mg IVP Q4H PRN


   PRN Reason: Pain, severe (8-10)


Morphine Sulfate (Morphine)  2 mg IVP Q4H PRN


   PRN Reason: Pain, moderate (4-7)


Nystatin (Nystop Topical Powder)  0 gm TOP TID [facility]


   Last Admin: 07/09/18 18:00 Dose:  Not Given


Ondansetron HCl (Zofran Inj)  4 mg IVP Q6H PRN


   PRN Reason: Nausea/Vomiting


   Last Admin: 07/08/18 09:51 Dose:  4 mg


Pantoprazole Sodium (Protonix Inj)  40 mg IVP Q12 [facility]


   Last Admin: 07/09/18 22:22 Dose:  40 mg


Petrolatum (Desitin Maximum Strength Topical 40% Oint)  0 gm TOP Q4H PRN


   PRN Reason: Rash


   Last Admin: 07/02/18 17:52 Dose:  1 applic


Saliva Substitute (Saliva Substitute)  2 ml PO Q6H PRN


   PRN Reason: Dry mouth


   Last Admin: 07/04/18 20:11 Dose:  2 ml


Zaleplon (Sonata)  5 mg PO HS PRN


   PRN Reason: Insomnia


   Last Admin: 07/04/18 21:32 Dose:  5 mg











- Labs


Labs: 


 





 07/10/18 06:00 





 07/10/18 06:00 





 











PT  12.7 SECONDS (9.4-12.5)  H  07/05/18  06:10    


 


INR  1.10  (0.93-1.08)  H  07/05/18  06:10    


 


APTT  26.9 Seconds (25.1-36.5)   07/05/18  06:10    














- Constitutional


Appears: Well, Non-toxic, No Acute Distress





- Head Exam


Head Exam: ATRAUMATIC, NORMOCEPHALIC





- ENT Exam


ENT Exam: Mucous Membranes Moist





- Respiratory Exam


Respiratory Exam: NORMAL BREATHING PATTERN





- GI/Abdominal Exam


GI & Abdominal Exam: Soft


Additional comments: 





abdomen soft, nontender, no rebound guarding or rigidity, surgical incision is 

clean dry and intact,, drain site is clean dry and intact 





- Neurological Exam


Neurological Exam: Alert, Awake, Oriented x3





- Skin


Skin Exam: Dry, Intact, Warm


Additional comments: 


 previously noted redness surrounding the fistula site is improving








Assessment and Plan





- Assessment and Plan (Free Text)


Assessment: 


62 yr old male s/p enterocutaneous fistula repair x2  7/5/18, post op day 5





Plan: 


Continue to encourage ambulation and monitor bowel function


Continue to monitor nausea and vomiting/patient tolerating diet


Plan to advance diet if patient continues to improve





Discussed plan with Dr. Yvette Linda, PGY1

## 2018-07-10 NOTE — CP.PCM.PN
Subjective





- Date & Time of Evaluation


Date of Evaluation: 07/10/18


Time of Evaluation: 06:05





- Subjective


Subjective: 





Wanted to eat and drink,Awake, comfortable, no distress, denies chest pain, 

discontinued NGT





Reason for consultation and follow up: Cardiac evaluation for bradycardia (

heart rate 40's/min) asymptomatic, history of asthma, interstitial hemorrhagic, 

cystitis s/p ileal conduit, and cystectomy, hypothyroidism, GI fistulas, 

anxiety issues, and nephritis.





Seen and examined by me and Dr. Albarado





Objective





- Vital Signs/Intake and Output


Vital Signs (last 24 hours): 


 











Temp Pulse Resp BP Pulse Ox


 


 97.8 F   76   18   117/71   99 


 


 07/09/18 14:00  07/09/18 14:00  07/09/18 14:00  07/09/18 14:00  07/09/18 14:00








Intake and Output: 


 











 07/10/18 07/10/18





 06:59 18:59


 


Output Total 1375 


 


Balance -1375 














- Medications


Medications: 


 Current Medications





Albuterol/Ipratropium (Duoneb 3 Mg/0.5 Mg (3 Ml) Ud)  3 ml IH [id number] PRN


   PRN Reason: Sinus symptoms


Benzocaine/Menthol (Cepacol Sore Throat)  1 fernandez MT Q2H PRN


   PRN Reason: Sore Throat


   Last Admin: 07/09/18 04:44 Dose:  1 fernandez


Clonazepam (Klonopin)  0.25 mg PO BID [facility] D


   PRN Reason: Protocol


   Last Admin: 07/09/18 17:53 Dose:  0.25 mg


Diphenhydramine HCl (Benadryl)  25 mg IVP Q4H PRN


   PRN Reason: Allergy symptoms


   Last Admin: 07/08/18 05:04 Dose:  25 mg


Dextrose/Sodium Chloride (Dextrose 5%/0.45% Ns 1000 Ml)  1,000 mls @ 75 mls/hr 

IV .[id number] [facility]


   Last Admin: 07/10/18 04:47 Dose:  75 mls/hr


Potassium Phosphate 30 mmole/Amino Acids/Electrolytes/Dextrose  2,010 mls @ 

83.333 mls/hr IV .Q24H [facility]


   Stop: 07/12/18 17:59


   Last Admin: 07/09/18 17:41 Dose:  83.333 mls/hr


Fat Emulsion Intravenous (Intralipid 20%)  250 mls @ 21 mls/hr IV MWF@1800 [facility]


   Stop: 07/12/18 17:59


   Last Admin: 07/09/18 17:42 Dose:  21 mls/hr


Levothyroxine Sodium (Synthroid)  75 mcg IVP DAILY [facility]


   Last Admin: 07/09/18 09:44 Dose:  75 mcg


Lorazepam (Ativan)  0.5 mg IV Q6 PRN; Protocol


   PRN Reason: anxiety/insomnia


   Last Admin: 07/10/18 02:05 Dose:  0.5 mg


Magnesium Hydroxide (Milk Of Magnesia)  30 ml PO DAILY [facility]


   Last Admin: 07/09/18 09:44 Dose:  30 ml


Morphine Sulfate (Morphine)  4 mg IVP Q4H PRN


   PRN Reason: Pain, severe (8-10)


Morphine Sulfate (Morphine)  2 mg IVP Q4H PRN


   PRN Reason: Pain, moderate (4-7)


Nystatin (Nystop Topical Powder)  0 gm TOP TID [facility]


   Last Admin: 07/09/18 18:00 Dose:  Not Given


Ondansetron HCl (Zofran Inj)  4 mg IVP Q6H PRN


   PRN Reason: Nausea/Vomiting


   Last Admin: 07/08/18 09:51 Dose:  4 mg


Pantoprazole Sodium (Protonix Inj)  40 mg IVP Q12 [facility]


   Last Admin: 07/09/18 22:22 Dose:  40 mg


Petrolatum (Desitin Maximum Strength Topical 40% Oint)  0 gm TOP Q4H PRN


   PRN Reason: Rash


   Last Admin: 07/02/18 17:52 Dose:  1 applic


Saliva Substitute (Saliva Substitute)  2 ml PO Q6H PRN


   PRN Reason: Dry mouth


   Last Admin: 07/04/18 20:11 Dose:  2 ml


Zaleplon (Sonata)  5 mg PO HS PRN


   PRN Reason: Insomnia


   Last Admin: 07/04/18 21:32 Dose:  5 mg











- Labs


Labs: 


 





 07/09/18 06:30 





 07/09/18 06:30 





 











PT  12.7 SECONDS (9.4-12.5)  H  07/05/18  06:10    


 


INR  1.10  (0.93-1.08)  H  07/05/18  06:10    


 


APTT  26.9 Seconds (25.1-36.5)   07/05/18  06:10    














- Constitutional


Appears: No Acute Distress





- Head Exam


Head Exam: NORMOCEPHALIC





- Eye Exam


Eye Exam: Normal appearance





- ENT Exam


ENT Exam: Mucous Membranes Dry





- Respiratory Exam


Respiratory Exam: Decreased Breath Sounds, NORMAL BREATHING PATTERN





- Cardiovascular Exam


Cardiovascular Exam: +S1, +S2





- GI/Abdominal Exam


GI & Abdominal Exam: Soft, Normal Bowel Sounds


Additional comments: 





Mid abdomen with staples open to air,


right CYNTHIA drain


Left nephrostomy tube/ drain





-  Exam


Additional comments: 





nephrostomy drain/bag





- Extremities Exam


Extremities Exam: Normal Capillary Refill





- Neurological Exam


Neurological Exam: Alert, Awake, Oriented x3





- Psychiatric Exam


Psychiatric exam: Normal Affect, Normal Mood





- Skin


Skin Exam: Dry, Warm





Assessment and Plan





- Assessment and Plan (Free Text)


Assessment: 





A 62 year old male who came in to the ER due to abdominal pain and leaking 

ostomy site. 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 

enterocutanoeus fistula. Cardiac consult was called for due to bradycardia. He 

has long history of bradycardia, hypothyroidism, asthma,interstitial hemorrhagic

, cystitis s/p ileal conduit, and cystectomy, anxiety, nephritis.Recently had 

reconstructive surgery of the urinary conduit at [facility].7 /6/18 status post explor lap.























Plan: 





NGT discontinued, positive bowel sounds


Had 2 bowel movements yesterday


Verbalized to be very hungry and thirsty


Leaking nephrostomy bag, RN called Surgical resident to evaluate site


Stable cardiac status


Chronic bradycardia,asymptomatic, 


Blood pressure stable, heart rate stable 80's and 90's


Urine positive for VRE, on contact isolation


ID on consult


Continue current treatment


Continue current medications


Discharge planning


Will follow up





Plan and treatment discussed with Dr. Albarado

## 2018-07-10 NOTE — CP.PCM.PN
Subjective





- Date & Time of Evaluation


Date of Evaluation: 07/10/18


Time of Evaluation: 10:10





- Subjective


Subjective: 





No increased pain in the abdomen, talking to his visitors, no fevers overnight.





Objective





- Vital Signs/Intake and Output


Vital Signs (last 24 hours): 


 











Temp Pulse Resp BP Pulse Ox


 


 98.3 F   54 L  20   110/68   99 


 


 07/10/18 06:00  07/10/18 06:00  07/10/18 06:00  07/10/18 06:00  07/10/18 06:00








Intake and Output: 


 











 07/10/18 07/10/18





 06:59 18:59


 


Output Total 1375 


 


Balance -1375 














- Medications


Medications: 


 Current Medications





Albuterol/Ipratropium (Duoneb 3 Mg/0.5 Mg (3 Ml) Ud)  3 ml IH [id number] PRN


   PRN Reason: Sinus symptoms


Benzocaine/Menthol (Cepacol Sore Throat)  1 fernandez MT Q2H PRN


   PRN Reason: Sore Throat


   Last Admin: 07/09/18 04:44 Dose:  1 fernandez


Clonazepam (Klonopin)  0.25 mg PO BID MARIA D


   PRN Reason: Protocol


   Last Admin: 07/09/18 17:53 Dose:  0.25 mg


Diphenhydramine HCl (Benadryl)  25 mg IVP Q4H PRN


   PRN Reason: Allergy symptoms


   Last Admin: 07/08/18 05:04 Dose:  25 mg


Dextrose/Sodium Chloride (Dextrose 5%/0.45% Ns 1000 Ml)  1,000 mls @ 75 mls/hr 

IV .[id number] [facility]


   Last Admin: 07/10/18 04:47 Dose:  75 mls/hr


Potassium Phosphate 30 mmole/Amino Acids/Electrolytes/Dextrose  2,010 mls @ 

83.333 mls/hr IV .Q24H [facility] D


   Stop: 07/12/18 17:59


   Last Admin: 07/09/18 17:41 Dose:  83.333 mls/hr


Fat Emulsion Intravenous (Intralipid 20%)  250 mls @ 21 mls/hr IV MWF@1800 [facility]


   Stop: 07/12/18 17:59


   Last Admin: 07/09/18 17:42 Dose:  21 mls/hr


Levothyroxine Sodium (Synthroid)  75 mcg IVP DAILY [facility]


   Last Admin: 07/09/18 09:44 Dose:  75 mcg


Lorazepam (Ativan)  0.5 mg IV Q6 PRN; Protocol


   PRN Reason: anxiety/insomnia


   Last Admin: 07/10/18 02:05 Dose:  0.5 mg


Magnesium Hydroxide (Milk Of Magnesia)  30 ml PO DAILY [facility]


   Last Admin: 07/09/18 09:44 Dose:  30 ml


Morphine Sulfate (Morphine)  4 mg IVP Q4H PRN


   PRN Reason: Pain, severe (8-10)


Morphine Sulfate (Morphine)  2 mg IVP Q4H PRN


   PRN Reason: Pain, moderate (4-7)


Nystatin (Nystop Topical Powder)  0 gm TOP TID [facility]


   Last Admin: 07/09/18 18:00 Dose:  Not Given


Ondansetron HCl (Zofran Inj)  4 mg IVP Q6H PRN


   PRN Reason: Nausea/Vomiting


   Last Admin: 07/08/18 09:51 Dose:  4 mg


Pantoprazole Sodium (Protonix Inj)  40 mg IVP Q12 [facility]


   Last Admin: 07/09/18 22:22 Dose:  40 mg


Petrolatum (Desitin Maximum Strength Topical 40% Oint)  0 gm TOP Q4H PRN


   PRN Reason: Rash


   Last Admin: 07/02/18 17:52 Dose:  1 applic


Saliva Substitute (Saliva Substitute)  2 ml PO Q6H PRN


   PRN Reason: Dry mouth


   Last Admin: 07/04/18 20:11 Dose:  2 ml


Zaleplon (Sonata)  5 mg PO HS PRN


   PRN Reason: Insomnia


   Last Admin: 07/04/18 21:32 Dose:  5 mg











- Labs


Labs: 


 





 07/10/18 06:00 





 07/10/18 06:00 





 











PT  12.7 SECONDS (9.4-12.5)  H  07/05/18  06:10    


 


INR  1.10  (0.93-1.08)  H  07/05/18  06:10    


 


APTT  26.9 Seconds (25.1-36.5)   07/05/18  06:10    














- Constitutional


Appears: Non-toxic, Chronically Ill





- Head Exam


Head Exam: NORMAL INSPECTION





- ENT Exam


ENT Exam: Mucous Membranes Moist





- Neck Exam


Neck Exam: absent: Lymphadenopathy, Meningismus





- Respiratory Exam


Respiratory Exam: Decreased Breath Sounds





- Cardiovascular Exam


Cardiovascular Exam: +S1, +S2





- GI/Abdominal Exam


GI & Abdominal Exam: Soft


Additional comments: 





dressings in place





Assessment and Plan





- Assessment and Plan (Free Text)


Plan: 





Assessment


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 

fistulectomy and debridement with ex-lap POD #4


history of Abdominal wall abscess S/P drainage S/P repeat debridement and wound 

vacuum placement, with Enterobacter


hypothyroidism


Prostate disease


anxiety disorder


esophageal strictures


acute renal failure





Plan


continue to monitor off antibiotics since he is at risk for hospital-acquired 

infections, and especially since he is on TPN

## 2018-07-10 NOTE — PN
DATE:  07/10/2018



PULMONARY PROGRESS NOTE



REFERRING PHYSICIAN:  Sandra Perez MD



SUBJECTIVE:  Patient is lying in the bed, head at 45 degrees.  Nursing

staff at bedside, changing urostomy bag.  Feels hungry, wants to eat.  Did

have a bowel movement.  No dysuria.  No leg pain or leg swelling.



OBJECTIVE:

GENERAL:  In no acute distress.

VITAL SIGNS:  Temperature is 98, heart rate 76, respiratory rate is 18,

blood pressure 117/71, pulse ox 99% on room air.

HEENT:  Moist mucous membranes.  No ulcer or thrush noted.

NECK:  Supple.  No JVD.

LUNGS:  Fair airflow with rhonchi.

HEART:  S1 and S2.

ABDOMEN:  Midline surgical scar looks okay.  Urostomy site looks okay.

EXTREMITIES:  There is no edema.

NEUROLOGICAL:  Awake and alert.  Follows simple command.



MEDICATIONS:  He is on Ativan 0.5 mg every 6 hours p.r.n., Benadryl 25 mg

every 4 hours p.r.n., Cepacol lozenges every 2 hours p.r.n., petroleum

jelly to the affected area, IV fluid, D5 normal saline 75 mL/hour, DuoNeb

every 6 hours p.r.n., Klonopin 0.25 mg twice a day, milk of magnesia 30 mL

daily, morphine 4 mg every 4 hour p.r.n, morphine 2 mg IV every 4 hour

p.r.n., nystatin to affected area, K-Phos is given, Protonix 40 mg every 12

hours, Saliva Substitute to the mouth, Sonata 5 mg at bedtime p.r.n.,

Synthroid 75 mcg daily, Zofran p.r.n. basis.



LABORATORY DATA:  Shows hemoglobin 9.9, hematocrit 30.2, WBC 3.6, platelet

is 192.  Sodium 143, potassium 3.9, chloride 105, bicarbonate is 29, BUN

20, creatinine 0.8, glucose 130, calcium is 8.3, phosphorous 4.8, magnesium

1.9, AST 18, ALT 12, alk phos is 45, albumin is 2.7.



IMPRESSION AND PLAN:  Status post laparotomy with reanastomosis and lysis

of adhesion, status post enterocutaneous fistula, chronic obstructive lung

disease, malnutrition, hypothyroid, ADL dysfunction.  Spoke to nursing

staff.  Continue bronchodilator p.r.n. basis.  Continue TPN for now until

starts oral diet.  He is being started on clear liquid diet.  Gastric

prophylaxis, deep venous thrombosis prophylaxis, out of bed to chair.  Will

benefit from physical therapy.



Thank you and we will follow with you.





__________________________________________

Dante Haque MD



DD:  07/10/2018 12:12:23

DT:  07/10/2018 13:23:35

Job # [id number]

## 2018-07-10 NOTE — CP.PCM.PN
Subjective





- Date & Time of Evaluation


Date of Evaluation: 07/10/18


Time of Evaluation: 10:00





- Subjective


Subjective: 





S&E at bedside, chart reviewed, patient in good mood today, he is excited to 

start clear liquids , he reported having 2 formed BM yesterday with relief, s/p 

suppository. No N/V, overt GI bleed, or abdominal pain reported, he is 

comfortable , eager to get OOB. 





Objective





- Vital Signs/Intake and Output


Vital Signs (last 24 hours): 


 











Temp Pulse Resp BP Pulse Ox


 


 98.3 F   54 L  20   110/68   99 


 


 07/10/18 06:00  07/10/18 06:00  07/10/18 06:00  07/10/18 06:00  07/10/18 06:00





 


Intake and Output: 


 











 07/10/18 07/10/18





 06:59 18:59


 


Output Total 1375 


 


Balance -1375 














- Medications


Medications: 


 Current Medications





Albuterol/Ipratropium (Duoneb 3 Mg/0.5 Mg (3 Ml) Ud)  3 ml IH [id number] PRN


   PRN Reason: Sinus symptoms


Benzocaine/Menthol (Cepacol Sore Throat)  1 fernandez MT Q2H PRN


   PRN Reason: Sore Throat


   Last Admin: 07/09/18 04:44 Dose:  1 fernandez


Clonazepam (Klonopin)  0.25 mg PO BID MARIA D


   PRN Reason: Protocol


   Last Admin: 07/09/18 17:53 Dose:  0.25 mg


Diphenhydramine HCl (Benadryl)  25 mg IVP Q4H PRN


   PRN Reason: Allergy symptoms


   Last Admin: 07/08/18 05:04 Dose:  25 mg


Dextrose/Sodium Chloride (Dextrose 5%/0.45% Ns 1000 Ml)  1,000 mls @ 75 mls/hr 

IV .[id number] [facility]


   Last Admin: 07/10/18 04:47 Dose:  75 mls/hr


Potassium Phosphate 30 mmole/Amino Acids/Electrolytes/Dextrose  2,010 mls @ 

83.333 mls/hr IV .Q24H [facility]


   Stop: 07/12/18 17:59


   Last Admin: 07/09/18 17:41 Dose:  83.333 mls/hr


Fat Emulsion Intravenous (Intralipid 20%)  250 mls @ 21 mls/hr IV MWF@1800 [facility]


   Stop: 07/12/18 17:59


   Last Admin: 07/09/18 17:42 Dose:  21 mls/hr


Levothyroxine Sodium (Synthroid)  75 mcg IVP DAILY [facility]


   Last Admin: 07/09/18 09:44 Dose:  75 mcg


Lorazepam (Ativan)  0.5 mg IV Q6 PRN; Protocol


   PRN Reason: anxiety/insomnia


   Last Admin: 07/10/18 02:05 Dose:  0.5 mg


Magnesium Hydroxide (Milk Of Magnesia)  30 ml PO DAILY [facility]


   Last Admin: 07/09/18 09:44 Dose:  30 ml


Morphine Sulfate (Morphine)  4 mg IVP Q4H PRN


   PRN Reason: Pain, severe (8-10)


Morphine Sulfate (Morphine)  2 mg IVP Q4H PRN


   PRN Reason: Pain, moderate (4-7)


Nystatin (Nystop Topical Powder)  0 gm TOP TID [facility]


   Last Admin: 07/09/18 18:00 Dose:  Not Given


Ondansetron HCl (Zofran Inj)  4 mg IVP Q6H PRN


   PRN Reason: Nausea/Vomiting


   Last Admin: 07/08/18 09:51 Dose:  4 mg


Pantoprazole Sodium (Protonix Inj)  40 mg IVP Q12 [facility]


   Last Admin: 07/09/18 22:22 Dose:  40 mg


Petrolatum (Desitin Maximum Strength Topical 40% Oint)  0 gm TOP Q4H PRN


   PRN Reason: Rash


   Last Admin: 07/02/18 17:52 Dose:  1 applic


Saliva Substitute (Saliva Substitute)  2 ml PO Q6H PRN


   PRN Reason: Dry mouth


   Last Admin: 07/04/18 20:11 Dose:  2 ml


Zaleplon (Sonata)  5 mg PO HS PRN


   PRN Reason: Insomnia


   Last Admin: 07/04/18 21:32 Dose:  5 mg











- Labs


Labs: 


 





 07/10/18 06:00 





 07/10/18 06:00 





 











PT  12.7 SECONDS (9.4-12.5)  H  07/05/18  06:10    


 


INR  1.10  (0.93-1.08)  H  07/05/18  06:10    


 


APTT  26.9 Seconds (25.1-36.5)   07/05/18  06:10    














- Constitutional


Appears: No Acute Distress





- Head Exam


Head Exam: NORMOCEPHALIC





- Eye Exam


Eye Exam: Normal appearance.  absent: Scleral icterus





- ENT Exam


ENT Exam: Mucous Membranes Moist





- Neck Exam


Neck Exam: Normal Inspection





- Respiratory Exam


Respiratory Exam: NORMAL BREATHING PATTERN.  absent: Respiratory Distress





- Cardiovascular Exam


Cardiovascular Exam: +S1, +S2





- GI/Abdominal Exam


GI & Abdominal Exam: Soft, Normal Bowel Sounds.  absent: Guarding, Tenderness, 

Rebound


Additional comments: 





midline sx incision with staples BRENNA, intact, no erythema, surrounding skin 

healing, continue to improve. CYNTHIA drain present but minimal to no drain. 

urostomy drain clear urine. 





- Extremities Exam


Extremities Exam: Normal Capillary Refill.  absent: Calf Tenderness, Pedal Edema





- Neurological Exam


Neurological Exam: Alert, Awake, Oriented x3





- Skin


Skin Exam: Dry, Warm





Assessment and Plan





- Assessment and Plan (Free Text)


Assessment: 





Assessment: 





S/P Exploratory Lap with primary anatomosis with lysis of adhesion


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 

fistulogram show abdominal fistulax2


Hemorrhagic cystitis s/p cystectomty and ileal condiut w/ revision 


UTI 


Hepatic lesion - Previous biopsy was non-diagnostic 


Anemia, s/p 2 U PRBC, hb stable


Hypothyroid





Plan: 





diet as per surgery, start clear liquids 


on TPN  


on PPI


Monitor I&O as well as drain output


on DVT prophylaxsis


monitor H/H and for overt GI bleeding


post op mgt as per surgery





Seen and discussed with Dr. Pham.

## 2018-07-10 NOTE — PN
DATE:  07/09/2018



SUBJECTIVE:  Patient is a 62-year-old male.  Patient was seen and examined

at the bedside on 07/09/2018, sitting on the chair, feeling comfortable. 

No nausea, vomiting.  Feels like he may need to go for bowel movement, but

not yet, passing gas.  Looks happy and comfortable.  Abdominal pain is

better.  No shortness of breath.  No chest pain.  No fever.  No chills.



PHYSICAL EXAMINATION:

VITAL SIGNS:  Temperature 98, heart rate 76, respiratory rate 20, blood

pressure 117/71, pulse oximetry 99% on room air.

HEENT:  Head:  Normocephalic, atraumatic.  Eyes:  PERRLA.  Extraocular

muscles intact.  Conjunctivae clear.  Nose:  Patent.  Mucous membrane

moist.

NECK:  Supple.  No carotid bruit.  No JVD or thyromegaly.

CHEST:  Bilaterally symmetrical.

HEART:  S1 and S2 positive.

LUNGS:  Fair airflow with rhonchi.

ABDOMEN:  Midline surgical scar, looks okay.  He has colostomy bag also. 

Mild tenderness.  No organomegaly.

EXTREMITIES:  No edema.  No cyanosis.

NEUROLOGIC:  Patient is awake and alert.  Follows simple commands.



MEDICATIONS:  Ativan, Benadryl, CPAP, DuoNeb, Klonopin, milk of magnesia,

morphine, nystatin, TPN, Protonix, Sonata, Synthroid, Zofran.



LABORATORY DATA:  Hemoglobin 9.8, hematocrit 29.3, white blood cells 4.2,

platelets 176.  Sodium 147, BUN 23, creatinine 0.8.  AST 14, ALT 14.



ASSESSMENT AND PLAN:  Mr. Dmitri Hickey is a 62-year-old male with

anemia, status post laparotomy with reanastomosis and lysis of adhesions,

history of enterocutaneous fistula repaired, chronic obstructive lung

disease, malnutrition, hypothyroidism, getting bronchodilators, aspiration

precautions, gastric prophylaxis, deep vein thrombosis prophylaxis. 

Antibiotics per ID.  Passing flatus, waiting for the bowel movement.  Seen

by ID, Dr. Mateusz Johnson.  Continue the treatment.  Surgical team is on the

case.  Gastrointestinal, deep vein thrombosis prophylaxis.  We will follow

up.







__________________________________________

Sandra Perez MD



DD:  07/10/2018 0:03:49

DT:  07/10/2018 1:24:28

[facility] # [id number]

## 2018-07-11 ENCOUNTER — HOSPITAL ENCOUNTER (INPATIENT)
Dept: HOSPITAL 42 - TRCU | Age: 63
LOS: 5 days | Discharge: HOME | DRG: 395 | End: 2018-07-16
Attending: INTERNAL MEDICINE | Admitting: INTERNAL MEDICINE
Payer: COMMERCIAL

## 2018-07-11 VITALS
OXYGEN SATURATION: 98 % | HEART RATE: 71 BPM | TEMPERATURE: 98.4 F | SYSTOLIC BLOOD PRESSURE: 106 MMHG | DIASTOLIC BLOOD PRESSURE: 72 MMHG

## 2018-07-11 VITALS — BODY MASS INDEX: 20.5 KG/M2

## 2018-07-11 VITALS — RESPIRATION RATE: 18 BRPM

## 2018-07-11 DIAGNOSIS — E83.51: ICD-10-CM

## 2018-07-11 DIAGNOSIS — J45.909: ICD-10-CM

## 2018-07-11 DIAGNOSIS — K66.0: ICD-10-CM

## 2018-07-11 DIAGNOSIS — K63.2: Primary | ICD-10-CM

## 2018-07-11 DIAGNOSIS — N30.10: ICD-10-CM

## 2018-07-11 DIAGNOSIS — D64.9: ICD-10-CM

## 2018-07-11 DIAGNOSIS — L80: ICD-10-CM

## 2018-07-11 DIAGNOSIS — Z93.3: ICD-10-CM

## 2018-07-11 LAB
ALBUMIN SERPL-MCNC: 2.9 G/DL (ref 3–4.8)
ALBUMIN/GLOB SERPL: 0.8 {RATIO} (ref 1.1–1.8)
ALT SERPL-CCNC: 23 U/L (ref 7–56)
AST SERPL-CCNC: 26 U/L (ref 17–59)
BASOPHILS # BLD AUTO: 0.01 K/MM3 (ref 0–2)
BASOPHILS NFR BLD: 0.3 % (ref 0–3)
BUN SERPL-MCNC: 17 MG/DL (ref 7–21)
CALCIUM SERPL-MCNC: 8.4 MG/DL (ref 8.4–10.5)
EOSINOPHIL # BLD: 0.2 10*3/UL (ref 0–0.7)
EOSINOPHIL NFR BLD: 4.6 % (ref 1.5–5)
ERYTHROCYTE [DISTWIDTH] IN BLOOD BY AUTOMATED COUNT: 13.4 % (ref 11.5–14.5)
GFR NON-AFRICAN AMERICAN: > 60
GRANULOCYTES # BLD: 2.43 10*3/UL (ref 1.4–6.5)
GRANULOCYTES NFR BLD: 61.8 % (ref 50–68)
HGB BLD-MCNC: 9.7 G/DL (ref 14–18)
LYMPHOCYTES # BLD: 1.1 10*3/UL (ref 1.2–3.4)
LYMPHOCYTES NFR BLD AUTO: 26.7 % (ref 22–35)
MCH RBC QN AUTO: 27.9 PG (ref 25–35)
MCHC RBC AUTO-ENTMCNC: 33.6 G/DL (ref 31–37)
MCV RBC AUTO: 83 FL (ref 80–105)
MONOCYTES # BLD AUTO: 0.3 10*3/UL (ref 0.1–0.6)
MONOCYTES NFR BLD: 6.6 % (ref 1–6)
PLATELET # BLD: 183 10^3/UL (ref 120–450)
PMV BLD AUTO: 8.6 FL (ref 7–11)
RBC # BLD AUTO: 3.48 10^6/UL (ref 3.5–6.1)
WBC # BLD AUTO: 3.9 10^3/UL (ref 4.5–11)

## 2018-07-11 RX ADMIN — NYSTATIN SCH APPLIC: 100000 POWDER TOPICAL at 14:38

## 2018-07-11 RX ADMIN — LEVOTHYROXINE SODIUM ANHYDROUS SCH MCG: 100 INJECTION, POWDER, LYOPHILIZED, FOR SOLUTION INTRAVENOUS at 10:21

## 2018-07-11 RX ADMIN — NYSTATIN SCH APPLIC: 100000 POWDER TOPICAL at 10:21

## 2018-07-11 RX ADMIN — MAGNESIUM HYDROXIDE SCH ML: 400 SUSPENSION ORAL at 10:20

## 2018-07-11 RX ADMIN — NYSTATIN SCH APPLIC: 100000 POWDER TOPICAL at 17:39

## 2018-07-11 NOTE — CP.PCM.PN
Subjective





- Date & Time of Evaluation


Date of Evaluation: 07/11/18


Time of Evaluation: 10:10





- Subjective


Subjective: 





Patient is feeling much better and is starting to tolerate soft foods, no 

fevers.





Objective





- Vital Signs/Intake and Output


Vital Signs (last 24 hours): 


 











Temp Pulse Resp BP Pulse Ox


 


 98.7 F   66   20   120/80   98 


 


 07/10/18 21:50  07/10/18 21:50  07/10/18 21:50  07/10/18 21:50  07/10/18 21:50








Intake and Output: 


 











 07/11/18 07/11/18





 06:59 18:59


 


Intake Total 680 


 


Output Total 2700 


 


Balance -2020 














- Medications


Medications: 


 Current Medications





Albuterol/Ipratropium (Duoneb 3 Mg/0.5 Mg (3 Ml) Ud)  3 ml IH [id number] PRN


   PRN Reason: Sinus symptoms


Benzocaine/Menthol (Cepacol Sore Throat)  1 fernandez MT Q2H PRN


   PRN Reason: Sore Throat


   Last Admin: 07/10/18 11:18 Dose:  1 fernandez


Clonazepam (Klonopin)  0.25 mg PO BID [facility]


   PRN Reason: Protocol


   Last Admin: 07/10/18 18:13 Dose:  0.25 mg


Diphenhydramine HCl (Benadryl)  25 mg IVP Q4H PRN


   PRN Reason: Allergy symptoms


   Last Admin: 07/08/18 05:04 Dose:  25 mg


Potassium Phosphate 30 mmole/Amino Acids/Electrolytes/Dextrose  2,010 mls @ 

83.333 mls/hr IV .Q24H [facility]


   Stop: 07/12/18 17:59


   Last Admin: 07/10/18 18:14 Dose:  83.333 mls/hr


Fat Emulsion Intravenous (Intralipid 20%)  250 mls @ 21 mls/hr IV MWF@1800 [facility]


   Stop: 07/12/18 17:59


   Last Admin: 07/09/18 17:42 Dose:  21 mls/hr


Levothyroxine Sodium (Synthroid)  75 mcg IVP DAILY [facility]


   Last Admin: 07/10/18 11:17 Dose:  75 mcg


Magnesium Hydroxide (Milk Of Magnesia)  30 ml PO DAILY [facility]


   Last Admin: 07/10/18 14:04 Dose:  Not Given


Morphine Sulfate (Morphine)  2 mg IVP Q4H PRN


   PRN Reason: Pain, severe (8-10)


Nystatin (Nystop Topical Powder)  0 gm TOP TID [facility]


   Last Admin: 07/10/18 19:36 Dose:  Not Given


Ondansetron HCl (Zofran Inj)  4 mg IVP Q6H PRN


   PRN Reason: Nausea/Vomiting


   Last Admin: 07/08/18 09:51 Dose:  4 mg


Pantoprazole Sodium (Protonix Inj)  40 mg IVP Q12 [facility]


   Last Admin: 07/10/18 21:31 Dose:  40 mg


Petrolatum (Desitin Maximum Strength Topical 40% Oint)  0 gm TOP Q4H PRN


   PRN Reason: Rash


   Last Admin: 07/02/18 17:52 Dose:  1 applic


Saliva Substitute (Saliva Substitute)  2 ml PO Q6H PRN


   PRN Reason: Dry mouth


   Last Admin: 07/04/18 20:11 Dose:  2 ml


Zaleplon (Sonata)  5 mg PO HS PRN


   PRN Reason: Insomnia


   Last Admin: 07/04/18 21:32 Dose:  5 mg











- Labs


Labs: 


 





 07/11/18 06:00 





 07/11/18 06:00 





 











PT  12.7 SECONDS (9.4-12.5)  H  07/05/18  06:10    


 


INR  1.10  (0.93-1.08)  H  07/05/18  06:10    


 


APTT  26.9 Seconds (25.1-36.5)   07/05/18  06:10    














- Constitutional


Appears: Non-toxic, Chronically Ill





- Head Exam


Head Exam: NORMAL INSPECTION





- ENT Exam


ENT Exam: Mucous Membranes Moist





- Neck Exam


Neck Exam: absent: Meningismus





- Respiratory Exam


Respiratory Exam: Decreased Breath Sounds





- Cardiovascular Exam


Cardiovascular Exam: +S1, +S2





- GI/Abdominal Exam


GI & Abdominal Exam: Soft.  absent: Tenderness


Additional comments: 





drains in place





Assessment and Plan





- Assessment and Plan (Free Text)


Plan: 





Assessment


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 

fistulectomy and debridement with ex-lap POD #6


history of Abdominal wall abscess S/P drainage S/P repeat debridement and wound 

vacuum placement, with Enterobacter


hypothyroidism


Prostate disease


anxiety disorder


esophageal strictures


acute renal failure





Plan


continue to monitor off antibiotics since he is at risk for healthcare-

associated infections, and especially since he is still on TPN

## 2018-07-11 NOTE — CP.PCM.PN
Subjective





- Date & Time of Evaluation


Date of Evaluation: 07/11/18


Time of Evaluation: 05:00





- Subjective


Subjective: 


Patient seen and examined at bedside in the AM. Patient feels he is doing well, 

denies abdominal pain and is getting OOB to chair. Patient denies nausea and 

vomiting and is tolerating full liquid diet well.








Objective





- Vital Signs/Intake and Output


Vital Signs (last 24 hours): 


 











Temp Pulse Resp BP Pulse Ox


 


 98.5 F   63   18   117/71   99 


 


 07/11/18 06:00  07/11/18 06:00  07/11/18 06:00  07/11/18 06:00  07/11/18 06:00








Intake and Output: 


 











 07/11/18 07/11/18





 06:59 18:59


 


Intake Total 680 


 


Output Total 2700 


 


Balance -2020 














- Medications


Medications: 


 Current Medications





Albuterol/Ipratropium (Duoneb 3 Mg/0.5 Mg (3 Ml) Ud)  3 ml IH [id number] PRN


   PRN Reason: Sinus symptoms


Benzocaine/Menthol (Cepacol Sore Throat)  1 fernandez MT Q2H PRN


   PRN Reason: Sore Throat


   Last Admin: 07/10/18 11:18 Dose:  1 fernandez


Clonazepam (Klonopin)  0.25 mg PO BID MARIA D


   PRN Reason: Protocol


   Last Admin: 07/11/18 10:20 Dose:  0.25 mg


Diphenhydramine HCl (Benadryl)  25 mg IVP Q4H PRN


   PRN Reason: Allergy symptoms


   Last Admin: 07/08/18 05:04 Dose:  25 mg


Potassium Phosphate 30 mmole/Amino Acids/Electrolytes/Dextrose  2,010 mls @ 

83.333 mls/hr IV .Q24H MARIA D


   Stop: 07/12/18 17:59


   Last Admin: 07/10/18 18:14 Dose:  83.333 mls/hr


Fat Emulsion Intravenous (Intralipid 20%)  250 mls @ 21 mls/hr IV MWF@1800 MARIA D


   Stop: 07/12/18 17:59


   Last Admin: 07/09/18 17:42 Dose:  21 mls/hr


Levothyroxine Sodium (Synthroid)  75 mcg IVP DAILY [facility]


   Last Admin: 07/11/18 10:21 Dose:  75 mcg


Magnesium Hydroxide (Milk Of Magnesia)  30 ml PO DAILY [facility] D


   Last Admin: 07/11/18 10:20 Dose:  30 ml


Morphine Sulfate (Morphine)  2 mg IVP Q4H PRN


   PRN Reason: Pain, severe (8-10)


Nystatin (Nystop Topical Powder)  0 gm TOP TID [facility]


   Last Admin: 07/11/18 10:21 Dose:  1 applic


Ondansetron HCl (Zofran Inj)  4 mg IVP Q6H PRN


   PRN Reason: Nausea/Vomiting


   Last Admin: 07/08/18 09:51 Dose:  4 mg


Pantoprazole Sodium (Protonix Inj)  40 mg IVP Q12 [facility]


   Last Admin: 07/11/18 10:21 Dose:  40 mg


Petrolatum (Desitin Maximum Strength Topical 40% Oint)  0 gm TOP Q4H PRN


   PRN Reason: Rash


   Last Admin: 07/02/18 17:52 Dose:  1 applic


Saliva Substitute (Saliva Substitute)  2 ml PO Q6H PRN


   PRN Reason: Dry mouth


   Last Admin: 07/04/18 20:11 Dose:  2 ml


Zaleplon (Sonata)  5 mg PO HS PRN


   PRN Reason: Insomnia


   Last Admin: 07/04/18 21:32 Dose:  5 mg











- Labs


Labs: 


 





 07/11/18 06:00 





 07/11/18 06:00 





 











PT  12.7 SECONDS (9.4-12.5)  H  07/05/18  06:10    


 


INR  1.10  (0.93-1.08)  H  07/05/18  06:10    


 


APTT  26.9 Seconds (25.1-36.5)   07/05/18  06:10    














- Constitutional


Appears: Well, Non-toxic, No Acute Distress





- Head Exam


Head Exam: ATRAUMATIC, NORMAL INSPECTION, NORMOCEPHALIC





- Eye Exam


Eye Exam: Normal appearance





- ENT Exam


ENT Exam: Mucous Membranes Moist





- Respiratory Exam


Respiratory Exam: Clear to Ausculation Bilateral, NORMAL BREATHING PATTERN





- Cardiovascular Exam


Cardiovascular Exam: REGULAR RHYTHM





- GI/Abdominal Exam


GI & Abdominal Exam: Soft.  absent: Distended, Guarding, Tenderness


Additional comments: 


surgical incision is clean dry and intact,, drain site is clean dry and intact 








- Neurological Exam


Neurological Exam: Alert, Awake, Oriented x3





- Skin


Skin Exam: Warm





Assessment and Plan





- Assessment and Plan (Free Text)


Assessment: 


62 yr old male s/p enterocutaneous fistula repair x2  7/5/18, post op day 6





Plan: 


Continue to encourage ambulation and monitor bowel function


Continue to monitor nausea and vomiting/patient tolerating diet


Full liquid diet, taper TPN


Possible regular diet tomorrow 


Discussed plan with Dr. Yvette Donnelly PGY-2

## 2018-07-11 NOTE — PN
DATE:  07/11/2018



PULMONARY PROGRESS NOTE



REFERRING PHYSICIAN:  Sandra Perez MD



SUBJECTIVE:  He is out of bed to chair and feels much better, on clear

liquid diet, tolerated well.  Had a bowel movement.  Breathing is okay.  No

cough.  No shortness of breath.  No new abdominal pain.  Urostomy working

well.



OBJECTIVE:

VITAL SIGNS:  Temperature is 98, heart rate is 63, respiratory rate is 20,

blood pressure 117/71, pulse ox 99% on room air.

HEENT:  Moist mucous membranes.  No ulcer or thrush noted.

NECK:  Supple.  No JVD.

LUNGS:  Fair airflow with rhonchi.

HEART:  S1, S2.

ABDOMEN:  Positive bowel sounds.  Soft.  Incision site looks okay. 

Urostomy working well.

EXTREMITIES:  There is no edema.

NEUROLOGIC:  Awake, alert, and follows simple commands.



MEDICATIONS:  He is on Benadryl 25 mg every 4 hours p.r.n., Cepacol

lozenges every 2 hours p.r.n., petroleum jelly to affected area every 4

hours, albuterol/Atrovent nebulizer every 6 hours p.r.n., also receiving

Intralipid _____ mL/hour, clonazepam 0.25 mg twice a day, morphine 2 mg IV

every 4 hours p.r.n., receiving Clinimix 83 mL/hour, Protonix 40 mg twice a

day, artificial saliva twice a day p.r.n., Sonata 5 mg at bedtime p.r.n.,

Synthroid 75 mcg daily, Zofran p.r.n.



LABORATORY DATA:  Shows hemoglobin 9.7, hematocrit 28.9, WBC 3.9, and

platelet count is 183.  Sodium 139, potassium 4.1, chloride 102,

bicarbonate 28.  BUN 17, creatinine 0.7.  Glucose 129.  Calcium 8.4,

phosphorus 4.8, magnesium 1.9.  AST 26, ALT 23, alk phos is 47.  Albumin is

2.9.



IMPRESSION AND PLAN:  Status post laparotomy with reanastomosis and lysis

of adhesions, history of enterocutaneous fistula, chronic obstructive lung

disease, malnutrition, hypothyroid, activities of daily living dysfunction.

Pulmonary point of view, doing okay.  Continue bronchodilator p.r.n. basis.

Continue TPN for now.  Oral p.o. diet is added.  If tolerated well by

tomorrow, may benefit from TRCU type services.  Gastric and deep venous

thrombosis prophylaxes.



Thank you and we will follow with you.







__________________________________________

Dante Haque MD



DD:  07/11/2018 12:02:49

DT:  07/11/2018 12:32:01

Job # [id number]

## 2018-07-11 NOTE — CP.PCM.PN
Subjective





- Date & Time of Evaluation


Date of Evaluation: 07/11/18


Time of Evaluation: 06:10





- Subjective


Subjective: 





Sleeping but easily awaken,comfortable, no distress, started on clear liquid, 

some nausea last night





Reason for consultation and follow up: Cardiac evaluation for bradycardia (

heart rate 40's/min) asymptomatic, history of asthma, interstitial hemorrhagic, 

cystitis s/p ileal conduit, and cystectomy, hypothyroidism, GI fistulas, 

anxiety issues, and nephritis.status post explor lap.





Seen and examined by me and Dr. Albarado





Objective





- Vital Signs/Intake and Output


Vital Signs (last 24 hours): 


 











Temp Pulse Resp BP Pulse Ox


 


 98.7 F   66   20   120/80   98 


 


 07/10/18 21:50  07/10/18 21:50  07/10/18 21:50  07/10/18 21:50  07/10/18 21:50








Intake and Output: 


 











 07/11/18 07/11/18





 06:59 18:59


 


Intake Total 680 


 


Output Total 2700 


 


Balance -2020 














- Medications


Medications: 


 Current Medications





Albuterol/Ipratropium (Duoneb 3 Mg/0.5 Mg (3 Ml) Ud)  3 ml IH [id number] PRN


   PRN Reason: Sinus symptoms


Benzocaine/Menthol (Cepacol Sore Throat)  1 fernandez MT Q2H PRN


   PRN Reason: Sore Throat


   Last Admin: 07/10/18 11:18 Dose:  1 fernandez


Clonazepam (Klonopin)  0.25 mg PO BID MARIA D


   PRN Reason: Protocol


   Last Admin: 07/10/18 18:13 Dose:  0.25 mg


Diphenhydramine HCl (Benadryl)  25 mg IVP Q4H PRN


   PRN Reason: Allergy symptoms


   Last Admin: 07/08/18 05:04 Dose:  25 mg


Potassium Phosphate 30 mmole/Amino Acids/Electrolytes/Dextrose  2,010 mls @ 

83.333 mls/hr IV .Q24H [facility]


   Stop: 07/12/18 17:59


   Last Admin: 07/10/18 18:14 Dose:  83.333 mls/hr


Fat Emulsion Intravenous (Intralipid 20%)  250 mls @ 21 mls/hr IV MWF@1800 [facility]


   Stop: 07/12/18 17:59


   Last Admin: 07/09/18 17:42 Dose:  21 mls/hr


Levothyroxine Sodium (Synthroid)  75 mcg IVP DAILY [facility]


   Last Admin: 07/10/18 11:17 Dose:  75 mcg


Magnesium Hydroxide (Milk Of Magnesia)  30 ml PO DAILY [facility]


   Last Admin: 07/10/18 14:04 Dose:  Not Given


Morphine Sulfate (Morphine)  2 mg IVP Q4H PRN


   PRN Reason: Pain, severe (8-10)


Nystatin (Nystop Topical Powder)  0 gm TOP TID [facility]


   Last Admin: 07/10/18 19:36 Dose:  Not Given


Ondansetron HCl (Zofran Inj)  4 mg IVP Q6H PRN


   PRN Reason: Nausea/Vomiting


   Last Admin: 07/08/18 09:51 Dose:  4 mg


Pantoprazole Sodium (Protonix Inj)  40 mg IVP Q12 [facility]


   Last Admin: 07/10/18 21:31 Dose:  40 mg


Petrolatum (Desitin Maximum Strength Topical 40% Oint)  0 gm TOP Q4H PRN


   PRN Reason: Rash


   Last Admin: 07/02/18 17:52 Dose:  1 applic


Saliva Substitute (Saliva Substitute)  2 ml PO Q6H PRN


   PRN Reason: Dry mouth


   Last Admin: 07/04/18 20:11 Dose:  2 ml


Zaleplon (Sonata)  5 mg PO HS PRN


   PRN Reason: Insomnia


   Last Admin: 07/04/18 21:32 Dose:  5 mg











- Labs


Labs: 


 





 07/11/18 06:00 





 07/10/18 06:00 





 











PT  12.7 SECONDS (9.4-12.5)  H  07/05/18  06:10    


 


INR  1.10  (0.93-1.08)  H  07/05/18  06:10    


 


APTT  26.9 Seconds (25.1-36.5)   07/05/18  06:10    














- Constitutional


Appears: No Acute Distress





- Head Exam


Head Exam: NORMOCEPHALIC





- Eye Exam


Eye Exam: Normal appearance





- ENT Exam


ENT Exam: Mucous Membranes Moist





- Respiratory Exam


Respiratory Exam: Decreased Breath Sounds, NORMAL BREATHING PATTERN





- Cardiovascular Exam


Cardiovascular Exam: +S1, +S2





- GI/Abdominal Exam


GI & Abdominal Exam: Soft, Normal Bowel Sounds


Additional comments: 





mid abdomen incision with staples open to air, no signs of infection


right CYNTHIA


Left nephrostomy drain





-  Exam


Additional comments: 





left nephrostomy drain





- Extremities Exam


Extremities Exam: Normal Capillary Refill


Additional comments: 





left arm PICC





- Neurological Exam


Neurological Exam: Alert, Awake, Oriented x3





- Psychiatric Exam


Psychiatric exam: Normal Affect, Normal Mood





- Skin


Skin Exam: Dry, Warm





Assessment and Plan





- Assessment and Plan (Free Text)


Assessment: 





A 62 year old male who came in to the ER due to abdominal pain and leaking 

ostomy site. 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 

enterocutanoeus fistula. Cardiac consult was called for due to bradycardia. He 

has long history of bradycardia, hypothyroidism, asthma,interstitial hemorrhagic

, cystitis s/p ileal conduit, and cystectomy, anxiety, nephritis.Recently had 

reconstructive surgery of the urinary conduit at [facility].7 /6/18 status post explor lap. Tolerating clear liquids. positive bowel 

movements.


























Plan: 





Tolerating clear liquids, some nausea last night, denies vomiting


Positive bowel movements


Peripheral TPN via PICC


Stable cardiac status


Blood pressure stable, heart rate stable 


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


ID on consult


Continue current treatment


Continue current medications


Discharge planning, possible TCU


Will follow up





Plan and treatment discussed with Dr. Albarado

## 2018-07-11 NOTE — PN
DATE:  07/10/2018



SUBJECTIVE:  The patient is a 62-year-old male.  Patient was seen and

examined at the bedside on 07/10/2018.  Did bowel movement two times, have

pooed, getting changed the urostomy bag, hungry.  No dysuria.  No swelling

of the leg.  Abdominal pain is getting better.  No fever.  No chills.  No

coughing.  No shortness of breath.



PHYSICAL EXAMINATION:

VITAL SIGNS:  Temperature 98, heart rate 76, respiratory rate 18, blood

pressure 117/70, pulse oximetry 99% on room air.

HEENT:  Head:  Normocephalic and atraumatic.  Eyes:  PERRLA.  Extraocular

muscles intact.  Conjunctivae clear.  Nose:  Patent.  Mucous membrane

moist.

NECK:  Supple.  No carotid bruit.  No JVD or thyromegaly.

CHEST:  Bilaterally symmetrical.

HEART:  S1 and S2 positive.

LUNGS:  Clear to auscultation.

ABDOMEN:  Soft, tender at the surgical site.  Urostomy bag looks healthy.

EXTREMITIES:  No edema.  No cyanosis.

NEUROLOGIC:  Patient is awake and alert.  Moving all 4 extremities.  No

focal deficits.



MEDICATIONS:  Ativan, Benadryl, Cepacol lozenges, petroleum jelly, IV

fluid, DuoNeb, Klonopin, milk of magnesia, morphine, nystatin on the

affected area, potassium, Protonix, Synthroid, Zofran.



ASSESSMENT AND PLAN:  Mr. Dmitri Hickey is a 62-year-old male with

history of multiple medical problems, hypothyroidism, anemia, hemoglobin

9.9, status post laparotomy with reanastomosis and lysis of the old

adhesions, history of status post enterocutaneous fistula repair, chronic

obstructive lung disease, malnutrition, activities of daily living

dysfunction.  Discussion done with the patient.  Will get physical therapy.

Continue total parenteral nutrition for now and started oral diet, clear

liquid diet.  Gastric prophylaxis.  Deep vein thrombosis prophylaxis. 

Patient will get physical therapy and may be we will take patient to rehab

,TRCU.  We will follow up.







__________________________________________

Sandra Perez MD



DD:  07/10/2018 19:13:04

DT:  07/10/2018 22:37:10

Job # [id number]

JOCELINE

## 2018-07-12 LAB
ALBUMIN SERPL-MCNC: 3.3 G/DL (ref 3–4.8)
ALBUMIN/GLOB SERPL: 0.9 {RATIO} (ref 1.1–1.8)
ALT SERPL-CCNC: 21 U/L (ref 7–56)
AST SERPL-CCNC: 38 U/L (ref 17–59)
BASOPHILS # BLD AUTO: 0.01 K/MM3 (ref 0–2)
BASOPHILS NFR BLD: 0.2 % (ref 0–3)
BUN SERPL-MCNC: 17 MG/DL (ref 7–21)
CALCIUM SERPL-MCNC: 8.7 MG/DL (ref 8.4–10.5)
EOSINOPHIL # BLD: 0.3 10*3/UL (ref 0–0.7)
EOSINOPHIL NFR BLD: 6.6 % (ref 1.5–5)
ERYTHROCYTE [DISTWIDTH] IN BLOOD BY AUTOMATED COUNT: 13.4 % (ref 11.5–14.5)
GFR NON-AFRICAN AMERICAN: > 60
GRANULOCYTES # BLD: 2.6 10*3/UL (ref 1.4–6.5)
GRANULOCYTES NFR BLD: 55.6 % (ref 50–68)
HGB BLD-MCNC: 10.5 G/DL (ref 14–18)
LYMPHOCYTES # BLD: 1.4 10*3/UL (ref 1.2–3.4)
LYMPHOCYTES NFR BLD AUTO: 30.8 % (ref 22–35)
MCH RBC QN AUTO: 27.9 PG (ref 25–35)
MCHC RBC AUTO-ENTMCNC: 33.4 G/DL (ref 31–37)
MCV RBC AUTO: 83.3 FL (ref 80–105)
MONOCYTES # BLD AUTO: 0.3 10*3/UL (ref 0.1–0.6)
MONOCYTES NFR BLD: 6.8 % (ref 1–6)
PLATELET # BLD: 202 10^3/UL (ref 120–450)
PMV BLD AUTO: 8.4 FL (ref 7–11)
RBC # BLD AUTO: 3.77 10^6/UL (ref 3.5–6.1)
WBC # BLD AUTO: 4.7 10^3/UL (ref 4.5–11)

## 2018-07-12 PROCEDURE — F08Z2ZZ GROOMING/PERSONAL HYGIENE TREATMENT: ICD-10-PCS | Performed by: INTERNAL MEDICINE

## 2018-07-12 PROCEDURE — F07Z9ZZ GAIT TRAINING/FUNCTIONAL AMBULATION TREATMENT: ICD-10-PCS | Performed by: INTERNAL MEDICINE

## 2018-07-12 PROCEDURE — F08Z4ZZ HOME MANAGEMENT TREATMENT: ICD-10-PCS | Performed by: INTERNAL MEDICINE

## 2018-07-12 PROCEDURE — F08Z1ZZ DRESSING TECHNIQUES TREATMENT: ICD-10-PCS | Performed by: INTERNAL MEDICINE

## 2018-07-12 PROCEDURE — F08Z0ZZ BATHING/SHOWERING TECHNIQUES TREATMENT: ICD-10-PCS | Performed by: INTERNAL MEDICINE

## 2018-07-12 RX ADMIN — PANTOPRAZOLE SODIUM SCH MG: 40 TABLET, DELAYED RELEASE ORAL at 17:38

## 2018-07-12 RX ADMIN — NYSTATIN SCH APPL: 100000 POWDER TOPICAL at 10:27

## 2018-07-12 RX ADMIN — MAGNESIUM HYDROXIDE SCH ML: 400 SUSPENSION ORAL at 10:26

## 2018-07-12 RX ADMIN — NYSTATIN SCH APPL: 100000 POWDER TOPICAL at 17:37

## 2018-07-12 RX ADMIN — PANTOPRAZOLE SODIUM SCH MG: 40 TABLET, DELAYED RELEASE ORAL at 05:37

## 2018-07-12 RX ADMIN — NYSTATIN SCH APPL: 100000 POWDER TOPICAL at 13:36

## 2018-07-12 NOTE — CP.PCM.PN
Subjective





- Date & Time of Evaluation


Date of Evaluation: 07/12/18


Time of Evaluation: 06:00





- Subjective


Subjective: 


Patient seen and evaluated bedside in AM. No acute issues overnight. Patient 

states he was tolerating diet well, had a BM around 6:30 am this morning. 

Denies any nausea, vomiting, fever, chills, abdominal pain, SOB, chest pain or 

nay other complaints at this time. 








Objective





- Vital Signs/Intake and Output


Vital Signs (last 24 hours): 


 











Temp Pulse Resp BP Pulse Ox


 


 99.1 F   73   18   95/55 L  98 


 


 07/11/18 21:00  07/11/18 21:00  07/11/18 21:00  07/11/18 21:00  07/11/18 19:00








Intake and Output: 


 











 07/12/18 07/12/18





 06:59 18:59


 


Output Total 930 


 


Balance -930 














- Medications


Medications: 


 Current Medications





Albuterol/Ipratropium (Duoneb 3 Mg/0.5 Mg (3 Ml) Ud)  3 ml IH [id number] PRN; 

Protocol


   PRN Reason: Sinus symptoms


Benzocaine/Menthol (Cepacol Sore Throat)  1 fernandez MT Q2H PRN; Protocol


   PRN Reason: Sore Throat


Cephalexin Monohydrate (Keflex)  250 mg PO Q6 [facility]IA D


   PRN Reason: Protocol


   Last Admin: 07/12/18 05:36 Dose:  250 mg


Clonazepam (Klonopin)  0.25 mg PO BID [facility]IA D


   PRN Reason: Protocol


Diphenhydramine HCl (Benadryl)  25 mg IVP Q4H PRN; Protocol


   PRN Reason: Allergy symptoms


Levothyroxine Sodium (Synthroid)  75 mcg PO 0600 [facility] D


   PRN Reason: Protocol


   Last Admin: 07/12/18 05:38 Dose:  75 mcg


Magnesium Hydroxide (Milk Of Magnesia)  30 ml PO DAILY [facility]


   PRN Reason: Protocol


Nystatin (Nystop Topical Powder)  0 gm TOP TID [facility]


   PRN Reason: Protocol


Ondansetron HCl (Zofran Inj)  4 mg IVP Q6H PRN; Protocol


   PRN Reason: Nausea/Vomiting


Pantoprazole Sodium (Protonix Ec Tab)  40 mg PO 0600,1600 [facility] D


   PRN Reason: Protocol


   Last Admin: 07/12/18 05:37 Dose:  40 mg


Saliva Substitute (Saliva Substitute)  2 ml PO Q6H PRN; Protocol


   PRN Reason: Dry mouth


Tramadol HCl (Ultram)  50 mg PO Q4H PRN


   PRN Reason: Pain, severe (8-10)


   Last Admin: 07/12/18 04:54 Dose:  50 mg


Zaleplon (Sonata)  5 mg PO HS PRN; Protocol


   PRN Reason: Insomnia











- Labs


Labs: 


 





 07/12/18 06:15 





 07/12/18 06:15 











- Constitutional


Appears: Well, Non-toxic, No Acute Distress





- Head Exam


Head Exam: ATRAUMATIC, NORMAL INSPECTION, NORMOCEPHALIC





- Eye Exam


Eye Exam: EOMI, Normal appearance





- ENT Exam


ENT Exam: Mucous Membranes Moist





- Respiratory Exam


Respiratory Exam: Clear to Ausculation Bilateral, NORMAL BREATHING PATTERN





- Cardiovascular Exam


Cardiovascular Exam: REGULAR RHYTHM





- GI/Abdominal Exam


GI & Abdominal Exam: Soft.  absent: Distended, Guarding, Rigid, Tenderness


Additional comments: 


incision clean, dry, in tact, healing well. some surrounding erythema present 

will monitor








- Extremities Exam


Extremities Exam: Full ROM.  absent: Pedal Edema





- Neurological Exam


Neurological Exam: Alert, Awake, Oriented x3





- Skin


Skin Exam: Erythema, Warm





Assessment and Plan





- Assessment and Plan (Free Text)


Assessment: 


62 yr old male s/p enterocutaneous fistula repair x2  7/5/18, post op day 7. 

Patient now in TCU for rehab as well. 





Plan: 


Continue to encourage ambulation and monitor bowel function


Continue to monitor nausea and vomiting


Started on regular diet


Keflex 


monitor erythema around surgical site 


Discussed plan with Dr. Crawford


Further recommendations as per Dr. Yvette Donnelly PGY-2

## 2018-07-13 LAB
ALBUMIN SERPL-MCNC: 3.2 G/DL (ref 3–4.8)
ALBUMIN/GLOB SERPL: 0.9 {RATIO} (ref 1.1–1.8)
ALT SERPL-CCNC: 27 U/L (ref 7–56)
AST SERPL-CCNC: 49 U/L (ref 17–59)
BASOPHILS # BLD AUTO: 0.01 K/MM3 (ref 0–2)
BASOPHILS NFR BLD: 0.2 % (ref 0–3)
BUN SERPL-MCNC: 19 MG/DL (ref 7–21)
CALCIUM SERPL-MCNC: 8.3 MG/DL (ref 8.4–10.5)
EOSINOPHIL # BLD: 0.3 10*3/UL (ref 0–0.7)
EOSINOPHIL NFR BLD: 6.1 % (ref 1.5–5)
ERYTHROCYTE [DISTWIDTH] IN BLOOD BY AUTOMATED COUNT: 13.4 % (ref 11.5–14.5)
GFR NON-AFRICAN AMERICAN: > 60
GRANULOCYTES # BLD: 2.99 10*3/UL (ref 1.4–6.5)
GRANULOCYTES NFR BLD: 59.1 % (ref 50–68)
HGB BLD-MCNC: 10 G/DL (ref 14–18)
LYMPHOCYTES # BLD: 1.5 10*3/UL (ref 1.2–3.4)
LYMPHOCYTES NFR BLD AUTO: 28.9 % (ref 22–35)
MCH RBC QN AUTO: 27.5 PG (ref 25–35)
MCHC RBC AUTO-ENTMCNC: 32.9 G/DL (ref 31–37)
MCV RBC AUTO: 83.7 FL (ref 80–105)
MONOCYTES # BLD AUTO: 0.3 10*3/UL (ref 0.1–0.6)
MONOCYTES NFR BLD: 5.7 % (ref 1–6)
PLATELET # BLD: 201 10^3/UL (ref 120–450)
PMV BLD AUTO: 8.7 FL (ref 7–11)
RBC # BLD AUTO: 3.63 10^6/UL (ref 3.5–6.1)
WBC # BLD AUTO: 5.1 10^3/UL (ref 4.5–11)

## 2018-07-13 RX ADMIN — NYSTATIN SCH APPL: 100000 POWDER TOPICAL at 13:22

## 2018-07-13 RX ADMIN — PANTOPRAZOLE SODIUM SCH MG: 40 TABLET, DELAYED RELEASE ORAL at 05:36

## 2018-07-13 RX ADMIN — PANTOPRAZOLE SODIUM SCH MG: 40 TABLET, DELAYED RELEASE ORAL at 17:00

## 2018-07-13 RX ADMIN — NYSTATIN SCH APPL: 100000 POWDER TOPICAL at 17:44

## 2018-07-13 RX ADMIN — MAGNESIUM HYDROXIDE SCH ML: 400 SUSPENSION ORAL at 10:10

## 2018-07-13 RX ADMIN — NYSTATIN SCH APPL: 100000 POWDER TOPICAL at 10:10

## 2018-07-13 NOTE — CP.PCM.PN
Subjective





- Date & Time of Evaluation


Date of Evaluation: 07/13/18


Time of Evaluation: 06:00





- Subjective


Subjective: 


Patient seen and evaluated bedside in AM. No acute issues overnight. Patient is 

tolerating full diet without issues. Denies nausea, vomiting, abdominal pain, 

fever, chills, or any other complaints at this time. Patient had a bowel 

movement earlier today that was normal. 








Objective





- Vital Signs/Intake and Output


Vital Signs (last 24 hours): 


 











Temp Pulse Resp BP Pulse Ox


 


 98.1 F   77   20   98/68 L  97 


 


 07/12/18 16:00  07/12/18 16:00  07/12/18 16:00  07/12/18 16:00  07/12/18 16:00








Intake and Output: 


 











 07/13/18 07/13/18





 06:59 18:59


 


Output Total 455 


 


Balance -455 














- Medications


Medications: 


 Current Medications





Albuterol/Ipratropium (Duoneb 3 Mg/0.5 Mg (3 Ml) Ud)  3 ml IH [id number] PRN; 

Protocol


   PRN Reason: Sinus symptoms


Benzocaine/Menthol (Cepacol Sore Throat)  1 fernandez MT Q2H PRN; Protocol


   PRN Reason: Sore Throat


Cephalexin Monohydrate (Keflex)  250 mg PO Q6 [facility] D


   PRN Reason: Protocol


   Last Admin: 07/13/18 05:36 Dose:  250 mg


Clonazepam (Klonopin)  0.25 mg PO BID [facility] D


   PRN Reason: Protocol


   Last Admin: 07/12/18 17:50 Dose:  0.25 mg


Diphenhydramine HCl (Benadryl)  25 mg IVP Q4H PRN; Protocol


   PRN Reason: Allergy symptoms


Levothyroxine Sodium (Synthroid)  75 mcg PO 0600 [facility] D


   PRN Reason: Protocol


   Last Admin: 07/13/18 05:36 Dose:  75 mcg


Magnesium Hydroxide (Milk Of Magnesia)  30 ml PO DAILY [facility]


   PRN Reason: Protocol


   Last Admin: 07/12/18 10:26 Dose:  30 ml


Nystatin (Nystop Topical Powder)  0 gm TOP TID [facility]


   PRN Reason: Protocol


   Last Admin: 07/12/18 17:37 Dose:  1 appl


Ondansetron HCl (Zofran Inj)  4 mg IVP Q6H PRN; Protocol


   PRN Reason: Nausea/Vomiting


Pantoprazole Sodium (Protonix Ec Tab)  40 mg PO 0600,1600 [facility] D


   PRN Reason: Protocol


   Last Admin: 07/13/18 05:36 Dose:  40 mg


Saliva Substitute (Saliva Substitute)  2 ml PO Q6H PRN; Protocol


   PRN Reason: Dry mouth


Tramadol HCl (Ultram)  50 mg PO Q4H PRN


   PRN Reason: Pain, severe (8-10)


   Last Admin: 07/12/18 21:20 Dose:  50 mg


Zaleplon (Sonata)  5 mg PO HS PRN; Protocol


   PRN Reason: Insomnia











- Labs


Labs: 


 





 07/13/18 06:30 





 07/13/18 06:30 











- Constitutional


Appears: Well, Non-toxic, No Acute Distress





- Head Exam


Head Exam: ATRAUMATIC, NORMAL INSPECTION, NORMOCEPHALIC





- Eye Exam


Eye Exam: EOMI, Normal appearance





- ENT Exam


ENT Exam: Mucous Membranes Moist





- Respiratory Exam


Respiratory Exam: Clear to Ausculation Bilateral, NORMAL BREATHING PATTERN





- Cardiovascular Exam


Cardiovascular Exam: REGULAR RHYTHM, +S1, +S2





- GI/Abdominal Exam


GI & Abdominal Exam: Soft.  absent: Distended, Firm, Guarding, Tenderness


Additional comments: 


incision clean, dry, in tact, healing well. some surrounding erythema present 

looks same as yesterday, will monitor








- Extremities Exam


Extremities Exam: Full ROM.  absent: Pedal Edema





- Neurological Exam


Neurological Exam: Alert, Awake, Oriented x3





- Skin


Skin Exam: Erythema, Warm





Assessment and Plan





- Assessment and Plan (Free Text)


Assessment: 


62 yr old male s/p enterocutaneous fistula repair x2  7/5/18, post op day 7. 

Patient now in TCU for rehab.





Plan: 


Continue to encourage ambulation and monitor bowel function


continue regular diet, tolerating well


Keflex 


monitor erythema around surgical site 


Discussed plan with Dr. Crawford


Further recommendations as per Dr. Yvette Donnelly PGY-2

## 2018-07-13 NOTE — DS
Patient was seen and examined on the bedside on 07/11/2018.  Patient was

admitted on 06/24/2018, discharged to TCU on 07/11/2018.



CHIEF COMPLAINT:  Abdominal pain.



HISTORY OF PRESENT ILLNESS:  Mr. Dmitri Hickey is a 62-year-old male with

past medical history of asthma, interstitial hemorrhagic cystitis, s/p

ileal conduit, cystectomy, hypothyroidism, GI fistula, anxiety, and

nephritis.  Came to the Emergency Department for evaluation and treatment

of leaking wound on the abdominal wall.  The patient stated that he was

recently treated for his chronic fistula, had seen by members for

approximately three months and spent another month in the Rehab Center in

some far away place.  He was released from the Rehab Center two days ago

before admission complaining of discomfort and also leaking of wound. 

According to the patient,  continuous pus and poop from the wound

site.  No fever.  No chills.  We admitted the patient.  Surgical consult

called with Dr. Crawford and called urologist.  Dr. Margarito Khan was

the Infectious Disease.  Seen by the house physician at different times. 

Dr. Haque was the pulmonologist.  During the hospital stay, patient became

very depressed.  No suicidal or homicidal ideations, but was crying.  I

called consult with Dr. Dee Gallego.  She prescribed some medications

and talked to the patient, the patient got better.  Surgery was done by Dr. Crawford.  Now, the patient improved.  Tolerated food very well.  Wound

is better.  Tried to transfer the patient  to TCU for continuity of

care.



PAST MEDICAL HISTORY:  As above.  History of asthma, interstitial cystitis,

vertigo, esophageal strictures, herniorrhaphy, hepatomegaly, colostomy,

right abdominal urostomy tube, anxiety, hernia repair, mesh removal, cyst

removal, and cystectomy.



FAMILY HISTORY:  Father and mother, noncontributory.



HABITS:  No smoking.  No drug.  No ethanol.



ALLERGIES:  PATIENT IS NOT ALLERGIC WITH ANY MEDICATIONS.



HOME MEDICATIONS:  Alprazolam and omeprazole.



REVIEW OF SYSTEMS:  Patient was seen and examined at the bedside on

07/11/2018, looking comfortable.  No nausea, vomiting, or diarrhea.  No

hematuria or hematochezia.  No headache.  No dizziness.  No chest pain or

palpitation.  Abdominal tenderness is better.  The patient has bowel

movement.  Tolerating food very well.  No shortness of breath.  Urostomy

working very well.



PHYSICAL EXAMINATION:

VITAL SIGNS:  Temperature 98, heart rate 63, respiratory rate 20, blood

pressure 117/71, pulse oximetry 99% on room air.

HEENT:  Head:  Normocephalic, atraumatic.  Eyes:  PERRLA.  Extraocular

muscles intact.  Conjunctivae clear.  Nose:  Patent.  Mucous membrane

moist.

NECK:  Supple.  No carotid bruit, JVD, or thyromegaly.

CHEST:  Bilaterally symmetrical.

HEART:  S1 and S2 positive.

LUNGS:  Clear to auscultation.

ABDOMEN:  Soft.  Bowel sounds positive.  Incision site looks, getting

better.  Urostomy tube is working.

EXTREMITIES:  No edema.  No cyanosis.

NEUROLOGIC:  Patient is awake and alert.  Follow simple commands.



MEDICATIONS:  Benadryl, Cepacol lozenges, petroleum jelly, albuterol,

clonazepam, Protonix, Sonata, Synthroid, Zofran.



LABORATORY DATA:  Hemoglobin 9.7, hematocrit 28.9, white blood cells 3.9,

platelets 182.  Sodium 139, potassium 4.1, BUN 17, creatinine 0.7, glucose

129.  AST 23, ALT 47.



ASSESSMENT AND PLAN:  Mr. Dmitri Hickey is a 62-year-old male status post

laparotomy with reanastomosis and lysis of adhesions, history of

enterocutaneous fistula by Dr. Crawford, chronic obstructive lung

disease, malnutrition, hypothyroidism, activities of daily living

dysfunction.  Patient is improving.  Tolerates food very well.  Continue

total parenteral nutrition for now; p.o. diet intake is getting better. 

The patient is getting pleasure food and if tolerated well, tomorrow, may

benefit from TRCU admission.  Gastrointestinal and deep venous thrombosis

prophylaxis.  Repeat labs.  Transfer the patient to Transitional Care Unit.

We will continue treatment.







__________________________________________

Sandra Perez MD



DD:  07/11/2018 23:57:56

DT:  07/12/2018 1:57:19

Job # [id number]

MTDD

## 2018-07-13 NOTE — HP
The patient was admitted on the medical floor on 06/24/2018, discharged to

TCU for physical therapy and continuity of care.



CHIEF COMPLAINT:  Abdominal pain.



HISTORY OF PRESENT ILLNESS:  Mr. Dmitri Hickey is 62-year-old male with

past medical history of asthma, interstitial hemorrhagic cystitis, s/p

ileal conduit, cystectomy, hypothyroidism, GI fistula, anxiety and

nephritis came to the Emergency Department for evaluation and treatment of

leaking wound package that was placed days ago in the ER when being

evaluated for abdominal pain.  Stated that he was recently treated for his

chronic fistula at [facility] for approximately three months and spent

another month in the rehab center at some far away place.  He was released

from the rehab center two days ago and complains of discomfort and leaking

wound, fistula from the abdominal wall due to continuous pus and poop.  We

admitted the patient.  Called consult with Dr. Crawford.  The patient

was cleared by the cardiologist and pulmonologist.  Dr. Crawford did

that procedure.  There was enterocutaneous fistula x2, two enterocutaneous

fistulas of small bowel, numerous diffuse dense adhesions seen and treated

by Dr. Crawford.  Exploratory laparotomy done.  Primary anastomosis

done.  Lysis of dense adhesions done.  The patient improved, transferred to

TCU for physical therapy and continuity of care.



PAST MEDICAL HISTORY:  As above is for asthma, interstitial cystitis,

vertigo, vitiligo of arms and hands, esophageal stricture, herniorrhaphy,

hepatomegaly, colostomy, right abdominal urostomy tube, anxiety, hernia

repair, mesh removal, cyst removal and cystectomy.



FAMILY HISTORY:  Father and mother, noncontributory.



HABITS:  Never smoked.  No drugs.  No ethanol.



ALLERGIES:  THE PATIENT IS NOT ALLERGIC WITH ANY MEDICATIONS.



HOME MEDICATIONS:  Reviewed by me.



REVIEW OF SYSTEMS:  The patient was seen and examined at the bedside on

07/12/2018, looking comfortable.  The patient's friend was sitting on the

bedside also.  The patient has bowel movement today and tolerating food

very well, did physical therapy, was excited about that.  No nausea or

vomiting.  No fever or chills.  No abdominal pain, shortness of breath,

chest pain or any other complaints.



PHYSICAL EXAMINATION:

VITAL SIGNS:  Temperature 99.1, pulse 76, respiratory rate 18, blood

pressure 95/55, pulse oximetry of 98.

HEENT:  Head normocephalic, atraumatic.  Eyes, PERRLA.  Extraocular muscles

intact.  Conjunctivae clear.  Nose:  Patent.  Mucous membrane moist.

NECK:  Supple.  No carotid bruit.  No JVD or thyromegaly.

CHEST:  Bilaterally symmetrical.

HEART:  S1 and S2 positive.

LUNGS:  Clear to auscultation.

ABDOMEN:  Soft, bowel sounds present.  No organomegaly.

EXTREMITIES:  No edema, no cyanosis.

NEUROLOGICAL:  Patient is awake and alert, moving all 4 extremities, no

focal deficits.



MEDICATIONS:  DuoNeb, Cepacol, Keflex, Klonopin, Benadryl, Synthroid, milk

of magnesia, nystatin, Zofran, Protonix, tramadol, Sonata.



LABORATORY DATA:  White blood cell is 4.7, hemoglobin is 10.5, hematocrit

31.7, platelets 202.  Sodium 139, potassium 4.1, BUN 17, creatinine 0.9,

glucose 93.



ASSESSMENT:  Mr. Dmitri Hickey is a 62-year-old male with anemia, status

post enterocutaneous fistula repair x2, postoperative day 7.  The patient

in TCU, getting physical therapy.



PLAN:  Continue to encourage ambulation and monitor bowel function,

continue monitoring nausea and vomiting.  Started on regular diet. 

Continue Keflex, antibiotics.  Watch erythema around the surgical site. 

Discussion done with the patient and the patient's friend.  Repeat labs. 

We will follow up.







__________________________________________

Sandra Perez MD



DD:  07/12/2018 23:36:22

DT:  07/13/2018 2:18:52

Job # [id number]

## 2018-07-14 VITALS
HEART RATE: 67 BPM | SYSTOLIC BLOOD PRESSURE: 96 MMHG | OXYGEN SATURATION: 97 % | TEMPERATURE: 98.1 F | RESPIRATION RATE: 20 BRPM | DIASTOLIC BLOOD PRESSURE: 57 MMHG

## 2018-07-14 RX ADMIN — NYSTATIN SCH: 100000 POWDER TOPICAL at 13:49

## 2018-07-14 RX ADMIN — NYSTATIN SCH APPL: 100000 POWDER TOPICAL at 17:39

## 2018-07-14 RX ADMIN — PANTOPRAZOLE SODIUM SCH MG: 40 TABLET, DELAYED RELEASE ORAL at 06:28

## 2018-07-14 RX ADMIN — PANTOPRAZOLE SODIUM SCH MG: 40 TABLET, DELAYED RELEASE ORAL at 17:00

## 2018-07-14 RX ADMIN — MAGNESIUM HYDROXIDE SCH: 400 SUSPENSION ORAL at 09:07

## 2018-07-14 RX ADMIN — NYSTATIN SCH APPL: 100000 POWDER TOPICAL at 09:07

## 2018-07-14 NOTE — CP.PCM.PN
Subjective





- Date & Time of Evaluation


Date of Evaluation: 07/14/18


Time of Evaluation: 07:00





- Subjective


Subjective: 





Patient seen and examined at bedside this AM. No adverse events overnight. 

Patient denies any abdominal pain, fevers, chills, nausea or vomiting, is 

tolerating his diet and continues to have bowel movements and work with PT.





Objective





- Vital Signs/Intake and Output


Vital Signs (last 24 hours): 


 











Temp Pulse Resp BP Pulse Ox


 


 97.9 F   70   18   101/63   98 


 


 07/13/18 16:00  07/13/18 16:00  07/13/18 16:00  07/13/18 16:00  07/13/18 16:00








Intake and Output: 


 











 07/14/18 07/14/18





 06:59 18:59


 


Output Total 452 


 


Balance -452 














- Medications


Medications: 


 Current Medications





Albuterol/Ipratropium (Duoneb 3 Mg/0.5 Mg (3 Ml) Ud)  3 ml IH [id number] PRN; 

Protocol


   PRN Reason: Sinus symptoms


Benzocaine/Menthol (Cepacol Sore Throat)  1 fernandez MT Q2H PRN; Protocol


   PRN Reason: Sore Throat


Cephalexin Monohydrate (Keflex)  250 mg PO Q6 MARIA D


   PRN Reason: Protocol


   Last Admin: 07/14/18 06:28 Dose:  250 mg


Clonazepam (Klonopin)  0.25 mg PO BID MARIA D


   PRN Reason: Protocol


   Last Admin: 07/13/18 17:43 Dose:  0.25 mg


Diphenhydramine HCl (Benadryl)  25 mg IVP Q4H PRN; Protocol


   PRN Reason: Allergy symptoms


Levothyroxine Sodium (Synthroid)  75 mcg PO 0600 MARIA D


   PRN Reason: Protocol


   Last Admin: 07/14/18 06:28 Dose:  75 mcg


Magnesium Hydroxide (Milk Of Magnesia)  30 ml PO DAILY MARIA D


   PRN Reason: Protocol


   Last Admin: 07/13/18 10:10 Dose:  30 ml


Nystatin (Nystop Topical Powder)  0 gm TOP TID MARIA D


   PRN Reason: Protocol


   Last Admin: 07/13/18 17:44 Dose:  1 appl


Ondansetron HCl (Zofran Inj)  4 mg IVP Q6H PRN; Protocol


   PRN Reason: Nausea/Vomiting


Pantoprazole Sodium (Protonix Ec Tab)  40 mg PO 0600,1600 MARIA D


   PRN Reason: Protocol


   Last Admin: 07/14/18 06:28 Dose:  40 mg


Saliva Substitute (Saliva Substitute)  2 ml PO Q6H PRN; Protocol


   PRN Reason: Dry mouth


Tramadol HCl (Ultram)  50 mg PO Q4H PRN


   PRN Reason: Pain, severe (8-10)


   Last Admin: 07/12/18 21:20 Dose:  50 mg


Zaleplon (Sonata)  5 mg PO HS PRN; Protocol


   PRN Reason: Insomnia











- Labs


Labs: 


 





 07/13/18 06:30 





 07/13/18 06:30 











- Constitutional


Appears: Well, Non-toxic, No Acute Distress





- Head Exam


Head Exam: ATRAUMATIC, NORMOCEPHALIC





- Eye Exam


Eye Exam: Normal appearance.  absent: Conjunctival injection, Scleral icterus





- ENT Exam


ENT Exam: Mucous Membranes Moist, Normal Oropharynx





- Respiratory Exam


Respiratory Exam: NORMAL BREATHING PATTERN.  absent: Accessory Muscle Use, 

Respiratory Distress





- GI/Abdominal Exam


GI & Abdominal Exam: Soft.  absent: Distended, Tenderness


Additional comments: 





midline incision well approximated by staples, moderate unchanged erythema of 

the surrounding skin in areas of prior excoriation, no induration, no fluctuance

, no expressible fluid from the incision. Urostomy pink and productive of clear 

yellow fluid. Juan drain with scant serous fluid output





- Extremities Exam


Extremities Exam: absent: Calf Tenderness, Pedal Edema, Tenderness





- Neurological Exam


Neurological Exam: Alert, Awake, Oriented x3





- Psychiatric Exam


Psychiatric exam: Normal Affect, Normal Mood





- Skin


Skin Exam: Dry, Intact, Normal Color, Warm


Additional comments: 





except as noted in abdominal exam





Assessment and Plan





- Assessment and Plan (Free Text)


Assessment: 





62M POD#9 s/p exploratory laparotomy with repair of chronic enterocutaneous 

fistulas of the small bowel and lysis of dense adhesions


Plan: 





No further surgical intervention indicated at this time. Patient continues to 

recover well


Continue PT


Continue HHD


Continue PO pain medication PRN


Anticipate possible discharge to home on Monday if patient continues to improve





Discussed with Dr. Yvette Ayala PGY2

## 2018-07-14 NOTE — PN
DATE:  07/13/2018



SUBJECTIVE:  The patient was seen and examined on the bedside, on

07/13/2018, looking comfortable.  No nausea, vomiting or diarrhea.  No

hematuria or hematochezia.  No swelling of the legs.  No chest pain, no

palpitations.  Tolerating food very well, doing physical therapy very well.

Overnight, no acute events happened.  No fever, no chills.  The patient has

 today bowel movement.



PHYSICAL EXAMINATION:

VITAL SIGNS:  Temperature 98.1, pulse 77, respiratory rate 20, blood

pressure 98/58, pulse oximetry of 97.

HEENT:  Head normocephalic, atraumatic.  Eyes, PERRLA.  Extraocular muscles

intact.  Conjunctivae clear.  Nose, Patent.  Mucous membrane moist.

NECK:  Supple.  No carotid bruit.  No JVD or thyromegaly.

CHEST:  Bilaterally symmetrical.

HEART:  S1 and S2 positive.

LUNGS:  Clear to auscultation.

ABDOMEN:  Soft.  Bowel sounds present.  No organomegaly.

EXTREMITIES:  No edema, no cyanosis.

NEUROLOGICAL:  The patient is awake and alert, moving all 4 extremities, no

focal deficits.



MEDICATIONS:  Cepacol lozenges, Keflex, Klonopin, Benadryl, Synthroid, milk

of magnesia, nystatin, Zofran, Protonix, saliva substitute, tramadol,

Sonata.



LABORATORY DATA:  White blood cell is 5.1, hemoglobin 10, hematocrit 30.4,

platelets 201.  Sodium 138, potassium 4, BUN 19, creatinine 1.1, glucose

116.



ASSESSMENT AND PLAN:  Mr. Dmitri Hickey is a 62-year-old male with

anemia, hyperglycemia, status post enterocutaneous fistula repair x2.  On

postoperative day #7, the patient is getting physical therapy in TCU for

rehab.  Continue to encourage ambulation and monitor bowel function. 

Continue regular diet, tolerated as well.  Continue Keflex.  Bleeding

erythema around the surgical site.  Appreciated Dr. Crawford and his

team's input.  The patient has history of asthma, interstitial cystitis,

vertigo, vitiligo of the arms and hands, esophageal stricture,

herniorrhaphy, hepatomegaly, colostomy, right abdominal urostomy and

working very well, anxiety, hernia repair, mesh removal, cyst removed and

cystectomy.  Length of time discussion done with the patient.  Gastric and

deep vein thrombosis prophylaxis.  Repeat labs. We will follow.







__________________________________________

Sandra Perez MD





DD:  07/14/2018 9:00:32

DT:  07/14/2018 10:02:28

Job # [id number]

[id number]

## 2018-07-15 RX ADMIN — MAGNESIUM HYDROXIDE SCH: 400 SUSPENSION ORAL at 09:43

## 2018-07-15 RX ADMIN — NYSTATIN SCH APPL: 100000 POWDER TOPICAL at 14:56

## 2018-07-15 RX ADMIN — PANTOPRAZOLE SODIUM SCH MG: 40 TABLET, DELAYED RELEASE ORAL at 17:24

## 2018-07-15 RX ADMIN — NYSTATIN SCH APPL: 100000 POWDER TOPICAL at 09:44

## 2018-07-15 RX ADMIN — NYSTATIN SCH APPL: 100000 POWDER TOPICAL at 17:24

## 2018-07-15 RX ADMIN — PANTOPRAZOLE SODIUM SCH MG: 40 TABLET, DELAYED RELEASE ORAL at 05:41

## 2018-07-15 NOTE — PN
DATE:  07/14/2018



SUBJECTIVE:  The patient is a 62-year-old male.  The patient was seen and

examined on the bedside on 07/14/2018, looking comfortable.  No nausea,

vomiting or diarrhea.  No hematuria or hematochezia.  No swelling of the

legs.  No chest pain, no palpitations.  Tolerating food very well, has

bowel movement.  Doing physical therapy.



PHYSICAL EXAMINATION:

VITAL SIGNS:  Temperature 98.1, pulse 57, blood pressure 96/57, respiratory

rate 20.

HEENT:  Head normocephalic, atraumatic.  Eyes, PERRLA.  Extraocular muscles

intact.  Conjunctivae clear.  Nose patent.  Mucosus membrane moist.

NECK:  Supple.  No carotid bruit.  No JVD or thyromegaly.

CHEST:  Bilaterally symmetrical.

HEART:  S1 and S2 positive.

LUNGS:  Clear to auscultation.

ABDOMEN:  Soft.  Tender at the surgical site.

EXTREMITIES:  No edema, no cyanosis.

NEUROLOGICAL:  The patient is awake and alert.  Moving all four

extremities.  No focal deficits.



MEDICATIONS:  Benadryl, Cepacol lozenges, albuterol, Keflex, Klonopin, milk

of magnesia, nystatin, Protonix, saliva substitute, Sonata, Synthroid,

Tylenol, Zofran.



LABORATORY DATA:  We do not have recent labs today, but I reviewed old

labs.



ASSESSMENT:  Mr. Dmitri Hickey is a 62-year-old male with anemia,

hyperglycemia, hypocalcemia with history of multiple medical problems,

status post enterocutaneous fistula repair x2, on postoperative day #8,

getting physical therapy, tolerating food very well, having bowel movement.



PLAN:  Continue Keflex.  Dr. Crawford is on the case.  Appreciated the

help.  He has a history of asthma, interstitial cystitis, vertigo,

vitiligo, esophageal stricture, herniorrhaphy, hepatomegaly, colostomy,

right abdominal urostomy, anxiety, hernia repair, mesh removal, history of

cystectomy.  Continue present treatment, GI and DVT prophylaxes, physical

therapy.  We will follow.





__________________________________________

Sandra Perez MD



DD:  07/15/2018 0:03:09

DT:  07/15/2018 2:05:39

Job # [id number]

## 2018-07-16 RX ADMIN — NYSTATIN SCH: 100000 POWDER TOPICAL at 14:23

## 2018-07-16 RX ADMIN — MAGNESIUM HYDROXIDE SCH ML: 400 SUSPENSION ORAL at 10:44

## 2018-07-16 RX ADMIN — PANTOPRAZOLE SODIUM SCH MG: 40 TABLET, DELAYED RELEASE ORAL at 05:47

## 2018-07-16 RX ADMIN — NYSTATIN SCH: 100000 POWDER TOPICAL at 10:44

## 2018-07-16 NOTE — CARD
--------------- APPROVED REPORT --------------





Date of service: 07/07/2018



EKG Measurement

Heart [id number]

SC [id number]

[id number]

[id number]

[id number]



<Conclusion>

Normal sinus rhythm

Nonspecific T wave abnormality, new

## 2018-07-16 NOTE — PN
DATE:  07/15/2018



SUBJECTIVE:  Patient was seen and examined on the bedside on 07/15/2018,

looking comfortable.  No nausea, vomiting, or diarrhea.  No hematuria or

hematochezia.  No swelling of the legs.  No chest pain.  No palpitation. 

No headache.  No dizziness.  Abdominal surgery is healing very well.



PHYSICAL EXAMINATION:

VITAL SIGNS:  Temperature 98.1, pulse 67, respiratory rate 20, blood

pressure  120/80  , pulse oximetry 97%.

HEENT:  Head:  Normocephalic, atraumatic.  Eyes:  PERRLA.  Extraocular

muscles intact.  Conjunctivae clear.  Nose patent.  Mucosus membrane moist.

NECK:  Supple.  No carotid bruit.  No JVD or thyromegaly.

CHEST:  Bilaterally symmetrical.

HEART:  S1 and S2 positive.

LUNGS:  Clear to auscultation.

ABDOMEN:  Soft.  Tender at the surgical place.

EXTREMITIES:  No edema.  No cyanosis.

NEUROLOGIC:  Patient is awake and alert.  Moving all 4 extremities.  No

focal deficit.



MEDICATIONS:  Tylenol, DuoNeb, Cepacol, Keflex, Klonopin, Benadryl,

Synthroid, milk of magnesia, nystatin, Zofran, Protonix, saliva

substitution, Sonata.



LABORATORY DATA:  White blood cells 5.1, hemoglobin 10, hematocrit 30.4,

platelets 201.  Sodium 138, potassium 4, BUN 19, creatinine 1.1, glucose

116.



ASSESSMENT AND PLAN:  Mr. Dmitri Hickey is a 62-year-old male with

anemia, hyperglycemia, status post enterocutaneous fistula repair,

postoperative day 10.  No further surgical intervention needed at this time

as per Surgery.  Continue physical therapy.  Encourage ambulation.  Patient

has  food  as tolerated.  History of hyperglycemia, anemia, hypocalcemia. 

Continue Keflex.  Repeat labs.  We will follow up.





__________________________________________

Sandra Perez MD



DD:  07/15/2018 23:21:20

DT:  07/16/2018 1:13:45

Job # [id number]

MTDD

## 2018-11-25 ENCOUNTER — HOSPITAL ENCOUNTER (EMERGENCY)
Dept: HOSPITAL 42 - ED | Age: 63
Discharge: HOME | End: 2018-11-25
Payer: COMMERCIAL

## 2018-11-25 VITALS
OXYGEN SATURATION: 98 % | RESPIRATION RATE: 17 BRPM | HEART RATE: 65 BPM | DIASTOLIC BLOOD PRESSURE: 76 MMHG | SYSTOLIC BLOOD PRESSURE: 135 MMHG

## 2018-11-25 VITALS — TEMPERATURE: 98.1 F

## 2018-11-25 VITALS — BODY MASS INDEX: 25.8 KG/M2

## 2018-11-25 DIAGNOSIS — E03.9: ICD-10-CM

## 2018-11-25 DIAGNOSIS — Z93.3: ICD-10-CM

## 2018-11-25 DIAGNOSIS — R10.30: Primary | ICD-10-CM

## 2018-11-25 LAB
ALBUMIN SERPL-MCNC: 4 G/DL (ref 3–4.8)
ALBUMIN/GLOB SERPL: 1 {RATIO} (ref 1.1–1.8)
ALT SERPL-CCNC: 34 U/L (ref 7–56)
APPEARANCE UR: CLEAR
AST SERPL-CCNC: 43 U/L (ref 17–59)
BACTERIA #/AREA URNS HPF: (no result) /[HPF]
BASOPHILS # BLD AUTO: 0.02 K/MM3 (ref 0–2)
BASOPHILS NFR BLD: 0.4 % (ref 0–3)
BILIRUB UR-MCNC: NEGATIVE MG/DL
BUN SERPL-MCNC: 24 MG/DL (ref 7–21)
CALCIUM SERPL-MCNC: 9.2 MG/DL (ref 8.4–10.5)
COLOR UR: YELLOW
EOSINOPHIL # BLD: 0.1 10*3/UL (ref 0–0.7)
EOSINOPHIL NFR BLD: 1.8 % (ref 1.5–5)
EPI CELLS #/AREA URNS HPF: (no result) /HPF (ref 0–5)
ERYTHROCYTE [DISTWIDTH] IN BLOOD BY AUTOMATED COUNT: 13 % (ref 11.5–14.5)
GFR NON-AFRICAN AMERICAN: > 60
GLUCOSE UR STRIP-MCNC: NEGATIVE MG/DL
GRANULOCYTES # BLD: 2.77 10*3/UL (ref 1.4–6.5)
GRANULOCYTES NFR BLD: 62 % (ref 50–68)
HGB BLD-MCNC: 10 G/DL (ref 14–18)
LEUKOCYTE ESTERASE UR-ACNC: (no result) LEU/UL
LYMPHOCYTES # BLD: 1.3 10*3/UL (ref 1.2–3.4)
LYMPHOCYTES NFR BLD AUTO: 30 % (ref 22–35)
MCH RBC QN AUTO: 27.2 PG (ref 25–35)
MCHC RBC AUTO-ENTMCNC: 32.3 G/DL (ref 31–37)
MCV RBC AUTO: 84.2 FL (ref 80–105)
MONOCYTES # BLD AUTO: 0.3 10*3/UL (ref 0.1–0.6)
MONOCYTES NFR BLD: 5.8 % (ref 1–6)
PH UR STRIP: 6.5 [PH] (ref 4.7–8)
PLATELET # BLD: 191 10^3/UL (ref 120–450)
PMV BLD AUTO: 9.1 FL (ref 7–11)
PROT UR STRIP-MCNC: NEGATIVE MG/DL
RBC # BLD AUTO: 3.68 10^6/UL (ref 3.5–6.1)
RBC # UR STRIP: (no result) /UL
SP GR UR STRIP: 1.01 (ref 1–1.03)
UROBILINOGEN UR STRIP-ACNC: 0.2 E.U./DL
WBC # BLD AUTO: 4.5 10^3/UL (ref 4.5–11)

## 2018-11-25 NOTE — ED PDOC
Arrival/HPI





- General


Chief Complaint: Abdominal Pain


Time Seen by Provider: 11/25/18 17:02





- History of Present Illness


Narrative History of Present Illness (Text): 


61 y/o M c PMHx ileal conduit, urostomy p/w testicular swelling x 1 week. 

Reports swelling and pain of R testicle worse than L with suprapubic pain and 

edema as well. Reports that urine in urostomy appears cloudy and foul smelling. 

Denies fever, chills, chest pain, dyspnea, nausea, vomiting, diarrhea. Denies 

sexual activity.





Past Medical History





- Infectious Disease


Hx of Infectious Diseases: None





- Tetanus Immunization


Tetanus Immunization: Unknown





- Cardiac


Hx Cardiac Disorders: No





- Pulmonary


Hx Respiratory Disorders: Yes


Hx Asthma: Yes





- Neurological


Hx Neurological Disorder: No





- HEENT


Hx HEENT Disorder: Yes


Other/Comment: difficulty hearing





- Renal


Hx Renal Disorder: Yes (Interstitial Cystitis)


Other/Comment: urinary bladder removed, has urostomy





- Endocrine/Metabolic


Hx Endocrine Disorders: Yes


Hx Hypothyroidism: Yes





- Hematological/Oncological


Hx Blood Disorders: No





- Integumentary


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





- Musculoskeletal/Rheumatological


Hx Musculoskeletal Disorders: Yes


Hx Falls: Yes





- Gastrointestinal


Hx Gastrointestinal Disorders: Yes (esophageal stricture,HERNIORHAPPHY 4 X)


Hx Gastroesophageal Reflux: Yes


Hx Liver Failure: Yes (HEPATOMEGALY)


HX Swallowing Problems: Yes


Other/Comment: + colostomy





- Genitourinary/Gynecological


Hx Genitourinary Disorders: No





- Psychiatric


Hx Psychophysiologic Disorder: Yes


Hx Anxiety: Yes


Hx Depression: Yes


Hx Substance Use: No





- Surgical History


Other/Comment: Hernia repair, urostomy, mesh removal, cyst removal, cystectomy; 

04/18 urostomy and fustula repair





- Anesthesia


Hx Anesthesia Reactions: No


Hx Malignant Hyperthermia: No





- Suicidal Assessment


Feels Threatened In Home Enviroment: No





Family/Social History


Family/Social History: No Known Family HX


Smoking Status: Never Smoked


Hx Alcohol Use: No


Hx Substance Use: No


Hx Substance Use Treatment: No





Allergies/Home Meds


Allergies/Adverse Reactions: 


Allergies





No Known Allergies Allergy (Verified 11/25/18 16:49)


   








Home Medications: 


                                    Home Meds











 Medication  Instructions  Recorded  Confirmed


 


Albuterol HFA [Ventolin HFA 90 2 puff IH [id number] PRN 05/10/17 07/12/18





mcg/actuation (8 g)]   


 


Omeprazole 20 mg PO DAILY 05/10/17 07/12/18


 


ALPRAZolam [Xanax] 0.25 mg PO DAILY 10/20/17 07/12/18














Review of Systems





- Physician Review


All systems were reviewed & negative as marked: Yes





- Review of Systems


Constitutional: absent: Fevers


Respiratory: absent: SOB





Physical Exam





- Physical Exam


Narrative Physical Exam (Text): 


Gen: NAD


Head: NC/AT


Eyes: PERRL


ENT: MMM


Neck: Supple


Chest: No tenderness


CV: Regular rate


Lungs: CTA b/l


Abd: Suprapubic tenderness. Multiple surgical scars. L sided urostomy.


: Penis normal. Scrotal edema and testicular tenderness bilaterally


Extremities: No tenderness or swelling


Skin: No rash.


Neuro: Alert, no focal deficit





Vital Signs











  Temp Pulse Resp BP Pulse Ox


 


 11/25/18 16:44  98.2 F  73  18  140/79  95














Medical Decision Making


ED Course and Treatment: 


Pending US. Signed out to ED night team. 





- RAD Interpretation


Radiology Orders: 











11/25/18 17:11


TESTES DUPLEX COMPLETE [US] Stat 














Disposition/Present on Arrival





- Present on Arrival


Any Indicators Present on Arrival: No


History of DVT/PE: No


History of Uncontrolled Diabetes: No


Urinary Catheter: No


History of Decub. Ulcer: No


History Surgical Site Infection Following: None





- Disposition


Have Diagnosis and Disposition been Completed?: No


Diagnosis: 


 Suprapubic pain





Disposition Time: 18:38


Condition: STABLE


Forms:  Not iT (English)

## 2018-11-26 NOTE — ED PDOC
Physical Exam


Vital Signs Reviewed: Yes





Vital Signs











  Temp Pulse Resp BP Pulse Ox


 


 11/25/18 20:50  98.1 F  65  17  135/76  98


 


 11/25/18 20:16   65  17  135/76  98


 


 11/25/18 18:00  98 F  75  19  132/53 L  99


 


 11/25/18 16:44  98.2 F  73  18  140/79  95











Temperature: Afebrile


Blood Pressure: Normal


Pulse: Regular


Respiratory Rate: Normal


Appearance: Positive for: Well-Appearing, Non-Toxic, Comfortable


Pain Distress: None


Mental Status: Positive for: Alert and Oriented X 3





Medical Decision Making


ED Course and Treatment: 





Progress notes:


11/25/18 19:00


Case endorsed to me by Dr. Ritchie, follow up Ultrasound.





11/25/18 20:15


Ultrasound shows Orchitis. Patient has no physical complaints upon evaluation. 

Pt to be discharged with antibiotics and follow up with PMD. 











- Lab Interpretations


Lab Results: 











                                 11/25/18 18:00 





                                 11/25/18 18:00 





                                   Lab Results





11/25/18 18:00: Sodium 141, Potassium 4.2, Chloride 107, Carbon Dioxide 24, 

Anion Gap 15, BUN 24 H, Creatinine 1.2, Est GFR (African Amer) > 60, Est GFR 

(Non-Af Amer) > 60, Random Glucose 125 H, Calcium 9.2, Total Bilirubin 0.4, AST 

43, ALT 34, Alkaline Phosphatase 56, Total Protein 7.9, Albumin 4.0, Globulin 

3.9, Albumin/Globulin Ratio 1.0 L


11/25/18 18:00: WBC 4.5, RBC 3.68, Hgb 10.0 L, Hct 31.0 L, MCV 84.2, MCH 27.2, 

MCHC 32.3, RDW 13.0, Plt Count 191, MPV 9.1, Gran % 62.0, Lymph % (Auto) 30.0, 

Mono % (Auto) 5.8, Eos % (Auto) 1.8, Baso % (Auto) 0.4, Gran # 2.77, Lymph # 

(Auto) 1.3, Mono # (Auto) 0.3, Eos # (Auto) 0.1, Baso # (Auto) 0.02


11/25/18 17:25: Urine Color Yellow, Urine Appearance Clear, Urine pH 6.5, Ur 

Specific Gravity 1.010, Urine Protein Negative, Urine Glucose (UA) Negative, 

Urine Ketones Negative, Urine Blood Small H, Urine Nitrate Negative, Urine 

Bilirubin Negative, Urine Urobilinogen 0.2, Ur Leukocyte Esterase Moderate H, 

Urine RBC 1 - 3, Urine WBC 5 - 10, Ur Epithelial Cells None, Urine Bacteria Many











- RAD Interpretation


Narrative RAD Interpretations (Text): 





US of testes duplex complete reviewed by radiologist, shows:





Exam Date: Nov 25, 2018 7:32:27 PM





Findings 


Right Testicle 


Measures 4.6 x 2.9 x 2.8 cm. Normal echotexture with increased blood flow. 


Right Epididymis 


Epididymal head measures 0.9 x 0.5 x 1.1 cm. Grossly unremarkable appearance 

with normal flow. 


Left Testicle 


Measures 4.7 x 2.1 x 4 cm. Normal echotexture and flow. 


Left Epididymis 


Epididymal head measures 1 x 0.7 x 0.8 cm. Grossly unremarkable appearance with 

normal flow. 


Hydrocele 


Bilateral hydroceles. 


Varicocele 


None. 





Other Findings 


None. 


Impression 


1.  Increased blood flow in the right testicle suggestive of orchitis. 


2.  Bilateral hydroceles.





Radiology Orders: 











11/25/18 17:11


TESTES DUPLEX COMPLETE [US] Stat 











: Radiologist





- Scribe Statement


The provider has reviewed the documentation as recorded by the Scribe





Karma Ayala 





All medical record entries made by the Scribe were at my direction and perso

alfonso dictated by me. I have reviewed the chart and agree that the record 

accurately reflects my personal performance of the history, physical exam, 

medical decision making, and the department course for this patient. I have also

 personally directed, reviewed, and agree with the discharge instructions and 

disposition.





Disposition/Present on Arrival





- Present on Arrival


Any Indicators Present on Arrival: Yes


History of DVT/PE: No


History of Uncontrolled Diabetes: No


Urinary Catheter: Yes


History of Decub. Ulcer: No


History Surgical Site Infection Following: None





- Disposition


Have Diagnosis and Disposition been Completed?: Yes


Diagnosis: 


 Suprapubic pain, Orchitis





Disposition: HOME/ ROUTINE


Disposition Time: 20:15


Condition: STABLE


Discharge Instructions (ExitCare):  Acute Abdomen (Belly Pain), Adult (DC)


Additional Instructions: 





GIANLUCA POTTER, thank you for letting us take care of you today. The 

emergency medical care you received today was directed at your acute symptoms. 

If you were prescribed any medication, please fill it and take as directed. It 

may take several days for your symptoms to resolve. Return to the Emergency 

Department if your symptoms worsen, do not improve, or if you have any other 

problems.





Please contact your doctor or call one of the physicians/clinics you have been 

referred to that are listed on the Patient Visit Information form that is 

included in your discharge packet. Bring any paperwork you were given at 

discharge with you along with any medications you are taking to your follow up 

visit. Our treatment cannot replace ongoing medical care by a primary care 

provider outside of the emergency department.





Thank you for allowing the [facility] team to be part of your care today.











Follow up with your primary care doctor this week for re-evaluation and further 

management.


Prescriptions: 


Ciprofloxacin [Cipro] 500 mg PO BID #28 tab


Referrals: 


Ihsan Stein MD [Staff Provider] - Follow up with primary


Forms:  [employer] (English)

## 2018-11-26 NOTE — US
Date of service: 



11/25/2018



HISTORY:

scrotal edema, r/o torsion vs epiditim vs orchitis



TECHNIQUE:

Realtime sonography through the scrotum with color and doppler flow.



COMPARISON:

None Available.



FINDINGS:



RIGHT TESTICLE:

Measures 4.6 x 2.9 x 2.8 cm. Increased blood flow on the right 

consistent with orchitis



RIGHT EPIDIDYMIS:

Epididymal head measures 0.9 x 0.5 x 1.1 cm. Grossly unremarkable 

appearance with normal flow.



LEFT TESTICLE:

Measures 4.7 x 2.1 x 4.0 cm. Normal echotexture and flow.



LEFT EPIDIDYMIS:

Epididymal head measures 1.0 x 0.7 x 0.8 cm. Grossly unremarkable 

appearance with normal flow.



HYDROCELE:

Bilateral hydroceles larger on the right.  Small on the left



VARICOCELE:

None.



OTHER FINDINGS:

The report concurs with the preliminary [facility] report 



IMPRESSION:

Increased blood flow in the right testicle suggestive of orchitis.



Bilateral hydroceles

## 2023-02-19 NOTE — PN
DATE: 04/14/2017



The patient is in bed in no acute distress, nontoxic.



PHYSICAL EXAMINATION:

VITAL SIGNS:  Temperature is 97, blood pressure is 120/70, respiratory rate of 16.

HEENT:  Unremarkable.

NECK:  Supple.

LUNGS:  Have decreased breath sounds.

HEART:  Normal S1, S2.

ABDOMEN:  Soft, nontender.



LABORATORY EXAMINATION:  Reveals a white count of 3.4, hemoglobin of 10, platelets of 213, BUN of 19,
 creatinine of 1.1.  Urinalysis is noted.  Microbiology reveals the blood cultures are no growth.  St
ool C. diff is negative toxin, negative antigen.  Urine culture is no growth.  Procalcitonin is less 
than 0.05.



ASSESSMENT AND PLAN:  A 61-year-old with a history of esophageal stricture with anemia, abdominal wal
l abscess, history of hemorrhagic interstitial cystitis, history of hypothyroidism, asthma, renal ins
ufficiency, history of Enterobacter urinary tract infection, admitted on this admission with systemic
 inflammatory response syndrome, found to have negative blood cultures, negative CAT scan, negative p
rocalcitonin, negative stool for Clostridium difficile antigen and toxin.  Urine cultures negative.  
Currently on meropenem.  We will discontinue the meropenem.  The patient is also on p.o. prednisone. 
 No evidence of an active infection with the patient being afebrile with negative urine culture and n
egative sputum culture, negative blood, cultures, stool Clostridium difficile negative antigen and to
suzanne and negative CAT scan of the abdomen and pelvis with a negative chest x-ray and negative procalci
tonin.  We will discontinue the meropenem.  No further antibiotics indicated at this point.





__________________________________________

Margarito Khan MD







cc:



DD: 04/14/2017 10:19:30  350

TT: 04/14/2017 10:32:36

Confirmation # [id number]

Dictation # [id number]

tn no

## 2023-08-15 NOTE — CP.PCM.PN
Subjective





- Date & Time of Evaluation


Date of Evaluation: 07/01/18


Time of Evaluation: 07:00





- Subjective


Subjective: 





Patient seen and examined at bedside this AM. patient had a leak from the 

ostomy devices, both were replaced which patient tolerated well. Patient 

reports some cramping abdominal pain but no bowel function. Reports extreme 

hunger pain.





Objective





- Vital Signs/Intake and Output


Vital Signs (last 24 hours): 


 











Temp Pulse Resp BP Pulse Ox


 


 97.9 F   50 L  20   112/65   97 


 


 07/01/18 06:00  07/01/18 06:00  07/01/18 06:00  07/01/18 06:00  07/01/18 06:00








Intake and Output: 


 











 07/01/18 07/01/18





 06:59 18:59


 


Intake Total 996 


 


Output Total 1875 


 


Balance -879 














- Medications


Medications: 


 Current Medications





Albuterol/Ipratropium (Duoneb 3 Mg/0.5 Mg (3 Ml) Ud)  3 ml IH [id number] PRN


   PRN Reason: Sinus symptoms


Benzocaine/Menthol (Cepacol Sore Throat)  1 fernandez MT Q2H PRN


   PRN Reason: Sore Throat


   Last Admin: 07/01/18 04:34 Dose:  1 fernandez


Clonazepam (Klonopin)  0.25 mg PO BID MARIA D


   PRN Reason: Protocol


   Last Admin: 06/30/18 17:52 Dose:  0.25 mg


Amino Acids/Electrolytes/Dextrose (Clinimix 5/20 % "E" (2000 Ml))  2,000 mls @ 

83.333 mls/hr IV .Q24H MARIA D


   Stop: 07/02/18 17:59


   Last Admin: 06/30/18 17:44 Dose:  83.333 mls/hr


Fat Emulsion Intravenous (Intralipid 20%)  250 mls @ 20.833 mls/hr IV MWF@1800 

MARIA D


   Last Admin: 06/29/18 17:38 Dose:  20.833 mls/hr


Levothyroxine Sodium (Synthroid)  150 mcg PO 0600 [facility]


   Last Admin: 07/01/18 06:55 Dose:  150 mcg


Lorazepam (Ativan)  0.5 mg IV Q6 PRN; Protocol


   PRN Reason: anxiety/insomnia


   Last Admin: 06/30/18 22:01 Dose:  0.5 mg


Magnesium Hydroxide (Milk Of Magnesia)  30 ml PO DAILY MARIA D


   Last Admin: 07/01/18 07:40 Dose:  30 ml


Morphine Sulfate (Morphine)  2 mg IVP Q4H PRN


   PRN Reason: Pain, severe (8-10)


Nystatin (Nystop Topical Powder)  0 gm TOP TID [facility]


   Last Admin: 06/30/18 17:45 Dose:  1 dose


Octreotide Acetate (Sandostatin)  50 mcg SC Q8H [facility]


   Last Admin: 06/30/18 17:58 Dose:  50 mcg


Pantoprazole Sodium (Protonix Ec Tab)  20 mg PO 0600 [facility]


   Last Admin: 07/01/18 06:55 Dose:  20 mg


Petrolatum (Desitin Maximum Strength Topical 40% Oint)  0 gm TOP Q4H PRN


   PRN Reason: Rash


   Last Admin: 06/25/18 02:59 Dose:  1 applic


Saliva Substitute (Saliva Substitute)  2 ml PO Q6H PRN


   PRN Reason: Dry mouth


   Last Admin: 06/29/18 12:41 Dose:  2 ml


Zaleplon (Sonata)  5 mg PO HS PRN


   PRN Reason: Insomnia











- Labs


Labs: 


 





 07/01/18 07:10 





 07/01/18 07:10 





 











PT  12.2 SECONDS (9.4-12.5)   06/24/18  18:18    


 


INR  1.07  (0.93-1.08)   06/24/18  18:18    














- Constitutional


Appears: Well, Non-toxic, No Acute Distress





- Head Exam


Head Exam: ATRAUMATIC, NORMOCEPHALIC





- Eye Exam


Eye Exam: Normal appearance.  absent: Conjunctival injection, Scleral icterus





- ENT Exam


ENT Exam: Mucous Membranes Moist, Normal Oropharynx





- Respiratory Exam


Respiratory Exam: NORMAL BREATHING PATTERN.  absent: Accessory Muscle Use, 

Respiratory Distress





- Cardiovascular Exam


Cardiovascular Exam: RRR





- GI/Abdominal Exam


GI & Abdominal Exam: Soft.  absent: Distended, Tenderness


Additional comments: 





midline enterocutanous fistula x2 with clear green succus produced from 

superior fistula. Urolostomy pink and productive of clear light yellow urine





-  Exam


 Exam: Circumcision.  absent: Scrotal Swelling


External exam: Erythema (mild skin excoriation)





- Extremities Exam


Extremities Exam: absent: Calf Tenderness, Pedal Edema, Tenderness





- Neurological Exam


Neurological Exam: Alert, Awake, Oriented x3





- Psychiatric Exam


Psychiatric exam: Normal Affect, Normal Mood





- Skin


Additional comments: 





diffuse abdominal skin excoriation from enteral fluids, improved from yesterday





Assessment and Plan





- Assessment and Plan (Free Text)


Assessment: 





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


Plan: 





Continue TPN


Continue local wound care


Continue to trend BMP, mag, phos, and glucose while on TPN


Continue NPO with ice chips/sips. May have small amount of hard candy and chew 

gum


Continue antacid medication


PRN pain and nausea medication


Continue to monitor closely for bowel function per rectum


Monitor fistula and urine output closely


Further surgical planning pending continued clinical evaluation





Discussed with Dr. Yvette Ayala, PGY2 Colchicine Pregnancy And Lactation Text: This medication is Pregnancy Category C and isn't considered safe during pregnancy. It is excreted in breast milk.